# Patient Record
Sex: FEMALE | Race: WHITE | NOT HISPANIC OR LATINO | Employment: OTHER | URBAN - METROPOLITAN AREA
[De-identification: names, ages, dates, MRNs, and addresses within clinical notes are randomized per-mention and may not be internally consistent; named-entity substitution may affect disease eponyms.]

---

## 2017-05-10 RX ORDER — LISINOPRIL 40 MG/1
40 TABLET ORAL DAILY
Qty: 90 TABLET | Refills: 3 | Status: SHIPPED | OUTPATIENT
Start: 2017-05-10 | End: 2017-10-30 | Stop reason: SDUPTHER

## 2017-07-10 ENCOUNTER — OFFICE VISIT (OUTPATIENT)
Dept: OPHTHALMOLOGY | Facility: CLINIC | Age: 67
End: 2017-07-10
Payer: COMMERCIAL

## 2017-07-10 DIAGNOSIS — I10 ESSENTIAL HYPERTENSION: Primary | ICD-10-CM

## 2017-07-10 DIAGNOSIS — H52.4 BILATERAL PRESBYOPIA: ICD-10-CM

## 2017-07-10 DIAGNOSIS — H52.13 MYOPIA, BILATERAL: ICD-10-CM

## 2017-07-10 PROCEDURE — 99999 PR PBB SHADOW E&M-EST. PATIENT-LVL I: CPT | Mod: PBBFAC,,, | Performed by: OPTOMETRIST

## 2017-07-10 PROCEDURE — 92004 COMPRE OPH EXAM NEW PT 1/>: CPT | Mod: S$GLB,,, | Performed by: OPTOMETRIST

## 2017-07-10 PROCEDURE — 92015 DETERMINE REFRACTIVE STATE: CPT | Mod: S$GLB,,, | Performed by: OPTOMETRIST

## 2017-07-10 NOTE — PROGRESS NOTES
HPI     White part of Left eye swells sometimes once a month. Dry eyes.  Last eye   exam 08/09/2013 TRF. Update glasses RX. No problem with left eye today.   Update glasses RX.    Last edited by Amarjit Gutierrez, OD on 7/10/2017  9:42 AM. (History)            Assessment /Plan     For exam results, see Encounter Report.    Essential hypertension    Nuclear cataract, bilateral    Myopia, bilateral    Bilateral presbyopia      No HTN Retinopathy    Mild cataracts OU, not surgical.    Dispense Final Rx for glasses.  RTC 1 year

## 2017-10-17 ENCOUNTER — LAB VISIT (OUTPATIENT)
Dept: LAB | Facility: HOSPITAL | Age: 67
End: 2017-10-17
Attending: FAMILY MEDICINE
Payer: MEDICARE

## 2017-10-17 DIAGNOSIS — I10 ESSENTIAL HYPERTENSION: ICD-10-CM

## 2017-10-17 DIAGNOSIS — E03.9 HYPOTHYROIDISM, UNSPECIFIED TYPE: ICD-10-CM

## 2017-10-17 DIAGNOSIS — Z11.59 NEED FOR HEPATITIS C SCREENING TEST: ICD-10-CM

## 2017-10-17 DIAGNOSIS — E78.2 MIXED HYPERLIPIDEMIA: ICD-10-CM

## 2017-10-17 DIAGNOSIS — E88.810 DYSMETABOLIC SYNDROME X: ICD-10-CM

## 2017-10-17 DIAGNOSIS — Z00.00 ROUTINE GENERAL MEDICAL EXAMINATION AT A HEALTH CARE FACILITY: ICD-10-CM

## 2017-10-17 DIAGNOSIS — Z98.84 STATUS POST BARIATRIC SURGERY: ICD-10-CM

## 2017-10-17 LAB
ALBUMIN SERPL BCP-MCNC: 3.7 G/DL
ALP SERPL-CCNC: 77 U/L
ALT SERPL W/O P-5'-P-CCNC: 13 U/L
ANION GAP SERPL CALC-SCNC: 11 MMOL/L
AST SERPL-CCNC: 23 U/L
BASOPHILS # BLD AUTO: 0.07 K/UL
BASOPHILS NFR BLD: 1 %
BILIRUB SERPL-MCNC: 0.7 MG/DL
BUN SERPL-MCNC: 16 MG/DL
CALCIUM SERPL-MCNC: 9.8 MG/DL
CHLORIDE SERPL-SCNC: 103 MMOL/L
CHOLEST SERPL-MCNC: 181 MG/DL
CHOLEST/HDLC SERPL: 2.3 {RATIO}
CO2 SERPL-SCNC: 23 MMOL/L
CREAT SERPL-MCNC: 0.9 MG/DL
DIFFERENTIAL METHOD: NORMAL
EOSINOPHIL # BLD AUTO: 0.2 K/UL
EOSINOPHIL NFR BLD: 2.2 %
ERYTHROCYTE [DISTWIDTH] IN BLOOD BY AUTOMATED COUNT: 12.9 %
EST. GFR  (AFRICAN AMERICAN): >60 ML/MIN/1.73 M^2
EST. GFR  (NON AFRICAN AMERICAN): >60 ML/MIN/1.73 M^2
GLUCOSE SERPL-MCNC: 73 MG/DL
HCT VFR BLD AUTO: 38.5 %
HDLC SERPL-MCNC: 80 MG/DL
HDLC SERPL: 44.2 %
HGB BLD-MCNC: 12.5 G/DL
IMM GRANULOCYTES # BLD AUTO: 0.01 K/UL
IMM GRANULOCYTES NFR BLD AUTO: 0.1 %
IRON SERPL-MCNC: 100 UG/DL
LDLC SERPL CALC-MCNC: 93.8 MG/DL
LYMPHOCYTES # BLD AUTO: 2 K/UL
LYMPHOCYTES NFR BLD: 27.5 %
MCH RBC QN AUTO: 29.6 PG
MCHC RBC AUTO-ENTMCNC: 32.5 G/DL
MCV RBC AUTO: 91 FL
MONOCYTES # BLD AUTO: 0.5 K/UL
MONOCYTES NFR BLD: 6.6 %
NEUTROPHILS # BLD AUTO: 4.5 K/UL
NEUTROPHILS NFR BLD: 62.6 %
NONHDLC SERPL-MCNC: 101 MG/DL
NRBC BLD-RTO: 0 /100 WBC
PLATELET # BLD AUTO: 289 K/UL
PMV BLD AUTO: 10.5 FL
POTASSIUM SERPL-SCNC: 3.9 MMOL/L
PROT SERPL-MCNC: 7.4 G/DL
RBC # BLD AUTO: 4.23 M/UL
SATURATED IRON: 23 %
SODIUM SERPL-SCNC: 137 MMOL/L
T4 FREE SERPL-MCNC: 1.32 NG/DL
TOTAL IRON BINDING CAPACITY: 428 UG/DL
TRANSFERRIN SERPL-MCNC: 289 MG/DL
TRIGL SERPL-MCNC: 36 MG/DL
TSH SERPL DL<=0.005 MIU/L-ACNC: 1.33 UIU/ML
WBC # BLD AUTO: 7.17 K/UL

## 2017-10-17 PROCEDURE — 84443 ASSAY THYROID STIM HORMONE: CPT

## 2017-10-17 PROCEDURE — 84439 ASSAY OF FREE THYROXINE: CPT

## 2017-10-17 PROCEDURE — 80061 LIPID PANEL: CPT

## 2017-10-17 PROCEDURE — 36415 COLL VENOUS BLD VENIPUNCTURE: CPT | Mod: PO

## 2017-10-17 PROCEDURE — 85025 COMPLETE CBC W/AUTO DIFF WBC: CPT

## 2017-10-17 PROCEDURE — 83540 ASSAY OF IRON: CPT

## 2017-10-17 PROCEDURE — 83036 HEMOGLOBIN GLYCOSYLATED A1C: CPT

## 2017-10-17 PROCEDURE — 80053 COMPREHEN METABOLIC PANEL: CPT

## 2017-10-17 PROCEDURE — 86803 HEPATITIS C AB TEST: CPT

## 2017-10-17 PROCEDURE — 82607 VITAMIN B-12: CPT

## 2017-10-18 ENCOUNTER — PATIENT OUTREACH (OUTPATIENT)
Dept: ADMINISTRATIVE | Facility: HOSPITAL | Age: 67
End: 2017-10-18

## 2017-10-18 LAB
ESTIMATED AVG GLUCOSE: 103 MG/DL
HBA1C MFR BLD HPLC: 5.2 %
HCV AB SERPL QL IA: NEGATIVE
VIT B12 SERPL-MCNC: 825 PG/ML

## 2017-10-30 ENCOUNTER — OFFICE VISIT (OUTPATIENT)
Dept: INTERNAL MEDICINE | Facility: CLINIC | Age: 67
End: 2017-10-30
Payer: MEDICARE

## 2017-10-30 VITALS
SYSTOLIC BLOOD PRESSURE: 170 MMHG | BODY MASS INDEX: 30.04 KG/M2 | HEIGHT: 63 IN | DIASTOLIC BLOOD PRESSURE: 94 MMHG | WEIGHT: 169.56 LBS | TEMPERATURE: 98 F | HEART RATE: 68 BPM

## 2017-10-30 DIAGNOSIS — Z00.00 MEDICARE ANNUAL WELLNESS VISIT, INITIAL: Primary | ICD-10-CM

## 2017-10-30 DIAGNOSIS — E03.9 ACQUIRED HYPOTHYROIDISM: ICD-10-CM

## 2017-10-30 DIAGNOSIS — E78.2 MIXED HYPERLIPIDEMIA: ICD-10-CM

## 2017-10-30 DIAGNOSIS — F41.1 ANXIETY STATE: ICD-10-CM

## 2017-10-30 DIAGNOSIS — I10 ESSENTIAL HYPERTENSION: ICD-10-CM

## 2017-10-30 DIAGNOSIS — N95.2 POST-MENOPAUSE ATROPHIC VAGINITIS: ICD-10-CM

## 2017-10-30 DIAGNOSIS — N95.9 MENOPAUSAL DISORDER: ICD-10-CM

## 2017-10-30 PROCEDURE — G0438 PPPS, INITIAL VISIT: HCPCS | Mod: S$GLB,,, | Performed by: FAMILY MEDICINE

## 2017-10-30 PROCEDURE — G0008 ADMIN INFLUENZA VIRUS VAC: HCPCS | Mod: S$GLB,,, | Performed by: FAMILY MEDICINE

## 2017-10-30 PROCEDURE — 90670 PCV13 VACCINE IM: CPT | Mod: S$GLB,,, | Performed by: FAMILY MEDICINE

## 2017-10-30 PROCEDURE — G0009 ADMIN PNEUMOCOCCAL VACCINE: HCPCS | Mod: S$GLB,,, | Performed by: FAMILY MEDICINE

## 2017-10-30 PROCEDURE — 99999 PR PBB SHADOW E&M-EST. PATIENT-LVL III: CPT | Mod: PBBFAC,,, | Performed by: FAMILY MEDICINE

## 2017-10-30 PROCEDURE — 90662 IIV NO PRSV INCREASED AG IM: CPT | Mod: S$GLB,,, | Performed by: FAMILY MEDICINE

## 2017-10-30 RX ORDER — LOVASTATIN 40 MG/1
TABLET ORAL
Qty: 90 TABLET | Refills: 3 | Status: SHIPPED | OUTPATIENT
Start: 2017-10-30 | End: 2018-03-05 | Stop reason: SDUPTHER

## 2017-10-30 RX ORDER — LISINOPRIL 40 MG/1
40 TABLET ORAL DAILY
Qty: 90 TABLET | Refills: 3 | Status: SHIPPED | OUTPATIENT
Start: 2017-10-30 | End: 2017-11-27

## 2017-10-30 RX ORDER — ALPRAZOLAM 1 MG/1
1 TABLET ORAL 2 TIMES DAILY PRN
Qty: 180 TABLET | Refills: 1 | Status: SHIPPED | OUTPATIENT
Start: 2017-10-30 | End: 2018-03-05 | Stop reason: SDUPTHER

## 2017-10-30 RX ORDER — LEVOTHYROXINE SODIUM 112 UG/1
TABLET ORAL
Qty: 90 TABLET | Refills: 3 | Status: SHIPPED | OUTPATIENT
Start: 2017-10-30 | End: 2018-02-02 | Stop reason: SDUPTHER

## 2017-10-30 RX ORDER — VENLAFAXINE 37.5 MG/1
37.5 TABLET ORAL 2 TIMES DAILY
Qty: 60 TABLET | Refills: 11 | Status: SHIPPED | OUTPATIENT
Start: 2017-10-30 | End: 2017-12-15

## 2017-10-30 RX ORDER — HYDRALAZINE HYDROCHLORIDE 10 MG/1
10 TABLET, FILM COATED ORAL EVERY 12 HOURS
Qty: 60 TABLET | Refills: 11 | Status: SHIPPED | OUTPATIENT
Start: 2017-10-30 | End: 2017-11-20 | Stop reason: SDUPTHER

## 2017-10-30 NOTE — PROGRESS NOTES
Subjective:      Patient ID: Cleo Ford is a 67 y.o. female.    Chief Complaint: Annual Exam    She is here today for followup of multiple issues and prevention exam.  Since I last saw her she is doing well but having issues dealing with menopausal hot flashes and moodiness.  Her  had back surgery earlier this year.  She did injure her back some trying to pick him up.  She feels like she needs something to help with her mood and irritability.  She has never taken anything other than his Xanax.  Willing to do the Effexor.   Still doing well with her gastric bypass. Iron is stable, on multivitamins.     Her thyroid is currently controlled.  No problems with medication labs reviewed today.  Her blood pressure is elevated.  She is a little agitated currently with her  and her menopausal symptoms.  Also her insurance is making her change from her Procardia to an alternative.  In the past however when she was on amlodipine she had a good bit of leg swelling.  Also on lisinopril 40.    Hyperlipidemia is currently controlled with low-dose statin.  HDL is much improved.  Tolerating medications well.  However risk score still greater than 7%.    The patient sees Dr. church for her mammograms Pap smear and DEXA scans in the past however now with Medicare she is having to find a new provider.  She's had a hysterectomy.  Having some vaginal dryness but not interested in doing topical estrogen.  Willing to have me order her mammograms and bone density still.  No current indication for Pap smear.  Has history of osteopenia.  Wants to still do these at [facility] in April.  ARA- is well controlled with as needed use of Xanax however lately having more agitation and anhedonia lately.    The 10-year ASCVD risk score (Renuka ZAYNAB Jr., et al., 2013) is: 10.5%    Values used to calculate the score:      Age: 67 years      Sex: Female      Is Non- : No      Diabetic: No       Tobacco smoker: No      Systolic Blood Pressure: 170 mmHg      Is BP treated: Yes      HDL Cholesterol: 80 mg/dL      Total Cholesterol: 181 mg/dL    Medicare wellness assessment:    Depression screening  1. In the past 2 weeks she has the patient felt down, depressed or hopeless? yes  2. Over the past 2 weeks as the patient felt little interest or pleasure in doing things?  yes   Functional Ability/ Safety Screening  1. Was the  Patient's timed up and go test unsteady or longer than 30 seconds?  No   2. Has the patient needed help with transportation shopping preparing meals housework laundry medications are managing money?  No  3.  If the patient's home have rugs in the hallway, back grab bars in the bathroom, or poor lighting? No, not necessary per patient.   4.has there been any hearing difficulties?  No            Lab Results   Component Value Date    WBC 7.17 10/17/2017    HGB 12.5 10/17/2017    HCT 38.5 10/17/2017     10/17/2017    CHOL 181 10/17/2017    TRIG 36 10/17/2017    HDL 80 (H) 10/17/2017    ALT 13 10/17/2017    AST 23 10/17/2017     10/17/2017    K 3.9 10/17/2017     10/17/2017    CREATININE 0.9 10/17/2017    BUN 16 10/17/2017    CO2 23 10/17/2017    TSH 1.331 10/17/2017    HGBA1C 5.2 10/17/2017       Review of Systems   Constitutional: Negative for chills, fatigue and fever.   HENT: Negative for ear pain and trouble swallowing.    Eyes: Negative for pain and visual disturbance.   Respiratory: Negative for cough and shortness of breath.    Cardiovascular: Negative for chest pain and leg swelling.   Gastrointestinal: Negative for abdominal pain, blood in stool, nausea and vomiting.   Endocrine: Negative for cold intolerance and heat intolerance.        Hot flashes   Genitourinary: Negative for dysuria and frequency.        Vaginal dryness   Musculoskeletal: Positive for back pain. Negative for joint swelling, myalgias and neck pain.   Skin: Negative for color change and rash.    Neurological: Negative for dizziness, weakness and headaches.   Psychiatric/Behavioral: Positive for dysphoric mood. Negative for behavioral problems, decreased concentration and sleep disturbance. The patient is not nervous/anxious.      Objective:     Physical Exam   Constitutional: She is oriented to person, place, and time. She appears well-developed and well-nourished.   HENT:   Head: Normocephalic and atraumatic.   Right Ear: External ear normal.   Left Ear: External ear normal.   Mouth/Throat: Oropharynx is clear and moist.   Eyes: EOM are normal.   Neck: Normal range of motion. Neck supple. No thyromegaly present.   Cardiovascular: Normal rate and regular rhythm.  Exam reveals no gallop and no friction rub.    No murmur heard.  Pulmonary/Chest: Effort normal. No respiratory distress. She has no wheezes. She has no rales.   Abdominal: Soft. Bowel sounds are normal. She exhibits no distension. There is no tenderness. There is no rebound.   Musculoskeletal: Normal range of motion. She exhibits no edema.        Lumbar back: She exhibits tenderness and spasm. She exhibits normal range of motion, no bony tenderness and no pain.        Back:    Lymphadenopathy:     She has no cervical adenopathy.   Neurological: She is alert and oriented to person, place, and time.   Skin: Skin is warm and dry. No rash noted.   Psychiatric: Her speech is normal and behavior is normal. Judgment and thought content normal. Her affect is blunt.   Vitals reviewed.    Assessment:     1. Medicare annual wellness visit, initial    2. Menopausal disorder    3. Post-menopause atrophic vaginitis    4. Essential hypertension    5. Acquired hypothyroidism    6. Mixed hyperlipidemia    7. Anxiety state      Plan:   Cleo was seen today for annual exam.    Diagnoses and all orders for this visit:    Medicare annual wellness visit, initial-patient now on Medicare.  Wanting to do her annual wellness visit.  See above.  Prevnar and flu shot  today.  Mammogram and bone density due in April.  Previously done at [facility].    Menopausal disorder-starting Effexor 37.5 mg dosing twice a day    Post-menopause atrophic vaginitis-declines wanting to do topical estrogen at this time    Essential hypertension-not controlled and due to tear changing on her insurance switching from Procardia to hydralazine.  Failed amlodipine in the past.  Needing nurse visit in 3-4 weeks for blood pressure recheck    Acquired hypothyroidism-stable labs reviewed continue with medications    Mixed hyperlipidemia-stable and improved with medications continue risk greater than 7% needing statin therapy    Anxiety state-stable with medications refilled  -     ALPRAZolam (XANAX) 1 MG tablet; Take 1 tablet (1 mg total) by mouth 2 (two) times daily as needed for Anxiety.    Other orders  -     venlafaxine (EFFEXOR) 37.5 MG Tab; Take 1 tablet (37.5 mg total) by mouth 2 (two) times daily.  -     levothyroxine (SYNTHROID) 112 MCG tablet; Take 1 tablet by mouth  before breakfast  -     lovastatin (MEVACOR) 40 MG tablet; Take 1 tablet by mouth  nightly  -     lisinopril (PRINIVIL,ZESTRIL) 40 MG tablet; Take 1 tablet (40 mg total) by mouth once daily.  -     hydrALAZINE (APRESOLINE) 10 MG tablet; Take 1 tablet (10 mg total) by mouth every 12 (twelve) hours.  -     (In Office Administered) Pneumococcal Conjugate Vaccine (13 Valent) (IM)            Return in about 3 weeks (around 11/20/2017), or BP recheck nurse visit, 1 yr f/u me physical.

## 2017-10-31 ENCOUNTER — PATIENT MESSAGE (OUTPATIENT)
Dept: INTERNAL MEDICINE | Facility: CLINIC | Age: 67
End: 2017-10-31

## 2017-11-20 ENCOUNTER — OFFICE VISIT (OUTPATIENT)
Dept: INTERNAL MEDICINE | Facility: CLINIC | Age: 67
End: 2017-11-20
Payer: MEDICARE

## 2017-11-20 ENCOUNTER — PATIENT MESSAGE (OUTPATIENT)
Dept: ADMINISTRATIVE | Facility: OTHER | Age: 67
End: 2017-11-20

## 2017-11-20 ENCOUNTER — CLINICAL SUPPORT (OUTPATIENT)
Dept: INTERNAL MEDICINE | Facility: CLINIC | Age: 67
End: 2017-11-20
Payer: MEDICARE

## 2017-11-20 VITALS — SYSTOLIC BLOOD PRESSURE: 160 MMHG | DIASTOLIC BLOOD PRESSURE: 100 MMHG

## 2017-11-20 VITALS — DIASTOLIC BLOOD PRESSURE: 100 MMHG | SYSTOLIC BLOOD PRESSURE: 160 MMHG

## 2017-11-20 DIAGNOSIS — I10 ESSENTIAL HYPERTENSION: Primary | ICD-10-CM

## 2017-11-20 DIAGNOSIS — N95.1 HOT FLASHES DUE TO MENOPAUSE: ICD-10-CM

## 2017-11-20 DIAGNOSIS — F39 MOOD DISORDER: ICD-10-CM

## 2017-11-20 PROCEDURE — 99999 PR PBB SHADOW E&M-EST. PATIENT-LVL II: CPT | Mod: PBBFAC,,,

## 2017-11-20 PROCEDURE — 99999 PR PBB SHADOW E&M-EST. PATIENT-LVL II: CPT | Mod: PBBFAC,,, | Performed by: FAMILY MEDICINE

## 2017-11-20 PROCEDURE — 99213 OFFICE O/P EST LOW 20 MIN: CPT | Mod: S$GLB,,, | Performed by: FAMILY MEDICINE

## 2017-11-20 RX ORDER — HYDRALAZINE HYDROCHLORIDE 10 MG/1
20 TABLET, FILM COATED ORAL EVERY 12 HOURS
Qty: 120 TABLET | Refills: 11 | Status: SHIPPED | OUTPATIENT
Start: 2017-11-20 | End: 2018-03-19

## 2017-11-20 NOTE — PROGRESS NOTES
Subjective:      Patient ID: Cleo Ford is a 67 y.o. female.    Chief Complaint: Hypertension    Patient initially was on the schedule with the nurses to have a blood pressure recheck.  At her last visit he was elevated.  She's now doing hydralazine 10 mg twice daily as well as still doing lisinopril 40 mg.  She reports that she eats a good diet.  She's had bariatric procedure and does well with sodium intake.  She is very active as a .  She does have a history of anxiety and stress but none recently.  She does not have a blood pressure cuff at home and does not monitor it at home.  No headaches and no chest pain at this time.  She's willing to adjust her medicines further as well as get enrolled with the digital hypertension program.    We also recently started Effexor not only to help with mood but also hot flashes.  The first initial week she was nauseated with the medication however this is improved now.  No further issues with medication.        Lab Results   Component Value Date    WBC 7.17 10/17/2017    HGB 12.5 10/17/2017    HCT 38.5 10/17/2017     10/17/2017    CHOL 181 10/17/2017    TRIG 36 10/17/2017    HDL 80 (H) 10/17/2017    ALT 13 10/17/2017    AST 23 10/17/2017     10/17/2017    K 3.9 10/17/2017     10/17/2017    CREATININE 0.9 10/17/2017    BUN 16 10/17/2017    CO2 23 10/17/2017    TSH 1.331 10/17/2017    HGBA1C 5.2 10/17/2017       Review of Systems   Eyes: Negative for pain and visual disturbance.   Respiratory: Negative for cough and shortness of breath.    Cardiovascular: Negative for chest pain and palpitations.   Gastrointestinal: Positive for nausea. Negative for constipation and diarrhea.   Neurological: Negative for dizziness, light-headedness and headaches.     Objective:     Physical Exam   Constitutional: She appears well-developed and well-nourished.   HENT:   Head: Normocephalic and atraumatic.   Cardiovascular: Normal rate and regular  rhythm.    Pulmonary/Chest: Effort normal and breath sounds normal.   Psychiatric: She has a normal mood and affect. Her speech is normal and behavior is normal.   Vitals reviewed.    Assessment:     1. Essential hypertension    2. Hot flashes due to menopause      Plan:   Cleo was seen today for hypertension.    Diagnoses and all orders for this visit:    Essential hypertension-not controlled enrolled in the digital hypertension program.  Increase hydralazine to 20 mg twice a day.  Continue with lisinopril 40 mg.    Hot flashes due to menopause-improved with the Effexor continue with medication.            No Follow-up on file.

## 2017-11-22 ENCOUNTER — PATIENT MESSAGE (OUTPATIENT)
Dept: ADMINISTRATIVE | Facility: OTHER | Age: 67
End: 2017-11-22

## 2017-11-27 ENCOUNTER — PATIENT MESSAGE (OUTPATIENT)
Dept: INTERNAL MEDICINE | Facility: CLINIC | Age: 67
End: 2017-11-27

## 2017-11-27 RX ORDER — OLMESARTAN MEDOXOMIL AND HYDROCHLOROTHIAZIDE 40/25 40; 25 MG/1; MG/1
1 TABLET ORAL DAILY
Qty: 90 TABLET | Refills: 3 | Status: SHIPPED | OUTPATIENT
Start: 2017-11-27 | End: 2017-11-28 | Stop reason: SDUPTHER

## 2017-11-27 NOTE — TELEPHONE ENCOUNTER
Yes I don't like those readings. Let's change up the meds. Change to generic benicar hct. Stop the lisinopril. This has diuretic in it also. Continue with the hydralazine 20mg bid for now as well. If still high after then next 1-2 weeks, we will need to stop the effexor.

## 2017-11-28 ENCOUNTER — PATIENT MESSAGE (OUTPATIENT)
Dept: INTERNAL MEDICINE | Facility: CLINIC | Age: 67
End: 2017-11-28

## 2017-11-28 RX ORDER — OLMESARTAN MEDOXOMIL AND HYDROCHLOROTHIAZIDE 40/25 40; 25 MG/1; MG/1
1 TABLET ORAL DAILY
Qty: 30 TABLET | Refills: 0 | Status: SHIPPED | OUTPATIENT
Start: 2017-11-28 | End: 2017-12-04

## 2017-11-29 ENCOUNTER — PATIENT OUTREACH (OUTPATIENT)
Dept: OTHER | Facility: OTHER | Age: 67
End: 2017-11-29

## 2017-11-29 NOTE — LETTER
Nafisa Tomas, PharmD  [address] [place] [address]     Dear Cleo Ford,    Welcome to the [facility]!       My name is Nafisa Tomas PharmD and I am your dedicated Digital Medicine clinician.  As an expert in medication management, I will help ensure that the medications you are taking continue to provide you with the intended benefits.      I am Teodora Fernandes and I will be your health  for the duration of the program.  My  job is to help you identify lifestyle changes to improve your blood pressure control.  We will talk about nutrition, exercise, and other ways that you may be able to adjust your current habits to better your health. Together, we will work to improve your overall health and encourage you to meet your goals for a healthier lifestyle.    What we expect from YOU:    You will need to take blood pressure readings multiple times a week and no less than one reading per week.   It is important that you take your measurements at different times during the day, when possible.     What you should expect from your Digital Medicine Care Team:   We will provide you with education about high blood pressure, including lifestyle changes that could help you to control your blood pressure.   We will review your weekly readings and provide you with monthly blood pressure progress reports after you have been in the program for more than 30 days.   We will send monthly progress reports on your blood pressure control to your physician so they can follow along with your progress as well.    You will be able to reach me by phone at [phone] or through your [facility] account by clicking my name under Care Team on the right side of the home screen.    I look forward to working with you to achieve your blood pressure goals!    Sincerely,    Nafisa Tomas PharmD  Your personal clinician    Please visit  [email or web address] to learn more about high blood pressure and what you can do lower your blood pressure.                                                                                           Cleo Ford  [address] [place] [id number]

## 2017-11-29 NOTE — PROGRESS NOTES
Last 5 Patient Entered Readings Current 30 Day Average: 185/104     Recent Readings 11/29/2017 11/28/2017 11/28/2017 11/28/2017 11/28/2017    Systolic BP (mmHg) 171 193 201 159 198    Diastolic BP (mmHg) 98 101 125 97 110    Pulse 91 71 76 99 93        Hypertension Digital Medicine Program (HDMP): Health  Introduction    Introduced Mrs. Cleo Ford to HDMP. Discussed health  role and recommended lifestyle modifications.    Diet (i.e. Low sodium, food labels): Reports that she generally follows a low sodium diet, using Abdoulaye Prudhommes No Salt seasoning on most foods and avoiding adding any salt.  States she eats mostly fresh fruits and vegetables, avoids caffeine and soft drinks, and that she has been able to maintain weight loss progress after bariatric surgery by following their dietary guidelines.  Reports her diet as being a strength of hers, however she did note that she is often on the go due to being a , and that this can sometimes be a barrier.  Will want to review with patient goal of <2000mg/day of sodium at next call and discuss in greater depth daily routine.     Exercise: Doesn't exercise due to busy schedule per patient.  States she would like to be more active, but has not been successful thus far. Notes she prefers to focus on nutrition.     Alcohol/Tobacco: Did not review.     Medication Adherence: has been compliant with the medicaiton regimen.  Reports full compliance with medications; this was first morning taking benicar hct.  Expresses concern regarding elevated readings and did report taking supplements at home.  States she would like to review these with Pharm D Genoveva.  When asked what she felt was contributing to elevated readings, she noted stress and genetics as major factors. Reviewed blood pressure technique, and patient has been taking readings while holding her arm in the air rather than resting it at heart level.  Verbalizes understanding of proper  technique and agrees to begin resting arm at heart level from here on.     Other goals: Patient states her main goal is to be able to manage blood pressure successfully and to continue with progress from bariatric surgery.  Patient was very polite and states she is looking forward to the program.     Reviewed AHA recommendations:  Limit sodium intake to <2000mg/day  Perform 150 minutes of physical activity per week    Patient is aware of the importance of taking daily BP readings at various times of the day  Patient aware that the health  can be used as a resource for lifestyle modifications to help reduce or maintain a healthy BP  Patient is aware of the importance of medication adherence.  [facility] is aware that [facility] is not available for emergencies. Patient should call 911 or [facility] on [facility] if one arises.

## 2017-12-01 ENCOUNTER — PATIENT OUTREACH (OUTPATIENT)
Dept: OTHER | Facility: OTHER | Age: 67
End: 2017-12-01

## 2017-12-01 NOTE — PROGRESS NOTES
Last 5 Patient Entered Readings Current 30 Day Average: 189/103     Recent Readings 12/1/2017 11/30/2017 11/29/2017 11/29/2017 11/29/2017    Systolic BP (mmHg) 204 198 178 190 171    Diastolic BP (mmHg) 104 111 85 93 98    Pulse 91 89 79 79 91          Patient's BP average is not controlled. Reviewed 2017 ACC/AHA HTN guidelines with patient and explained new BP goal is <130/80.     Patient denies s/s of hypertension (SOB, CP, severe headaches, changes in vision) associated with high readings. Instructed patient to go to the ED if BP > 180/110 and accompanied by hypertensive s/s, patient confirms understanding. Patient denies caffeine and soft drinks. She uses a salt substitute and does not consume processed foods. She has a stressful job as a . Patient underwent a gastric sleeve five years ago.    Current HTN regimen:  Hypertension Medications             hydrALAZINE (APRESOLINE) 10 MG tablet Take 2 tablets (20 mg total) by mouth every 12 (twelve) hours.    olmesartan-hydrochlorothiazide (BENICAR HCT) 40-25 mg per tablet Take 1 tablet by mouth once daily.       Patient started Benicar HCT 11/29 by her PCP. If her readings remain elevated on 12/4 I will recommend a change to chlorthalidone 12.5mg daily and valsartan 160mg daily. Her potassium was within normal limits in October.     Will continue to monitor regularly. Will follow up within the next week.    Patient has my contact information and knows to call with any concerns or clinical changes.

## 2017-12-04 ENCOUNTER — PATIENT MESSAGE (OUTPATIENT)
Dept: OTHER | Facility: OTHER | Age: 67
End: 2017-12-04

## 2017-12-04 ENCOUNTER — PATIENT OUTREACH (OUTPATIENT)
Dept: OTHER | Facility: OTHER | Age: 67
End: 2017-12-04

## 2017-12-04 DIAGNOSIS — I10 ESSENTIAL HYPERTENSION: Primary | ICD-10-CM

## 2017-12-04 RX ORDER — CHLORTHALIDONE 25 MG/1
TABLET ORAL
Qty: 30 TABLET | Refills: 0 | Status: SHIPPED | OUTPATIENT
Start: 2017-12-04 | End: 2017-12-12 | Stop reason: SDUPTHER

## 2017-12-04 RX ORDER — VALSARTAN 160 MG/1
160 TABLET ORAL DAILY
Qty: 30 TABLET | Refills: 0 | Status: SHIPPED | OUTPATIENT
Start: 2017-12-04 | End: 2017-12-13 | Stop reason: SDUPTHER

## 2017-12-04 RX ORDER — AMLODIPINE BESYLATE 5 MG/1
2.5 TABLET ORAL DAILY
Qty: 30 TABLET | Refills: 0 | Status: SHIPPED | OUTPATIENT
Start: 2017-12-04 | End: 2017-12-13 | Stop reason: SDUPTHER

## 2017-12-04 NOTE — PROGRESS NOTES
Last 5 Patient Entered Readings Current 30 Day Average: 188/102     Recent Readings 12/3/2017 12/2/2017 12/2/2017 12/1/2017 12/1/2017    Systolic BP (mmHg) 188 185 176 197 204    Diastolic BP (mmHg) 97 94 96 104 104    Pulse 70 73 93 76 91        Patient denies s/s of hypertension (SOB, CP, severe headaches, changes in vision, dizziness or nosebleeds) associated with high readings. Instructed patient to go to the ED if BP > 180/110 and accompanied by hypertensive s/s, patient confirms understanding. Patient is currently taking Benicar HCT and took a dose this morning. She will stop this medication today and begin chlorthalidone 12.5mg daily, Diovan 160mg daily and amlodipine 2.5mg daily. She has an allergy to amlodipine on her allergy list however it is leg edema which is a side effect not allergy. She stated it took place when they were in [place] after a day of walking around. She was also a hundred pounds heavier. These new medications were sent to her local pharmacy. If her BP responds nicely with these medications I will send a 90 day supply to her mail order pharmacy. She will have labs in 7-10 days after starting these medications.    Current HTN regimen:  Hypertension Medications             amLODIPine (NORVASC) 5 MG tablet Take 0.5 tablets (2.5 mg total) by mouth once daily.    chlorthalidone (HYGROTEN) 25 MG Tab Take 0.5 tablets (12.5mg) daily    hydrALAZINE (APRESOLINE) 10 MG tablet Take 2 tablets (20 mg total) by mouth every 12 (twelve) hours.    valsartan (DIOVAN) 160 MG tablet Take 1 tablet (160 mg total) by mouth once daily.          Follow up on labs and BP readings. Labs scheduled 12/14.

## 2017-12-08 NOTE — PROGRESS NOTES
Last 5 Patient Entered Readings Current 30 Day Average: 183/100     Recent Readings 12/8/2017 12/7/2017 12/5/2017 12/4/2017 12/3/2017    Systolic BP (mmHg) 171 173 169 154 188    Diastolic BP (mmHg) 98 91 97 96 97    Pulse 110 98 89 95 70        Her blood pressure has started to increase therefore I am adjusting her chlorthalidone from 12.5mg to 25mg. Patient advised through [facility]. She will begin the change 12/9. Labs will remain as scheduled 11/14.

## 2017-12-12 ENCOUNTER — PATIENT OUTREACH (OUTPATIENT)
Dept: OTHER | Facility: OTHER | Age: 67
End: 2017-12-12

## 2017-12-12 DIAGNOSIS — I10 ESSENTIAL HYPERTENSION: Primary | ICD-10-CM

## 2017-12-12 RX ORDER — CHLORTHALIDONE 25 MG/1
25 TABLET ORAL DAILY
Qty: 30 TABLET | Refills: 0
Start: 2017-12-12 | End: 2017-12-15

## 2017-12-12 NOTE — PROGRESS NOTES
Last 5 Patient Entered Readings Current 30 Day Average: 183/101     Recent Readings 12/12/2017 12/11/2017 12/10/2017 12/9/2017 12/8/2017    Systolic BP (mmHg) 184 184 188 188 171    Diastolic BP (mmHg) 106 101 108 104 98    Pulse 88 88 87 96 110          Patient's BP average is not controlled. Reviewed 2017 ACC/AHA HTN guidelines with patient and explained BP goal is <130/80.     Patient denies s/s of hypertension (SOB, CP, severe headaches, changes in vision) associated with high readings. Instructed patient to go to the ED if BP > 180/110 and accompanied by hypertensive s/s, patient confirms understanding.    Will continue to monitor regularly. Will follow up in 1 weeks, sooner if BP begins to trend upward or downward.    Patient has my contact information and knows to call with any concerns or clinical changes.     Current HTN regimen:  Hypertension Medications             amLODIPine (NORVASC) 5 MG tablet Take 0.5 tablets (2.5 mg total) by mouth once daily.    chlorthalidone (HYGROTEN) 25 MG Tab Take 1 tablet (25 mg total) by mouth once daily. Take 0.5 tablets (12.5mg) daily    hydrALAZINE (APRESOLINE) 10 MG tablet Take 2 tablets (20 mg total) by mouth every 12 (twelve) hours.    valsartan (DIOVAN) 160 MG tablet Take 1 tablet (160 mg total) by mouth once daily.        Patient denies any leg swelling since starting amlodipine. I am increasing amlodipine to 5mg daily and valsartan to 320mg daily taking 160mg AM and 160mg PM starting 12/13. She has labs planned tomorrow to assess any changes since starting chlorthalidone. Verbal and written instructions through [facility] were given including hypotension side effects.

## 2017-12-13 RX ORDER — VALSARTAN 160 MG/1
160 TABLET ORAL 2 TIMES DAILY
Qty: 30 TABLET | Refills: 0
Start: 2017-12-13 | End: 2017-12-19 | Stop reason: SDUPTHER

## 2017-12-13 RX ORDER — AMLODIPINE BESYLATE 5 MG/1
5 TABLET ORAL DAILY
Qty: 30 TABLET | Refills: 0
Start: 2017-12-13 | End: 2017-12-29 | Stop reason: SDUPTHER

## 2017-12-14 ENCOUNTER — LAB VISIT (OUTPATIENT)
Dept: LAB | Facility: HOSPITAL | Age: 67
End: 2017-12-14
Attending: FAMILY MEDICINE
Payer: MEDICARE

## 2017-12-14 ENCOUNTER — PATIENT MESSAGE (OUTPATIENT)
Dept: INTERNAL MEDICINE | Facility: CLINIC | Age: 67
End: 2017-12-14

## 2017-12-14 DIAGNOSIS — I10 ESSENTIAL HYPERTENSION: ICD-10-CM

## 2017-12-14 LAB
ANION GAP SERPL CALC-SCNC: 7 MMOL/L
BUN SERPL-MCNC: 18 MG/DL
CALCIUM SERPL-MCNC: 10.8 MG/DL
CHLORIDE SERPL-SCNC: 94 MMOL/L
CO2 SERPL-SCNC: 30 MMOL/L
CREAT SERPL-MCNC: 0.9 MG/DL
EST. GFR  (AFRICAN AMERICAN): >60 ML/MIN/1.73 M^2
EST. GFR  (NON AFRICAN AMERICAN): >60 ML/MIN/1.73 M^2
GLUCOSE SERPL-MCNC: 86 MG/DL
POTASSIUM SERPL-SCNC: 4.1 MMOL/L
SODIUM SERPL-SCNC: 131 MMOL/L

## 2017-12-14 PROCEDURE — 80048 BASIC METABOLIC PNL TOTAL CA: CPT

## 2017-12-14 PROCEDURE — 36415 COLL VENOUS BLD VENIPUNCTURE: CPT | Mod: PO

## 2017-12-14 RX ORDER — METOPROLOL TARTRATE 50 MG/1
50 TABLET ORAL 2 TIMES DAILY
Qty: 60 TABLET | Refills: 0 | Status: SHIPPED | OUTPATIENT
Start: 2017-12-14 | End: 2018-01-12 | Stop reason: SDUPTHER

## 2017-12-14 NOTE — PROGRESS NOTES
Last 5 Patient Entered Readings Current 30 Day Average: 183/102       Units 12/14/2017 12/14/2017 12/13/2017 12/12/2017 12/11/2017    Time -  9:04 AM  9:03 AM 10:53 AM  9:57 AM  9:27 AM    Systolic Blood Pressure - 195 208 166 184 184    Diastolic Blood Pressure - 108 116 99 106 101    Pulse bpm 78 81 113 88 88        Patient is taking swiss jasper herbal laxative however I do not see any interference with BP.  Patient called regarding her BP and concern over a stroke. I am adding metoprolol 50mg BID. Labs from today are still in process since starting chlorthalidone.

## 2017-12-15 ENCOUNTER — PATIENT MESSAGE (OUTPATIENT)
Dept: INTERNAL MEDICINE | Facility: CLINIC | Age: 67
End: 2017-12-15

## 2017-12-15 ENCOUNTER — PATIENT MESSAGE (OUTPATIENT)
Dept: OTHER | Facility: OTHER | Age: 67
End: 2017-12-15

## 2017-12-15 DIAGNOSIS — I10 HYPERTENSION, UNSPECIFIED TYPE: Primary | ICD-10-CM

## 2017-12-15 RX ORDER — CITALOPRAM 20 MG/1
20 TABLET, FILM COATED ORAL DAILY
Qty: 30 TABLET | Refills: 11 | Status: SHIPPED | OUTPATIENT
Start: 2017-12-15 | End: 2018-01-03 | Stop reason: SDUPTHER

## 2017-12-15 NOTE — TELEPHONE ENCOUNTER
I sent in citalopram. Stop effexor. Stop chlorthalidone for now. Repeat labs in 1 week. Pharm D is aware and will continue to monitor BP.

## 2017-12-15 NOTE — PROGRESS NOTES
Last 5 Patient Entered Readings Current 30 Day Average: 183/101       Units 12/15/2017 12/14/2017 12/14/2017 12/13/2017 12/12/2017    Time -  9:34 AM  9:04 AM  9:03 AM 10:53 AM  9:57 AM    Systolic Blood Pressure - 185 195 208 166 184    Diastolic Blood Pressure - 93 108 116 99 106    Pulse bpm 64 78 81 113 88        Patient began chlorthalidone and her sodium intake dropped. She will stop chlorthalidone per Dr. Julian. I sent Rut a [facility] message confirming these changes.

## 2017-12-15 NOTE — TELEPHONE ENCOUNTER
Called pt, she verbalized understanding. She will stop the cholorthalidone. She stated that she does find the effexor is really helping her and she is feeling better since getting on it. She would be okay to stop it if you could give her something in its place?      Pt asked since she was driving when I called to send info to her [facility]. Message sent.

## 2017-12-15 NOTE — TELEPHONE ENCOUNTER
Pt needs to stop he cholorthalidone due to the low sodium and elevated potassium levels.     She is on the HTN digital program.     I would see if she is willing to stop the effexor because her bp really has been uncontrolled since starting this medication. Let's see if it improves after stopping that medication.

## 2017-12-18 NOTE — PROGRESS NOTES
Last 5 Patient Entered Readings Current 30 Day Average: 180/99       Units 12/18/2017 12/18/2017 12/17/2017 12/17/2017 12/15/2017    Time - 10:11 AM  9:54 AM 10:17 PM  8:20 PM  9:34 AM    Systolic Blood Pressure - 133 138 158 185 185    Diastolic Blood Pressure - 77 91 79 107 93    Pulse bpm 75 73 88 76 64        Patient sent me a [facility] message this morning relating to side effects. She is experiencing flatulence, headaches, confusion and nightmares. I feel this is most likely related to withdrawals from stopping Effexor 12/15. I did offer to adjust metoprolol to carvedilol to help reduce her nightmares but recommended waiting to make any adjustments at this time since she just stopped Effexor. She will have labs 12/21 due to her abnormal sodium 12/14.

## 2017-12-19 ENCOUNTER — PATIENT MESSAGE (OUTPATIENT)
Dept: OTHER | Facility: OTHER | Age: 67
End: 2017-12-19

## 2017-12-19 DIAGNOSIS — I10 ESSENTIAL HYPERTENSION: Primary | ICD-10-CM

## 2017-12-19 RX ORDER — VALSARTAN 160 MG/1
160 TABLET ORAL 2 TIMES DAILY
Qty: 180 TABLET | Refills: 0 | OUTPATIENT
Start: 2017-12-19 | End: 2018-01-30 | Stop reason: SDUPTHER

## 2017-12-20 ENCOUNTER — PATIENT MESSAGE (OUTPATIENT)
Dept: OTHER | Facility: OTHER | Age: 67
End: 2017-12-20

## 2017-12-20 NOTE — PROGRESS NOTES
Last 5 Patient Entered Readings Current 30 Day Average: 180/99       Units 12/19/2017 12/18/2017 12/18/2017 12/18/2017 12/17/2017    Time - 10:06 AM  7:19 PM 10:11 AM  9:54 AM 10:17 PM    Systolic Blood Pressure - 154 155 133 138 158    Diastolic Blood Pressure - 83 78 77 91 79    Pulse bpm 55 63 75 73 88          Patient stated she had another nightmare 12/18. She does not want to stop metoprolol and start carvedilol. Her BP is on a downward trend and she wants to continue the regimen due to the reduction in BP. I will follow-up in one week.

## 2017-12-22 ENCOUNTER — LAB VISIT (OUTPATIENT)
Dept: LAB | Facility: HOSPITAL | Age: 67
End: 2017-12-22
Attending: FAMILY MEDICINE
Payer: MEDICARE

## 2017-12-22 DIAGNOSIS — I10 ESSENTIAL HYPERTENSION: ICD-10-CM

## 2017-12-22 LAB
ANION GAP SERPL CALC-SCNC: 8 MMOL/L
BUN SERPL-MCNC: 16 MG/DL
CALCIUM SERPL-MCNC: 9.5 MG/DL
CHLORIDE SERPL-SCNC: 99 MMOL/L
CO2 SERPL-SCNC: 26 MMOL/L
CREAT SERPL-MCNC: 1 MG/DL
EST. GFR  (AFRICAN AMERICAN): >60 ML/MIN/1.73 M^2
EST. GFR  (NON AFRICAN AMERICAN): 59.3 ML/MIN/1.73 M^2
GLUCOSE SERPL-MCNC: 144 MG/DL
POTASSIUM SERPL-SCNC: 4 MMOL/L
SODIUM SERPL-SCNC: 133 MMOL/L

## 2017-12-22 PROCEDURE — 36415 COLL VENOUS BLD VENIPUNCTURE: CPT | Mod: PO

## 2017-12-22 PROCEDURE — 80048 BASIC METABOLIC PNL TOTAL CA: CPT

## 2017-12-29 ENCOUNTER — PATIENT OUTREACH (OUTPATIENT)
Dept: OTHER | Facility: OTHER | Age: 67
End: 2017-12-29

## 2017-12-29 DIAGNOSIS — I10 ESSENTIAL HYPERTENSION: ICD-10-CM

## 2017-12-29 RX ORDER — AMLODIPINE BESYLATE 10 MG/1
10 TABLET ORAL DAILY
Qty: 30 TABLET | Refills: 0 | Status: SHIPPED | OUTPATIENT
Start: 2017-12-29 | End: 2018-01-15 | Stop reason: SDUPTHER

## 2017-12-29 NOTE — PROGRESS NOTES
Last 5 Patient Entered Readings Current 30 Day Average: 175/95     Recent Readings 12/28/2017 12/27/2017 12/27/2017 12/26/2017 12/21/2017    SBP (mmHg) 175 177 184 184 157    DBP (mmHg) 92 91 96 95 81    Pulse 59 65 59 70 62        Patient's BP average is above goal of <130/80.     Patient denies s/s of hypotension (lightheadedness, dizziness, nausea, fatigue) associated with low readings. Instructed patient to inform me if this occurs, patient confirms understanding.      Patient denies s/s of hypertension (SOB, CP, severe headaches, changes in vision) associated with high readings. Instructed patient to go to the ED if BP > 180/110 and accompanied by hypertensive s/s, patient confirms understanding.    Will continue to monitor regularly. Will follow up in 2-3 weeks, sooner if BP begins to trend upward or downward.    Patient has my contact information and knows to call with any concerns or clinical changes.     Current HTN regimen:  Hypertension Medications             amLODIPine (NORVASC) 5 MG tablet Take 1 tablet (5 mg total) by mouth once daily.    hydrALAZINE (APRESOLINE) 10 MG tablet Take 2 tablets (20 mg total) by mouth every 12 (twelve) hours.    metoprolol tartrate (LOPRESSOR) 50 MG tablet Take 1 tablet (50 mg total) by mouth 2 (two) times daily.    valsartan (DIOVAN) 160 MG tablet Take 1 tablet (160 mg total) by mouth 2 (two) times daily.        Patient denies any swelling with amlodipine. She ate a high salt diet over Tamie causing her elevated BP readings 12/26-12/27. She will begin an increase in amlodipine dose from 5mg daily to 10mg daily. She will inform me if swelling occurs and I will follow-up in two weeks. Her sodium has improved but not back with normal limits. I will reassess labs in one month.

## 2018-01-03 ENCOUNTER — PATIENT OUTREACH (OUTPATIENT)
Dept: OTHER | Facility: OTHER | Age: 68
End: 2018-01-03

## 2018-01-03 ENCOUNTER — PATIENT MESSAGE (OUTPATIENT)
Dept: INTERNAL MEDICINE | Facility: CLINIC | Age: 68
End: 2018-01-03

## 2018-01-03 RX ORDER — CITALOPRAM 20 MG/1
30 TABLET, FILM COATED ORAL DAILY
Qty: 45 TABLET | Refills: 11 | Status: SHIPPED | OUTPATIENT
Start: 2018-01-03 | End: 2018-01-16

## 2018-01-03 NOTE — PROGRESS NOTES
"Last 5 Patient Entered Readings Current 30 Day Average: 171/93     Recent Readings 1/2/2018 1/2/2018 12/30/2017 12/29/2017 12/29/2017    SBP (mmHg) 180 144 168 156 178    DBP (mmHg) 91 88 87 85 90    Pulse 72 67 58 61 61          Hypertension Digital Medicine Program (HDMP): Health  Follow Up    Lifestyle Modifications:    1.Low sodium diet: no States she has not been adhering to low sodium diet with holidays but is currently working on "getting back on track". States she has been successful with this in the past and feels confident her ability to return to her routine.  Encouraged to keep up the good work and reinforced importance of low sodium diet.     2.Physical activity: no Discussed with patient benefits of physical activity on overall wellbeing in addition to heart health.  States she is feeling much better with recent anti-depressant, but that she feels she may want to increase the dose.  Inquired about ways to continue improving her mental health from a lifestyle perspective; reinforced benefits of exercise.     3.Hypotension/Hypertension symptoms: no Denies any s/sx of HTN at this time.  Encouraged to follow up with Dr. Julian to discuss antidepressants should she wish to make a change.     4.Patient has been compliant with the medication regimen.     Follow up with . Cleo Ford completed. No further questions or concerns. I will follow up in a few weeks to assess progress.       "

## 2018-01-04 ENCOUNTER — PATIENT MESSAGE (OUTPATIENT)
Dept: INTERNAL MEDICINE | Facility: CLINIC | Age: 68
End: 2018-01-04

## 2018-01-12 ENCOUNTER — PATIENT OUTREACH (OUTPATIENT)
Dept: OTHER | Facility: OTHER | Age: 68
End: 2018-01-12

## 2018-01-12 ENCOUNTER — PATIENT MESSAGE (OUTPATIENT)
Dept: OTHER | Facility: OTHER | Age: 68
End: 2018-01-12

## 2018-01-12 DIAGNOSIS — I10 ESSENTIAL HYPERTENSION: Primary | ICD-10-CM

## 2018-01-12 RX ORDER — METOPROLOL TARTRATE 50 MG/1
50 TABLET ORAL 2 TIMES DAILY
Qty: 180 TABLET | Refills: 3 | OUTPATIENT
Start: 2018-01-12 | End: 2018-01-15 | Stop reason: SDUPTHER

## 2018-01-12 NOTE — PROGRESS NOTES
Last 5 Patient Entered Readings                                      Current 30 Day Average: 160/85     Recent Readings 1/11/2018 1/11/2018 1/9/2018 1/7/2018 1/4/2018    SBP (mmHg) 156 164 120 122 140    DBP (mmHg) 83 85 61 76 79    Pulse 59 60 54 59 65        Patient's BP average is above goal of <130/80.     Patient denies s/s of hypotension (lightheadedness, dizziness, nausea, fatigue) associated with low readings. Instructed patient to inform me if this occurs, patient confirms understanding.      Patient denies s/s of hypertension (SOB, CP, severe headaches, changes in vision) associated with high readings. Instructed patient to go to the ED if BP > 180/110 and accompanied by hypertensive s/s, patient confirms understanding.    Will continue to monitor regularly. Will follow up in 2-3 weeks, sooner if BP begins to trend upward or downward.    Patient has my contact information and knows to call with any concerns or clinical changes.     Current HTN regimen:  Hypertension Medications             amLODIPine (NORVASC) 10 MG tablet Take 1 tablet (10 mg total) by mouth once daily.    hydrALAZINE (APRESOLINE) 10 MG tablet Take 2 tablets (20 mg total) by mouth every 12 (twelve) hours.    metoprolol tartrate (LOPRESSOR) 50 MG tablet Take 1 tablet (50 mg total) by mouth 2 (two) times daily.    valsartan (DIOVAN) 160 MG tablet Take 1 tablet (160 mg total) by mouth 2 (two) times daily.        Blood pressure improving but remains above goal. I am maintaining her current regimen and giving it time to show it's full affects. She will remain on these medications for another two weeks and if no changes occur, I will discuss increasing her medications. She needed a refill on metoprolol which will be sent over today. Labs are due 1/18. I have put an order in for a BMP.

## 2018-01-15 ENCOUNTER — PATIENT OUTREACH (OUTPATIENT)
Dept: OTHER | Facility: OTHER | Age: 68
End: 2018-01-15

## 2018-01-15 DIAGNOSIS — I10 ESSENTIAL HYPERTENSION: ICD-10-CM

## 2018-01-15 RX ORDER — AMLODIPINE BESYLATE 10 MG/1
10 TABLET ORAL DAILY
Qty: 90 TABLET | Refills: 3 | OUTPATIENT
Start: 2018-01-15 | End: 2018-01-30 | Stop reason: SDUPTHER

## 2018-01-15 RX ORDER — METOPROLOL TARTRATE 50 MG/1
50 TABLET ORAL 2 TIMES DAILY
Qty: 180 TABLET | Refills: 3 | OUTPATIENT
Start: 2018-01-15 | End: 2018-01-19 | Stop reason: SDUPTHER

## 2018-01-15 NOTE — PROGRESS NOTES
Last 5 Patient Entered Readings                                      Current 30 Day Average: 156/83     Recent Readings 1/12/2018 1/12/2018 1/11/2018 1/11/2018 1/9/2018    SBP (mmHg) 163 161 156 164 120    DBP (mmHg) 86 97 83 85 61    Pulse 59 60 59 60 54        Patient's BP average is above goal of <130/80.     Patient denies s/s of hypotension (lightheadedness, dizziness, nausea, fatigue) associated with low readings. Instructed patient to inform me if this occurs, patient confirms understanding.      Patient denies s/s of hypertension (SOB, CP, severe headaches, changes in vision) associated with high readings. Instructed patient to go to the ED if BP > 180/110 and accompanied by hypertensive s/s, patient confirms understanding.    Will continue to monitor regularly. Will follow up in 2-3 weeks, sooner if BP begins to trend upward or downward.    Patient has my contact information and knows to call with any concerns or clinical changes.     Current HTN regimen:  Hypertension Medications             amLODIPine (NORVASC) 10 MG tablet Take 1 tablet (10 mg total) by mouth once daily.    hydrALAZINE (APRESOLINE) 10 MG tablet Take 2 tablets (20 mg total) by mouth every 12 (twelve) hours.    metoprolol tartrate (LOPRESSOR) 50 MG tablet Take 1 tablet (50 mg total) by mouth 2 (two) times daily.    valsartan (DIOVAN) 160 MG tablet Take 1 tablet (160 mg total) by mouth 2 (two) times daily.        Patient had a gastric sleeve procedure 5 years ago and has gained 9 pounds since the holidays. She is thinking about food all the time and feels this is related to Celexa. She will begin a tapered down dose of Celexa over this week and will then stop. I messaged Dr. Julian informing her of these plans and need for another antidepressant.     Patient would like to come off Celexa before deciding about medication changes for HTN. Her BP remains above goal and I will add on a Maxzide if her BP remains above goal.

## 2018-01-16 ENCOUNTER — TELEPHONE (OUTPATIENT)
Dept: INTERNAL MEDICINE | Facility: CLINIC | Age: 68
End: 2018-01-16

## 2018-01-16 RX ORDER — FLUOXETINE HYDROCHLORIDE 20 MG/1
20 CAPSULE ORAL DAILY
Qty: 30 CAPSULE | Refills: 11 | Status: SHIPPED | OUTPATIENT
Start: 2018-01-16 | End: 2018-02-23 | Stop reason: SDUPTHER

## 2018-01-16 RX ORDER — FLUOXETINE HYDROCHLORIDE 20 MG/1
20 CAPSULE ORAL DAILY
Qty: 30 CAPSULE | Refills: 11 | Status: SHIPPED | OUTPATIENT
Start: 2018-01-16 | End: 2018-01-16 | Stop reason: SDUPTHER

## 2018-01-16 NOTE — TELEPHONE ENCOUNTER
----- Message from Nafisa Tomas, PharmD sent at 1/15/2018  4:29 PM CST -----  Good Afternoon Dr. Julian,    Ms. Ford would like to stop Celexa due to a 9 pound weight gain. I am tapering her dose over a week before completely stopping. She did state it worked well and she was feels great with it however with her weight gain she would prefer to try another medication. She asked that I reach out to you to inform you of this change and need for another antidepressant.     If there's anything I can do to help please let me know.    Thank you!    Nafisa Tomas

## 2018-01-16 NOTE — TELEPHONE ENCOUNTER
OK I 'll send in prozac instead. Still will help with mood and works on serotonin. Will just have to monitor if has weight gain with it. Please inform. Can send her a message through the patient portal. She responds that way.

## 2018-01-19 DIAGNOSIS — I10 ESSENTIAL HYPERTENSION: ICD-10-CM

## 2018-01-19 RX ORDER — METOPROLOL TARTRATE 50 MG/1
50 TABLET ORAL 2 TIMES DAILY
Qty: 180 TABLET | Refills: 3 | Status: SHIPPED | OUTPATIENT
Start: 2018-01-19 | End: 2018-01-22 | Stop reason: SDUPTHER

## 2018-01-19 NOTE — TELEPHONE ENCOUNTER
----- Message from Alayna Woods sent at 1/19/2018 11:58 AM CST -----  Contact: Thierry/[facility]   States she's requesting a refill on metoprolol. Please call Thierry at [phone]. Thank you

## 2018-01-22 ENCOUNTER — PATIENT OUTREACH (OUTPATIENT)
Dept: OTHER | Facility: OTHER | Age: 68
End: 2018-01-22

## 2018-01-22 ENCOUNTER — PATIENT MESSAGE (OUTPATIENT)
Dept: OTHER | Facility: OTHER | Age: 68
End: 2018-01-22

## 2018-01-22 DIAGNOSIS — I10 ESSENTIAL HYPERTENSION: Primary | ICD-10-CM

## 2018-01-22 RX ORDER — TRIAMTERENE/HYDROCHLOROTHIAZID 37.5-25 MG
1 TABLET ORAL DAILY
Qty: 30 TABLET | Refills: 0 | Status: SHIPPED | OUTPATIENT
Start: 2018-01-22 | End: 2018-01-30 | Stop reason: SDUPTHER

## 2018-01-22 RX ORDER — METOPROLOL TARTRATE 50 MG/1
50 TABLET ORAL 2 TIMES DAILY
Qty: 180 TABLET | Refills: 3 | Status: SHIPPED | OUTPATIENT
Start: 2018-01-22 | End: 2018-02-06 | Stop reason: DRUGHIGH

## 2018-01-22 NOTE — PROGRESS NOTES
Last 5 Patient Entered Readings                                      Current 30 Day Average: 155/83     Recent Readings 1/19/2018 1/18/2018 1/16/2018 1/16/2018 1/15/2018    SBP (mmHg) 156 146 152 160 129    DBP (mmHg) 107 80 76 79 49    Pulse 103 103 65 66 63        Patient's BP average is above goal of <130/80.     Patient denies s/s of hypotension (lightheadedness, dizziness, nausea, fatigue) associated with low readings. Instructed patient to inform me if this occurs, patient confirms understanding.      Patient denies s/s of hypertension (SOB, CP, severe headaches, changes in vision) associated with high readings. Instructed patient to go to the ED if BP > 180/110 and accompanied by hypertensive s/s, patient confirms understanding.    Will continue to monitor regularly. Will follow up in 2-3 weeks, sooner if BP begins to trend upward or downward.    Patient has my contact information and knows to call with any concerns or clinical changes.     Current HTN regimen:  Hypertension Medications             amLODIPine (NORVASC) 10 MG tablet Take 1 tablet (10 mg total) by mouth once daily.    hydrALAZINE (APRESOLINE) 10 MG tablet Take 2 tablets (20 mg total) by mouth every 12 (twelve) hours.    metoprolol tartrate (LOPRESSOR) 50 MG tablet Take 1 tablet (50 mg total) by mouth 2 (two) times daily.    valsartan (DIOVAN) 160 MG tablet Take 1 tablet (160 mg total) by mouth 2 (two) times daily.

## 2018-01-22 NOTE — PROGRESS NOTES
Last 5 Patient Entered Readings                                      Current 30 Day Average: 155/83     Recent Readings 1/22/2018 1/22/2018 1/19/2018 1/18/2018 1/16/2018    SBP (mmHg) 165 182 156 146 152    DBP (mmHg) 89 94 107 80 76    Pulse 72 73 103 103 65        Patient's BP average is above goal of <130/80.     Patient denies s/s of hypotension (lightheadedness, dizziness, nausea, fatigue) associated with low readings. Instructed patient to inform me if this occurs, patient confirms understanding.      Patient denies s/s of hypertension (SOB, CP, severe headaches, changes in vision) associated with high readings. Instructed patient to go to the ED if BP > 180/110 and accompanied by hypertensive s/s, patient confirms understanding.    Will continue to monitor regularly. Will follow up in 2-3 weeks, sooner if BP begins to trend upward or downward.    Patient has my contact information and knows to call with any concerns or clinical changes.     Current HTN regimen:  Hypertension Medications             amLODIPine (NORVASC) 10 MG tablet Take 1 tablet (10 mg total) by mouth once daily.    hydrALAZINE (APRESOLINE) 10 MG tablet Take 2 tablets (20 mg total) by mouth every 12 (twelve) hours.    metoprolol tartrate (LOPRESSOR) 50 MG tablet Take 1 tablet (50 mg total) by mouth 2 (two) times daily.    triamterene-hydrochlorothiazide 37.5-25 mg (MAXZIDE-25) 37.5-25 mg per tablet Take 1 tablet by mouth once daily.    valsartan (DIOVAN) 160 MG tablet Take 1 tablet (160 mg total) by mouth 2 (two) times daily.        Patient sent me a [facility] message stating swelling in her ankles 1/19. She stated it was for one day and has now resolved. She has continued amlodipine despite this. Her BP has improved but not at goal. I am starting triamterene-HCTZ 37.5-25mg once daily. She stated the metoprolol prescription never reached the pharmacy on 1/19 therefore I sent over another prescription today.     I am changing her lab date from  today to 2/5 to see how she looks with triamterene-hydrochlorothiazide.

## 2018-01-30 ENCOUNTER — PATIENT MESSAGE (OUTPATIENT)
Dept: CARDIOLOGY | Facility: CLINIC | Age: 68
End: 2018-01-30

## 2018-01-30 DIAGNOSIS — I10 ESSENTIAL HYPERTENSION: Primary | ICD-10-CM

## 2018-01-30 RX ORDER — TRIAMTERENE/HYDROCHLOROTHIAZID 37.5-25 MG
1 TABLET ORAL DAILY
Qty: 90 TABLET | Refills: 3 | Status: SHIPPED | OUTPATIENT
Start: 2018-01-30 | End: 2018-02-02 | Stop reason: SDUPTHER

## 2018-01-30 RX ORDER — AMLODIPINE BESYLATE 10 MG/1
10 TABLET ORAL DAILY
Qty: 90 TABLET | Refills: 3 | Status: SHIPPED | OUTPATIENT
Start: 2018-01-30 | End: 2018-05-23 | Stop reason: DRUGHIGH

## 2018-01-30 RX ORDER — VALSARTAN 160 MG/1
160 TABLET ORAL 2 TIMES DAILY
Qty: 180 TABLET | Refills: 3 | Status: SHIPPED | OUTPATIENT
Start: 2018-01-30 | End: 2018-02-27 | Stop reason: SDUPTHER

## 2018-01-31 ENCOUNTER — PATIENT OUTREACH (OUTPATIENT)
Dept: OTHER | Facility: OTHER | Age: 68
End: 2018-01-31

## 2018-01-31 NOTE — PROGRESS NOTES
"Last 5 Patient Entered Readings                                      Current 30 Day Average: 145/79     Recent Readings 1/30/2018 1/28/2018 1/26/2018 1/24/2018 1/23/2018    SBP (mmHg) 129 134 147 137 137    DBP (mmHg) 68 72 82 74 84    Pulse 58 60 57 57 59        Hypertension Digital Medicine Program (HDMP): Health  Follow Up    Lifestyle Modifications:    1.Low sodium diet: yes Reports monitoring of sodium and using Abdoulaye Bolanos's No Salt seasoning; states she enjoys this and uses it often.  Patient also reports increasing fruit and vegetable intake and is working on reducing portions to assist with weight loss.  States she "feels great" and is pleased with recent medication changes; feels this combined with dietary changes have been helpful in managing blood pressure.     2.Physical activity: no Per patient, "I still sit on the couch all day" but does note she has improved sleep quality and is pleased with energy levels. Reports she works from home often and does get up and move a little bit, but has not been integrating exercise per say. Will review at next follow up.     3.Hypotension/Hypertension symptoms: no Denies any s/sx of hyper/hypotension and states she's "feeling great".  Denies any recent ankle swelling and tremors; states both of these have stopped with the exception of some swelling when she is on her feet for long periods.     4.Patient has been compliant with the medication regimen.     Follow up with Mrs. Cleo GUTIERREZ Logan completed. No further questions or concerns. I will follow up in a few weeks to assess progress.         "

## 2018-02-02 ENCOUNTER — PATIENT MESSAGE (OUTPATIENT)
Dept: INTERNAL MEDICINE | Facility: CLINIC | Age: 68
End: 2018-02-02

## 2018-02-02 DIAGNOSIS — I10 ESSENTIAL HYPERTENSION: ICD-10-CM

## 2018-02-02 RX ORDER — TRIAMTERENE/HYDROCHLOROTHIAZID 37.5-25 MG
1 TABLET ORAL DAILY
Qty: 90 TABLET | Refills: 3 | Status: SHIPPED | OUTPATIENT
Start: 2018-02-02 | End: 2018-02-19 | Stop reason: SDUPTHER

## 2018-02-02 RX ORDER — LEVOTHYROXINE SODIUM 112 UG/1
TABLET ORAL
Qty: 90 TABLET | Refills: 3 | Status: SHIPPED | OUTPATIENT
Start: 2018-02-02 | End: 2018-11-29

## 2018-02-05 ENCOUNTER — LAB VISIT (OUTPATIENT)
Dept: LAB | Facility: HOSPITAL | Age: 68
End: 2018-02-05
Attending: FAMILY MEDICINE
Payer: MEDICARE

## 2018-02-05 DIAGNOSIS — I10 ESSENTIAL HYPERTENSION: ICD-10-CM

## 2018-02-05 LAB
ANION GAP SERPL CALC-SCNC: 7 MMOL/L
BUN SERPL-MCNC: 29 MG/DL
CALCIUM SERPL-MCNC: 10.3 MG/DL
CHLORIDE SERPL-SCNC: 103 MMOL/L
CO2 SERPL-SCNC: 26 MMOL/L
CREAT SERPL-MCNC: 1.4 MG/DL
EST. GFR  (AFRICAN AMERICAN): 45.5 ML/MIN/1.73 M^2
EST. GFR  (NON AFRICAN AMERICAN): 39.5 ML/MIN/1.73 M^2
GLUCOSE SERPL-MCNC: 72 MG/DL
POTASSIUM SERPL-SCNC: 4.4 MMOL/L
SODIUM SERPL-SCNC: 136 MMOL/L

## 2018-02-05 PROCEDURE — 80048 BASIC METABOLIC PNL TOTAL CA: CPT

## 2018-02-05 PROCEDURE — 36415 COLL VENOUS BLD VENIPUNCTURE: CPT | Mod: PO

## 2018-02-06 ENCOUNTER — PATIENT OUTREACH (OUTPATIENT)
Dept: OTHER | Facility: OTHER | Age: 68
End: 2018-02-06

## 2018-02-06 ENCOUNTER — PATIENT MESSAGE (OUTPATIENT)
Dept: CARDIOLOGY | Facility: CLINIC | Age: 68
End: 2018-02-06

## 2018-02-06 DIAGNOSIS — I10 ESSENTIAL HYPERTENSION: Primary | ICD-10-CM

## 2018-02-06 RX ORDER — CARVEDILOL 12.5 MG/1
12.5 TABLET ORAL 2 TIMES DAILY WITH MEALS
Qty: 60 TABLET | Refills: 3 | Status: SHIPPED | OUTPATIENT
Start: 2018-02-06 | End: 2018-04-10

## 2018-02-06 NOTE — PROGRESS NOTES
Last 5 Patient Entered Readings                                      Current 30 Day Average: 145/79     Recent Readings 2/5/2018 2/2/2018 1/31/2018 1/31/2018 1/30/2018    SBP (mmHg) 173 159 142 161 129    DBP (mmHg) 88 86 83 85 68    Pulse 59 56 55 55 58        Patient's BP average is above goal of <130/80.     Patient denies s/s of hypotension (lightheadedness, dizziness, nausea, fatigue) associated with low readings. Instructed patient to inform me if this occurs, patient confirms understanding.      Patient denies s/s of hypertension (SOB, CP, severe headaches, changes in vision) associated with high readings. Instructed patient to go to the ED if BP > 180/110 and accompanied by hypertensive s/s, patient confirms understanding.    Will continue to monitor regularly. Will follow up in 2-3 weeks, sooner if BP begins to trend upward or downward.    Patient has my contact information and knows to call with any concerns or clinical changes.     Current HTN regimen:  Hypertension Medications             amLODIPine (NORVASC) 10 MG tablet Take 1 tablet (10 mg total) by mouth once daily.    hydrALAZINE (APRESOLINE) 10 MG tablet Take 2 tablets (20 mg total) by mouth every 12 (twelve) hours.    metoprolol tartrate (LOPRESSOR) 50 MG tablet Take 1 tablet (50 mg total) by mouth 2 (two) times daily.    triamterene-hydrochlorothiazide 37.5-25 mg (MAXZIDE-25) 37.5-25 mg per tablet Take 1 tablet by mouth once daily.    valsartan (DIOVAN) 160 MG tablet Take 1 tablet (160 mg total) by mouth 2 (two) times daily.        Patient's labs returned with a decline in kidney function. I sent a message to Dr. Julian to recommend looking into secondary causes for her HTN. She is classified as resistant hypertension since she is on three different classes of medications including a diuretic. I can increase hydralazine today. I will await a response from Dr. Julian before contacting the patient to inform her of the results and plan.    Per Dr. Julian  ""Sure, I'm happy to put in a renal consult if needed, but she has been on ace inhibitor for quite some time without issues. To me the biggest issues were she finally decided to get treated for her depression/anxiety and she had gastric sleeve. I don't doubt she was dehydrated with lab draw, but I'm happy to work up the issue as well. Just let me know if you need me to put it the referral.   Elizabeth Love     I am changing metoprolol to carvedilol before making a renal consult. If this does not obtain her BP I will suggest a renal consult. She will begin carvedilol 12.5mg BID 2/6.    "

## 2018-02-17 ENCOUNTER — PATIENT MESSAGE (OUTPATIENT)
Dept: CARDIOLOGY | Facility: CLINIC | Age: 68
End: 2018-02-17

## 2018-02-19 ENCOUNTER — TELEPHONE (OUTPATIENT)
Dept: INTERNAL MEDICINE | Facility: CLINIC | Age: 68
End: 2018-02-19

## 2018-02-19 DIAGNOSIS — I10 ESSENTIAL HYPERTENSION: ICD-10-CM

## 2018-02-19 RX ORDER — TRIAMTERENE/HYDROCHLOROTHIAZID 37.5-25 MG
1 TABLET ORAL DAILY
Qty: 90 TABLET | Refills: 3 | Status: CANCELLED | OUTPATIENT
Start: 2018-02-19 | End: 2019-02-19

## 2018-02-19 RX ORDER — TRIAMTERENE/HYDROCHLOROTHIAZID 37.5-25 MG
1 TABLET ORAL DAILY
Qty: 90 TABLET | Refills: 3 | Status: ON HOLD | OUTPATIENT
Start: 2018-02-19 | End: 2018-03-17 | Stop reason: HOSPADM

## 2018-02-19 RX ORDER — TRIAMTERENE/HYDROCHLOROTHIAZID 37.5-25 MG
1 TABLET ORAL DAILY
Qty: 90 TABLET | Refills: 3 | Status: SHIPPED | OUTPATIENT
Start: 2018-02-19 | End: 2018-02-19 | Stop reason: SDUPTHER

## 2018-02-21 ENCOUNTER — PATIENT OUTREACH (OUTPATIENT)
Dept: OTHER | Facility: OTHER | Age: 68
End: 2018-02-21

## 2018-02-21 NOTE — PROGRESS NOTES
"Last 5 Patient Entered Readings                                      Current 30 Day Average: 138/76     Recent Readings 2/17/2018 2/15/2018 2/12/2018 2/12/2018 2/11/2018    SBP (mmHg) 144 146 125 125 86    DBP (mmHg) 80 79 67 76 55    Pulse 70 73 67 69 64          Hypertension Digital Medicine Program (HDMP): Health  Follow Up    Lifestyle Modifications:    1.Low sodium diet: yes Reports continued reading of food labels and adherence to low sodium diet. Notes she has also continued avoiding fast food or fried foods even when she is "on the go". States she often chooses salads while dining out and that she is looking forward to getting a little more serious now that Jose Holder is over, and has set a goal for herself to lose 7 lbs.     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  Reports this has been going well for her and she intends to continue. States her sleep quality has also improved greatly and she is extremely pleased with this.     3.Hypotension/Hypertension symptoms: no     4.Patient has been compliant with the medication regimen.     Follow up with Mrs. Cleo Ford completed. No further questions or concerns. I will follow up in a few weeks to assess progress. Continue to build on successes. Patient states her goal is to continue with current nutrition and integrating movement.             "

## 2018-02-23 ENCOUNTER — PATIENT MESSAGE (OUTPATIENT)
Dept: CARDIOLOGY | Facility: CLINIC | Age: 68
End: 2018-02-23

## 2018-02-23 RX ORDER — FLUOXETINE HYDROCHLORIDE 20 MG/1
20 CAPSULE ORAL DAILY
Qty: 90 CAPSULE | Refills: 3 | Status: SHIPPED | OUTPATIENT
Start: 2018-02-23 | End: 2018-03-19

## 2018-02-27 ENCOUNTER — PATIENT OUTREACH (OUTPATIENT)
Dept: OTHER | Facility: OTHER | Age: 68
End: 2018-02-27

## 2018-02-27 DIAGNOSIS — I10 ESSENTIAL HYPERTENSION: ICD-10-CM

## 2018-02-27 RX ORDER — VALSARTAN 160 MG/1
320 TABLET ORAL NIGHTLY
Qty: 180 TABLET | Refills: 3
Start: 2018-02-27 | End: 2018-07-17

## 2018-02-27 NOTE — PROGRESS NOTES
Last 5 Patient Entered Readings                                      Current 30 Day Average: 137/73     Recent Readings 2/27/2018 2/26/2018 2/17/2018 2/15/2018 2/12/2018    SBP (mmHg) 151 145 144 146 125    DBP (mmHg) 73 59 80 79 67    Pulse 66 69 70 73 67        Patient's BP average is above goal of <130/80.     Patient denies s/s of hypotension (lightheadedness, dizziness, nausea, fatigue) associated with low readings. Instructed patient to inform me if this occurs, patient confirms understanding.      Patient denies s/s of hypertension (SOB, CP, severe headaches, changes in vision) associated with high readings. Instructed patient to go to the ED if BP > 180/110 and accompanied by hypertensive s/s, patient confirms understanding.    Will continue to monitor regularly. Will follow up in 2-3 weeks, sooner if BP begins to trend upward or downward.    Patient has my contact information and knows to call with any concerns or clinical changes.     Current HTN regimen:  Hypertension Medications             amLODIPine (NORVASC) 10 MG tablet Take 1 tablet (10 mg total) by mouth once daily.    carvedilol (COREG) 12.5 MG tablet Take 1 tablet (12.5 mg total) by mouth 2 (two) times daily with meals.    hydrALAZINE (APRESOLINE) 10 MG tablet Take 2 tablets (20 mg total) by mouth every 12 (twelve) hours.    triamterene-hydrochlorothiazide 37.5-25 mg (MAXZIDE-25) 37.5-25 mg per tablet Take 1 tablet by mouth once daily.    valsartan (DIOVAN) 160 MG tablet Take 1 tablet (160 mg total) by mouth 2 (two) times daily.        Patient sent me a [employer] message stating she is experiencing fatigue. I am adjusting valsartan from 160mg twice daily to 320mg nightly. She mentioned weight gain and doesn't feel it's related to her diet. I messaged Dr. Julian since she is quite concerned since this is her first weight gain since her gastric procedure.

## 2018-03-01 ENCOUNTER — PATIENT MESSAGE (OUTPATIENT)
Dept: INTERNAL MEDICINE | Facility: CLINIC | Age: 68
End: 2018-03-01

## 2018-03-01 DIAGNOSIS — F41.1 ANXIETY STATE: ICD-10-CM

## 2018-03-05 RX ORDER — LOVASTATIN 40 MG/1
TABLET ORAL
Qty: 90 TABLET | Refills: 3 | Status: SHIPPED | OUTPATIENT
Start: 2018-03-05 | End: 2019-01-30 | Stop reason: SDUPTHER

## 2018-03-05 RX ORDER — ALPRAZOLAM 1 MG/1
1 TABLET ORAL 2 TIMES DAILY PRN
Qty: 180 TABLET | Refills: 1 | Status: SHIPPED | OUTPATIENT
Start: 2018-03-05 | End: 2018-12-10 | Stop reason: SDUPTHER

## 2018-03-13 ENCOUNTER — PATIENT MESSAGE (OUTPATIENT)
Dept: INTERNAL MEDICINE | Facility: CLINIC | Age: 68
End: 2018-03-13

## 2018-03-14 ENCOUNTER — PATIENT OUTREACH (OUTPATIENT)
Dept: OTHER | Facility: OTHER | Age: 68
End: 2018-03-14

## 2018-03-14 ENCOUNTER — PATIENT MESSAGE (OUTPATIENT)
Dept: CARDIOLOGY | Facility: CLINIC | Age: 68
End: 2018-03-14

## 2018-03-14 ENCOUNTER — PATIENT MESSAGE (OUTPATIENT)
Dept: OTHER | Facility: OTHER | Age: 68
End: 2018-03-14

## 2018-03-14 DIAGNOSIS — E02 SUBCLINICAL IODINE-DEFICIENCY HYPOTHYROIDISM: ICD-10-CM

## 2018-03-14 DIAGNOSIS — I10 ESSENTIAL HYPERTENSION: Primary | ICD-10-CM

## 2018-03-14 NOTE — PROGRESS NOTES
"Last 5 Patient Entered Readings                                      Current 30 Day Average: 138/75     Recent Readings 3/14/2018 3/14/2018 3/13/2018 3/9/2018 3/6/2018    SBP (mmHg) 146 154 117 145 124    DBP (mmHg) 100 85 83 78 62    Pulse 67 70 64 63 62      Per [employer] message "Abran Hoovern.  I am not quite sure what is going on.  I am still sleeping and listless for most of the day.  I can't concentrate and feel like I have brain fog.  I also am dizzy and light headed.  I attempted to make an appointment with  my PCP but nothing available until 3/30.  I stopped taking the Prozac over a week ago, thinking that was the issue, but it is not.  This is not like me.  I usually have a lot of energy and have no problem focusing mentally.  Also, my vision seems to be declining."    I am reducing carvedilol from 12.5mg twice daily to 6.25mg twice daily.    "

## 2018-03-15 ENCOUNTER — LAB VISIT (OUTPATIENT)
Dept: LAB | Facility: HOSPITAL | Age: 68
End: 2018-03-15
Attending: FAMILY MEDICINE
Payer: MEDICARE

## 2018-03-15 ENCOUNTER — HOSPITAL ENCOUNTER (INPATIENT)
Facility: HOSPITAL | Age: 68
LOS: 2 days | Discharge: HOME | DRG: 641 | End: 2018-03-17
Attending: EMERGENCY MEDICINE | Admitting: HOSPITALIST
Payer: MEDICARE

## 2018-03-15 ENCOUNTER — TELEPHONE (OUTPATIENT)
Dept: FAMILY MEDICINE | Facility: CLINIC | Age: 68
End: 2018-03-15

## 2018-03-15 ENCOUNTER — PATIENT MESSAGE (OUTPATIENT)
Dept: OTHER | Facility: OTHER | Age: 68
End: 2018-03-15

## 2018-03-15 DIAGNOSIS — E87.1 DILUTIONAL HYPONATREMIA: ICD-10-CM

## 2018-03-15 DIAGNOSIS — E02 SUBCLINICAL IODINE-DEFICIENCY HYPOTHYROIDISM: ICD-10-CM

## 2018-03-15 DIAGNOSIS — E88.810 METABOLIC SYNDROME: Chronic | ICD-10-CM

## 2018-03-15 DIAGNOSIS — E03.8 OTHER SPECIFIED HYPOTHYROIDISM: Chronic | ICD-10-CM

## 2018-03-15 DIAGNOSIS — I10 ESSENTIAL HYPERTENSION: ICD-10-CM

## 2018-03-15 DIAGNOSIS — E87.1 HYPONATREMIA: Primary | ICD-10-CM

## 2018-03-15 LAB
ALBUMIN SERPL BCP-MCNC: 4.1 G/DL
ALP SERPL-CCNC: 74 U/L
ALT SERPL W/O P-5'-P-CCNC: 16 U/L
ANION GAP SERPL CALC-SCNC: 10 MMOL/L
AST SERPL-CCNC: 25 U/L
BILIRUB SERPL-MCNC: 0.7 MG/DL
BILIRUB UR QL STRIP: NEGATIVE
BUN SERPL-MCNC: 23 MG/DL
CALCIUM SERPL-MCNC: 10 MG/DL
CHLORIDE SERPL-SCNC: 84 MMOL/L
CLARITY UR: CLEAR
CO2 SERPL-SCNC: 25 MMOL/L
COLOR UR: YELLOW
CREAT SERPL-MCNC: 1.3 MG/DL
EST. GFR  (AFRICAN AMERICAN): 49.7 ML/MIN/1.73 M^2
EST. GFR  (NON AFRICAN AMERICAN): 43.2 ML/MIN/1.73 M^2
GLUCOSE SERPL-MCNC: 84 MG/DL
GLUCOSE UR QL STRIP: NEGATIVE
HGB UR QL STRIP: NEGATIVE
KETONES UR QL STRIP: ABNORMAL
LEUKOCYTE ESTERASE UR QL STRIP: ABNORMAL
MICROSCOPIC COMMENT: NORMAL
NITRITE UR QL STRIP: NEGATIVE
PH UR STRIP: 6 [PH] (ref 5–8)
POTASSIUM SERPL-SCNC: 4.4 MMOL/L
PROT SERPL-MCNC: 7.5 G/DL
PROT UR QL STRIP: NEGATIVE
SODIUM SERPL-SCNC: 119 MMOL/L
SP GR UR STRIP: <=1.005 (ref 1–1.03)
T4 FREE SERPL-MCNC: 1.36 NG/DL
TSH SERPL DL<=0.005 MIU/L-ACNC: 2.35 UIU/ML
URN SPEC COLLECT METH UR: ABNORMAL
UROBILINOGEN UR STRIP-ACNC: NEGATIVE EU/DL
WBC #/AREA URNS HPF: 2 /HPF (ref 0–5)

## 2018-03-15 PROCEDURE — 11000001 HC ACUTE MED/SURG PRIVATE ROOM

## 2018-03-15 PROCEDURE — 80053 COMPREHEN METABOLIC PANEL: CPT

## 2018-03-15 PROCEDURE — 81000 URINALYSIS NONAUTO W/SCOPE: CPT

## 2018-03-15 PROCEDURE — 99285 EMERGENCY DEPT VISIT HI MDM: CPT

## 2018-03-15 PROCEDURE — 84439 ASSAY OF FREE THYROXINE: CPT

## 2018-03-15 PROCEDURE — 80053 COMPREHEN METABOLIC PANEL: CPT | Mod: 91

## 2018-03-15 PROCEDURE — 85025 COMPLETE CBC W/AUTO DIFF WBC: CPT

## 2018-03-15 PROCEDURE — 36415 COLL VENOUS BLD VENIPUNCTURE: CPT | Mod: PO

## 2018-03-15 PROCEDURE — 84100 ASSAY OF PHOSPHORUS: CPT

## 2018-03-15 PROCEDURE — 84443 ASSAY THYROID STIM HORMONE: CPT

## 2018-03-15 PROCEDURE — 83735 ASSAY OF MAGNESIUM: CPT

## 2018-03-15 NOTE — PROGRESS NOTES
"Last 5 Patient Entered Readings                                      Current 30 Day Average: 139/74     Recent Readings 3/15/2018 3/14/2018 3/14/2018 3/14/2018 3/13/2018    SBP (mmHg) 138 142 146 154 117    DBP (mmHg) 78 79 100 85 83    Pulse 63 63 67 70 64        Per [facility] "Nafisa, I really think I need bloodwork again.  I changed my meds last night and this morning as you prescribed.  Not feeling any better. "    I sent her for a TSH, T4 and CMP. Informing patient through Mr Banananorma.    "

## 2018-03-16 ENCOUNTER — PATIENT MESSAGE (OUTPATIENT)
Dept: OTHER | Facility: OTHER | Age: 68
End: 2018-03-16

## 2018-03-16 PROBLEM — E88.810 METABOLIC SYNDROME: Chronic | Status: ACTIVE | Noted: 2018-03-16

## 2018-03-16 PROBLEM — E87.1 HYPONATREMIA: Status: ACTIVE | Noted: 2018-03-16

## 2018-03-16 PROBLEM — E88.810 METABOLIC SYNDROME: Chronic | Status: RESOLVED | Noted: 2018-03-16 | Resolved: 2018-03-16

## 2018-03-16 PROBLEM — N17.9 AKI (ACUTE KIDNEY INJURY): Status: ACTIVE | Noted: 2018-03-16

## 2018-03-16 LAB
ALBUMIN SERPL BCP-MCNC: 4.6 G/DL
ALP SERPL-CCNC: 79 U/L
ALT SERPL W/O P-5'-P-CCNC: 17 U/L
ANION GAP SERPL CALC-SCNC: 10 MMOL/L
ANION GAP SERPL CALC-SCNC: 12 MMOL/L
ANION GAP SERPL CALC-SCNC: 13 MMOL/L
ANION GAP SERPL CALC-SCNC: 8 MMOL/L
ANION GAP SERPL CALC-SCNC: 8 MMOL/L
ANION GAP SERPL CALC-SCNC: 9 MMOL/L
AST SERPL-CCNC: 40 U/L
BASOPHILS # BLD AUTO: 0.03 K/UL
BASOPHILS # BLD AUTO: 0.03 K/UL
BASOPHILS NFR BLD: 0.3 %
BASOPHILS NFR BLD: 0.4 %
BILIRUB SERPL-MCNC: 0.6 MG/DL
BUN SERPL-MCNC: 20 MG/DL
BUN SERPL-MCNC: 22 MG/DL
BUN SERPL-MCNC: 22 MG/DL
BUN SERPL-MCNC: 23 MG/DL
BUN SERPL-MCNC: 24 MG/DL
BUN SERPL-MCNC: 24 MG/DL
CALCIUM SERPL-MCNC: 10.1 MG/DL
CALCIUM SERPL-MCNC: 10.1 MG/DL
CALCIUM SERPL-MCNC: 9.3 MG/DL
CALCIUM SERPL-MCNC: 9.4 MG/DL
CHLORIDE SERPL-SCNC: 83 MMOL/L
CHLORIDE SERPL-SCNC: 86 MMOL/L
CHLORIDE SERPL-SCNC: 89 MMOL/L
CHLORIDE SERPL-SCNC: 89 MMOL/L
CHLORIDE SERPL-SCNC: 90 MMOL/L
CHLORIDE SERPL-SCNC: 92 MMOL/L
CO2 SERPL-SCNC: 22 MMOL/L
CO2 SERPL-SCNC: 22 MMOL/L
CO2 SERPL-SCNC: 23 MMOL/L
CO2 SERPL-SCNC: 24 MMOL/L
CO2 SERPL-SCNC: 24 MMOL/L
CO2 SERPL-SCNC: 25 MMOL/L
CREAT SERPL-MCNC: 1 MG/DL
CREAT SERPL-MCNC: 1.2 MG/DL
CREAT SERPL-MCNC: 1.3 MG/DL
CREAT SERPL-MCNC: 1.3 MG/DL
DIFFERENTIAL METHOD: ABNORMAL
DIFFERENTIAL METHOD: ABNORMAL
EOSINOPHIL # BLD AUTO: 0.2 K/UL
EOSINOPHIL # BLD AUTO: 0.2 K/UL
EOSINOPHIL NFR BLD: 2.4 %
EOSINOPHIL NFR BLD: 2.5 %
ERYTHROCYTE [DISTWIDTH] IN BLOOD BY AUTOMATED COUNT: 12.4 %
ERYTHROCYTE [DISTWIDTH] IN BLOOD BY AUTOMATED COUNT: 12.6 %
EST. GFR  (AFRICAN AMERICAN): 50 ML/MIN/1.73 M^2
EST. GFR  (AFRICAN AMERICAN): 50 ML/MIN/1.73 M^2
EST. GFR  (AFRICAN AMERICAN): 55 ML/MIN/1.73 M^2
EST. GFR  (AFRICAN AMERICAN): >60 ML/MIN/1.73 M^2
EST. GFR  (NON AFRICAN AMERICAN): 43 ML/MIN/1.73 M^2
EST. GFR  (NON AFRICAN AMERICAN): 43 ML/MIN/1.73 M^2
EST. GFR  (NON AFRICAN AMERICAN): 48 ML/MIN/1.73 M^2
EST. GFR  (NON AFRICAN AMERICAN): 59 ML/MIN/1.73 M^2
GLUCOSE SERPL-MCNC: 110 MG/DL
GLUCOSE SERPL-MCNC: 112 MG/DL
GLUCOSE SERPL-MCNC: 134 MG/DL
GLUCOSE SERPL-MCNC: 134 MG/DL
GLUCOSE SERPL-MCNC: 81 MG/DL
GLUCOSE SERPL-MCNC: 81 MG/DL
HCT VFR BLD AUTO: 30.4 %
HCT VFR BLD AUTO: 34.6 %
HGB BLD-MCNC: 11.2 G/DL
HGB BLD-MCNC: 12.8 G/DL
LYMPHOCYTES # BLD AUTO: 2 K/UL
LYMPHOCYTES # BLD AUTO: 2.7 K/UL
LYMPHOCYTES NFR BLD: 26.3 %
LYMPHOCYTES NFR BLD: 29.6 %
MAGNESIUM SERPL-MCNC: 2.7 MG/DL
MCH RBC QN AUTO: 30.8 PG
MCH RBC QN AUTO: 31.1 PG
MCHC RBC AUTO-ENTMCNC: 36.8 G/DL
MCHC RBC AUTO-ENTMCNC: 37 G/DL
MCV RBC AUTO: 83 FL
MCV RBC AUTO: 84 FL
MONOCYTES # BLD AUTO: 0.8 K/UL
MONOCYTES # BLD AUTO: 0.8 K/UL
MONOCYTES NFR BLD: 10.2 %
MONOCYTES NFR BLD: 8.4 %
NEUTROPHILS # BLD AUTO: 4.6 K/UL
NEUTROPHILS # BLD AUTO: 5.5 K/UL
NEUTROPHILS NFR BLD: 59.5 %
NEUTROPHILS NFR BLD: 60.9 %
OSMOLALITY UR: 144 MOSM/KG
OVALOCYTES BLD QL SMEAR: ABNORMAL
PHOSPHATE SERPL-MCNC: 3 MG/DL
PLATELET # BLD AUTO: 252 K/UL
PLATELET # BLD AUTO: 347 K/UL
PMV BLD AUTO: 9.4 FL
PMV BLD AUTO: 9.5 FL
POIKILOCYTOSIS BLD QL SMEAR: SLIGHT
POTASSIUM SERPL-SCNC: 3.6 MMOL/L
POTASSIUM SERPL-SCNC: 3.6 MMOL/L
POTASSIUM SERPL-SCNC: 3.7 MMOL/L
POTASSIUM SERPL-SCNC: 3.9 MMOL/L
POTASSIUM SERPL-SCNC: 4 MMOL/L
POTASSIUM SERPL-SCNC: 4.2 MMOL/L
PROT SERPL-MCNC: 8.5 G/DL
RBC # BLD AUTO: 3.6 M/UL
RBC # BLD AUTO: 4.15 M/UL
SODIUM SERPL-SCNC: 118 MMOL/L
SODIUM SERPL-SCNC: 121 MMOL/L
SODIUM SERPL-SCNC: 122 MMOL/L
SODIUM SERPL-SCNC: 126 MMOL/L
SODIUM UR-SCNC: 22 MMOL/L
WBC # BLD AUTO: 7.52 K/UL
WBC # BLD AUTO: 9.25 K/UL

## 2018-03-16 PROCEDURE — 85025 COMPLETE CBC W/AUTO DIFF WBC: CPT

## 2018-03-16 PROCEDURE — 25000003 PHARM REV CODE 250: Performed by: NURSE PRACTITIONER

## 2018-03-16 PROCEDURE — 80048 BASIC METABOLIC PNL TOTAL CA: CPT | Mod: 91

## 2018-03-16 PROCEDURE — 99223 1ST HOSP IP/OBS HIGH 75: CPT | Mod: ,,, | Performed by: INTERNAL MEDICINE

## 2018-03-16 PROCEDURE — 25000003 PHARM REV CODE 250: Performed by: FAMILY MEDICINE

## 2018-03-16 PROCEDURE — 21400001 HC TELEMETRY ROOM

## 2018-03-16 PROCEDURE — 63600175 PHARM REV CODE 636 W HCPCS: Performed by: NURSE PRACTITIONER

## 2018-03-16 PROCEDURE — 83935 ASSAY OF URINE OSMOLALITY: CPT

## 2018-03-16 PROCEDURE — 36415 COLL VENOUS BLD VENIPUNCTURE: CPT

## 2018-03-16 PROCEDURE — 84300 ASSAY OF URINE SODIUM: CPT

## 2018-03-16 RX ORDER — ALPRAZOLAM 0.25 MG/1
0.25 TABLET ORAL 2 TIMES DAILY PRN
Status: DISCONTINUED | OUTPATIENT
Start: 2018-03-16 | End: 2018-03-16

## 2018-03-16 RX ORDER — ALPRAZOLAM 0.5 MG/1
0.5 TABLET ORAL 2 TIMES DAILY PRN
Status: DISCONTINUED | OUTPATIENT
Start: 2018-03-16 | End: 2018-03-17 | Stop reason: HOSPADM

## 2018-03-16 RX ORDER — ONDANSETRON 2 MG/ML
4 INJECTION INTRAMUSCULAR; INTRAVENOUS EVERY 6 HOURS PRN
Status: DISCONTINUED | OUTPATIENT
Start: 2018-03-16 | End: 2018-03-17 | Stop reason: HOSPADM

## 2018-03-16 RX ORDER — AMLODIPINE BESYLATE 10 MG/1
10 TABLET ORAL DAILY
Status: DISCONTINUED | OUTPATIENT
Start: 2018-03-16 | End: 2018-03-16

## 2018-03-16 RX ORDER — HYDRALAZINE HYDROCHLORIDE 20 MG/ML
10 INJECTION INTRAMUSCULAR; INTRAVENOUS EVERY 8 HOURS PRN
Status: DISCONTINUED | OUTPATIENT
Start: 2018-03-16 | End: 2018-03-17 | Stop reason: HOSPADM

## 2018-03-16 RX ORDER — ENOXAPARIN SODIUM 100 MG/ML
40 INJECTION SUBCUTANEOUS EVERY 24 HOURS
Status: DISCONTINUED | OUTPATIENT
Start: 2018-03-16 | End: 2018-03-17 | Stop reason: HOSPADM

## 2018-03-16 RX ORDER — ACETAMINOPHEN 325 MG/1
650 TABLET ORAL EVERY 6 HOURS PRN
Status: DISCONTINUED | OUTPATIENT
Start: 2018-03-16 | End: 2018-03-17 | Stop reason: HOSPADM

## 2018-03-16 RX ORDER — CARVEDILOL 12.5 MG/1
12.5 TABLET ORAL 2 TIMES DAILY WITH MEALS
Status: DISCONTINUED | OUTPATIENT
Start: 2018-03-16 | End: 2018-03-16

## 2018-03-16 RX ORDER — SODIUM CHLORIDE 9 MG/ML
INJECTION, SOLUTION INTRAVENOUS CONTINUOUS
Status: DISCONTINUED | OUTPATIENT
Start: 2018-03-16 | End: 2018-03-16

## 2018-03-16 RX ORDER — FAMOTIDINE 20 MG/1
20 TABLET, FILM COATED ORAL DAILY
Status: DISCONTINUED | OUTPATIENT
Start: 2018-03-16 | End: 2018-03-17 | Stop reason: HOSPADM

## 2018-03-16 RX ORDER — LEVOTHYROXINE SODIUM 112 UG/1
112 TABLET ORAL
Status: DISCONTINUED | OUTPATIENT
Start: 2018-03-16 | End: 2018-03-17 | Stop reason: HOSPADM

## 2018-03-16 RX ORDER — ASPIRIN 81 MG/1
81 TABLET ORAL DAILY
Status: DISCONTINUED | OUTPATIENT
Start: 2018-03-16 | End: 2018-03-17 | Stop reason: HOSPADM

## 2018-03-16 RX ORDER — ALPRAZOLAM 0.25 MG/1
0.25 TABLET ORAL ONCE
Status: COMPLETED | OUTPATIENT
Start: 2018-03-16 | End: 2018-03-16

## 2018-03-16 RX ORDER — LOVASTATIN 20 MG/1
40 TABLET ORAL NIGHTLY
Status: DISCONTINUED | OUTPATIENT
Start: 2018-03-16 | End: 2018-03-17 | Stop reason: HOSPADM

## 2018-03-16 RX ADMIN — ASPIRIN 81 MG: 81 TABLET, COATED ORAL at 08:03

## 2018-03-16 RX ADMIN — LEVOTHYROXINE SODIUM 112 MCG: 112 TABLET ORAL at 05:03

## 2018-03-16 RX ADMIN — FAMOTIDINE 20 MG: 20 TABLET ORAL at 06:03

## 2018-03-16 RX ADMIN — ALPRAZOLAM 0.25 MG: 0.25 TABLET ORAL at 10:03

## 2018-03-16 RX ADMIN — ENOXAPARIN SODIUM 40 MG: 100 INJECTION SUBCUTANEOUS at 04:03

## 2018-03-16 RX ADMIN — LOVASTATIN 40 MG: 20 TABLET ORAL at 09:03

## 2018-03-16 RX ADMIN — ALPRAZOLAM 0.25 MG: 0.25 TABLET ORAL at 11:03

## 2018-03-16 RX ADMIN — SODIUM CHLORIDE: 0.9 INJECTION, SOLUTION INTRAVENOUS at 02:03

## 2018-03-16 NOTE — ASSESSMENT & PLAN NOTE
- Antihypertensives as above.  - Continue statin therapy.  - HbA1c 5.2.  - Continue lifestyle modifications.

## 2018-03-16 NOTE — PROGRESS NOTES
Patient's /59 without receiving any antihypertensives.  She remains asymptomatic.  Will hold Coreg and amlodipine.  Monitor BP trends and will resume meds, with parameters, as needed.        Loida Jennings, JAMARP

## 2018-03-16 NOTE — PROGRESS NOTES
"[facility] Hospital Medicine  Progress Note    Patient Name: Cleo Ford  MRN: [id number]  Patient Class: IP- Inpatient   Admission Date: 3/15/2018  Length of Stay: 0 days  Attending Physician: Nigel Borges MD  Primary Care Provider: Elizabeth Julian MD        Subjective:     Principal Problem:Dilutional hyponatremia    HPI:  Ms. Ford is a 66yo female with a PMHx of HTN, metabolic syndrome, hypothyroidism, and h/o sleeve gastrectomy, who presented to the ED with c/o fatigue that has progressively worsened over the past 1 month.  Associated blurry vision and "foggy headedness".  No aggravating or alleviating factors.  Denies any headache, lethargy, nausea, vomiting, dizziness, gait disturbances, confusion, muscle cramps, or seizures.  She had blood work drawn at PCP's office earlier today, and received a call from the office instructing her to go to ED for low sodium level.  Initial work-up in ED resulted Na 118, Cl 83, BUN 22, cr. 1.2.  Patient reports multiple adjustments to her BP medications since December, including adding and increasing doses of diuretics and valsartan.  Also, patient states she has been on a special liquid cleanse diet where she is drinking several more liters of water per day than usual.  Patient was admitted for severe hyponatremia under Hospital Medicine services.    Hospital Course:  Cleo Ford is a 67 year old female admitted for Dilutional hyponatremia. Na+ level upon admit was 118. Associated symptoms include: fatigue, weakness, and feeling "dazed" for 3 weeks. Pt reports increasing water intake and started "juicing" on Monday, 03/12/18. Pt reports drinking almost 6-8 16 oz of water a day 3-4 days prior to admit. Recently tapered off Paxil by PCP. She also reports taking HCTZ but has not really been diuresing as much. Nephrology consulted -- hyponatremia occurred as a result of SSRI (tapered off recently), HCTZ, and not having a normal diet. " "Continue to hold HCTZ and do not restart on discharge. Fluid restriction of 1000mL. Na+ level improved to 122 s/p IVF. PO intake encouraged.     Interval History: Pt seen and examined. Pt reports, "I feel 100% better." Na+ level upon admit 118, now 122 s/p IVF. Nephrology consulted. Low sodium likely secondary to water intoxification, HCTZ, and recent discontinuation of SSRI. PO intake encouraged with 1000 mL fluid restriction. Possible discharge in AM    Review of Systems   Constitutional: Positive for activity change and fatigue.   HENT: Negative.    Eyes: Negative.    Respiratory: Negative for apnea, cough, choking, chest tightness, shortness of breath, wheezing and stridor.    Cardiovascular: Negative for chest pain, palpitations and leg swelling.   Gastrointestinal: Negative for abdominal pain, blood in stool, constipation, diarrhea and vomiting.   Endocrine: Negative.    Genitourinary: Negative.    Musculoskeletal: Negative.    Skin: Negative.    Allergic/Immunologic: Negative.    Neurological: Positive for weakness. Negative for dizziness, tremors, seizures, syncope, facial asymmetry, speech difficulty, light-headedness, numbness and headaches.   Hematological: Negative.    Psychiatric/Behavioral: Negative.      Objective:     Vital Signs (Most Recent):  Temp: 98.6 °F (37 °C) (03/16/18 1237)  Pulse: 64 (03/16/18 1237)  Resp: 20 (03/16/18 1237)  BP: (!) 116/58 (03/16/18 1237)  SpO2: 99 % (03/16/18 1237) Vital Signs (24h Range):  Temp:  [96.2 °F (35.7 °C)-98.6 °F (37 °C)] 98.6 °F (37 °C)  Pulse:  [59-67] 64  Resp:  [14-20] 20  SpO2:  [96 %-100 %] 99 %  BP: ()/(41-88) 116/58     Weight: 77.4 kg (170 lb 10.2 oz)  Body mass index is 30.23 kg/m².    Intake/Output Summary (Last 24 hours) at 03/16/18 1436  Last data filed at 03/16/18 0959   Gross per 24 hour   Intake              240 ml   Output                0 ml   Net              240 ml      Physical Exam   Constitutional: She is oriented to person, place, " and time. She appears well-developed and well-nourished. No distress.   HENT:   Head: Normocephalic and atraumatic.   Eyes: EOM are normal.   Neck: Normal range of motion. Neck supple.   Cardiovascular: Normal rate, regular rhythm, normal heart sounds and intact distal pulses.    No murmur heard.  Pulmonary/Chest: Effort normal and breath sounds normal. No respiratory distress. She has no wheezes. She has no rales. She exhibits no tenderness.   Abdominal: Soft. Bowel sounds are normal. She exhibits no distension. There is no tenderness. There is no rebound and no guarding.   Genitourinary:   Genitourinary Comments: Deferred   Musculoskeletal: Normal range of motion.   Neurological: She is alert and oriented to person, place, and time. No sensory deficit.   Skin: Skin is warm and dry. Capillary refill takes less than 2 seconds.   Psychiatric: She has a normal mood and affect. Her behavior is normal. Judgment and thought content normal.   Nursing note and vitals reviewed.      Significant Labs:   BMP:   Recent Labs  Lab 03/15/18  2324  03/16/18  0927   GLU 81  < > 112*   *  < > 122*   K 4.2  < > 3.9   CL 83*  < > 90*   CO2 23  < > 22*   BUN 22  < > 23   CREATININE 1.2  < > 1.2   CALCIUM 10.1  < > 9.3   MG 2.7*  --   --    < > = values in this interval not displayed.  CBC:   Recent Labs  Lab 03/15/18  2324 03/16/18  0625   WBC 9.25 7.52   HGB 12.8 11.2*   HCT 34.6* 30.4*    252       Significant Imaging:   Imaging Results          CT Head Without Contrast (Final result)  Result time 03/15/18 23:48:04    Final result by Laron Roca MD (03/15/18 23:48:04)                 Impression:         No acute findings        All CT scans at this facility use dose modulation, iterative reconstruction, and/or weight based dosing when appropriate to reduce radiation dose to as low as reasonably achievable.       Electronically signed by: LARON ROCA MD  Date:     03/15/18  Time:    23:48               Narrative:    CT HEAD WITHOUT CONTRAST    Date: 03/15/18 23:30:00    Clinical indication: Blurred vision, malaise and fatigue     Technique:  Standard noncontrast CT scan of the brain. No previous for comparison.     Findings:  There is no evidence of intracranial hemorrhage or acute focal parenchymal abnormality. The ventricular system is normal. The cranium is intact. Visualized paranasal sinuses and mastoid air cells are clear.                            Assessment/Plan:      * Dilutional hyponatremia    - Na+ level upon admit 118  - Improved to 122 s/p IVF  - Nephrology following  - Likely secondary to water intoxification, HCTZ, and recently discontinuation of SSRI   - PO intake encouraged with 1000 mL fluid restriction  - Continue to hold HCTZ and do not restart at discharge  - Continue Neuro checks          ENMA (acute kidney injury)    - Improving  - Continue to hold ARB and HCTZ  - Follow BMP        Hypothyroidism    - TSH 2.346  - Continue Levothyroxine        Essential hypertension    - Hypotensive overnight. BP improving -- obtained BP at bedside, 123/66  - Continue Coreg and Amlodipine with parameters  - If becomes hypertensive, restart home Diovan  - Hold HCTZ             VTE Risk Mitigation         Ordered     enoxaparin injection 40 mg  Daily     Route:  Subcutaneous        03/16/18 0205     IP VTE LOW RISK PATIENT  Once      03/16/18 0205     Place sequential compression device  Until discontinued      03/16/18 0205              KJ Kraft  Department of Hospital Medicine   [facility]

## 2018-03-16 NOTE — PLAN OF CARE
Problem: Patient Care Overview  Goal: Plan of Care Review  Outcome: Ongoing (interventions implemented as appropriate)  Pt remains free of falls and free of injury this shift. Pt asks appropriate questions and is very pleasant and responsive to staying in the hospital until medically cleared to go home.   Bed in low position, side rails up x2, call light in reach.

## 2018-03-16 NOTE — SUBJECTIVE & OBJECTIVE
"Interval History: Pt seen and examined. Pt reports, "I feel 100% better." Na+ level upon admit 118, now 122 s/p IVF. Nephrology consulted. Low sodium likely secondary to water intoxification, HCTZ, and recent discontinuation of SSRI. PO intake encouraged with 1000 mL fluid restriction. Possible discharge in AM    Review of Systems   Constitutional: Positive for activity change and fatigue.   HENT: Negative.    Eyes: Negative.    Respiratory: Negative for apnea, cough, choking, chest tightness, shortness of breath, wheezing and stridor.    Cardiovascular: Negative for chest pain, palpitations and leg swelling.   Gastrointestinal: Negative for abdominal pain, blood in stool, constipation, diarrhea and vomiting.   Endocrine: Negative.    Genitourinary: Negative.    Musculoskeletal: Negative.    Skin: Negative.    Allergic/Immunologic: Negative.    Neurological: Positive for weakness. Negative for dizziness, tremors, seizures, syncope, facial asymmetry, speech difficulty, light-headedness, numbness and headaches.   Hematological: Negative.    Psychiatric/Behavioral: Negative.      Objective:     Vital Signs (Most Recent):  Temp: 98.6 °F (37 °C) (03/16/18 1237)  Pulse: 64 (03/16/18 1237)  Resp: 20 (03/16/18 1237)  BP: (!) 116/58 (03/16/18 1237)  SpO2: 99 % (03/16/18 1237) Vital Signs (24h Range):  Temp:  [96.2 °F (35.7 °C)-98.6 °F (37 °C)] 98.6 °F (37 °C)  Pulse:  [59-67] 64  Resp:  [14-20] 20  SpO2:  [96 %-100 %] 99 %  BP: ()/(41-88) 116/58     Weight: 77.4 kg (170 lb 10.2 oz)  Body mass index is 30.23 kg/m².    Intake/Output Summary (Last 24 hours) at 03/16/18 1436  Last data filed at 03/16/18 0959   Gross per 24 hour   Intake              240 ml   Output                0 ml   Net              240 ml      Physical Exam   Constitutional: She is oriented to person, place, and time. She appears well-developed and well-nourished. No distress.   HENT:   Head: Normocephalic and atraumatic.   Eyes: EOM are normal. "   Neck: Normal range of motion. Neck supple.   Cardiovascular: Normal rate, regular rhythm, normal heart sounds and intact distal pulses.    No murmur heard.  Pulmonary/Chest: Effort normal and breath sounds normal. No respiratory distress. She has no wheezes. She has no rales. She exhibits no tenderness.   Abdominal: Soft. Bowel sounds are normal. She exhibits no distension. There is no tenderness. There is no rebound and no guarding.   Genitourinary:   Genitourinary Comments: Deferred   Musculoskeletal: Normal range of motion.   Neurological: She is alert and oriented to person, place, and time. No sensory deficit.   Skin: Skin is warm and dry. Capillary refill takes less than 2 seconds.   Psychiatric: She has a normal mood and affect. Her behavior is normal. Judgment and thought content normal.   Nursing note and vitals reviewed.      Significant Labs:   BMP:   Recent Labs  Lab 03/15/18  2324  03/16/18  0927   GLU 81  < > 112*   *  < > 122*   K 4.2  < > 3.9   CL 83*  < > 90*   CO2 23  < > 22*   BUN 22  < > 23   CREATININE 1.2  < > 1.2   CALCIUM 10.1  < > 9.3   MG 2.7*  --   --    < > = values in this interval not displayed.  CBC:   Recent Labs  Lab 03/15/18  2324 03/16/18  0625   WBC 9.25 7.52   HGB 12.8 11.2*   HCT 34.6* 30.4*    252       Significant Imaging:   Imaging Results          CT Head Without Contrast (Final result)  Result time 03/15/18 23:48:04    Final result by Laron Roca MD (03/15/18 23:48:04)                 Impression:         No acute findings        All CT scans at this facility use dose modulation, iterative reconstruction, and/or weight based dosing when appropriate to reduce radiation dose to as low as reasonably achievable.       Electronically signed by: LARON ROCA MD  Date:     03/15/18  Time:    23:48              Narrative:    CT HEAD WITHOUT CONTRAST    Date: 03/15/18 23:30:00    Clinical indication: Blurred vision, malaise and fatigue     Technique:   Standard noncontrast CT scan of the brain. No previous for comparison.     Findings:  There is no evidence of intracranial hemorrhage or acute focal parenchymal abnormality. The ventricular system is normal. The cranium is intact. Visualized paranasal sinuses and mastoid air cells are clear.

## 2018-03-16 NOTE — HPI
Pt was seen and examined. Chart and h/o reviewed. Discussed with PCP. Pt is a 66 y/o female who was admitted with hyponatremia. Noted pt has background wt issues with past h/o of abdominal surgery for wt loss. Pt is constantly pre-occupied with her wt and applies diet and liquids to control her wt. Pt reports having developed vague sx's related to tiredness and fatigue and some minor walking issues resulting in tripping. Pt currently feels better. treatment and meds reviewed. Noted pt was on an SSRI until 3 wks ago. She says that due to wt gain, pt and her PCP were tapering prozac slowly. Pt also was taking HCTZ. In addition, pt drinks very large amounts of water, and in the past 4 day prior to admission, pt was not eating at all and was using a diet consisting only of variety of juices. Pt has no pertinent h/o of heart, kidney, or liver disease. She does have hypothyroidism and takes synthroid. TSH is normal.

## 2018-03-16 NOTE — ASSESSMENT & PLAN NOTE
- Na+ level upon admit 118  - Improved to 122 s/p IVF  - Nephrology following  - Likely secondary to water intoxification, HCTZ, and recently discontinuation of SSRI   - Continue gentle IVF  - PO intake encouraged with 1000 mL fluid restriction  - Continue to hold HCTZ and do not restart at discharge  - Continue Neuro checks

## 2018-03-16 NOTE — TELEPHONE ENCOUNTER
"Critical lab call: Na+ 119  One month ago 136  Called patient: was alert and oriented but said she has been feeling "foggy brained" and tired.   Recent HTN med changes, incl diuretics.   Pt agreed to go to ER tonight for treatment  "

## 2018-03-16 NOTE — ED PROVIDER NOTES
SCRIBE #1 NOTE: I, Chana Rome, am scribing for, and in the presence of, Mejia Baltazar MD. I have scribed the entire note.      History      Chief Complaint   Patient presents with    Abnormal Lab     patient sent her by MD for hyonatremia        Review of patient's allergies indicates:   Allergen Reactions    Tobramycin-dexamethasone      Eye Drops      Amlodipine      Other reaction(s): leg edema        HPI   HPI    3/15/2018, 10:47 PM   History obtained from the patient      History of Present Illness: Cleo Ford is a 67 y.o. female patient who presents to the Emergency Department for Hyponatremia which onset gradually PTA. Pt had labs drawn today and was told to report to the ED for a sodium of 119. Pt is c/o fatigue, blurred vision, and activity change. Pt has been experiencing these sxs for 2-3 weeks. Pt states she has been sleeping 12 hours every day which is very unusual per pt. Patient denies any fever, chills, n/v/d, dysuria, frequency, hematuria, HA, CP, SOB, palpitations, and all other sxs at this time. Pt states her PCP has been adjusting her anti-hypertensives. Pt also states she has been doing a juice cleanse and drinking 0.5 gallons of H2O per day for 1-2 weeks. Pt states she normally drinks less H2O. No prior Tx reported. No further complaints or concerns at this time.       Arrival mode: Personal vehicle      PCP: Elizabeth Julian MD       Past Medical History:  Past Medical History:   Diagnosis Date    Anxiety     Dysmetabolic syndrome X     Obesity     Other and unspecified hyperlipidemia     Unspecified essential hypertension     Unspecified hypothyroidism        Past Surgical History:  Past Surgical History:   Procedure Laterality Date    APPENDECTOMY      CHOLECYSTECTOMY      COLONOSCOPY N/A 9/15/2016    Procedure: COLONOSCOPY;  Surgeon: Jose Daniel MD;  Location: [facility];  Service: Endoscopy;  Laterality: N/A;    gastric sleeve      HYSTERECTOMY            Family History:  Family History   Problem Relation Age of Onset    Cancer Mother     Hypertension Father     Cancer Maternal Grandmother 80     colon    Colon cancer         Social History:  Social History     Social History Main Topics    Smoking status: Never Smoker    Smokeless tobacco: Never Used    Alcohol use No    Drug use: No    Sexual activity: Yes     Partners: Female       ROS   Review of Systems   Constitutional: Positive for activity change (Excessive Sleeping) and fatigue. Negative for chills and fever.   HENT: Negative for sore throat.    Eyes: Positive for visual disturbance (blurred).   Respiratory: Negative for shortness of breath.    Cardiovascular: Negative for chest pain and palpitations.   Gastrointestinal: Negative for diarrhea, nausea and vomiting.   Genitourinary: Negative for dysuria.   Musculoskeletal: Negative for back pain.   Skin: Negative for rash.   Neurological: Negative for weakness and headaches.   Hematological: Does not bruise/bleed easily.   All other systems reviewed and are negative.    Physical Exam      Initial Vitals [03/15/18 2243]   BP Pulse Resp Temp SpO2   (!) 156/88 67 20 98.3 °F (36.8 °C) 99 %      MAP       110.67          Physical Exam  Nursing Notes and Vital Signs Reviewed.  Constitutional: Patient is in no apparent distress. Well-developed and well-nourished.  Head: Atraumatic. Normocephalic.  Eyes: PERRL. EOM intact. Conjunctivae are not pale. No scleral icterus.  ENT: Mucous membranes are moist. Oropharynx is clear and symmetric.    Neck: Supple. Full ROM. No lymphadenopathy.  Cardiovascular: Regular rate. Regular rhythm. No murmurs, rubs, or gallops. Distal pulses are 2+ and symmetric.  Pulmonary/Chest: No respiratory distress. Clear to auscultation bilaterally. No wheezing or rales.  Abdominal: Soft and non-distended.  There is no tenderness.  No rebound, guarding, or rigidity.  Musculoskeletal: Moves all extremities. No obvious deformities. No  "edema. No calf tenderness.  Skin: Warm and dry.  Neurological:  Alert, awake, and appropriate.  Normal speech.  No acute focal neurological deficits are appreciated.  Psychiatric: Normal affect. Good eye contact. Appropriate in content.    ED Course    Procedures  ED Vital Signs:  Vitals:    03/15/18 2243 03/15/18 2323 03/16/18 0002   BP: (!) 156/88 (!) 146/87 (!) 152/73   Pulse: 67 62 61   Resp: 20  16   Temp: 98.3 °F (36.8 °C)     TempSrc: Oral     SpO2: 99% 100% 100%   Weight: 77.1 kg (170 lb)     Height: 5' 3" (1.6 m)         Abnormal Lab Results:  Labs Reviewed   CBC W/ AUTO DIFFERENTIAL - Abnormal; Notable for the following:        Result Value    Hematocrit 34.6 (*)     MCHC 37.0 (*)     All other components within normal limits   COMPREHENSIVE METABOLIC PANEL - Abnormal; Notable for the following:     Sodium 118 (*)     Chloride 83 (*)     Total Protein 8.5 (*)     eGFR if  55 (*)     eGFR if non  48 (*)     All other components within normal limits    Narrative:        critical result(s) called and verbal readback obtained from   DARRYL Galindo RN , 03/16/2018 00:16   MAGNESIUM - Abnormal; Notable for the following:     Magnesium 2.7 (*)     All other components within normal limits   URINALYSIS - Abnormal; Notable for the following:     Specific Gravity, UA <=1.005 (*)     Ketones, UA Trace (*)     Leukocytes, UA 1+ (*)     All other components within normal limits   PHOSPHORUS   URINALYSIS MICROSCOPIC   OSMOLALITY, URINE RANDOM   SODIUM, URINE, RANDOM   SODIUM, URINE, RANDOM   OSMOLALITY, URINE RANDOM        All Lab Results:  Results for orders placed or performed during the hospital encounter of 03/15/18   CBC auto differential   Result Value Ref Range    WBC 9.25 3.90 - 12.70 K/uL    RBC 4.15 4.00 - 5.40 M/uL    Hemoglobin 12.8 12.0 - 16.0 g/dL    Hematocrit 34.6 (L) 37.0 - 48.5 %    MCV 83 82 - 98 fL    MCH 30.8 27.0 - 31.0 pg    MCHC 37.0 (H) 32.0 - 36.0 g/dL    RDW " 12.6 11.5 - 14.5 %    Platelets 347 150 - 350 K/uL    MPV 9.5 9.2 - 12.9 fL    Gran # (ANC) 5.5 1.8 - 7.7 K/uL    Lymph # 2.7 1.0 - 4.8 K/uL    Mono # 0.8 0.3 - 1.0 K/uL    Eos # 0.2 0.0 - 0.5 K/uL    Baso # 0.03 0.00 - 0.20 K/uL    Gran% 59.5 38.0 - 73.0 %    Lymph% 29.6 18.0 - 48.0 %    Mono% 8.4 4.0 - 15.0 %    Eosinophil% 2.4 0.0 - 8.0 %    Basophil% 0.3 0.0 - 1.9 %    Differential Method Automated    Comprehensive metabolic panel   Result Value Ref Range    Sodium 118 (LL) 136 - 145 mmol/L    Potassium 4.2 3.5 - 5.1 mmol/L    Chloride 83 (L) 95 - 110 mmol/L    CO2 23 23 - 29 mmol/L    Glucose 81 70 - 110 mg/dL    BUN, Bld 22 8 - 23 mg/dL    Creatinine 1.2 0.5 - 1.4 mg/dL    Calcium 10.1 8.7 - 10.5 mg/dL    Total Protein 8.5 (H) 6.0 - 8.4 g/dL    Albumin 4.6 3.5 - 5.2 g/dL    Total Bilirubin 0.6 0.1 - 1.0 mg/dL    Alkaline Phosphatase 79 55 - 135 U/L    AST 40 10 - 40 U/L    ALT 17 10 - 44 U/L    Anion Gap 12 8 - 16 mmol/L    eGFR if African American 55 (A) >60 mL/min/1.73 m^2    eGFR if non African American 48 (A) >60 mL/min/1.73 m^2   Magnesium   Result Value Ref Range    Magnesium 2.7 (H) 1.6 - 2.6 mg/dL   Phosphorus   Result Value Ref Range    Phosphorus 3.0 2.7 - 4.5 mg/dL   Urinalysis   Result Value Ref Range    Specimen UA Urine, Clean Catch     Color, UA Yellow Yellow, Straw, Letha    Appearance, UA Clear Clear    pH, UA 6.0 5.0 - 8.0    Specific Gravity, UA <=1.005 (A) 1.005 - 1.030    Protein, UA Negative Negative    Glucose, UA Negative Negative    Ketones, UA Trace (A) Negative    Bilirubin (UA) Negative Negative    Occult Blood UA Negative Negative    Nitrite, UA Negative Negative    Urobilinogen, UA Negative <2.0 EU/dL    Leukocytes, UA 1+ (A) Negative   Urinalysis Microscopic   Result Value Ref Range    WBC, UA 2 0 - 5 /hpf    Microscopic Comment SEE COMMENT          Results for JUAN PAIGE (MRN [id number]) as of 3/15/2018 22:49   Ref. Range 3/15/2018 13:00   Sodium Latest Ref Range: 136 -  145 mmol/L 119 (LL)   Potassium Latest Ref Range: 3.5 - 5.1 mmol/L 4.4   Chloride Latest Ref Range: 95 - 110 mmol/L 84 (L)   CO2 Latest Ref Range: 23 - 29 mmol/L 25   Anion Gap Latest Ref Range: 8 - 16 mmol/L 10   BUN, Bld Latest Ref Range: 8 - 23 mg/dL 23   Creatinine Latest Ref Range: 0.5 - 1.4 mg/dL 1.3   eGFR if non African American Latest Ref Range: >60 mL/min/1.73 m^2 43.2 (A)   eGFR if African American Latest Ref Range: >60 mL/min/1.73 m^2 49.7 (A)   Glucose Latest Ref Range: 70 - 110 mg/dL 84   Calcium Latest Ref Range: 8.7 - 10.5 mg/dL 10.0   Alkaline Phosphatase Latest Ref Range: 55 - 135 U/L 74   Total Protein Latest Ref Range: 6.0 - 8.4 g/dL 7.5   Albumin Latest Ref Range: 3.5 - 5.2 g/dL 4.1   Total Bilirubin Latest Ref Range: 0.1 - 1.0 mg/dL 0.7   AST Latest Ref Range: 10 - 40 U/L 25   ALT Latest Ref Range: 10 - 44 U/L 16     Imaging Results:  Imaging Results          CT Head Without Contrast (Final result)  Result time 03/15/18 23:48:04    Final result by Laron Roca MD (03/15/18 23:48:04)                 Impression:         No acute findings        All CT scans at this facility use dose modulation, iterative reconstruction, and/or weight based dosing when appropriate to reduce radiation dose to as low as reasonably achievable.       Electronically signed by: LARON ROCA MD  Date:     03/15/18  Time:    23:48              Narrative:    CT HEAD WITHOUT CONTRAST    Date: 03/15/18 23:30:00    Clinical indication: Blurred vision, malaise and fatigue     Technique:  Standard noncontrast CT scan of the brain. No previous for comparison.     Findings:  There is no evidence of intracranial hemorrhage or acute focal parenchymal abnormality. The ventricular system is normal. The cranium is intact. Visualized paranasal sinuses and mastoid air cells are clear.                                      The Emergency Provider reviewed the vital signs and test results, which are outlined above.    ED Discussion      12:47 AM: Discussed case with Loida Jennings NP ([facility]). Dr. Burrows agrees with current care and management of pt and accepts admission.   Admitting Service: Hospital medicine   Admitting Physician: Dr. Burrows  Admit to: Med/Tele     12:49 AM: Re-evaluated pt. I have discussed test results, shared treatment plan, and the need for admission with patient and family at bedside. Pt and family express understanding at this time and agree with all information. All questions answered. Pt and family have no further questions or concerns at this time. Pt is ready for admit.      ED Medication(s):  Medications - No data to display    New Prescriptions    No medications on file             Medical Decision Making    Medical Decision Making:   History:        <> Summary of Records:     Clinical Tests:   Lab Tests: Ordered and Reviewed  Radiological Study: Ordered and Reviewed           Scribe Attestation:   Scribe #1: I performed the above scribed service and the documentation accurately describes the services I performed. I attest to the accuracy of the note.    Attending:   Physician Attestation Statement for Scribe #1: I, Mejia Baltazar MD, personally performed the services described in this documentation, as scribed by Chana Rome, in my presence, and it is both accurate and complete.          Clinical Impression       ICD-10-CM ICD-9-CM   1. Hyponatremia E87.1 276.1       Disposition:   Disposition: Admitted  Condition: Fair         Mejia Baltazar MD  03/16/18 0128

## 2018-03-16 NOTE — ASSESSMENT & PLAN NOTE
Renal: Hyponatremia. Chronic  Slow development  Mild, subtle symptoms  Euvolemic  Hyponatremia occurred as a result of background SSRI and HCTZ and pt not having a normal diet.  Pt drinks water excessively, polydipsia  In addition, pt drinks juices instead of eating solid food more more osmoles.  Discussed with pt in details  Reason for her hyponatremia explained in layman's language  Agree with holding HCTZ, do not re-start on d/c  Encourage PO feed of food  Fluid restrict 1 L/day  May d/c NS IVF's  Will repeat BMP  Opportunity for questions and discussion provided.

## 2018-03-16 NOTE — HOSPITAL COURSE
"Cleo Ford is a 67 year old female admitted for Dilutional hyponatremia. Na+ level upon admit was 118. Associated symptoms include: fatigue, weakness, and feeling "dazed" for 3 weeks. Pt reports increasing water intake and started "juicing" on Monday, 03/12/18. Pt reports drinking almost 6-8 16 oz of water a day 3-4 days prior to admit. Recently tapered off Paxil by PCP. She also reports taking HCTZ but has not really been diuresing as much. Nephrology consulted -- hyponatremia occurred as a result of SSRI (tapered off recently), HCTZ, and not having a normal diet. Continue to hold HCTZ and do not restart on discharge. Fluid restriction of 1000mL. Na+ level improved to 122 s/p IVF. PO intake encouraged. 3/17/18 The patient reports improvement in symptoms overnight. The patients Na level increased to 130 today. The patient was seen and examined today and deemed stable for discharge. The HCTZ will not be restarted on discharge. The patient will follow up with her PCP.   "

## 2018-03-16 NOTE — PROGRESS NOTES
Pharmacist Renal Dose Adjustment Note    Cleo Ford is a 67 y.o. female being treated with the medication famotidine (Pepcid) for stress ulcer prophylaxis.     Patient Data:    Vital Signs (Most Recent):  Temp: 98.6 °F (37 °C) (03/16/18 1603)  Pulse: 65 (03/16/18 1722)  Resp: 20 (03/16/18 1603)  BP: (!) 127/59 (03/16/18 1603)  SpO2: 99 % (03/16/18 1603)   Vital Signs (72h Range):  Temp:  [96.2 °F (35.7 °C)-98.6 °F (37 °C)]   Pulse:  [59-67]   Resp:  [14-20]   BP: ()/(41-88)   SpO2:  [96 %-100 %]        Recent Labs     Lab 03/16/18  0625 03/16/18  0927 03/16/18  1449   CREATININE 1.3  1.3 1.2 1.2     Serum creatinine: 1.2 mg/dL 03/16/18 1449  Estimated creatinine clearance: 46 mL/min    Pepcid 20 mg PO twice daily will be decreased to Pepcid 20 mg PO once daily due to CrCl < 50 ml/min.     Pharmacist's Name: Katherine E Mcardle  Pharmacist's Extension: [phone]

## 2018-03-16 NOTE — ASSESSMENT & PLAN NOTE
- Hypotensive overnight. BP improving -- obtained BP at bedside, 123/66  - Continue Coreg and Amlodipine with parameters  - If becomes hypertensive, restart home Diovan  - Hold HCTZ

## 2018-03-16 NOTE — SUBJECTIVE & OBJECTIVE
Past Medical History:   Diagnosis Date    Anxiety     Dysmetabolic syndrome X     Obesity     Other and unspecified hyperlipidemia     Unspecified essential hypertension     Unspecified hypothyroidism        Past Surgical History:   Procedure Laterality Date    APPENDECTOMY      CHOLECYSTECTOMY      COLONOSCOPY N/A 9/15/2016    Procedure: COLONOSCOPY;  Surgeon: Jose Daniel MD;  Location: [facility];  Service: Endoscopy;  Laterality: N/A;    gastric sleeve      HYSTERECTOMY         Review of patient's allergies indicates:   Allergen Reactions    Tobramycin-dexamethasone      Eye Drops      Amlodipine      Other reaction(s): leg edema       No current facility-administered medications on file prior to encounter.      Current Outpatient Prescriptions on File Prior to Encounter   Medication Sig    ALPRAZolam (XANAX) 1 MG tablet Take 1 tablet (1 mg total) by mouth 2 (two) times daily as needed for Anxiety.    amLODIPine (NORVASC) 10 MG tablet Take 1 tablet (10 mg total) by mouth once daily.    aspirin (ECOTRIN) 81 MG EC tablet Take 81 mg by mouth once daily.    carvedilol (COREG) 12.5 MG tablet Take 1 tablet (12.5 mg total) by mouth 2 (two) times daily with meals.    FLUoxetine (PROZAC) 20 MG capsule Take 1 capsule (20 mg total) by mouth once daily.    hydrALAZINE (APRESOLINE) 10 MG tablet Take 2 tablets (20 mg total) by mouth every 12 (twelve) hours.    levothyroxine (SYNTHROID) 112 MCG tablet Take 1 tablet by mouth  before breakfast    lovastatin (MEVACOR) 40 MG tablet Take 1 tablet by mouth  nightly    triamterene-hydrochlorothiazide 37.5-25 mg (MAXZIDE-25) 37.5-25 mg per tablet Take 1 tablet by mouth once daily.    valsartan (DIOVAN) 160 MG tablet Take 2 tablets (320 mg total) by mouth every evening.     Family History     Problem Relation (Age of Onset)    Cancer Mother, Maternal Grandmother (80)    Colon cancer     Hypertension Father        Social History Main Topics    Smoking  status: Never Smoker    Smokeless tobacco: Never Used    Alcohol use No    Drug use: No    Sexual activity: Yes     Partners: Female     Review of Systems   Constitutional: Positive for fatigue. Negative for activity change, chills, diaphoresis, fever and unexpected weight change.   HENT: Negative for congestion, sore throat and trouble swallowing.    Eyes: Positive for visual disturbance (blurry vision). Negative for photophobia.   Respiratory: Negative for apnea, cough, chest tightness, shortness of breath and wheezing.    Cardiovascular: Negative for chest pain, palpitations and leg swelling.   Gastrointestinal: Negative for abdominal distention, abdominal pain, blood in stool, constipation, diarrhea, nausea and vomiting.   Endocrine: Negative for polydipsia, polyphagia and polyuria.   Genitourinary: Negative for difficulty urinating, dysuria, frequency, hematuria and urgency.   Musculoskeletal: Negative for arthralgias, back pain, gait problem, joint swelling and myalgias.   Skin: Negative for pallor, rash and wound.   Neurological: Negative for dizziness, tremors, seizures, syncope, speech difficulty, weakness, light-headedness, numbness and headaches.   Psychiatric/Behavioral: Positive for decreased concentration. Negative for agitation, confusion, hallucinations and sleep disturbance. The patient is not nervous/anxious.    All other systems reviewed and are negative.    Objective:     Vital Signs (Most Recent):  Temp: 97.5 °F (36.4 °C) (03/16/18 0202)  Pulse: 66 (03/16/18 0202)  Resp: 14 (03/16/18 0202)  BP: (!) 163/71 (03/16/18 0202)  SpO2: 98 % (03/16/18 0202) Vital Signs (24h Range):  Temp:  [97.5 °F (36.4 °C)-98.3 °F (36.8 °C)] 97.5 °F (36.4 °C)  Pulse:  [61-67] 66  Resp:  [14-20] 14  SpO2:  [98 %-100 %] 98 %  BP: (146-168)/(71-88) 163/71     Weight: 77.1 kg (170 lb)  Body mass index is 30.11 kg/m².    Physical Exam   Constitutional: She is oriented to person, place, and time. She appears  well-developed and well-nourished. No distress.   HENT:   Head: Normocephalic and atraumatic.   Eyes: Conjunctivae are normal.   PERRL; EOM intact.   Neck: Normal range of motion. Neck supple. No JVD present.   Cardiovascular: Normal rate, regular rhythm, S1 normal, S2 normal and intact distal pulses.   No extrasystoles are present. Exam reveals no gallop and no friction rub.    No murmur heard.  Pulses:       Radial pulses are 2+ on the right side, and 2+ on the left side.        Dorsalis pedis pulses are 2+ on the right side, and 2+ on the left side.        Posterior tibial pulses are 2+ on the right side, and 2+ on the left side.   Pulmonary/Chest: Effort normal and breath sounds normal. No accessory muscle usage. No tachypnea. No respiratory distress. She has no wheezes. She has no rhonchi. She has no rales.   Abdominal: Soft. Bowel sounds are normal. She exhibits no distension. There is no tenderness. There is no rebound, no guarding and no CVA tenderness.   Musculoskeletal: Normal range of motion. She exhibits no edema, tenderness or deformity.   Neurological: She is alert and oriented to person, place, and time. No cranial nerve deficit or sensory deficit.   Skin: Skin is warm, dry and intact. Capillary refill takes less than 2 seconds. No rash noted. She is not diaphoretic. No cyanosis or erythema.   Psychiatric: She has a normal mood and affect. Her speech is normal and behavior is normal. Cognition and memory are normal.   Nursing note and vitals reviewed.          Significant Labs:   Results for orders placed or performed during the hospital encounter of 03/15/18   CBC auto differential   Result Value Ref Range    WBC 9.25 3.90 - 12.70 K/uL    RBC 4.15 4.00 - 5.40 M/uL    Hemoglobin 12.8 12.0 - 16.0 g/dL    Hematocrit 34.6 (L) 37.0 - 48.5 %    MCV 83 82 - 98 fL    MCH 30.8 27.0 - 31.0 pg    MCHC 37.0 (H) 32.0 - 36.0 g/dL    RDW 12.6 11.5 - 14.5 %    Platelets 347 150 - 350 K/uL    MPV 9.5 9.2 - 12.9 fL     Gran # (ANC) 5.5 1.8 - 7.7 K/uL    Lymph # 2.7 1.0 - 4.8 K/uL    Mono # 0.8 0.3 - 1.0 K/uL    Eos # 0.2 0.0 - 0.5 K/uL    Baso # 0.03 0.00 - 0.20 K/uL    Gran% 59.5 38.0 - 73.0 %    Lymph% 29.6 18.0 - 48.0 %    Mono% 8.4 4.0 - 15.0 %    Eosinophil% 2.4 0.0 - 8.0 %    Basophil% 0.3 0.0 - 1.9 %    Differential Method Automated    Comprehensive metabolic panel   Result Value Ref Range    Sodium 118 (LL) 136 - 145 mmol/L    Potassium 4.2 3.5 - 5.1 mmol/L    Chloride 83 (L) 95 - 110 mmol/L    CO2 23 23 - 29 mmol/L    Glucose 81 70 - 110 mg/dL    BUN, Bld 22 8 - 23 mg/dL    Creatinine 1.2 0.5 - 1.4 mg/dL    Calcium 10.1 8.7 - 10.5 mg/dL    Total Protein 8.5 (H) 6.0 - 8.4 g/dL    Albumin 4.6 3.5 - 5.2 g/dL    Total Bilirubin 0.6 0.1 - 1.0 mg/dL    Alkaline Phosphatase 79 55 - 135 U/L    AST 40 10 - 40 U/L    ALT 17 10 - 44 U/L    Anion Gap 12 8 - 16 mmol/L    eGFR if African American 55 (A) >60 mL/min/1.73 m^2    eGFR if non African American 48 (A) >60 mL/min/1.73 m^2   Magnesium   Result Value Ref Range    Magnesium 2.7 (H) 1.6 - 2.6 mg/dL   Phosphorus   Result Value Ref Range    Phosphorus 3.0 2.7 - 4.5 mg/dL   Urinalysis   Result Value Ref Range    Specimen UA Urine, Clean Catch     Color, UA Yellow Yellow, Straw, Letha    Appearance, UA Clear Clear    pH, UA 6.0 5.0 - 8.0    Specific Gravity, UA <=1.005 (A) 1.005 - 1.030    Protein, UA Negative Negative    Glucose, UA Negative Negative    Ketones, UA Trace (A) Negative    Bilirubin (UA) Negative Negative    Occult Blood UA Negative Negative    Nitrite, UA Negative Negative    Urobilinogen, UA Negative <2.0 EU/dL    Leukocytes, UA 1+ (A) Negative   Urinalysis Microscopic   Result Value Ref Range    WBC, UA 2 0 - 5 /hpf    Microscopic Comment SEE COMMENT       All pertinent labs within the past 24 hours have been reviewed.    Significant Imaging:   Imaging Results          CT Head Without Contrast (Final result)  Result time 03/15/18 23:48:04    Final result by Laron PARKS  MD Chanelle (03/15/18 23:48:04)                 Impression:         No acute findings        All CT scans at this facility use dose modulation, iterative reconstruction, and/or weight based dosing when appropriate to reduce radiation dose to as low as reasonably achievable.       Electronically signed by: MARBIN PALOMINO MD  Date:     03/15/18  Time:    23:48              Narrative:    CT HEAD WITHOUT CONTRAST    Date: 03/15/18 23:30:00    Clinical indication: Blurred vision, malaise and fatigue     Technique:  Standard noncontrast CT scan of the brain. No previous for comparison.     Findings:  There is no evidence of intracranial hemorrhage or acute focal parenchymal abnormality. The ventricular system is normal. The cranium is intact. Visualized paranasal sinuses and mastoid air cells are clear.                             I have reviewed all pertinent imaging results/findings within the past 24 hours.

## 2018-03-16 NOTE — H&P
"[facility] Hospital Medicine  History & Physical    Patient Name: Cleo Ford  MRN: [id number]  Admission Date: 3/16/2018  Attending Physician: Ernesto Burrows MD   Primary Care Provider: Elizabeth Julian MD         Patient information was obtained from patient, past medical records and ER records.     Subjective:     Principal Problem:Hyponatremia    Chief Complaint:   Chief Complaint   Patient presents with    Abnormal Lab     patient sent her by MD for hyonatremia         HPI: Ms. Ford is a 68yo female with a PMHx of HTN, metabolic syndrome, hypothyroidism, and h/o sleeve gastrectomy, who presented to the ED with c/o fatigue that has progressively worsened over the past 1 month.  Associated blurry vision and "foggy headedness".  No aggravating or alleviating factors.  Denies any headache, lethargy, nausea, vomiting, dizziness, gait disturbances, confusion, muscle cramps, or seizures.  She had blood work drawn at PCP's office earlier today, and received a call from the office instructing her to go to ED for low sodium level.  Initial work-up in ED resulted Na 118, Cl 83, BUN 22, cr. 1.2.  Patient reports multiple adjustments to her BP medications since December, including adding and increasing doses of diuretics and valsartan.  Also, patient states she has been on a special liquid cleanse diet where she is drinking several more liters of water per day than usual.  Patient was admitted for severe hyponatremia under Hospital Medicine services.    Past Medical History:   Diagnosis Date    Anxiety     Dysmetabolic syndrome X     Obesity     Other and unspecified hyperlipidemia     Unspecified essential hypertension     Unspecified hypothyroidism        Past Surgical History:   Procedure Laterality Date    APPENDECTOMY      CHOLECYSTECTOMY      COLONOSCOPY N/A 9/15/2016    Procedure: COLONOSCOPY;  Surgeon: Jose Daniel MD;  Location: [facility];  Service: Endoscopy;  Laterality: " N/A;    gastric sleeve      HYSTERECTOMY         Review of patient's allergies indicates:   Allergen Reactions    Tobramycin-dexamethasone      Eye Drops      Amlodipine      Other reaction(s): leg edema       No current facility-administered medications on file prior to encounter.      Current Outpatient Prescriptions on File Prior to Encounter   Medication Sig    ALPRAZolam (XANAX) 1 MG tablet Take 1 tablet (1 mg total) by mouth 2 (two) times daily as needed for Anxiety.    amLODIPine (NORVASC) 10 MG tablet Take 1 tablet (10 mg total) by mouth once daily.    aspirin (ECOTRIN) 81 MG EC tablet Take 81 mg by mouth once daily.    carvedilol (COREG) 12.5 MG tablet Take 1 tablet (12.5 mg total) by mouth 2 (two) times daily with meals.    FLUoxetine (PROZAC) 20 MG capsule Take 1 capsule (20 mg total) by mouth once daily.    hydrALAZINE (APRESOLINE) 10 MG tablet Take 2 tablets (20 mg total) by mouth every 12 (twelve) hours.    levothyroxine (SYNTHROID) 112 MCG tablet Take 1 tablet by mouth  before breakfast    lovastatin (MEVACOR) 40 MG tablet Take 1 tablet by mouth  nightly    triamterene-hydrochlorothiazide 37.5-25 mg (MAXZIDE-25) 37.5-25 mg per tablet Take 1 tablet by mouth once daily.    valsartan (DIOVAN) 160 MG tablet Take 2 tablets (320 mg total) by mouth every evening.     Family History     Problem Relation (Age of Onset)    Cancer Mother, Maternal Grandmother (80)    Colon cancer     Hypertension Father        Social History Main Topics    Smoking status: Never Smoker    Smokeless tobacco: Never Used    Alcohol use No    Drug use: No    Sexual activity: Yes     Partners: Female     Review of Systems   Constitutional: Positive for fatigue. Negative for activity change, chills, diaphoresis, fever and unexpected weight change.   HENT: Negative for congestion, sore throat and trouble swallowing.    Eyes: Positive for visual disturbance (blurry vision). Negative for photophobia.   Respiratory:  Negative for apnea, cough, chest tightness, shortness of breath and wheezing.    Cardiovascular: Negative for chest pain, palpitations and leg swelling.   Gastrointestinal: Negative for abdominal distention, abdominal pain, blood in stool, constipation, diarrhea, nausea and vomiting.   Endocrine: Negative for polydipsia, polyphagia and polyuria.   Genitourinary: Negative for difficulty urinating, dysuria, frequency, hematuria and urgency.   Musculoskeletal: Negative for arthralgias, back pain, gait problem, joint swelling and myalgias.   Skin: Negative for pallor, rash and wound.   Neurological: Negative for dizziness, tremors, seizures, syncope, speech difficulty, weakness, light-headedness, numbness and headaches.   Psychiatric/Behavioral: Positive for decreased concentration. Negative for agitation, confusion, hallucinations and sleep disturbance. The patient is not nervous/anxious.    All other systems reviewed and are negative.    Objective:     Vital Signs (Most Recent):  Temp: 97.5 °F (36.4 °C) (03/16/18 0202)  Pulse: 66 (03/16/18 0202)  Resp: 14 (03/16/18 0202)  BP: (!) 163/71 (03/16/18 0202)  SpO2: 98 % (03/16/18 0202) Vital Signs (24h Range):  Temp:  [97.5 °F (36.4 °C)-98.3 °F (36.8 °C)] 97.5 °F (36.4 °C)  Pulse:  [61-67] 66  Resp:  [14-20] 14  SpO2:  [98 %-100 %] 98 %  BP: (146-168)/(71-88) 163/71     Weight: 77.1 kg (170 lb)  Body mass index is 30.11 kg/m².    Physical Exam   Constitutional: She is oriented to person, place, and time. She appears well-developed and well-nourished. No distress.   HENT:   Head: Normocephalic and atraumatic.   Eyes: Conjunctivae are normal.   PERRL; EOM intact.   Neck: Normal range of motion. Neck supple. No JVD present.   Cardiovascular: Normal rate, regular rhythm, S1 normal, S2 normal and intact distal pulses.   No extrasystoles are present. Exam reveals no gallop and no friction rub.    No murmur heard.  Pulses:       Radial pulses are 2+ on the right side, and 2+ on  the left side.        Dorsalis pedis pulses are 2+ on the right side, and 2+ on the left side.        Posterior tibial pulses are 2+ on the right side, and 2+ on the left side.   Pulmonary/Chest: Effort normal and breath sounds normal. No accessory muscle usage. No tachypnea. No respiratory distress. She has no wheezes. She has no rhonchi. She has no rales.   Abdominal: Soft. Bowel sounds are normal. She exhibits no distension. There is no tenderness. There is no rebound, no guarding and no CVA tenderness.   Musculoskeletal: Normal range of motion. She exhibits no edema, tenderness or deformity.   Neurological: She is alert and oriented to person, place, and time. No cranial nerve deficit or sensory deficit.   Skin: Skin is warm, dry and intact. Capillary refill takes less than 2 seconds. No rash noted. She is not diaphoretic. No cyanosis or erythema.   Psychiatric: She has a normal mood and affect. Her speech is normal and behavior is normal. Cognition and memory are normal.   Nursing note and vitals reviewed.          Significant Labs:   Results for orders placed or performed during the hospital encounter of 03/15/18   CBC auto differential   Result Value Ref Range    WBC 9.25 3.90 - 12.70 K/uL    RBC 4.15 4.00 - 5.40 M/uL    Hemoglobin 12.8 12.0 - 16.0 g/dL    Hematocrit 34.6 (L) 37.0 - 48.5 %    MCV 83 82 - 98 fL    MCH 30.8 27.0 - 31.0 pg    MCHC 37.0 (H) 32.0 - 36.0 g/dL    RDW 12.6 11.5 - 14.5 %    Platelets 347 150 - 350 K/uL    MPV 9.5 9.2 - 12.9 fL    Gran # (ANC) 5.5 1.8 - 7.7 K/uL    Lymph # 2.7 1.0 - 4.8 K/uL    Mono # 0.8 0.3 - 1.0 K/uL    Eos # 0.2 0.0 - 0.5 K/uL    Baso # 0.03 0.00 - 0.20 K/uL    Gran% 59.5 38.0 - 73.0 %    Lymph% 29.6 18.0 - 48.0 %    Mono% 8.4 4.0 - 15.0 %    Eosinophil% 2.4 0.0 - 8.0 %    Basophil% 0.3 0.0 - 1.9 %    Differential Method Automated    Comprehensive metabolic panel   Result Value Ref Range    Sodium 118 (LL) 136 - 145 mmol/L    Potassium 4.2 3.5 - 5.1 mmol/L     Chloride 83 (L) 95 - 110 mmol/L    CO2 23 23 - 29 mmol/L    Glucose 81 70 - 110 mg/dL    BUN, Bld 22 8 - 23 mg/dL    Creatinine 1.2 0.5 - 1.4 mg/dL    Calcium 10.1 8.7 - 10.5 mg/dL    Total Protein 8.5 (H) 6.0 - 8.4 g/dL    Albumin 4.6 3.5 - 5.2 g/dL    Total Bilirubin 0.6 0.1 - 1.0 mg/dL    Alkaline Phosphatase 79 55 - 135 U/L    AST 40 10 - 40 U/L    ALT 17 10 - 44 U/L    Anion Gap 12 8 - 16 mmol/L    eGFR if African American 55 (A) >60 mL/min/1.73 m^2    eGFR if non African American 48 (A) >60 mL/min/1.73 m^2   Magnesium   Result Value Ref Range    Magnesium 2.7 (H) 1.6 - 2.6 mg/dL   Phosphorus   Result Value Ref Range    Phosphorus 3.0 2.7 - 4.5 mg/dL   Urinalysis   Result Value Ref Range    Specimen UA Urine, Clean Catch     Color, UA Yellow Yellow, Straw, Letha    Appearance, UA Clear Clear    pH, UA 6.0 5.0 - 8.0    Specific Gravity, UA <=1.005 (A) 1.005 - 1.030    Protein, UA Negative Negative    Glucose, UA Negative Negative    Ketones, UA Trace (A) Negative    Bilirubin (UA) Negative Negative    Occult Blood UA Negative Negative    Nitrite, UA Negative Negative    Urobilinogen, UA Negative <2.0 EU/dL    Leukocytes, UA 1+ (A) Negative   Urinalysis Microscopic   Result Value Ref Range    WBC, UA 2 0 - 5 /hpf    Microscopic Comment SEE COMMENT       All pertinent labs within the past 24 hours have been reviewed.    Significant Imaging:   Imaging Results          CT Head Without Contrast (Final result)  Result time 03/15/18 23:48:04    Final result by Laron Roca MD (03/15/18 23:48:04)                 Impression:         No acute findings        All CT scans at this facility use dose modulation, iterative reconstruction, and/or weight based dosing when appropriate to reduce radiation dose to as low as reasonably achievable.       Electronically signed by: LARON ROCA MD  Date:     03/15/18  Time:    23:48              Narrative:    CT HEAD WITHOUT CONTRAST    Date: 03/15/18 23:30:00    Clinical  indication: Blurred vision, malaise and fatigue     Technique:  Standard noncontrast CT scan of the brain. No previous for comparison.     Findings:  There is no evidence of intracranial hemorrhage or acute focal parenchymal abnormality. The ventricular system is normal. The cranium is intact. Visualized paranasal sinuses and mastoid air cells are clear.                             I have reviewed all pertinent imaging results/findings within the past 24 hours.        Assessment/Plan:     * Severe hyponatremia    - Possibly secondary to excessive free water intake and HCTZ + ARB.  - Initial Na 118.  Denies HA, lethargy, N/V, dizziness, AMS, or seizures.   - 0.9% NS @ 75mL/hr.  - NPO; Free water restriction.  - Hold ARB and HCTZ.  - Follow serial BMP.  - Check urine osmolality + Na.  - Neuro checks.  - Nephrology consult in AM.        ENMA (acute kidney injury)    - Kidney function progressively worsening since adding HCTZ + ARB in 12/2017, will hold for now.  - IVF's as above.  - Strict I&O's.  - Follow BMP.        Metabolic syndrome    - Antihypertensives as above.  - Continue statin therapy.  - HbA1c 5.2.  - Continue lifestyle modifications.        Hypothyroidism    - TSH 2.346.  - Continue Synthroid.        Essential hypertension    - BP stable.  - Continue home Coreg and amlodipine.  Monitor BP trends and optimize as needed.  - ARB and diuretic on hold as above.          VTE Risk Mitigation         Ordered     enoxaparin injection 40 mg  Daily     Route:  Subcutaneous        03/16/18 0205     IP VTE LOW RISK PATIENT  Once      03/16/18 0205     Place sequential compression device  Until discontinued      03/16/18 0205             KJ Varghese  Department of Hospital Medicine   [facility]

## 2018-03-16 NOTE — HPI
"Ms. Ford is a 68yo female with a PMHx of HTN, metabolic syndrome, hypothyroidism, and h/o sleeve gastrectomy, who presented to the ED with c/o fatigue that has progressively worsened over the past 1 month.  Associated blurry vision and "foggy headedness".  No aggravating or alleviating factors.  Denies any headache, lethargy, nausea, vomiting, dizziness, gait disturbances, confusion, muscle cramps, or seizures.  She had blood work drawn at PCP's office earlier today, and received a call from the office instructing her to go to ED for low sodium level.  Initial work-up in ED resulted Na 118, Cl 83, BUN 22, cr. 1.2.  Patient reports multiple adjustments to her BP medications since December, including adding and increasing doses of diuretics and valsartan.  Also, patient states she has been on a special liquid cleanse diet where she is drinking several more liters of water per day than usual.  Patient was admitted for severe hyponatremia under Hospital Medicine services.  "

## 2018-03-16 NOTE — ED NOTES
Pt up to floor with this rn. Pt remained stable throughout transport. Pt ambulated with steady gait to bed. Report given to lilliana ariza for room 204.

## 2018-03-16 NOTE — CONSULTS
[facility]   Nephrology  Consult Note    Patient Name: Cleo Ford  MRN: [id number]  Admission Date: 3/15/2018  Hospital Length of Stay: 0 days  Attending Provider: Nigel Borges MD   Primary Care Physician: Elizabeth Julian MD  Principal Problem:Hyponatremia     Referring physician: Dr. Nigel Borges  Reason for consult: hyponatremia    Consults  Subjective:     HPI: Pt was seen and examined. Chart and h/o reviewed. Discussed with PCP. Pt is a 68 y/o female who was admitted with hyponatremia. Noted pt has background wt issues with past h/o of abdominal surgery for wt loss. Pt is constantly pre-occupied with her wt and applies diet and liquids to control her wt. Pt reports having developed vague sx's related to tiredness and fatigue and some minor walking issues resulting in tripping. Pt currently feels better. treatment and meds reviewed. Noted pt was on an SSRI until 3 wks ago. She says that due to wt gain, pt and her PCP were tapering prozac slowly. Pt also was taking HCTZ. In addition, pt drinks very large amounts of water, and in the past 4 day prior to admission, pt was not eating at all and was using a diet consisting only of variety of juices. Pt has no pertinent h/o of heart, kidney, or liver disease. She does have hypothyroidism and takes synthroid. TSH is normal.    Past Medical History:   Diagnosis Date    Anxiety     Dysmetabolic syndrome X     Obesity     Other and unspecified hyperlipidemia     Unspecified essential hypertension     Unspecified hypothyroidism        Past Surgical History:   Procedure Laterality Date    APPENDECTOMY      CHOLECYSTECTOMY      COLONOSCOPY N/A 9/15/2016    Procedure: COLONOSCOPY;  Surgeon: Jose Daniel MD;  Location: [facility];  Service: Endoscopy;  Laterality: N/A;    gastric sleeve      HYSTERECTOMY         Review of patient's allergies indicates:   Allergen Reactions    Tobramycin-dexamethasone      Eye Drops      Amlodipine       Other reaction(s): leg edema     Current Facility-Administered Medications   Medication Frequency    0.9%  NaCl infusion Continuous    acetaminophen tablet 650 mg Q6H PRN    aspirin EC tablet 81 mg Daily    enoxaparin injection 40 mg Daily    hydrALAZINE injection 10 mg Q8H PRN    levothyroxine tablet 112 mcg Before breakfast    lovastatin tablet 40 mg Nightly    ondansetron injection 4 mg Q6H PRN     Family History     Problem Relation (Age of Onset)    Cancer Mother, Maternal Grandmother (80)    Colon cancer     Hypertension Father        Social History Main Topics    Smoking status: Never Smoker    Smokeless tobacco: Never Used    Alcohol use No    Drug use: No    Sexual activity: Yes     Partners: Female     Review of Systems   Constitutional: Positive for activity change and fatigue.   HENT: Negative.    Respiratory: Negative.    Cardiovascular: Negative.    Gastrointestinal: Negative.    Genitourinary: Negative.    Musculoskeletal: Negative.    Neurological: Negative.    Psychiatric/Behavioral: Negative.      Objective:     Vital Signs (Most Recent):  Temp: 97.8 °F (36.6 °C) (03/16/18 0730)  Pulse: (!) 59 (03/16/18 0730)  Resp: 18 (03/16/18 0730)  BP: (!) 85/49 (03/16/18 0730)  SpO2: 96 % (03/16/18 0730)  O2 Device (Oxygen Therapy): room air (03/16/18 0730) Vital Signs (24h Range):  Temp:  [96.2 °F (35.7 °C)-98.3 °F (36.8 °C)] 97.8 °F (36.6 °C)  Pulse:  [59-67] 59  Resp:  [14-20] 18  SpO2:  [96 %-100 %] 96 %  BP: ()/(41-88) 85/49     Weight: 77.4 kg (170 lb 10.2 oz) (03/16/18 0202)  Body mass index is 30.23 kg/m².  Body surface area is 1.85 meters squared.    No intake/output data recorded.    Physical Exam   Constitutional: She is oriented to person, place, and time. She appears well-developed and well-nourished.   HENT:   Head: Normocephalic and atraumatic.   Neck: No JVD present.   Cardiovascular: Normal rate, regular rhythm and normal heart sounds.  Exam reveals no friction rub.     Pulmonary/Chest: Effort normal and breath sounds normal. No respiratory distress. She has no rales.   Abdominal: Soft. She exhibits no distension.   Musculoskeletal: She exhibits no edema.   Neurological: She is alert and oriented to person, place, and time.   Skin: Skin is warm and dry.   Psychiatric: She has a normal mood and affect. Her behavior is normal. Thought content normal.   Nursing note and vitals reviewed.      Significant Labs: reviewed  BMP  Lab Results   Component Value Date     (L) 03/16/2018    K 3.9 03/16/2018    CL 90 (L) 03/16/2018    CO2 22 (L) 03/16/2018    BUN 23 03/16/2018    CREATININE 1.2 03/16/2018    CALCIUM 9.3 03/16/2018    ANIONGAP 10 03/16/2018    ESTGFRAFRICA 55 (A) 03/16/2018    EGFRNONAA 48 (A) 03/16/2018     S na on admit 118    Significant Imaging: CXR reviewed, clear lung fields  CT head unremarkable    Assessment/Plan:   66 y/o female with hyponatremia:    * Severe hyponatremia    Renal: Hyponatremia. Chronic  Slow development  Mild, subtle symptoms  Euvolemic  Hyponatremia occurred as a result of background SSRI and HCTZ and pt not having a normal diet.  Pt drinks water excessively, polydipsia  In addition, pt drinks juices instead of eating solid food more more osmoles.  Discussed with pt in details  Reason for her hyponatremia explained in layman's language  Agree with holding HCTZ, do not re-start on d/c  Encourage PO feed of food  Fluid restrict 1 L/day  May d/c NS IVF's  Will repeat BMP  Opportunity for questions and discussion provided.        Plans and recommendations:  As detailed above  Total time spent 70 minutes including time needed to review the records, the   patient evaluation, documentation, face-to-face discussion with the patient,   more than 50% of the time was spent on coordination of care and counseling.    Level V visit.      Lenore Murphy MD  Nephrology  [facility]

## 2018-03-16 NOTE — ASSESSMENT & PLAN NOTE
- BP stable.  - Continue home Coreg and amlodipine.  Monitor BP trends and optimize as needed.  - ARB and diuretic on hold as above.

## 2018-03-16 NOTE — PLAN OF CARE
CM met with patient at the bedside to assess for discharge needs.  Patient lives at home with her .  She is independent with needs and does not have any anticipated discharge needs.  CM provided a transitional care folder, information on advanced directives, information on pharmacy bedside delivery, and discharge planning begins on admission with contact information for BRIAN Marte    D/C Plan:  Home with no needs    CM handed off to BRIAN Marte     03/16/18 3905   Discharge Assessment   Assessment Type Discharge Planning Assessment   Confirmed/corrected address and phone number on facesheet? Yes   Assessment information obtained from? Patient;Medical Record   Expected Length of Stay (days) (TBD)   Communicated expected length of stay with patient/caregiver yes   Prior to hospitilization cognitive status: Alert/Oriented   Prior to hospitalization functional status: Independent   Current cognitive status: Alert/Oriented   Current Functional Status: Independent   Facility Arrived From: home    Lives With spouse   Able to Return to Prior Arrangements yes   Is patient able to care for self after discharge? Yes   Who are your caregiver(s) and their phone number(s)? Patient is independent.  Henrry Ford, spouse [phone]   Patient's perception of discharge disposition home or selfcare   Readmission Within The Last 30 Days no previous admission in last 30 days   Patient currently being followed by outpatient case management? No   Patient currently receives any other outside agency services? No   Equipment Currently Used at Home none   Do you have any problems affording any of your prescribed medications? No   Is the patient taking medications as prescribed? yes   Does the patient have transportation home? Yes   Transportation Available family or friend will provide;car   Dialysis Name and Scheduled days na   Does the patient receive services at the [facility]mad[facility] Clinic? No   Discharge Plan A Home with family   Discharge  Plan B Home with family   Patient/Family In Agreement With Plan yes

## 2018-03-16 NOTE — ASSESSMENT & PLAN NOTE
- Kidney function progressively worsening since adding HCTZ + ARB in 12/2017, will hold for now.  - IVF's as above.  - Strict I&O's.  - Follow BMP.

## 2018-03-16 NOTE — PLAN OF CARE
Problem: Patient Care Overview  Goal: Plan of Care Review  Outcome: Ongoing (interventions implemented as appropriate)  Pt stable. NSR on heart monitor. NS @ 75 ml/hr through PIV, no redness, warmth or swelling noted. Urine sample obtained. Aware of frequent lab draws to monitor electrolytes. Neuro checks done every 4 hours as ordered. NPO. VSS, remains free of injury or trauma. Bed in low position, nonskid socks, call light in reach,  at bedside. Instructed to call for assistance. Plan of care reviewed with patient, verbalized understanding.

## 2018-03-16 NOTE — ASSESSMENT & PLAN NOTE
- Possibly secondary to excessive free water intake and HCTZ + ARB.  - Initial Na 118.  Denies HA, lethargy, N/V, dizziness, AMS, or seizures.   - 0.9% NS @ 75mL/hr.  - NPO; Free water restriction.  - Hold ARB and HCTZ.  - Follow serial BMP.  - Check urine osmolality + Na.  - Neuro checks.  - Nephrology consult in AM.

## 2018-03-16 NOTE — SUBJECTIVE & OBJECTIVE
Past Medical History:   Diagnosis Date    Anxiety     Dysmetabolic syndrome X     Obesity     Other and unspecified hyperlipidemia     Unspecified essential hypertension     Unspecified hypothyroidism        Past Surgical History:   Procedure Laterality Date    APPENDECTOMY      CHOLECYSTECTOMY      COLONOSCOPY N/A 9/15/2016    Procedure: COLONOSCOPY;  Surgeon: Jose Daniel MD;  Location: [facility];  Service: Endoscopy;  Laterality: N/A;    gastric sleeve      HYSTERECTOMY         Review of patient's allergies indicates:   Allergen Reactions    Tobramycin-dexamethasone      Eye Drops      Amlodipine      Other reaction(s): leg edema     Current Facility-Administered Medications   Medication Frequency    0.9%  NaCl infusion Continuous    acetaminophen tablet 650 mg Q6H PRN    aspirin EC tablet 81 mg Daily    enoxaparin injection 40 mg Daily    hydrALAZINE injection 10 mg Q8H PRN    levothyroxine tablet 112 mcg Before breakfast    lovastatin tablet 40 mg Nightly    ondansetron injection 4 mg Q6H PRN     Family History     Problem Relation (Age of Onset)    Cancer Mother, Maternal Grandmother (80)    Colon cancer     Hypertension Father        Social History Main Topics    Smoking status: Never Smoker    Smokeless tobacco: Never Used    Alcohol use No    Drug use: No    Sexual activity: Yes     Partners: Female     Review of Systems   Constitutional: Positive for activity change and fatigue.   HENT: Negative.    Respiratory: Negative.    Cardiovascular: Negative.    Gastrointestinal: Negative.    Genitourinary: Negative.    Musculoskeletal: Negative.    Neurological: Negative.    Psychiatric/Behavioral: Negative.      Objective:     Vital Signs (Most Recent):  Temp: 97.8 °F (36.6 °C) (03/16/18 0730)  Pulse: (!) 59 (03/16/18 0730)  Resp: 18 (03/16/18 0730)  BP: (!) 85/49 (03/16/18 0730)  SpO2: 96 % (03/16/18 0730)  O2 Device (Oxygen Therapy): room air (03/16/18 0730) Vital Signs (24h  Range):  Temp:  [96.2 °F (35.7 °C)-98.3 °F (36.8 °C)] 97.8 °F (36.6 °C)  Pulse:  [59-67] 59  Resp:  [14-20] 18  SpO2:  [96 %-100 %] 96 %  BP: ()/(41-88) 85/49     Weight: 77.4 kg (170 lb 10.2 oz) (03/16/18 0202)  Body mass index is 30.23 kg/m².  Body surface area is 1.85 meters squared.    No intake/output data recorded.    Physical Exam   Constitutional: She is oriented to person, place, and time. She appears well-developed and well-nourished.   HENT:   Head: Normocephalic and atraumatic.   Neck: No JVD present.   Cardiovascular: Normal rate, regular rhythm and normal heart sounds.  Exam reveals no friction rub.    Pulmonary/Chest: Effort normal and breath sounds normal. No respiratory distress. She has no rales.   Abdominal: Soft. She exhibits no distension.   Musculoskeletal: She exhibits no edema.   Neurological: She is alert and oriented to person, place, and time.   Skin: Skin is warm and dry.   Psychiatric: She has a normal mood and affect. Her behavior is normal. Thought content normal.   Nursing note and vitals reviewed.      Significant Labs: reviewed  BMP  Lab Results   Component Value Date     (L) 03/16/2018    K 3.9 03/16/2018    CL 90 (L) 03/16/2018    CO2 22 (L) 03/16/2018    BUN 23 03/16/2018    CREATININE 1.2 03/16/2018    CALCIUM 9.3 03/16/2018    ANIONGAP 10 03/16/2018    ESTGFRAFRICA 55 (A) 03/16/2018    EGFRNONAA 48 (A) 03/16/2018     S na on admit 118    Significant Imaging: CXR reviewed, clear lung fields  CT head unremarkable

## 2018-03-17 VITALS
HEART RATE: 63 BPM | SYSTOLIC BLOOD PRESSURE: 114 MMHG | TEMPERATURE: 98 F | WEIGHT: 181.19 LBS | HEIGHT: 63 IN | OXYGEN SATURATION: 95 % | DIASTOLIC BLOOD PRESSURE: 55 MMHG | RESPIRATION RATE: 18 BRPM | BODY MASS INDEX: 32.11 KG/M2

## 2018-03-17 PROBLEM — N17.9 AKI (ACUTE KIDNEY INJURY): Status: RESOLVED | Noted: 2018-03-16 | Resolved: 2018-03-17

## 2018-03-17 LAB
ANION GAP SERPL CALC-SCNC: 5 MMOL/L
BASOPHILS # BLD AUTO: 0.04 K/UL
BASOPHILS NFR BLD: 0.6 %
BUN SERPL-MCNC: 22 MG/DL
CALCIUM SERPL-MCNC: 9.1 MG/DL
CHLORIDE SERPL-SCNC: 98 MMOL/L
CO2 SERPL-SCNC: 27 MMOL/L
CREAT SERPL-MCNC: 1.1 MG/DL
DIFFERENTIAL METHOD: ABNORMAL
EOSINOPHIL # BLD AUTO: 0.1 K/UL
EOSINOPHIL NFR BLD: 2.1 %
ERYTHROCYTE [DISTWIDTH] IN BLOOD BY AUTOMATED COUNT: 13 %
EST. GFR  (AFRICAN AMERICAN): >60 ML/MIN/1.73 M^2
EST. GFR  (NON AFRICAN AMERICAN): 53 ML/MIN/1.73 M^2
GLUCOSE SERPL-MCNC: 84 MG/DL
HCT VFR BLD AUTO: 30 %
HGB BLD-MCNC: 10.6 G/DL
LYMPHOCYTES # BLD AUTO: 2.5 K/UL
LYMPHOCYTES NFR BLD: 37.2 %
MCH RBC QN AUTO: 30.5 PG
MCHC RBC AUTO-ENTMCNC: 35.3 G/DL
MCV RBC AUTO: 87 FL
MONOCYTES # BLD AUTO: 0.7 K/UL
MONOCYTES NFR BLD: 10.7 %
NEUTROPHILS # BLD AUTO: 3.3 K/UL
NEUTROPHILS NFR BLD: 49.4 %
PLATELET # BLD AUTO: 227 K/UL
PMV BLD AUTO: 9.5 FL
POTASSIUM SERPL-SCNC: 3.9 MMOL/L
RBC # BLD AUTO: 3.47 M/UL
SODIUM SERPL-SCNC: 130 MMOL/L
WBC # BLD AUTO: 6.62 K/UL

## 2018-03-17 PROCEDURE — 99233 SBSQ HOSP IP/OBS HIGH 50: CPT | Mod: ,,, | Performed by: INTERNAL MEDICINE

## 2018-03-17 PROCEDURE — 25000003 PHARM REV CODE 250: Performed by: FAMILY MEDICINE

## 2018-03-17 PROCEDURE — 85025 COMPLETE CBC W/AUTO DIFF WBC: CPT

## 2018-03-17 PROCEDURE — 25000003 PHARM REV CODE 250: Performed by: NURSE PRACTITIONER

## 2018-03-17 PROCEDURE — 80048 BASIC METABOLIC PNL TOTAL CA: CPT

## 2018-03-17 PROCEDURE — 36415 COLL VENOUS BLD VENIPUNCTURE: CPT

## 2018-03-17 RX ADMIN — LEVOTHYROXINE SODIUM 112 MCG: 112 TABLET ORAL at 05:03

## 2018-03-17 RX ADMIN — ASPIRIN 81 MG: 81 TABLET, COATED ORAL at 08:03

## 2018-03-17 RX ADMIN — FAMOTIDINE 20 MG: 20 TABLET ORAL at 08:03

## 2018-03-17 NOTE — DISCHARGE SUMMARY
"[facility] Hospital Medicine  Discharge Summary      Patient Name: Cleo Ford  MRN: [id number]  Admission Date: 3/15/2018  Hospital Length of Stay: 1 days  Discharge Date and Time:  03/17/2018 1:47 PM  Attending Physician: No att. providers found   Discharging Provider: Jonathon Mccall NP  Primary Care Provider: Elizabeth Julian MD      HPI:   Ms. Ford is a 66yo female with a PMHx of HTN, metabolic syndrome, hypothyroidism, and h/o sleeve gastrectomy, who presented to the ED with c/o fatigue that has progressively worsened over the past 1 month.  Associated blurry vision and "foggy headedness".  No aggravating or alleviating factors.  Denies any headache, lethargy, nausea, vomiting, dizziness, gait disturbances, confusion, muscle cramps, or seizures.  She had blood work drawn at PCP's office earlier today, and received a call from the office instructing her to go to ED for low sodium level.  Initial work-up in ED resulted Na 118, Cl 83, BUN 22, cr. 1.2.  Patient reports multiple adjustments to her BP medications since December, including adding and increasing doses of diuretics and valsartan.  Also, patient states she has been on a special liquid cleanse diet where she is drinking several more liters of water per day than usual.  Patient was admitted for severe hyponatremia under Hospital Medicine services.    * No surgery found *      Hospital Course:   Cleo Ford is a 67 year old female admitted for Dilutional hyponatremia. Na+ level upon admit was 118. Associated symptoms include: fatigue, weakness, and feeling "dazed" for 3 weeks. Pt reports increasing water intake and started "juicing" on Monday, 03/12/18. Pt reports drinking almost 6-8 16 oz of water a day 3-4 days prior to admit. Recently tapered off Paxil by PCP. She also reports taking HCTZ but has not really been diuresing as much. Nephrology consulted -- hyponatremia occurred as a result of SSRI (tapered off recently), " HCTZ, and not having a normal diet. Continue to hold HCTZ and do not restart on discharge. Fluid restriction of 1000mL. Na+ level improved to 122 s/p IVF. PO intake encouraged. 3/17/18 The patient reports improvement in symptoms overnight. The patients Na level increased to 130 today. The patient was seen and examined today and deemed stable for discharge. The HCTZ will not be restarted on discharge. The patient will follow up with her PCP.      Consults:   Consults         Status Ordering Provider     Inpatient consult to Nephrology  Once     Provider:  Lenore Murphy MD    Acknowledged SANDRA LUWDIG          No new Assessment & Plan notes have been filed under this hospital service since the last note was generated.  Service: Hospital Medicine    Final Active Diagnoses:    Diagnosis Date Noted POA    PRINCIPAL PROBLEM:  Dilutional hyponatremia [E87.1] 03/16/2018 Yes    Essential hypertension [I10]  Yes     Chronic    Hypothyroidism [E03.9]  Yes     Chronic      Problems Resolved During this Admission:    Diagnosis Date Noted Date Resolved POA    Metabolic syndrome [E88.81] 03/16/2018 03/16/2018 Yes     Chronic    ENMA (acute kidney injury) [N17.9] 03/16/2018 03/17/2018 Yes       Discharged Condition: stable    Disposition: Home or Self Care    Follow Up:  Follow-up Information     Elizabeth Julian MD. Schedule an appointment as soon as possible for a visit in 3 days.    Specialty:  Family Medicine  Why:  With a BMP   Contact information:  [id number] [facility]  [address]  [facility] [address] [phone]                 Patient Instructions:   No discharge procedures on file.    Significant Diagnostic Studies:   Imaging Results          CT Head Without Contrast (Final result)  Result time 03/15/18 23:48:04    Final result by Laron Roca MD (03/15/18 23:48:04)                 Impression:         No acute findings        All CT scans at this facility use dose modulation, iterative reconstruction, and/or  weight based dosing when appropriate to reduce radiation dose to as low as reasonably achievable.       Electronically signed by: MARBIN PALOMINO MD  Date:     03/15/18  Time:    23:48              Narrative:    CT HEAD WITHOUT CONTRAST    Date: 03/15/18 23:30:00    Clinical indication: Blurred vision, malaise and fatigue     Technique:  Standard noncontrast CT scan of the brain. No previous for comparison.     Findings:  There is no evidence of intracranial hemorrhage or acute focal parenchymal abnormality. The ventricular system is normal. The cranium is intact. Visualized paranasal sinuses and mastoid air cells are clear.                              Pending Diagnostic Studies:     None         Medications:  Reconciled Home Medications:   Discharge Medication List as of 3/17/2018 12:10 PM      CONTINUE these medications which have NOT CHANGED    Details   ALPRAZolam (XANAX) 1 MG tablet Take 1 tablet (1 mg total) by mouth 2 (two) times daily as needed for Anxiety., Starting Mon 3/5/2018, Normal      amLODIPine (NORVASC) 10 MG tablet Take 1 tablet (10 mg total) by mouth once daily., Starting Tue 1/30/2018, Until Wed 1/30/2019, Normal      aspirin (ECOTRIN) 81 MG EC tablet Take 81 mg by mouth once daily., Until Discontinued, Historical Med      carvedilol (COREG) 12.5 MG tablet Take 1 tablet (12.5 mg total) by mouth 2 (two) times daily with meals., Starting Tue 2/6/2018, Until Wed 2/6/2019, Normal      FLUoxetine (PROZAC) 20 MG capsule Take 1 capsule (20 mg total) by mouth once daily., Starting Fri 2/23/2018, Until Sat 2/23/2019, Normal      hydrALAZINE (APRESOLINE) 10 MG tablet Take 2 tablets (20 mg total) by mouth every 12 (twelve) hours., Starting Mon 11/20/2017, Until Tue 11/20/2018, Normal      levothyroxine (SYNTHROID) 112 MCG tablet Take 1 tablet by mouth  before breakfast, Normal      lovastatin (MEVACOR) 40 MG tablet Take 1 tablet by mouth  nightly, Normal      valsartan (DIOVAN) 160 MG tablet Take 2  tablets (320 mg total) by mouth every evening., Starting Tue 2/27/2018, Until Wed 2/27/2019, No Print         STOP taking these medications       triamterene-hydrochlorothiazide 37.5-25 mg (MAXZIDE-25) 37.5-25 mg per tablet Comments:   Reason for Stopping:               Indwelling Lines/Drains at time of discharge:   Lines/Drains/Airways          No matching active lines, drains, or airways          Time spent on the discharge of patient: > 30 minutes  Patient was seen and examined on the date of discharge and determined to be suitable for discharge.         Jonathon Mccall NP  Department of Hospital Medicine  [facility]

## 2018-03-17 NOTE — PLAN OF CARE
Problem: Patient Care Overview  Goal: Plan of Care Review  Patient is resting in bed. No complaints at this time. PIV intact. Saline locked. Medications reviewed. Fall precautions in place. Non skid socks on. Hourly rounding completed. SR on tele monitor. Will continue to monitor patient.

## 2018-03-17 NOTE — PLAN OF CARE
Problem: Patient Care Overview  Goal: Plan of Care Review  Outcome: Outcome(s) achieved Date Met: 03/17/18  Pt remains free of falls and free of injury. Pt being discharged this afternoon; verbalizes that she feels better and stronger than she has in several days.   Bed in low position, side rails up x2, call light in reach.

## 2018-03-17 NOTE — PROGRESS NOTES
[facility]   Nephrology  Progress Note    Patient Name: Cleo Ford  MRN: [id number]  Admission Date: 3/15/2018  Hospital Length of Stay: 1 days  Attending Provider: No att. providers found   Primary Care Physician: Elizabeth Julian MD  Principal Problem:Dilutional hyponatremia    Subjective:     HPI: Pt was seen and examined. Chart and h/o reviewed. Discussed with PCP. Pt is a 66 y/o female who was admitted with hyponatremia. Noted pt has background wt issues with past h/o of abdominal surgery for wt loss. Pt is constantly pre-occupied with her wt and applies diet and liquids to control her wt. Pt reports having developed vague sx's related to tiredness and fatigue and some minor walking issues resulting in tripping. Pt currently feels better. treatment and meds reviewed. Noted pt was on an SSRI until 3 wks ago. She says that due to wt gain, pt and her PCP were tapering prozac slowly. Pt also was taking HCTZ. In addition, pt drinks very large amounts of water, and in the past 4 day prior to admission, pt was not eating at all and was using a diet consisting only of variety of juices. Pt has no pertinent h/o of heart, kidney, or liver disease. She does have hypothyroidism and takes synthroid. TSH is normal.    Interval History: Pt was seen and examined this am before d/c home. No new events last pm, stable since last visit. Has no c/o's this am. No SOB, no leg swelling. Has adhered to fluid restriction.    Review of patient's allergies indicates:   Allergen Reactions    Tobramycin-dexamethasone      Eye Drops      Amlodipine      Other reaction(s): leg edema     Current Facility-Administered Medications   Medication Frequency    acetaminophen tablet 650 mg Q6H PRN    ALPRAZolam tablet 0.5 mg BID PRN    aspirin EC tablet 81 mg Daily    enoxaparin injection 40 mg Daily    famotidine tablet 20 mg Daily    hydrALAZINE injection 10 mg Q8H PRN    levothyroxine tablet 112 mcg Before breakfast     lovastatin tablet 40 mg Nightly    ondansetron injection 4 mg Q6H PRN     Current Outpatient Prescriptions   Medication    ALPRAZolam (XANAX) 1 MG tablet    amLODIPine (NORVASC) 10 MG tablet    aspirin (ECOTRIN) 81 MG EC tablet    carvedilol (COREG) 12.5 MG tablet    FLUoxetine (PROZAC) 20 MG capsule    hydrALAZINE (APRESOLINE) 10 MG tablet    levothyroxine (SYNTHROID) 112 MCG tablet    lovastatin (MEVACOR) 40 MG tablet    valsartan (DIOVAN) 160 MG tablet       Objective:     Vital Signs (Most Recent):  Temp: 98 °F (36.7 °C) (03/17/18 0717)  Pulse: 63 (03/17/18 1112)  Resp: 18 (03/17/18 0717)  BP: (!) 114/55 (03/17/18 0717)  SpO2: 95 % (03/17/18 0717)  O2 Device (Oxygen Therapy): room air (03/17/18 0717) Vital Signs (24h Range):  Temp:  [97.7 °F (36.5 °C)-98.6 °F (37 °C)] 98 °F (36.7 °C)  Pulse:  [63-71] 63  Resp:  [18-20] 18  SpO2:  [95 %-99 %] 95 %  BP: (111-130)/(55-63) 114/55     Weight: 82.2 kg (181 lb 3.5 oz) (03/17/18 0405)  Body mass index is 32.1 kg/m².  Body surface area is 1.91 meters squared.    I/O last 3 completed shifts:  In: 1620 [P.O.:720; I.V.:900]  Out: -     Physical Exam   Constitutional: She is oriented to person, place, and time. She appears well-developed and well-nourished.   HENT:   Head: Normocephalic and atraumatic.   Neck: No JVD present.   Cardiovascular: Normal rate, regular rhythm and normal heart sounds.  Exam reveals no friction rub.    Pulmonary/Chest: Effort normal and breath sounds normal. No respiratory distress. She has no rales.   Abdominal: Soft. She exhibits no distension.   Musculoskeletal: She exhibits no edema.   Neurological: She is alert and oriented to person, place, and time.   Skin: Skin is warm and dry.   Psychiatric: She has a normal mood and affect. Her behavior is normal. Thought content normal.   Nursing note and vitals reviewed.      Significant Labs: reviewed  BMP  Lab Results   Component Value Date     (L) 03/17/2018    K 3.9 03/17/2018     CL 98 03/17/2018    CO2 27 03/17/2018    BUN 22 03/17/2018    CREATININE 1.1 03/17/2018    CALCIUM 9.1 03/17/2018    ANIONGAP 5 (L) 03/17/2018    ESTGFRAFRICA >60 03/17/2018    EGFRNONAA 53 (A) 03/17/2018         Significant Imaging: reviewed    Assessment/Plan:         66 y/o female with hyponatremia:         * Severe hyponatremia     Renal: Hyponatremia. Chronic.  Slowly corrected. s Na has improved. The correction was achieved gradually.  Asymptomatic  Hemodynamically stable  Remains euvolemic    Hyponatremia due to SSRI and HCTZ and pt not having a normal diet.  Pt drinks water excessively, polydipsia  In addition, pt drinks juices instead of eating solid food more more osmoles.  Discussed with pt in details again today  OK to d/c home  Advised pt not to re-start HCTZ  Pt already has stopped the SSRI (was done by PCP about 3 wks ago)  Limit water intake to about 1 L/day  Include solid food in diet  Opportunity for questions and discussion provided.          Plans and recommendations:  As detailed above  Renal clinic f/u in 1-2 wks recommended.            Lenore Murphy MD  Nephrology  [facility]

## 2018-03-17 NOTE — ASSESSMENT & PLAN NOTE
66 y/o female with hyponatremia:         * Severe hyponatremia     Renal: Hyponatremia. Chronic  Slow development  Mild, subtle symptoms  Euvolemic  Hyponatremia occurred as a result of background SSRI and HCTZ and pt not having a normal diet.  Pt drinks water excessively, polydipsia  In addition, pt drinks juices instead of eating solid food more more osmoles.  Discussed with pt in details  Reason for her hyponatremia explained in layman's language  Agree with holding HCTZ, do not re-start on d/c  Encourage PO feed of food  Fluid restrict 1 L/day  May d/c NS IVF's  Will repeat BMP  Opportunity for questions and discussion provided.          Plans and recommendations:  As detailed above  Total time spent 70 minutes including time needed to review the records, the   patient evaluation, documentation, face-to-face discussion with the patient,   more than 50% of the time was spent on coordination of care and counseling.    Level V visit.

## 2018-03-17 NOTE — SUBJECTIVE & OBJECTIVE
Interval History: Pt was seen and examined this am before d/c home. No new events last pm, stable since last visit. Has no c/o's this am. No SOB, no leg swelling. Has adhered to fluid restriction.    Review of patient's allergies indicates:   Allergen Reactions    Tobramycin-dexamethasone      Eye Drops      Amlodipine      Other reaction(s): leg edema     Current Facility-Administered Medications   Medication Frequency    acetaminophen tablet 650 mg Q6H PRN    ALPRAZolam tablet 0.5 mg BID PRN    aspirin EC tablet 81 mg Daily    enoxaparin injection 40 mg Daily    famotidine tablet 20 mg Daily    hydrALAZINE injection 10 mg Q8H PRN    levothyroxine tablet 112 mcg Before breakfast    lovastatin tablet 40 mg Nightly    ondansetron injection 4 mg Q6H PRN     Current Outpatient Prescriptions   Medication    ALPRAZolam (XANAX) 1 MG tablet    amLODIPine (NORVASC) 10 MG tablet    aspirin (ECOTRIN) 81 MG EC tablet    carvedilol (COREG) 12.5 MG tablet    FLUoxetine (PROZAC) 20 MG capsule    hydrALAZINE (APRESOLINE) 10 MG tablet    levothyroxine (SYNTHROID) 112 MCG tablet    lovastatin (MEVACOR) 40 MG tablet    valsartan (DIOVAN) 160 MG tablet       Objective:     Vital Signs (Most Recent):  Temp: 98 °F (36.7 °C) (03/17/18 0717)  Pulse: 63 (03/17/18 1112)  Resp: 18 (03/17/18 0717)  BP: (!) 114/55 (03/17/18 0717)  SpO2: 95 % (03/17/18 0717)  O2 Device (Oxygen Therapy): room air (03/17/18 0717) Vital Signs (24h Range):  Temp:  [97.7 °F (36.5 °C)-98.6 °F (37 °C)] 98 °F (36.7 °C)  Pulse:  [63-71] 63  Resp:  [18-20] 18  SpO2:  [95 %-99 %] 95 %  BP: (111-130)/(55-63) 114/55     Weight: 82.2 kg (181 lb 3.5 oz) (03/17/18 0405)  Body mass index is 32.1 kg/m².  Body surface area is 1.91 meters squared.    I/O last 3 completed shifts:  In: 1620 [P.O.:720; I.V.:900]  Out: -     Physical Exam   Constitutional: She is oriented to person, place, and time. She appears well-developed and well-nourished.   HENT:   Head:  Normocephalic and atraumatic.   Neck: No JVD present.   Cardiovascular: Normal rate, regular rhythm and normal heart sounds.  Exam reveals no friction rub.    Pulmonary/Chest: Effort normal and breath sounds normal. No respiratory distress. She has no rales.   Abdominal: Soft. She exhibits no distension.   Musculoskeletal: She exhibits no edema.   Neurological: She is alert and oriented to person, place, and time.   Skin: Skin is warm and dry.   Psychiatric: She has a normal mood and affect. Her behavior is normal. Thought content normal.   Nursing note and vitals reviewed.      Significant Labs: reviewed  BMP  Lab Results   Component Value Date     (L) 03/17/2018    K 3.9 03/17/2018    CL 98 03/17/2018    CO2 27 03/17/2018    BUN 22 03/17/2018    CREATININE 1.1 03/17/2018    CALCIUM 9.1 03/17/2018    ANIONGAP 5 (L) 03/17/2018    ESTGFRAFRICA >60 03/17/2018    EGFRNONAA 53 (A) 03/17/2018         Significant Imaging: reviewed

## 2018-03-17 NOTE — PLAN OF CARE
03/17/18 1110   Final Note   Assessment Type Final Discharge Note   Discharge Disposition Home   What phone number can be called within the next 1-3 days to see how you are doing after discharge? [phone]   Right Care Referral Info   Post Acute Recommendation No Care

## 2018-03-19 ENCOUNTER — TELEPHONE (OUTPATIENT)
Dept: INTERNAL MEDICINE | Facility: CLINIC | Age: 68
End: 2018-03-19

## 2018-03-19 NOTE — TELEPHONE ENCOUNTER
----- Message from Nafisa Tomas, PharmD sent at 3/19/2018  9:09 AM CDT -----  Good Morning Dr. Julian,    I am sure you are aware but Rut was discharged over the weekend from hyponatremia. She has an appointment with you for 4/30 however she would like something sooner due to her recent hospitalization.     If I can do anything, please let me know.    Nafisa

## 2018-03-19 NOTE — PROGRESS NOTES
"Last 5 Patient Entered Readings                                      Current 30 Day Average: 140/74     Recent Readings 3/18/2018 3/15/2018 3/14/2018 3/14/2018 3/14/2018    SBP (mmHg) 166 138 142 146 154    DBP (mmHg) 83 78 79 100 85    Pulse 76 63 63 67 70        Per [employer] message "I was discharged Saturday.   My sodium level was not up to normal but they let me go anyway.  I was told to stop my diuretic, to resume my other meds and to see Dr. Julian as soon as possible.  To my knowledge, the only meds I was given was my thyroid, statin, pepcid (don't know why), a shot in my stomach I don't remember why and .25 Xanax.  Unless it was given in my IV, I don't believe I was given any blood pressure medication. "    Per discharge note "Cleo Ford is a 67 year old female admitted for Dilutional hyponatremia. Na+ level upon admit was 118. Associated symptoms include: fatigue, weakness, and feeling "dazed" for 3 weeks. Pt reports increasing water intake and started "juicing" on Monday, 03/12/18. Pt reports drinking almost 6-8 16 oz of water a day 3-4 days prior to admit. Recently tapered off Paxil by PCP. She also reports taking HCTZ but has not really been diuresing as much. Nephrology consulted -- hyponatremia occurred as a result of SSRI (tapered off recently), HCTZ, and not having a normal diet. Continue to hold HCTZ and do not restart on discharge."    Patient stated she is still gaining weight. She will be see by Dr. Julian 4/30 however she is calling today to get the appointment moved up.  "

## 2018-03-20 ENCOUNTER — PATIENT OUTREACH (OUTPATIENT)
Dept: ADMINISTRATIVE | Facility: CLINIC | Age: 68
End: 2018-03-20

## 2018-03-20 NOTE — PATIENT INSTRUCTIONS
Discharge Instructions for Hyponatremia  You were diagnosed with hyponatremia, which means your blood level of sodium (salt) is too low. Salt is needed for the body and brain to work. Very low blood levels of sodium can be fatal. Symptoms can include headache, confusion, fatigue, muscle cramps, hallucinations, seizures, and coma. You have been treated to raise your blood levels of sodium. The following instructions will help you care for yourself at home as you have been instructed.  Home care  · Limit your intake of fluids. Drink only the amounts directed by your healthcare provider.  · Ask your healthcare provider what you should use to replace fluids if you are throwing up.  · Keep all follow-up appointments. Your provider needs to watch your condition closely.  To help prevent hyponatremia:  · Take all medicines exactly as directed. Certain medicines can lower blood sodium levels.  · If you have done something that makes you sweat a lot, drink fluids that contain salt and other electrolytes.   · Tell all healthcare providers what medicines you take. Mention all prescription and over-the-counter drugs and herbs.  · Have your sodium levels checked often. This is vital if you take a diuretic (medicine that helps your body get rid of water).  Follow-up  Schedule a follow-up visit as directed.  When to call your healthcare provider  Call your provider right away if you have any of the following:  · Severe tiredness  · Fainting  · Dizziness  · Loss of appetite  · Nausea or vomiting  · Confusion or forgetfullness  · Muscle spasms, cramping, or twitching  · Seizures  · Gait disturbances   Date Last Reviewed: 6/8/2015  © [id number] [employer]. [address] [place] [address], [place] [address]. All rights reserved. This information is not intended as a substitute for professional medical care. Always follow your healthcare professional's instructions.

## 2018-03-21 ENCOUNTER — OFFICE VISIT (OUTPATIENT)
Dept: INTERNAL MEDICINE | Facility: CLINIC | Age: 68
End: 2018-03-21
Payer: MEDICARE

## 2018-03-21 VITALS
BODY MASS INDEX: 30.94 KG/M2 | TEMPERATURE: 98 F | HEART RATE: 64 BPM | SYSTOLIC BLOOD PRESSURE: 120 MMHG | DIASTOLIC BLOOD PRESSURE: 60 MMHG | WEIGHT: 174.63 LBS | HEIGHT: 63 IN

## 2018-03-21 DIAGNOSIS — E87.1 HYPONATREMIA: Primary | ICD-10-CM

## 2018-03-21 DIAGNOSIS — Z12.31 ENCOUNTER FOR SCREENING MAMMOGRAM FOR BREAST CANCER: ICD-10-CM

## 2018-03-21 DIAGNOSIS — F39 MOOD DISORDER: ICD-10-CM

## 2018-03-21 DIAGNOSIS — E87.1 HYPOSMOLALITY SYNDROME: ICD-10-CM

## 2018-03-21 DIAGNOSIS — Z13.820 ENCOUNTER FOR SCREENING FOR OSTEOPOROSIS: ICD-10-CM

## 2018-03-21 DIAGNOSIS — E03.8 OTHER SPECIFIED HYPOTHYROIDISM: Chronic | ICD-10-CM

## 2018-03-21 DIAGNOSIS — I10 ESSENTIAL HYPERTENSION, MALIGNANT: ICD-10-CM

## 2018-03-21 DIAGNOSIS — E06.5 HYPOTHYROIDISM DUE TO FIBROUS INVASIVE THYROIDITIS: ICD-10-CM

## 2018-03-21 DIAGNOSIS — R53.83 FATIGUE, UNSPECIFIED TYPE: ICD-10-CM

## 2018-03-21 DIAGNOSIS — I10 ESSENTIAL HYPERTENSION: ICD-10-CM

## 2018-03-21 DIAGNOSIS — E03.8 HYPOTHYROIDISM DUE TO FIBROUS INVASIVE THYROIDITIS: ICD-10-CM

## 2018-03-21 DIAGNOSIS — R53.83 FATIGUE, UNSPECIFIED TYPE: Primary | ICD-10-CM

## 2018-03-21 PROCEDURE — 99495 TRANSJ CARE MGMT MOD F2F 14D: CPT | Mod: S$GLB,,, | Performed by: FAMILY MEDICINE

## 2018-03-21 PROCEDURE — 99999 PR PBB SHADOW E&M-EST. PATIENT-LVL III: CPT | Mod: PBBFAC,,, | Performed by: FAMILY MEDICINE

## 2018-03-21 NOTE — PROGRESS NOTES
"Subjective:      Patient ID: Cleo Ford is a 67 y.o. female.    Chief Complaint: Hospital Follow Up (Hyponatremia, blood pressure)    Disclaimer:  This note is prepared using voice recognition software and as such is likely to have errors and has not been proof read. Please contact me for questions.     Transitional Care Note    Family and/or Caretaker present at visit?  No.  Diagnostic tests reviewed/disposition: No diagnosic tests pending after this hospitalization.  Disease/illness education: hyponatremia  Home health/community services discussion/referrals: Patient does not have home health established from hospital visit.  They do not need home health.  If needed, we will set up home health for the patient.   Establishment or re-establishment of referral orders for community resources: No other necessary community resources.   Discussion with other health care providers: will forward to [facility] digital htn program.     .     Patient's coming in today for hospital follow-up with recent hospitalization for hyponatremia.  She had about 1 month history of not feeling well with fatigue and noticing some weight gain.  She's been enrolled in the digital hypertension program.  There've been several medication changes.  She did also recently embarked on a "cleanse" where she was drinking a lot of water and having some diarrhea as well.  Ultimately she ended up with a sodium level of 119.  She is placed inpatient in For several days until her sodium levels had become more normal.  She's here today to have repeat sodium level testing.    Since that time she's off all SSRIs.  She did find that it helped with her depression and anxiety but she is heavily concerned about weight gain.  She's gained approximately 5-10 pounds in the last month.  She's had gastric sleeve surgery.  For this reason she states she would much rather be off any medication and have her normal weight then to gain weight with her mood " "better.  We did discuss potential possibly using Wellbutrin in the future but initially I would like to get her sodium levels reevaluated.    Also from the fatigue she's currently only on the Coreg at half tablet twice daily.  I believe this is probably more of her cause for fatigue as well.        Lab Results   Component Value Date    WBC 8.09 03/22/2018    HGB 10.8 (L) 03/22/2018    HCT 33.7 (L) 03/22/2018     03/22/2018    CHOL 181 10/17/2017    TRIG 36 10/17/2017    HDL 80 (H) 10/17/2017    ALT 16 03/22/2018    AST 26 03/22/2018     03/22/2018    K 4.1 03/22/2018     03/22/2018    CREATININE 1.1 03/22/2018    BUN 18 03/22/2018    CO2 26 03/22/2018    TSH 2.346 03/15/2018    HGBA1C 5.2 10/17/2017       Review of Systems   Constitutional: Positive for activity change, appetite change, fatigue and unexpected weight change.   Respiratory: Negative for cough and shortness of breath.    Cardiovascular: Negative for chest pain, palpitations and leg swelling.   Gastrointestinal: Negative for abdominal distention and abdominal pain.   Psychiatric/Behavioral: Positive for decreased concentration, dysphoric mood and sleep disturbance. The patient is not nervous/anxious.      Objective:     Vitals:    03/21/18 1158   BP: 120/60   Pulse: 64   Temp: 98.3 °F (36.8 °C)   Weight: 79.2 kg (174 lb 9.7 oz)   Height: 5' 3" (1.6 m)     Physical Exam   Constitutional: She appears well-developed and well-nourished.   HENT:   Head: Normocephalic and atraumatic.   Right Ear: Tympanic membrane normal.   Left Ear: Tympanic membrane normal.   Mouth/Throat: Oropharynx is clear and moist.   Eyes: Conjunctivae and EOM are normal.   Neck: Normal range of motion. Neck supple.   Cardiovascular: Normal rate and regular rhythm.    Pulmonary/Chest: Effort normal and breath sounds normal.   Abdominal: Soft. Bowel sounds are normal. She exhibits no distension. There is no tenderness.   Psychiatric: She has a normal mood and affect. " Her behavior is normal.   Nursing note and vitals reviewed.    Assessment:     1. Hyponatremia    2. Fatigue, unspecified type    3. Other specified hypothyroidism    4. Essential hypertension    5. Mood disorder    6. Encounter for screening mammogram for breast cancer    7. Encounter for screening for osteoporosis      Plan:   Cleo was seen today for hospital follow up.    Diagnoses and all orders for this visit:    Hyponatremia  Comments:  s/p hospitalization, needing repeat today. off diuretics.  Sodium levels now normalized.  Checking also for other causes of fatigue  Orders:  -     Comprehensive metabolic panel; Future  -     CBC auto differential; Future  -     Ferritin; Future  -     Iron and TIBC; Future  -     Vitamin B12; Future    Fatigue, unspecified type  Comments:  still present, check labs, if normal, stop coreg.  In the setting of gastric sleeve needing to check iron and B12 levels.    Orders:  -     Comprehensive metabolic panel; Future  -     CBC auto differential; Future  -     Ferritin; Future  -     Iron and TIBC; Future  -     Vitamin B12; Future    Other specified hypothyroidism  Comments:  stable labs reviewed.  Continue with medications  Orders:  -     Comprehensive metabolic panel; Future  -     CBC auto differential; Future  -     Ferritin; Future  -     Iron and TIBC; Future  -     Vitamin B12; Future    Essential hypertension  Comments:  on digital program, if labs normal, then will stop carvediol due to fatigue and ok for higher pressures for 2 weeks to see if improved.   Orders:  -     Comprehensive metabolic panel; Future  -     CBC auto differential; Future  -     Ferritin; Future  -     Iron and TIBC; Future  -     Vitamin B12; Future    Mood disorder  Comments:  interested in trying wellbutrin in future. make sure labs ok and bp ok.     Encounter for screening mammogram for breast cancer-schedule  -     Mammo Digital Screening Bilat with CAD; Future    Encounter for screening  for osteoporosis-schedule  -     DXA Bone Density Spine And Hip; Future            Follow-up in about 4 weeks (around 4/18/2018) for F/u WT, Labs, Meds Dr Julian.

## 2018-03-22 ENCOUNTER — PATIENT OUTREACH (OUTPATIENT)
Dept: OTHER | Facility: OTHER | Age: 68
End: 2018-03-22

## 2018-03-22 ENCOUNTER — LAB VISIT (OUTPATIENT)
Dept: LAB | Facility: HOSPITAL | Age: 68
End: 2018-03-22
Attending: FAMILY MEDICINE
Payer: MEDICARE

## 2018-03-22 DIAGNOSIS — E87.1 HYPOSMOLALITY SYNDROME: ICD-10-CM

## 2018-03-22 DIAGNOSIS — R53.83 FATIGUE, UNSPECIFIED TYPE: ICD-10-CM

## 2018-03-22 DIAGNOSIS — I10 ESSENTIAL HYPERTENSION, MALIGNANT: ICD-10-CM

## 2018-03-22 DIAGNOSIS — E03.8 HYPOTHYROIDISM DUE TO FIBROUS INVASIVE THYROIDITIS: ICD-10-CM

## 2018-03-22 DIAGNOSIS — E06.5 HYPOTHYROIDISM DUE TO FIBROUS INVASIVE THYROIDITIS: ICD-10-CM

## 2018-03-22 LAB
BASOPHILS # BLD AUTO: 0.09 K/UL
BASOPHILS NFR BLD: 1.1 %
DIFFERENTIAL METHOD: ABNORMAL
EOSINOPHIL # BLD AUTO: 0.2 K/UL
EOSINOPHIL NFR BLD: 3 %
ERYTHROCYTE [DISTWIDTH] IN BLOOD BY AUTOMATED COUNT: 12.5 %
HCT VFR BLD AUTO: 33.7 %
HGB BLD-MCNC: 10.8 G/DL
IMM GRANULOCYTES # BLD AUTO: 0.02 K/UL
IMM GRANULOCYTES NFR BLD AUTO: 0.2 %
LYMPHOCYTES # BLD AUTO: 2.4 K/UL
LYMPHOCYTES NFR BLD: 29.2 %
MCH RBC QN AUTO: 30.2 PG
MCHC RBC AUTO-ENTMCNC: 32 G/DL
MCV RBC AUTO: 94 FL
MONOCYTES # BLD AUTO: 0.5 K/UL
MONOCYTES NFR BLD: 6.2 %
NEUTROPHILS # BLD AUTO: 4.9 K/UL
NEUTROPHILS NFR BLD: 60.3 %
NRBC BLD-RTO: 0 /100 WBC
PLATELET # BLD AUTO: 293 K/UL
PMV BLD AUTO: 10.1 FL
RBC # BLD AUTO: 3.58 M/UL
WBC # BLD AUTO: 8.09 K/UL

## 2018-03-22 PROCEDURE — 82607 VITAMIN B-12: CPT

## 2018-03-22 PROCEDURE — 82728 ASSAY OF FERRITIN: CPT

## 2018-03-22 PROCEDURE — 36415 COLL VENOUS BLD VENIPUNCTURE: CPT | Mod: PO

## 2018-03-22 PROCEDURE — 80053 COMPREHEN METABOLIC PANEL: CPT

## 2018-03-22 PROCEDURE — 83540 ASSAY OF IRON: CPT

## 2018-03-22 PROCEDURE — 85025 COMPLETE CBC W/AUTO DIFF WBC: CPT

## 2018-03-22 NOTE — PROGRESS NOTES
"Last 5 Patient Entered Readings                                      Current 30 Day Average: 137/74     Recent Readings 3/19/2018 3/18/2018 3/15/2018 3/14/2018 3/14/2018    SBP (mmHg) 111 166 138 142 146    DBP (mmHg) 77 83 78 79 100    Pulse 64 76 63 63 67        Hypertension Digital Medicine Program (HDMP): Health  Follow Up    Lifestyle Modifications:    1.Low sodium diet: yes Reports that since ED she is feeling much better and has since resumed normal eating patterns. States she did a juice cleanse for a few days before ending up in the hospital, however notes that she thinks this was the "icing on the cake" and exacerbated underlying issues.  Encouraged patient to resume eating whole foods and monitoring sodium.  Declines further resources/coaching at this time. States she "feels great" and returned to work Monday.     2.Physical activity: no Deferred.     3.Hypotension/Hypertension symptoms: no Denies s/sx. States that since being discharged she has felt great with no symptoms.     4.Patient has been compliant with the medication regimen.     Follow up with Mrs. Cleo GUTIERREZ Logan completed. No further questions or concerns. I will follow up in a few weeks to assess progress. BP is not yet at goal. May want to discuss meal planning or meal structure with patient in future to assist with health and wellness goals.       "

## 2018-03-23 ENCOUNTER — PATIENT OUTREACH (OUTPATIENT)
Dept: OTHER | Facility: OTHER | Age: 68
End: 2018-03-23

## 2018-03-23 LAB
ALBUMIN SERPL BCP-MCNC: 4 G/DL
ALP SERPL-CCNC: 73 U/L
ALT SERPL W/O P-5'-P-CCNC: 16 U/L
ANION GAP SERPL CALC-SCNC: 9 MMOL/L
AST SERPL-CCNC: 26 U/L
BILIRUB SERPL-MCNC: 0.5 MG/DL
BUN SERPL-MCNC: 18 MG/DL
CALCIUM SERPL-MCNC: 10.2 MG/DL
CHLORIDE SERPL-SCNC: 104 MMOL/L
CO2 SERPL-SCNC: 26 MMOL/L
CREAT SERPL-MCNC: 1.1 MG/DL
EST. GFR  (AFRICAN AMERICAN): >60 ML/MIN/1.73 M^2
EST. GFR  (NON AFRICAN AMERICAN): 52.8 ML/MIN/1.73 M^2
FERRITIN SERPL-MCNC: 135 NG/ML
GLUCOSE SERPL-MCNC: 102 MG/DL
IRON SERPL-MCNC: 103 UG/DL
POTASSIUM SERPL-SCNC: 4.1 MMOL/L
PROT SERPL-MCNC: 7.3 G/DL
SATURATED IRON: 26 %
SODIUM SERPL-SCNC: 139 MMOL/L
TOTAL IRON BINDING CAPACITY: 389 UG/DL
TRANSFERRIN SERPL-MCNC: 263 MG/DL
VIT B12 SERPL-MCNC: 810 PG/ML

## 2018-03-23 NOTE — PROGRESS NOTES
Last 5 Patient Entered Readings                                      Current 30 Day Average: 137/74     Recent Readings 3/19/2018 3/18/2018 3/15/2018 3/14/2018 3/14/2018    SBP (mmHg) 111 166 138 142 146    DBP (mmHg) 77 83 78 79 100    Pulse 64 76 63 63 67        Sent [employer] message to see how she is feeling and let her know that her labs came back WNL 3/22.    4/10: Patient's BP average is above goal of <130/80.     Patient denies s/s of hypotension (lightheadedness, dizziness, nausea, fatigue) associated with low readings. Instructed patient to inform me if this occurs, patient confirms understanding.      Patient denies s/s of hypertension (SOB, CP, severe headaches, changes in vision) associated with high readings. Instructed patient to go to the ED if BP > 180/110 and accompanied by hypertensive s/s, patient confirms understanding.    Will continue to monitor regularly. Will follow up in 2-3 weeks, sooner if BP begins to trend upward or downward.    Patient has my contact information and knows to call with any concerns or clinical changes.     Current HTN regimen:  Hypertension Medications             amLODIPine (NORVASC) 10 MG tablet Take 1 tablet (10 mg total) by mouth once daily.    valsartan (DIOVAN) 160 MG tablet Take 2 tablets (320 mg total) by mouth every evening.      Patient seen by Dr. Julian today who stopped Coreg. Her pressure was at goal today at her appointment. I will follow-up in one month to assess her BP.    5/8: Dr. Julian stopped Coreg due to a goal blood pressure reading while in her office. Her 30 day average is elevated therefore I messaged Dr. Julian to see if she would be on board for resuming Coreg.

## 2018-03-25 ENCOUNTER — PATIENT MESSAGE (OUTPATIENT)
Dept: INTERNAL MEDICINE | Facility: CLINIC | Age: 68
End: 2018-03-25

## 2018-03-27 ENCOUNTER — PATIENT MESSAGE (OUTPATIENT)
Dept: INTERNAL MEDICINE | Facility: CLINIC | Age: 68
End: 2018-03-27

## 2018-03-27 RX ORDER — CIPROFLOXACIN 500 MG/1
500 TABLET ORAL 2 TIMES DAILY
Qty: 14 TABLET | Refills: 0 | Status: SHIPPED | OUTPATIENT
Start: 2018-03-27 | End: 2018-04-10

## 2018-04-06 ENCOUNTER — PATIENT OUTREACH (OUTPATIENT)
Dept: ADMINISTRATIVE | Facility: HOSPITAL | Age: 68
End: 2018-04-06

## 2018-04-06 ENCOUNTER — HOSPITAL ENCOUNTER (OUTPATIENT)
Dept: RADIOLOGY | Facility: HOSPITAL | Age: 68
Discharge: HOME | End: 2018-04-06
Attending: FAMILY MEDICINE
Payer: MEDICARE

## 2018-04-06 DIAGNOSIS — Z12.31 ENCOUNTER FOR SCREENING MAMMOGRAM FOR BREAST CANCER: ICD-10-CM

## 2018-04-06 PROCEDURE — 77063 BREAST TOMOSYNTHESIS BI: CPT | Mod: 26,,, | Performed by: RADIOLOGY

## 2018-04-06 PROCEDURE — 77067 SCR MAMMO BI INCL CAD: CPT | Mod: TC,PO

## 2018-04-06 PROCEDURE — 77067 SCR MAMMO BI INCL CAD: CPT | Mod: 26,,, | Performed by: RADIOLOGY

## 2018-04-10 ENCOUNTER — LAB VISIT (OUTPATIENT)
Dept: LAB | Facility: HOSPITAL | Age: 68
End: 2018-04-10
Attending: FAMILY MEDICINE
Payer: MEDICARE

## 2018-04-10 ENCOUNTER — OFFICE VISIT (OUTPATIENT)
Dept: INTERNAL MEDICINE | Facility: CLINIC | Age: 68
End: 2018-04-10
Payer: MEDICARE

## 2018-04-10 VITALS
TEMPERATURE: 97 F | HEIGHT: 63 IN | WEIGHT: 172.38 LBS | DIASTOLIC BLOOD PRESSURE: 60 MMHG | BODY MASS INDEX: 30.54 KG/M2 | SYSTOLIC BLOOD PRESSURE: 120 MMHG

## 2018-04-10 DIAGNOSIS — R10.9 RIGHT FLANK PAIN: ICD-10-CM

## 2018-04-10 DIAGNOSIS — E87.1 HYPONATREMIA: ICD-10-CM

## 2018-04-10 DIAGNOSIS — F39 MOOD DISORDER: ICD-10-CM

## 2018-04-10 DIAGNOSIS — E78.2 MIXED HYPERLIPIDEMIA: ICD-10-CM

## 2018-04-10 DIAGNOSIS — E03.8 OTHER SPECIFIED HYPOTHYROIDISM: Chronic | ICD-10-CM

## 2018-04-10 DIAGNOSIS — D64.9 ANEMIA, UNSPECIFIED TYPE: ICD-10-CM

## 2018-04-10 DIAGNOSIS — I10 ESSENTIAL HYPERTENSION: Primary | Chronic | ICD-10-CM

## 2018-04-10 DIAGNOSIS — M85.80 OSTEOPENIA, UNSPECIFIED LOCATION: ICD-10-CM

## 2018-04-10 DIAGNOSIS — I10 ESSENTIAL HYPERTENSION: Chronic | ICD-10-CM

## 2018-04-10 LAB
25(OH)D3+25(OH)D2 SERPL-MCNC: 51 NG/ML
ANION GAP SERPL CALC-SCNC: 5 MMOL/L
BASOPHILS # BLD AUTO: 0.08 K/UL
BASOPHILS NFR BLD: 1 %
BUN SERPL-MCNC: 17 MG/DL
CALCIUM SERPL-MCNC: 10.1 MG/DL
CHLORIDE SERPL-SCNC: 103 MMOL/L
CO2 SERPL-SCNC: 27 MMOL/L
CREAT SERPL-MCNC: 1 MG/DL
DIFFERENTIAL METHOD: ABNORMAL
EOSINOPHIL # BLD AUTO: 0.3 K/UL
EOSINOPHIL NFR BLD: 3.7 %
ERYTHROCYTE [DISTWIDTH] IN BLOOD BY AUTOMATED COUNT: 13.6 %
EST. GFR  (AFRICAN AMERICAN): >60 ML/MIN/1.73 M^2
EST. GFR  (NON AFRICAN AMERICAN): 59.3 ML/MIN/1.73 M^2
FOLATE SERPL-MCNC: 12.7 NG/ML
GLUCOSE SERPL-MCNC: 86 MG/DL
HCT VFR BLD AUTO: 34 %
HGB BLD-MCNC: 10.5 G/DL
IMM GRANULOCYTES # BLD AUTO: 0.02 K/UL
IMM GRANULOCYTES NFR BLD AUTO: 0.2 %
LDH SERPL L TO P-CCNC: 154 U/L
LYMPHOCYTES # BLD AUTO: 1.9 K/UL
LYMPHOCYTES NFR BLD: 23.9 %
MCH RBC QN AUTO: 31 PG
MCHC RBC AUTO-ENTMCNC: 30.9 G/DL
MCV RBC AUTO: 100 FL
MONOCYTES # BLD AUTO: 0.5 K/UL
MONOCYTES NFR BLD: 5.9 %
NEUTROPHILS # BLD AUTO: 5.2 K/UL
NEUTROPHILS NFR BLD: 65.3 %
NRBC BLD-RTO: 0 /100 WBC
PLATELET # BLD AUTO: 332 K/UL
PMV BLD AUTO: 10.1 FL
POTASSIUM SERPL-SCNC: 3.9 MMOL/L
RBC # BLD AUTO: 3.39 M/UL
SODIUM SERPL-SCNC: 135 MMOL/L
WBC # BLD AUTO: 8.02 K/UL

## 2018-04-10 PROCEDURE — 80048 BASIC METABOLIC PNL TOTAL CA: CPT

## 2018-04-10 PROCEDURE — 82746 ASSAY OF FOLIC ACID SERUM: CPT

## 2018-04-10 PROCEDURE — 36415 COLL VENOUS BLD VENIPUNCTURE: CPT | Mod: PO

## 2018-04-10 PROCEDURE — 87086 URINE CULTURE/COLONY COUNT: CPT

## 2018-04-10 PROCEDURE — 3074F SYST BP LT 130 MM HG: CPT | Mod: CPTII,S$GLB,, | Performed by: FAMILY MEDICINE

## 2018-04-10 PROCEDURE — 99215 OFFICE O/P EST HI 40 MIN: CPT | Mod: S$GLB,,, | Performed by: FAMILY MEDICINE

## 2018-04-10 PROCEDURE — 82306 VITAMIN D 25 HYDROXY: CPT

## 2018-04-10 PROCEDURE — 85025 COMPLETE CBC W/AUTO DIFF WBC: CPT

## 2018-04-10 PROCEDURE — 83615 LACTATE (LD) (LDH) ENZYME: CPT

## 2018-04-10 PROCEDURE — 99999 PR PBB SHADOW E&M-EST. PATIENT-LVL III: CPT | Mod: PBBFAC,,, | Performed by: FAMILY MEDICINE

## 2018-04-10 PROCEDURE — 3078F DIAST BP <80 MM HG: CPT | Mod: CPTII,S$GLB,, | Performed by: FAMILY MEDICINE

## 2018-04-10 NOTE — PROGRESS NOTES
Subjective:      Patient ID: Cleo Ford is a 67 y.o. female.    Chief Complaint: Follow-up (fatigue, labs, bp, bone density)    Disclaimer:  This note is prepared using voice recognition software and as such is likely to have errors and has not been proof read. Please contact me for questions.     Patient's coming in today for three-week follow-up.  Since I last saw her her fatigue is slightly better but still not back to her 100%.  At her last visit she had just gotten out of the hospital due to some hyponatremia.  Her sodium levels had resolved.  However it was noted that she was significantly anemic.  Her previous hemoglobin had been in the 12's and she was down into the lower tens upon discharge.  Repeat showed slight improvement from 10.6-10.8.  Her iron levels and B12 levels however were normal.  She did go ahead and start an iron tablet on her own.  She's willing to repeat some labs again today.  She denies any blood in her stool.  She denies any indigestion or gastritis symptoms.  She has had a gastric sleeve.  She's chronically suffered from chronic constipation and since taking the iron tablets her stool has become more black.  She does usually use an herbal laxative every day.    She also reports that she's been having some right-sided flank pain with a backache.  She was uncertain if it might be related to a bladder infection.  The pain is not present today but has been off and on.    When we discussed her anemia levels we discussed several causes for anemia 1 is not having enough of the basic building materials which so far her B12 and iron levels are normal.  We need to do folate levels today.  Another way is losing blood from either the colon the stomach or the small bowel.  She had a colonoscopy in 2016 which showed some nonbleeding internal hemorrhoids but no polyps.  She has a history of diverticulosis noted on colonoscopy.  She has not had an EGD.    The we also discussed the potential  for breakdown of the red blood cells.  For this reason we'll get an LDH today.  She does not have a prior history of sickle cell or thalassemia.  Her MCV does not support the use of doing a thalassemia testing.    She also wants to review her bone density.  She's noted to be osteopenia with the left hip at a -1.8.  Lumbar spine is at -0.4.  She did continue with her calcium and vitamin D supplements however she gets enough calcium to her diet that at this point I recommend she stop the extra calcium supplements and mainly get calcium through her diet.  We'll be willing to check her vitamin D levels today.    Upon review I did repeat her blood pressure again today and both arms on the right arm it was 120/60 on the left arm is 110/60.  This is with out Coreg.  She's been enrolled in the digital hypertension program.  She normally takes her medicines and then about 30 minutes later will take her blood pressure.  This morning at home she got around 141/82.  I did discuss with her about proper taking of her blood pressure at least 1 hour after her medications sitting with both feet on the floor and had a resting position.  She'll start to do this as well. From a blood pressure standpoint she's now only on Norvasc 10 and Diovan 320.    Reviewed her thyroid studies a been normal.        She would be interested in starting the Wellbutrin again to help not only with depression and anxiety issues however at this point once again I like to hold off and make sure we evaluate further the anemia and the fatigue before adding on an additional medication.  She was amendable to this.    Weight is also down 2 pounds.        Lab Results   Component Value Date    WBC 8.09 03/22/2018    HGB 10.8 (L) 03/22/2018    HCT 33.7 (L) 03/22/2018     03/22/2018    CHOL 181 10/17/2017    TRIG 36 10/17/2017    HDL 80 (H) 10/17/2017    ALT 16 03/22/2018    AST 26 03/22/2018     03/22/2018    K 4.1 03/22/2018     03/22/2018     "CREATININE 1.1 03/22/2018    BUN 18 03/22/2018    CO2 26 03/22/2018    TSH 2.346 03/15/2018    HGBA1C 5.2 10/17/2017       Review of Systems   Constitutional: Positive for appetite change, fatigue and unexpected weight change. Negative for activity change and fever.   HENT: Negative for hearing loss, rhinorrhea and trouble swallowing.    Eyes: Negative for discharge and visual disturbance.   Respiratory: Negative for chest tightness and wheezing.    Cardiovascular: Negative for chest pain and palpitations.   Gastrointestinal: Negative for abdominal distention, abdominal pain, anal bleeding, blood in stool, constipation, diarrhea, rectal pain and vomiting.   Endocrine: Positive for polydipsia. Negative for polyuria.   Genitourinary: Positive for flank pain. Negative for difficulty urinating, dysuria, frequency and hematuria.   Musculoskeletal: Negative for arthralgias, joint swelling and neck pain.   Skin: Negative for color change and rash.   Neurological: Negative for weakness and headaches.   Psychiatric/Behavioral: Positive for confusion and dysphoric mood. Negative for sleep disturbance.     Objective:     Vitals:    04/10/18 0819 04/10/18 0844   BP: 130/76 120/60   Temp: 96.6 °F (35.9 °C)    TempSrc: Tympanic    Weight: 78.2 kg (172 lb 6.4 oz)    Height: 5' 3" (1.6 m)      Physical Exam   Constitutional: She appears well-developed and well-nourished.   HENT:   Head: Normocephalic and atraumatic.   Cardiovascular: Normal rate and regular rhythm.    No murmur heard.  Pulmonary/Chest: Effort normal and breath sounds normal. No respiratory distress.   Abdominal: Soft. Normal appearance and bowel sounds are normal. There is tenderness. There is CVA tenderness. There is no rigidity, no rebound and no guarding.   Musculoskeletal:        Lumbar back: She exhibits tenderness and pain. She exhibits normal range of motion, no bony tenderness and no spasm.        Back:    Psychiatric: She has a normal mood and affect. Her " speech is normal and behavior is normal.   Vitals reviewed.    Assessment:     1. Essential hypertension    2. Hyponatremia    3. Other specified hypothyroidism    4. Anemia, unspecified type    5. Osteopenia, unspecified location    6. Right flank pain    7. Mood disorder    8. Mixed hyperlipidemia      Plan:   Cleo was seen today for follow-up.    Diagnoses and all orders for this visit:    Essential hypertension  Comments:  much improved, stopping coreg.  Continue with digital hypertension program  Orders:  -     CBC auto differential; Future  -     Lactate dehydrogenase; Future  -     Folate; Future  -     Basic metabolic panel; Future  -     Vitamin D; Future    Hyponatremia  Comments:  improved. repeat today  Orders:  -     CBC auto differential; Future  -     Lactate dehydrogenase; Future  -     Folate; Future  -     Basic metabolic panel; Future  -     Vitamin D; Future    Other specified hypothyroidism  Comments:  Stable reviewed labs from March  Orders:  -     CBC auto differential; Future  -     Lactate dehydrogenase; Future  -     Folate; Future  -     Basic metabolic panel; Future  -     Vitamin D; Future    Anemia, unspecified type  Comments:  not related to iron or b12. check folate, ldh and repeat today. if still not improving, then refer for egd.   Orders:  -     CBC auto differential; Future  -     Lactate dehydrogenase; Future  -     Folate; Future  -     Basic metabolic panel; Future  -     Vitamin D; Future    Osteopenia, unspecified location  Comments:  at left hip, ok for calcium through diet. check vitamin D level.  Reviewed DEXA  Orders:  -     CBC auto differential; Future  -     Lactate dehydrogenase; Future  -     Folate; Future  -     Basic metabolic panel; Future  -     Vitamin D; Future    Right flank pain  Comments:  off and on. check urine today, rule out infection.   Orders:  -     POCT URINE DIPSTICK WITHOUT MICROSCOPE  -     Urine culture; Future    Mood  disorder  Comments:  At this time are going to hold off on Wellbutrin as well until we get back the CBC.    Mixed hyperlipidemia  Comments:  On statin therapy no change            Follow-up in about 2 months (around 6/10/2018) for F/u WT, Labs, Meds Dr Julian.    Time spent: 45 minutes in face to face discussion concerning diagnosis, prognosis, review of lab and test results, benefits of treatment as well as management of disease, counseling of patient and coordination of care between various health care providers . Greater than half the time spent was used for coordination of care and counseling of patient.

## 2018-04-12 ENCOUNTER — PATIENT MESSAGE (OUTPATIENT)
Dept: INTERNAL MEDICINE | Facility: CLINIC | Age: 68
End: 2018-04-12

## 2018-04-12 ENCOUNTER — PATIENT OUTREACH (OUTPATIENT)
Dept: OTHER | Facility: OTHER | Age: 68
End: 2018-04-12

## 2018-04-12 LAB — BACTERIA UR CULT: NORMAL

## 2018-04-12 NOTE — PROGRESS NOTES
"Last 5 Patient Entered Readings                                      Current 30 Day Average: 138/80     Recent Readings 4/10/2018 4/6/2018 3/28/2018 3/19/2018 3/18/2018    SBP (mmHg) 141 138 148 111 166    DBP (mmHg) 82 73 85 77 83    Pulse 69 86 83 64 76          Hypertension Digital Medicine Program (HDMP): Health  Follow Up    Lifestyle Modifications:    1.Low sodium diet: yes Reports no changes in diet; states she continues to monitor sodium and feels like she has improved overall in how she feels after last medication change. Reports some issues with fatigue she associates with anemia; states she is incorporating strategies discussed by her and her doctor to help iron sources in diet and is scheduling labs to follow up.     2.Physical activity: no Deferred.     3.Hypotension/Hypertension symptoms: yes Reporting fatigue and is noting "weird dreams" at night and thinks it may be related to one of her medications.     4.Patient has been compliant with the medication regimen.     Follow up with Mrs. Cleo GUTIERREZ Logan completed. No further questions or concerns. I will follow up in a few weeks to assess progress. BP is not at goal. Patient declines further coaching/resources at this time.             "

## 2018-04-13 ENCOUNTER — PATIENT MESSAGE (OUTPATIENT)
Dept: INTERNAL MEDICINE | Facility: CLINIC | Age: 68
End: 2018-04-13

## 2018-04-13 DIAGNOSIS — D64.9 ANEMIA, UNSPECIFIED TYPE: Primary | ICD-10-CM

## 2018-04-24 ENCOUNTER — TELEPHONE (OUTPATIENT)
Dept: NEPHROLOGY | Facility: CLINIC | Age: 68
End: 2018-04-24

## 2018-04-24 DIAGNOSIS — E87.1 HYPONATREMIA: Primary | ICD-10-CM

## 2018-05-01 ENCOUNTER — LAB VISIT (OUTPATIENT)
Dept: LAB | Facility: HOSPITAL | Age: 68
End: 2018-05-01
Attending: INTERNAL MEDICINE
Payer: MEDICARE

## 2018-05-01 ENCOUNTER — INITIAL CONSULT (OUTPATIENT)
Dept: HEMATOLOGY/ONCOLOGY | Facility: CLINIC | Age: 68
End: 2018-05-01
Payer: MEDICARE

## 2018-05-01 VITALS
DIASTOLIC BLOOD PRESSURE: 78 MMHG | HEIGHT: 63 IN | BODY MASS INDEX: 31.05 KG/M2 | HEART RATE: 72 BPM | TEMPERATURE: 98 F | RESPIRATION RATE: 20 BRPM | OXYGEN SATURATION: 98 % | SYSTOLIC BLOOD PRESSURE: 151 MMHG | WEIGHT: 175.25 LBS

## 2018-05-01 DIAGNOSIS — E87.1 HYPONATREMIA: ICD-10-CM

## 2018-05-01 DIAGNOSIS — D53.9 MACROCYTIC ANEMIA: ICD-10-CM

## 2018-05-01 LAB
ANION GAP SERPL CALC-SCNC: 11 MMOL/L
BASOPHILS # BLD AUTO: 0.06 K/UL
BASOPHILS NFR BLD: 0.9 %
BUN SERPL-MCNC: 18 MG/DL
CALCIUM SERPL-MCNC: 10.2 MG/DL
CHLORIDE SERPL-SCNC: 105 MMOL/L
CO2 SERPL-SCNC: 24 MMOL/L
CREAT SERPL-MCNC: 0.8 MG/DL
DIFFERENTIAL METHOD: ABNORMAL
EOSINOPHIL # BLD AUTO: 0.2 K/UL
EOSINOPHIL NFR BLD: 2.8 %
ERYTHROCYTE [DISTWIDTH] IN BLOOD BY AUTOMATED COUNT: 13.2 %
EST. GFR  (AFRICAN AMERICAN): >60 ML/MIN/1.73 M^2
EST. GFR  (NON AFRICAN AMERICAN): >60 ML/MIN/1.73 M^2
GLUCOSE SERPL-MCNC: 83 MG/DL
HAPTOGLOB SERPL-MCNC: 193 MG/DL
HCT VFR BLD AUTO: 36.7 %
HGB BLD-MCNC: 12 G/DL
LYMPHOCYTES # BLD AUTO: 2.2 K/UL
LYMPHOCYTES NFR BLD: 32.7 %
MCH RBC QN AUTO: 31 PG
MCHC RBC AUTO-ENTMCNC: 32.7 G/DL
MCV RBC AUTO: 95 FL
MONOCYTES # BLD AUTO: 0.4 K/UL
MONOCYTES NFR BLD: 5.8 %
NEUTROPHILS # BLD AUTO: 3.9 K/UL
NEUTROPHILS NFR BLD: 57.8 %
PLATELET # BLD AUTO: 241 K/UL
PMV BLD AUTO: 9.8 FL
POTASSIUM SERPL-SCNC: 4 MMOL/L
RBC # BLD AUTO: 3.87 M/UL
SODIUM SERPL-SCNC: 140 MMOL/L
TSH SERPL DL<=0.005 MIU/L-ACNC: 1.92 UIU/ML
WBC # BLD AUTO: 6.73 K/UL

## 2018-05-01 PROCEDURE — 84443 ASSAY THYROID STIM HORMONE: CPT

## 2018-05-01 PROCEDURE — 3078F DIAST BP <80 MM HG: CPT | Mod: CPTII,S$GLB,, | Performed by: INTERNAL MEDICINE

## 2018-05-01 PROCEDURE — 99999 PR PBB SHADOW E&M-EST. PATIENT-LVL III: CPT | Mod: PBBFAC,,, | Performed by: INTERNAL MEDICINE

## 2018-05-01 PROCEDURE — 84165 PROTEIN E-PHORESIS SERUM: CPT

## 2018-05-01 PROCEDURE — 80048 BASIC METABOLIC PNL TOTAL CA: CPT | Mod: PO

## 2018-05-01 PROCEDURE — 99205 OFFICE O/P NEW HI 60 MIN: CPT | Mod: S$GLB,,, | Performed by: INTERNAL MEDICINE

## 2018-05-01 PROCEDURE — 36415 COLL VENOUS BLD VENIPUNCTURE: CPT | Mod: PO

## 2018-05-01 PROCEDURE — 3077F SYST BP >= 140 MM HG: CPT | Mod: CPTII,S$GLB,, | Performed by: INTERNAL MEDICINE

## 2018-05-01 PROCEDURE — 85025 COMPLETE CBC W/AUTO DIFF WBC: CPT | Mod: PO

## 2018-05-01 PROCEDURE — 83520 IMMUNOASSAY QUANT NOS NONAB: CPT | Mod: 59

## 2018-05-01 PROCEDURE — 84165 PROTEIN E-PHORESIS SERUM: CPT | Mod: 26,,, | Performed by: PATHOLOGY

## 2018-05-01 PROCEDURE — 83010 ASSAY OF HAPTOGLOBIN QUANT: CPT

## 2018-05-01 NOTE — PROGRESS NOTES
Subjective:       Patient ID: Cleo Ford is a 67 y.o. female.    Chief Complaint: Anemia and Results    HPI 67-year-old female referred from her primary physician for evaluation of macrocytic anemia patient had a gastric bypass performed 6 years ago I was asked to the patient for further evaluation    Past Medical History:   Diagnosis Date    Anxiety     Dysmetabolic syndrome X     Obesity     Other and unspecified hyperlipidemia     Unspecified essential hypertension     Unspecified hypothyroidism      Family History   Problem Relation Age of Onset    Cancer Mother     Breast cancer Mother     Hypertension Father     Cancer Maternal Grandmother 80     colon    Colon cancer       Social History     Social History    Marital status:      Spouse name: orlin    Number of children: 2    Years of education: N/A     Occupational History    realtor      Social History Main Topics    Smoking status: Never Smoker    Smokeless tobacco: Never Used    Alcohol use No    Drug use: No    Sexual activity: Yes     Partners: Female     Other Topics Concern    Not on file     Social History Narrative    No narrative on file     Past Surgical History:   Procedure Laterality Date    APPENDECTOMY      CHOLECYSTECTOMY      COLONOSCOPY N/A 9/15/2016    Procedure: COLONOSCOPY;  Surgeon: Jose Daniel MD;  Location: [facility];  Service: Endoscopy;  Laterality: N/A;    gastric sleeve      HYSTERECTOMY         Labs:  Lab Results   Component Value Date    WBC 6.73 05/01/2018    HGB 12.0 05/01/2018    HCT 36.7 (L) 05/01/2018    MCV 95 05/01/2018     05/01/2018     BMP  Lab Results   Component Value Date     (L) 04/10/2018    K 3.9 04/10/2018     04/10/2018    CO2 27 04/10/2018    BUN 17 04/10/2018    CREATININE 1.0 04/10/2018    CALCIUM 10.1 04/10/2018    ANIONGAP 5 (L) 04/10/2018    ESTGFRAFRICA >60.0 04/10/2018    EGFRNONAA 59.3 (A) 04/10/2018     Lab Results   Component  Value Date    ALT 16 03/22/2018    AST 26 03/22/2018    ALKPHOS 73 03/22/2018    BILITOT 0.5 03/22/2018       Lab Results   Component Value Date    IRON 103 03/22/2018    TIBC 389 03/22/2018    FERRITIN 135 03/22/2018     Lab Results   Component Value Date    VRXCKOOE44 810 03/22/2018     Lab Results   Component Value Date    FOLATE 12.7 04/10/2018     Lab Results   Component Value Date    TSH 2.346 03/15/2018         Review of Systems   Constitutional: Positive for fatigue. Negative for activity change, appetite change, chills, diaphoresis, fever and unexpected weight change.   HENT: Negative for congestion, dental problem, drooling, ear discharge, ear pain, facial swelling, hearing loss, mouth sores, nosebleeds, postnasal drip, rhinorrhea, sinus pressure, sneezing, sore throat, tinnitus, trouble swallowing and voice change.    Eyes: Negative for photophobia, pain, discharge, redness, itching and visual disturbance.   Respiratory: Negative for cough, choking, chest tightness, shortness of breath, wheezing and stridor.    Cardiovascular: Negative for chest pain, palpitations and leg swelling.   Gastrointestinal: Negative for abdominal distention, abdominal pain, anal bleeding, blood in stool, constipation, diarrhea, nausea, rectal pain and vomiting.   Endocrine: Negative for cold intolerance, heat intolerance, polydipsia, polyphagia and polyuria.   Genitourinary: Negative for decreased urine volume, difficulty urinating, dyspareunia, dysuria, enuresis, flank pain, frequency, genital sores, hematuria, menstrual problem, pelvic pain, urgency, vaginal bleeding, vaginal discharge and vaginal pain.   Musculoskeletal: Negative for arthralgias, back pain, gait problem, joint swelling, myalgias, neck pain and neck stiffness.   Skin: Negative for color change, pallor and rash.   Allergic/Immunologic: Negative for environmental allergies, food allergies and immunocompromised state.   Neurological: Positive for weakness.  Negative for dizziness, tremors, seizures, syncope, facial asymmetry, speech difficulty, light-headedness, numbness and headaches.   Hematological: Negative for adenopathy. Does not bruise/bleed easily.   Psychiatric/Behavioral: Negative for agitation, behavioral problems, confusion, decreased concentration, dysphoric mood, hallucinations, self-injury, sleep disturbance and suicidal ideas. The patient is nervous/anxious. The patient is not hyperactive.        Objective:      Physical Exam   Constitutional: She is oriented to person, place, and time. She appears well-developed and well-nourished. She appears distressed.   HENT:   Head: Normocephalic and atraumatic.   Right Ear: Tympanic membrane and external ear normal.   Left Ear: Tympanic membrane and external ear normal.   Nose: Nose normal. Right sinus exhibits no maxillary sinus tenderness and no frontal sinus tenderness. Left sinus exhibits no maxillary sinus tenderness and no frontal sinus tenderness.   Mouth/Throat: Oropharynx is clear and moist. No oropharyngeal exudate.   Eyes: Conjunctivae, EOM and lids are normal. Pupils are equal, round, and reactive to light. Right eye exhibits no discharge. Left eye exhibits no discharge. Right conjunctiva is not injected. Right conjunctiva has no hemorrhage. Left conjunctiva is not injected. Left conjunctiva has no hemorrhage. No scleral icterus.   Neck: Normal range of motion. Neck supple. No JVD present. No tracheal deviation present. No thyromegaly present.   Cardiovascular: Normal rate, regular rhythm, normal heart sounds and intact distal pulses.    Pulmonary/Chest: Effort normal and breath sounds normal. No stridor. No respiratory distress. She exhibits no tenderness.   Abdominal: Soft. Bowel sounds are normal. She exhibits no distension and no mass. There is no splenomegaly or hepatomegaly. There is no tenderness. There is no rebound.   Spleen tip palpated in the right lateral decubitus position    Musculoskeletal: Normal range of motion. She exhibits no edema or tenderness.   Lymphadenopathy:     She has no cervical adenopathy.     She has no axillary adenopathy.        Right: No supraclavicular adenopathy present.        Left: No supraclavicular adenopathy present.   Neurological: She is alert and oriented to person, place, and time. No cranial nerve deficit. Coordination normal.   Skin: Skin is dry. No rash noted. She is not diaphoretic. No erythema.   Psychiatric: Her behavior is normal. Judgment and thought content normal. Her mood appears anxious.   Vitals reviewed.          Assessment:      1. Macrocytic anemia           Plan:   Proceed with further laboratory results no evidence of iron deficiency B12 deficiency or folate deficiency.  Follow-up results of laboratory studies will most likely need to consider possibility of bone marrow aspirate biopsy discussed applications for her for possible underlying myelodysplasia.  If no other laboratory results demonstrate source of macrocytic findings

## 2018-05-01 NOTE — LETTER
May 1, 2018      Elizabeth Julian MD  [address]  [address]  [place] [address]           [facility] - Hematology Oncology  [address]  [place] [address]  Phone: [phone]  Fax: [phone]          Patient: Cleo Ford   MR Number: [id number]   YOB: 1950   Date of Visit: 5/1/2018       Dear Dr. Elizabeth Julian:    Thank you for referring Cleo Ford to me for evaluation. Attached you will find relevant portions of my assessment and plan of care.    If you have questions, please do not hesitate to call me. I look forward to following Cleo Ford along with you.    Sincerely,    Malik Perez MD    Enclosure  CC:  No Recipients    If you would like to receive this communication electronically, please contact [email or web address] or [phone] to request more information on [employer] Link access.    For providers and/or their staff who would like to refer a patient to [facility], please contact us through our one-stop-shop provider referral line, [facility] , at [phone].    If you feel you have received this communication in error or would no longer like to receive these types of communications, please e-mail [email or web address]

## 2018-05-02 LAB
ALBUMIN SERPL ELPH-MCNC: 4.12 G/DL
ALPHA1 GLOB SERPL ELPH-MCNC: 0.29 G/DL
ALPHA2 GLOB SERPL ELPH-MCNC: 0.79 G/DL
B-GLOBULIN SERPL ELPH-MCNC: 0.77 G/DL
GAMMA GLOB SERPL ELPH-MCNC: 1.03 G/DL
KAPPA LC SER QL IA: 1.81 MG/DL
KAPPA LC/LAMBDA SER IA: 1.46
LAMBDA LC SER QL IA: 1.24 MG/DL
PATHOLOGIST INTERPRETATION SPE: NORMAL
PROT SERPL-MCNC: 7 G/DL

## 2018-05-04 ENCOUNTER — OFFICE VISIT (OUTPATIENT)
Dept: NEPHROLOGY | Facility: CLINIC | Age: 68
End: 2018-05-04
Payer: MEDICARE

## 2018-05-04 VITALS
WEIGHT: 173.75 LBS | SYSTOLIC BLOOD PRESSURE: 134 MMHG | DIASTOLIC BLOOD PRESSURE: 70 MMHG | BODY MASS INDEX: 30.79 KG/M2 | HEART RATE: 76 BPM | HEIGHT: 63 IN

## 2018-05-04 DIAGNOSIS — E87.1 DILUTIONAL HYPONATREMIA: ICD-10-CM

## 2018-05-04 DIAGNOSIS — E87.1 HYPONATREMIA: Primary | ICD-10-CM

## 2018-05-04 DIAGNOSIS — R63.1 POLYDIPSIA: ICD-10-CM

## 2018-05-04 PROCEDURE — 99214 OFFICE O/P EST MOD 30 MIN: CPT | Mod: S$GLB,,, | Performed by: INTERNAL MEDICINE

## 2018-05-04 PROCEDURE — 99999 PR PBB SHADOW E&M-EST. PATIENT-LVL III: CPT | Mod: PBBFAC,,, | Performed by: INTERNAL MEDICINE

## 2018-05-04 PROCEDURE — 3075F SYST BP GE 130 - 139MM HG: CPT | Mod: CPTII,S$GLB,, | Performed by: INTERNAL MEDICINE

## 2018-05-04 PROCEDURE — 3078F DIAST BP <80 MM HG: CPT | Mod: CPTII,S$GLB,, | Performed by: INTERNAL MEDICINE

## 2018-05-04 PROCEDURE — 3008F BODY MASS INDEX DOCD: CPT | Mod: CPTII,S$GLB,, | Performed by: INTERNAL MEDICINE

## 2018-05-04 NOTE — PROGRESS NOTES
NEPHROLOGY CLINIC FOLLOWUP NOTE AND POST HOSPITAL FOLLOWUP NOTE    REASON FOR FOLLOWUP AND CHIEF COMPLAINT:  Post-hospital followup for   hyponatremia.    HISTORY OF PRESENT ILLNESS:  Ms. Cleo Ford is a 67-year-old female who   was initially seen by us at [facility] in 03/2018 for hyponatremia.  Her   serum sodium was as low as 118.  As previously documented, the evaluation showed   that there are multiple reasons for her hyponatremia.  This included patient's   drinking excessive amounts of water and being on a dietary cleansing system,   which used almost exclusively drinking juices and liquid extracts.  The patient   was avoiding solid food for most of the time.  In addition, she had been on an   SSRI until three weeks prior to the onset of hyponatremia as well as she was   actively taking HCTZ.  On fluid restriction and resumption of normal diet, her   serum sodium uneventfully and properly improved.  On discharge home, her serum   sodium was 130.  Post-hospital showed that her serum sodium was 139, and today,   her serum sodium is 140 millimoles per liter.  She has absolutely no complaints,   no chest pain or shortness of breath, no dizziness, no headaches, and no   walking problems.  She has maintained a normal diet and her fluid intake is   normal and not excessive.    PAST MEDICAL HISTORY:   1.  Hyperlipidemia.  2.  Metabolic syndrome.  3.  Anxiety.  4.  Mild obesity.  5.  Hypertension.  6.  Hypothyroidism.    PAST SURGICAL HISTORY:  Reviewed, appendectomy, cholecystectomy, colonoscopy,   and hysterectomy.    FAMILY MEDICAL HISTORY:  Reviewed.    ALLERGIES:  Reviewed, to tobramycin.    SOCIAL HISTORY:  Negative for smoking.  No alcohol use.    MEDICATIONS:  Reviewed.  Amlodipine 10 mg daily, aspirin 81 mg daily, Synthroid   112 mcg daily, Diovan 160 mg daily, Mevacor 40 mg daily, and Xanax as needed.    REVIEW OF SYSTEMS:  No recent hospitalizations apart from what is described    above.  GENERAL:  Negative.  HEAD, EYES, EARS, NOSE, THROAT:  Negative.  CARDIAC:  Negative.  PULMONARY:  Negative.  GASTROINTESTINAL:  Negative.  GENITOURINARY:  Negative.  PSYCHOLOGICAL:  Negative.  NEUROLOGICAL:  Negative.  ENDOCRINE:  Negative.  HEMATOLOGIC AND ONCOLOGIC:  Negative.  INFECTIOUS DISEASE:  Negative.  The rest of the review of systems negative.    PHYSICAL EXAMINATION:  VITAL SIGNS:  Blood pressure is 134/70, pulse 76, weight is 173 pounds.  GENERAL:  She is cooperative, pleasant, in no acute distress.  Speech and   thought process appropriate, normal.  Ambulating by herself.  Mucous membranes   moist.  NECK:  No JVD.  HEART:  Regular rate and rhythm.  CHEST:  Clear to auscultation.  No rales.  No wheezes.  Breathing symmetric,   unlabored.  EXTREMITIES:  No edema.    LABS:  Reviewed.  Sodium is now 140, potassium 4.0, chloride 105, bicarbonate   24, BUN is 18, calcium 10.2.  TSH is 1.9.    ASSESSMENT AND PLAN:  This is a 67-year-old female who presents for   post-hospital followup for hyponatremia.  The impression is as follows:  1.  Renal:  Serum sodium is now perfectly normal.  Previous hyponatremia had   occurred as a result of excessive fluid intake as well as lack of having a   normal diet because the patient was trying to lose weight using liquid extracts   to the exclusion of solid food.  In addition, she was on an SSRI for depression   and HCTZ    HCTZ was stopped.  I have advised the patient that she can take an SSRI for   depression, but that depends on her and her primary care physician, Dr. Julian.    If there is a situation where she would benefit from SSRI, it is okay by us for   the primary care to restart that.  As long as she is not on excessive water   intake or abnormal dietary intake, I do not expect that addition of an SSRI   would cause any further sodium problems.    2.  Hypertension.  Blood pressure is controlled.  The patient is doing well.    3.  Medication review was  done.  Details as above.    PLANS AND RECOMMENDATIONS:  As discussed above.    Total time spent 25 minutes, more than 50% of the time was spent in counseling   and coordination of care.  Level IV visit.  Return to see us in about one year   or sooner if needed.      AK/GONZALES  dd: 05/04/2018 10:35:26 (CDT)  td: 05/05/2018 01:14:45 (CDT)  Doc ID   #[id number]  Job ID #[id number]    CC:     [id number]

## 2018-05-08 ENCOUNTER — OFFICE VISIT (OUTPATIENT)
Dept: HEMATOLOGY/ONCOLOGY | Facility: CLINIC | Age: 68
End: 2018-05-08
Payer: MEDICARE

## 2018-05-08 VITALS
RESPIRATION RATE: 18 BRPM | DIASTOLIC BLOOD PRESSURE: 90 MMHG | WEIGHT: 172.63 LBS | TEMPERATURE: 98 F | SYSTOLIC BLOOD PRESSURE: 158 MMHG | HEART RATE: 72 BPM | HEIGHT: 63 IN | OXYGEN SATURATION: 99 % | BODY MASS INDEX: 30.59 KG/M2

## 2018-05-08 DIAGNOSIS — D53.9 MACROCYTIC ANEMIA: Primary | ICD-10-CM

## 2018-05-08 PROCEDURE — 99999 PR PBB SHADOW E&M-EST. PATIENT-LVL III: CPT | Mod: PBBFAC,,, | Performed by: INTERNAL MEDICINE

## 2018-05-08 PROCEDURE — 3077F SYST BP >= 140 MM HG: CPT | Mod: CPTII,S$GLB,, | Performed by: INTERNAL MEDICINE

## 2018-05-08 PROCEDURE — 99214 OFFICE O/P EST MOD 30 MIN: CPT | Mod: S$GLB,,, | Performed by: INTERNAL MEDICINE

## 2018-05-08 PROCEDURE — 3080F DIAST BP >= 90 MM HG: CPT | Mod: CPTII,S$GLB,, | Performed by: INTERNAL MEDICINE

## 2018-05-08 PROCEDURE — 3008F BODY MASS INDEX DOCD: CPT | Mod: CPTII,S$GLB,, | Performed by: INTERNAL MEDICINE

## 2018-05-08 NOTE — PROGRESS NOTES
Subjective:       Patient ID: Cleo Ford is a 67 y.o. female.    Chief Complaint: Follow-up and Results    HPI 67-year-old female history of macrocytic anemia returns for review of CBC and laboratory evaluation    Past Medical History:   Diagnosis Date    Anxiety     Dysmetabolic syndrome X     Obesity     Other and unspecified hyperlipidemia     Unspecified essential hypertension     Unspecified hypothyroidism      Family History   Problem Relation Age of Onset    Cancer Mother     Breast cancer Mother     Hypertension Father     Cancer Maternal Grandmother 80        colon    Colon cancer Unknown      Social History     Social History    Marital status:      Spouse name: orlin    Number of children: 2    Years of education: N/A     Occupational History    realtor      Social History Main Topics    Smoking status: Never Smoker    Smokeless tobacco: Never Used    Alcohol use No    Drug use: No    Sexual activity: Yes     Partners: Female     Other Topics Concern    Not on file     Social History Narrative    No narrative on file     Past Surgical History:   Procedure Laterality Date    APPENDECTOMY      CHOLECYSTECTOMY      COLONOSCOPY N/A 9/15/2016    Procedure: COLONOSCOPY;  Surgeon: Jose Daniel MD;  Location: [facility];  Service: Endoscopy;  Laterality: N/A;    gastric sleeve      HYSTERECTOMY         Labs:  Lab Results   Component Value Date    WBC 6.73 05/01/2018    HGB 12.0 05/01/2018    HCT 36.7 (L) 05/01/2018    MCV 95 05/01/2018     05/01/2018     BMP  Lab Results   Component Value Date     05/01/2018    K 4.0 05/01/2018     05/01/2018    CO2 24 05/01/2018    BUN 18 05/01/2018    CREATININE 0.8 05/01/2018    CALCIUM 10.2 05/01/2018    ANIONGAP 11 05/01/2018    ESTGFRAFRICA >60 05/01/2018    EGFRNONAA >60 05/01/2018     Lab Results   Component Value Date    ALT 16 03/22/2018    AST 26 03/22/2018    ALKPHOS 73 03/22/2018    BILITOT 0.5  03/22/2018       Lab Results   Component Value Date    IRON 103 03/22/2018    TIBC 389 03/22/2018    FERRITIN 135 03/22/2018     Lab Results   Component Value Date    [id number] 810 03/22/2018     Lab Results   Component Value Date    FOLATE 12.7 04/10/2018     Lab Results   Component Value Date    TSH 1.920 05/01/2018         Review of Systems   Constitutional: Negative for activity change, appetite change, chills, diaphoresis, fatigue, fever and unexpected weight change.   HENT: Negative for congestion, dental problem, drooling, ear discharge, ear pain, facial swelling, hearing loss, mouth sores, nosebleeds, postnasal drip, rhinorrhea, sinus pressure, sneezing, sore throat, tinnitus, trouble swallowing and voice change.    Eyes: Negative for photophobia, pain, discharge, redness, itching and visual disturbance.   Respiratory: Negative for cough, choking, chest tightness, shortness of breath, wheezing and stridor.    Cardiovascular: Negative for chest pain, palpitations and leg swelling.   Gastrointestinal: Negative for abdominal distention, abdominal pain, anal bleeding, blood in stool, constipation, diarrhea, nausea, rectal pain and vomiting.   Endocrine: Negative for cold intolerance, heat intolerance, polydipsia, polyphagia and polyuria.   Genitourinary: Negative for decreased urine volume, difficulty urinating, dyspareunia, dysuria, enuresis, flank pain, frequency, genital sores, hematuria, menstrual problem, pelvic pain, urgency, vaginal bleeding, vaginal discharge and vaginal pain.   Musculoskeletal: Negative for arthralgias, back pain, gait problem, joint swelling, myalgias, neck pain and neck stiffness.   Skin: Negative for color change, pallor and rash.   Allergic/Immunologic: Negative for environmental allergies, food allergies and immunocompromised state.   Neurological: Negative for dizziness, tremors, seizures, syncope, facial asymmetry, speech difficulty, weakness, light-headedness, numbness and  headaches.   Hematological: Negative for adenopathy. Does not bruise/bleed easily.   Psychiatric/Behavioral: Negative for agitation, behavioral problems, confusion, decreased concentration, dysphoric mood, hallucinations, self-injury, sleep disturbance and suicidal ideas. The patient is not nervous/anxious and is not hyperactive.        Objective:      Physical Exam   Constitutional: She is oriented to person, place, and time. She appears well-developed and well-nourished. No distress.   HENT:   Head: Normocephalic and atraumatic.   Right Ear: External ear normal.   Left Ear: External ear normal.   Nose: Nose normal. Right sinus exhibits no maxillary sinus tenderness and no frontal sinus tenderness. Left sinus exhibits no maxillary sinus tenderness and no frontal sinus tenderness.   Mouth/Throat: Oropharynx is clear and moist. No oropharyngeal exudate.   Eyes: Conjunctivae, EOM and lids are normal. Pupils are equal, round, and reactive to light. Right eye exhibits no discharge. Left eye exhibits no discharge. Right conjunctiva is not injected. Right conjunctiva has no hemorrhage. Left conjunctiva is not injected. Left conjunctiva has no hemorrhage. No scleral icterus.   Neck: Normal range of motion. Neck supple. No JVD present. No tracheal deviation present. No thyromegaly present.   Cardiovascular: Normal rate and regular rhythm.    Pulmonary/Chest: Effort normal. No stridor. No respiratory distress. She exhibits no tenderness.   Abdominal: Soft. She exhibits no distension and no mass. There is no splenomegaly or hepatomegaly. There is no tenderness. There is no rebound.   Musculoskeletal: Normal range of motion. She exhibits no edema or tenderness.   Lymphadenopathy:     She has no cervical adenopathy.     She has no axillary adenopathy.        Right: No supraclavicular adenopathy present.        Left: No supraclavicular adenopathy present.   Neurological: She is alert and oriented to person, place, and time. No  cranial nerve deficit. Coordination normal.   Skin: Skin is dry. No rash noted. She is not diaphoretic. No erythema.   Psychiatric: She has a normal mood and affect. Her behavior is normal. Judgment and thought content normal.   Vitals reviewed.          Assessment:      1. Macrocytic anemia           Plan:   Macrocytic anemia improved at this point check CBC on a monthly basis and follow-up in 4-6 months with repeat CBC if change will be contacted for further evaluation

## 2018-05-11 ENCOUNTER — PATIENT OUTREACH (OUTPATIENT)
Dept: OTHER | Facility: OTHER | Age: 68
End: 2018-05-11

## 2018-05-11 DIAGNOSIS — I10 ESSENTIAL HYPERTENSION: Primary | Chronic | ICD-10-CM

## 2018-05-11 NOTE — PROGRESS NOTES
"Last 5 Patient Entered Readings                                      Current 30 Day Average: 151/85     Recent Readings 5/9/2018 5/9/2018 5/9/2018 5/9/2018 5/9/2018    Pulse 77 75 72 73 75      Sent Dr. Julian message on 5/8 regarding resumption of Coreg. Sent patient a [facility] message.    5/15: Dr. Julian replied "I'd prefer to not put her back on the coreg because she was so fatigued before I believe this was causing some of the symptoms. Elizabeth Julian MD " I replied asking if she has a preference on a vasodilator or adjusting amlodipine to nifedipine. Beta blockers are not an option due to fatigue and thiazides are out due to hyponatremia. Dr. Julian replied stating to adjust amlodipine to nifedipine. I will sent the patient a [facility] message.     5/22: Patient is ready to make an adjustment in medication. She will stop amlodipine and start nifedipine however she would like to see how many days worth of amlodipine she has at home before making the switch. She will contact me through [facility] with the days supply before we adjust.    5/23: Patient replied through [facility] informing me she has a bottle left of amlodipine. She is fine switching to nifedipine therefore she will begin nifedipine today. I will follow-up in two weeks. We discussed side effects and to contact me if swelling occurs.  "

## 2018-05-23 ENCOUNTER — PATIENT MESSAGE (OUTPATIENT)
Dept: OTHER | Facility: OTHER | Age: 68
End: 2018-05-23

## 2018-05-23 RX ORDER — NIFEDIPINE 60 MG/1
60 TABLET, EXTENDED RELEASE ORAL DAILY
Qty: 30 TABLET | Refills: 3 | Status: SHIPPED | OUTPATIENT
Start: 2018-05-23 | End: 2018-07-03 | Stop reason: SDUPTHER

## 2018-05-29 ENCOUNTER — PATIENT OUTREACH (OUTPATIENT)
Dept: ADMINISTRATIVE | Facility: HOSPITAL | Age: 68
End: 2018-05-29

## 2018-06-01 ENCOUNTER — PATIENT OUTREACH (OUTPATIENT)
Dept: OTHER | Facility: OTHER | Age: 68
End: 2018-06-01

## 2018-06-01 NOTE — PROGRESS NOTES
Last 5 Patient Entered Readings                                      Current 30 Day Average: 151/88     Recent Readings 5/22/2018 5/21/2018 5/16/2018 5/9/2018 5/9/2018    SBP (mmHg) 169 143 155 - -    DBP (mmHg) 98 84 98 - -    Pulse 85 87 92 77 75        Digital Medicine: Health  Follow Up    Left voicemail to follow up with Ravin Cleo BRENDA Ford.  Current BP average 151/88 mmHg is not at goal, 130/80 mmHg. FU regarding low sodium diet and medication adjustments.

## 2018-06-07 ENCOUNTER — PATIENT OUTREACH (OUTPATIENT)
Dept: OTHER | Facility: OTHER | Age: 68
End: 2018-06-07

## 2018-06-07 NOTE — PROGRESS NOTES
Last 5 Patient Entered Readings                                      Current 30 Day Average: 152/91     Recent Readings 6/7/2018 5/22/2018 5/21/2018 5/16/2018 5/9/2018    SBP (mmHg) 140 169 143 155 -    DBP (mmHg) 83 98 84 98 -    Pulse 86 85 87 92 77        Patient's BP average is above goal of <130/80.     Patient denies s/s of hypotension (lightheadedness, dizziness, nausea, fatigue) associated with low readings. Instructed patient to inform me if this occurs, patient confirms understanding.      Patient denies s/s of hypertension (SOB, CP, severe headaches, changes in vision) associated with high readings. Instructed patient to go to the ED if BP > 180/110 and accompanied by hypertensive s/s, patient confirms understanding.    Will continue to monitor regularly. Will follow up in 2-3 weeks, sooner if BP begins to trend upward or downward.    Patient has my contact information and knows to call with any concerns or clinical changes.     Current HTN regimen:  Hypertension Medications             NIFEdipine (ADALAT CC) 60 MG TbSR Take 1 tablet (60 mg total) by mouth once daily.    valsartan (DIOVAN) 160 MG tablet Take 2 tablets (320 mg total) by mouth every evening.      Patient is feeling fine since starting nifedipine. She has one reading while on both medications therefore I am maintaining her current regimen and advised her to increase her readings. I will follow-up in two weeks to assess how if her blood pressure continues on this downward trend. She will see Dr. Julian 6/12.

## 2018-06-12 ENCOUNTER — HOSPITAL ENCOUNTER (OUTPATIENT)
Dept: RADIOLOGY | Facility: HOSPITAL | Age: 68
Discharge: HOME | End: 2018-06-12
Attending: FAMILY MEDICINE
Payer: MEDICARE

## 2018-06-12 ENCOUNTER — OFFICE VISIT (OUTPATIENT)
Dept: INTERNAL MEDICINE | Facility: CLINIC | Age: 68
End: 2018-06-12
Payer: MEDICARE

## 2018-06-12 VITALS
SYSTOLIC BLOOD PRESSURE: 126 MMHG | HEIGHT: 63 IN | TEMPERATURE: 98 F | WEIGHT: 174.38 LBS | HEART RATE: 72 BPM | BODY MASS INDEX: 30.9 KG/M2 | DIASTOLIC BLOOD PRESSURE: 78 MMHG

## 2018-06-12 DIAGNOSIS — K59.00 CONSTIPATION, UNSPECIFIED CONSTIPATION TYPE: ICD-10-CM

## 2018-06-12 DIAGNOSIS — I10 ESSENTIAL HYPERTENSION: Chronic | ICD-10-CM

## 2018-06-12 DIAGNOSIS — G89.29 CHRONIC RIGHT FLANK PAIN: ICD-10-CM

## 2018-06-12 DIAGNOSIS — M25.551 RIGHT HIP PAIN: ICD-10-CM

## 2018-06-12 DIAGNOSIS — R10.9 CHRONIC RIGHT FLANK PAIN: ICD-10-CM

## 2018-06-12 DIAGNOSIS — R10.9 CHRONIC RIGHT FLANK PAIN: Primary | ICD-10-CM

## 2018-06-12 DIAGNOSIS — R10.13 EPIGASTRIC PAIN: ICD-10-CM

## 2018-06-12 DIAGNOSIS — F33.1 MODERATE EPISODE OF RECURRENT MAJOR DEPRESSIVE DISORDER: ICD-10-CM

## 2018-06-12 DIAGNOSIS — M85.80 OSTEOPENIA, UNSPECIFIED LOCATION: ICD-10-CM

## 2018-06-12 DIAGNOSIS — G89.29 CHRONIC RIGHT FLANK PAIN: Primary | ICD-10-CM

## 2018-06-12 PROCEDURE — 73502 X-RAY EXAM HIP UNI 2-3 VIEWS: CPT | Mod: TC,FY,PO,RT

## 2018-06-12 PROCEDURE — 72100 X-RAY EXAM L-S SPINE 2/3 VWS: CPT | Mod: 26,,, | Performed by: RADIOLOGY

## 2018-06-12 PROCEDURE — 3008F BODY MASS INDEX DOCD: CPT | Mod: CPTII,S$GLB,, | Performed by: FAMILY MEDICINE

## 2018-06-12 PROCEDURE — 99999 PR PBB SHADOW E&M-EST. PATIENT-LVL III: CPT | Mod: PBBFAC,,, | Performed by: FAMILY MEDICINE

## 2018-06-12 PROCEDURE — 99215 OFFICE O/P EST HI 40 MIN: CPT | Mod: S$GLB,,, | Performed by: FAMILY MEDICINE

## 2018-06-12 PROCEDURE — 74018 RADEX ABDOMEN 1 VIEW: CPT | Mod: TC,FY,PO

## 2018-06-12 PROCEDURE — 73502 X-RAY EXAM HIP UNI 2-3 VIEWS: CPT | Mod: 26,RT,, | Performed by: RADIOLOGY

## 2018-06-12 PROCEDURE — 3078F DIAST BP <80 MM HG: CPT | Mod: CPTII,S$GLB,, | Performed by: FAMILY MEDICINE

## 2018-06-12 PROCEDURE — 74018 RADEX ABDOMEN 1 VIEW: CPT | Mod: 26,,, | Performed by: RADIOLOGY

## 2018-06-12 PROCEDURE — 3074F SYST BP LT 130 MM HG: CPT | Mod: CPTII,S$GLB,, | Performed by: FAMILY MEDICINE

## 2018-06-12 PROCEDURE — 72100 X-RAY EXAM L-S SPINE 2/3 VWS: CPT | Mod: TC,FY,PO

## 2018-06-12 RX ORDER — BUPROPION HYDROCHLORIDE 150 MG/1
150 TABLET ORAL DAILY
Qty: 90 TABLET | Refills: 3 | Status: SHIPPED | OUTPATIENT
Start: 2018-06-12 | End: 2018-07-09 | Stop reason: SDUPTHER

## 2018-06-12 RX ORDER — TRIAMTERENE/HYDROCHLOROTHIAZID 37.5-25 MG
TABLET ORAL
COMMUNITY
Start: 2018-05-25 | End: 2018-06-12

## 2018-06-12 RX ORDER — OMEPRAZOLE 40 MG/1
40 CAPSULE, DELAYED RELEASE ORAL DAILY
Qty: 30 CAPSULE | Refills: 11
Start: 2018-06-12 | End: 2018-10-30

## 2018-06-12 NOTE — PROGRESS NOTES
Subjective:      Patient ID: Cleo Ford is a 67 y.o. female.    Chief Complaint: Follow-up (2m)    Disclaimer:  This note is prepared using voice recognition software and as such is likely to have errors and has not been proof read. Please contact me for questions.     Patient's coming in today for 2 month follow-up.  Since I last saw her she met with the nephrologist.  It was found that there were multiple etiologies for the hyponatremia.  Since that time she has normalized.  She is now off hydrochlorothiazide.  Blood pressure is actually doing better at this time.  Currently on nifedipine.  Not on a beta-blocker due to significant fatigue.  Having minimal leg swelling off and on but it is tolerable.  Did discuss the potential of using compression socks that the patient is hesitant to do so because she gets too hot.  Finds that she is OK tolerating with the current dosing of her medications at this time.  On Diovan and nifedipine.    Also did see Dr. Perez for macrocytic anemia.  Did additional blood work and it normalized.    Lately reports that she is feeling like something is a burning or rale sensation in the epigastric area.  She has had gastric sleeve procedure.  Occasionally will take some of her 's omeprazole.  Does not eat spicy foods or acidic foods.  Has never been told she had a hiatal hernia before even previously when she did her surgery but has not had an EGD or had any type of upper scope or x-rays of the chest recently.  Does not lay down at night.  Only caffeine is from tea and Women in it.  Does take several supplements.    Possibly battles with constipation.  Has been taking a calcium supplement as well.  Has been doing an herbal supplement as well.  Weight is stable for now but she would like to go further down.    Chronically deals with also some right-sided hip and flank pain.  Uncertain if there may be issues ongoing has been told in the past that she has a larger right hip  than others due to caring her sisters in siblings before.  Is bothersome to her that she would like to have it evaluated.  No prior x-rays at this time.  Does have noticed osteopenia of the back and the hips on previous DXA scans.    Also still reports that she is depressed and has lived with lifelong depression.  Felt much better when she was on SSRI therapy however had to stop due to low sodium levels and also with the recent blood pressure changes and significantly weight gain.  Previously we had discussed about using Wellbutrin as it has a side effect of also weight loss.  She is interested in trying it at this time now.            Lab Results   Component Value Date    WBC 6.73 05/01/2018    HGB 12.0 05/01/2018    HCT 36.7 (L) 05/01/2018     05/01/2018    CHOL 181 10/17/2017    TRIG 36 10/17/2017    HDL 80 (H) 10/17/2017    ALT 16 03/22/2018    AST 26 03/22/2018     05/01/2018    K 4.0 05/01/2018     05/01/2018    CREATININE 0.8 05/01/2018    BUN 18 05/01/2018    CO2 24 05/01/2018    TSH 1.920 05/01/2018    HGBA1C 5.2 10/17/2017       Review of Systems   Constitutional: Positive for activity change and appetite change. Negative for chills, fatigue and fever.   HENT: Negative for ear pain and trouble swallowing.    Eyes: Negative for pain and visual disturbance.   Respiratory: Negative for cough and shortness of breath.    Cardiovascular: Negative for chest pain and leg swelling.   Gastrointestinal: Positive for abdominal pain and constipation. Negative for abdominal distention, blood in stool, nausea and vomiting.   Endocrine: Negative for cold intolerance and heat intolerance.   Genitourinary: Positive for flank pain. Negative for dysuria and frequency.   Musculoskeletal: Positive for arthralgias, back pain and myalgias. Negative for joint swelling and neck pain.   Skin: Negative for color change and rash.   Neurological: Negative for dizziness and headaches.   Psychiatric/Behavioral:  "Positive for dysphoric mood. Negative for behavioral problems, decreased concentration and sleep disturbance. The patient is not nervous/anxious.      Objective:     Vitals:    06/12/18 1002   BP: 126/78   Pulse: 72   Temp: 97.8 °F (36.6 °C)   Weight: 79.1 kg (174 lb 6.1 oz)   Height: 5' 3" (1.6 m)     Physical Exam   Constitutional: She is oriented to person, place, and time. She appears well-developed and well-nourished.   HENT:   Head: Normocephalic and atraumatic.   Right Ear: External ear normal.   Left Ear: External ear normal.   Mouth/Throat: Oropharynx is clear and moist.   Eyes: EOM are normal.   Neck: Normal range of motion. Neck supple. No thyromegaly present.   Cardiovascular: Normal rate, regular rhythm and normal heart sounds.  Exam reveals no gallop and no friction rub.    No murmur heard.  No evidence of leg swelling today however does have pictures from prior when it is slightly more nonpitting.  Lower extremities from the ankles below   Pulmonary/Chest: Effort normal. No respiratory distress. She has no wheezes. She has no rales.   Abdominal: Soft. Bowel sounds are normal. She exhibits no distension. There is tenderness in the epigastric area. There is no rebound.   Musculoskeletal: She exhibits no edema.        Lumbar back: She exhibits decreased range of motion, tenderness, bony tenderness, pain and spasm.        Back:    Possible evidence of either some mild scoliosis or leg length issues due to alignment.  When standing now actually she likes to stand with her right foot slightly more forward and turned out.  Does have limited flexion.  Pain with palpation of the right gluteal region.  Normal reflexes.  No radicular symptoms today.   Lymphadenopathy:     She has no cervical adenopathy.   Neurological: She is alert and oriented to person, place, and time.   Skin: Skin is warm and dry. No rash noted.   Psychiatric: Her speech is normal and behavior is normal. Judgment and thought content normal. " She exhibits a depressed mood.   Vitals reviewed.    Assessment:     1. Chronic right flank pain    2. Right hip pain    3. Essential hypertension    4. Epigastric pain    5. Constipation, unspecified constipation type    6. Moderate episode of recurrent major depressive disorder    7. Osteopenia, unspecified location      Plan:   Cleo was seen today for follow-up.    Diagnoses and all orders for this visit:    Chronic right flank pain-new desiring workup.  Rule out kidney stones.  Comments:  stop calcium supplements. check xrays today r/o kidney stone, or hip/pelvis, scoliosis as cause.  Could consider topical treatments versus muscle relaxants.  Holding off on NSAIDs due to high blood pressure  Orders:  -     Cancel: X-Ray Abdomen Flat And Erect; Future  -     X-Ray Lumbar Spine Ap And Lateral; Future  -     Cancel: X-Ray Hips Bilateral 2 [employer] AP Pelvis; Future    Right hip pain-suspect possibly of musculoskeletal etiology such as a ligament describes the or even possibly scoliosis.  Obtain x-rays today.  Discussed strengthening exercises  -     Cancel: X-Ray Abdomen Flat And Erect; Future  -     X-Ray Lumbar Spine Ap And Lateral; Future  -     Cancel: X-Ray Hips Bilateral 2 [employer] AP Pelvis; Future    Essential hypertension  Comments:  improved, mild leg swelling prn, use compression socks prn.  Continue with Diovan and nifedipine  Orders:  -     Cancel: X-Ray Abdomen Flat And Erect; Future  -     X-Ray Lumbar Spine Ap And Lateral; Future  -     Cancel: X-Ray Hips Bilateral 2 [employer] AP Pelvis; Future    Epigastric pain-new and ongoing with sensation of feeling raw and at times discomfort worse at night.  Comments:  consider hiatal hernia vs supplements.  Will look into diet further as well as some behavioral changes.  can try pepcid ac or omeprazole prn at night.  If not improving to consider EGD  Orders:  -     Cancel: X-Ray Abdomen Flat And Erect; Future  -     X-Ray Lumbar Spine Ap And Lateral;  Future  -     Cancel: X-Ray Hips Bilateral 2 [employer] AP Pelvis; Future    Constipation, unspecified constipation type  Comments:  stopping calcium supplements.  This may help can continue with her oral supplements  Orders:  -     Cancel: X-Ray Abdomen Flat And Erect; Future  -     X-Ray Lumbar Spine Ap And Lateral; Future  -     Cancel: X-Ray Hips Bilateral 2 [employer] AP Pelvis; Future    Moderate episode of recurrent major depressive disorder  Comments:  sodium normal, cbc now normal. start wellbutrin.  Discussed risks and benefits of medication.    Osteopenia, unspecified location-noted prior stopping calcium supplements will obtain through diet discussed weight-bearing exercise.    Other orders  -     buPROPion (WELLBUTRIN XL) 150 MG TB24 tablet; Take 1 tablet (150 mg total) by mouth once daily.  -     omeprazole (PRILOSEC) 40 MG capsule; Take 1 capsule (40 mg total) by mouth once daily.      Time spent: 45 minutes in face to face discussion concerning diagnosis, prognosis, review of lab and test results, benefits of treatment as well as management of disease, counseling of patient and coordination of care between various health care providers . Greater than half the time spent was used for coordination of care and counseling of patient.  I also did review Dr. Perez is notes and  came years notes from the specialist recently that she was'seen for in May for the macrocytic anemia and hyponatremia.        Follow-up in about 2 months (around 8/12/2018) for mood,meds, bp.

## 2018-06-12 NOTE — PATIENT INSTRUCTIONS
What Is a Hiatal Hernia?    Hiatal hernia is when the area where the stomach and esophagus meet bulges up through the diaphragm into the chest cavity. In some cases, part of the stomach may bulge above the diaphragm. Stomach acid may move up into the esophagus and cause symptoms. The symptoms are often blamed on gastroesophageal reflux disease (GERD). You may only know about the hernia when it shows up on an X-ray taken for other reasons.   What you may feel  The hiatus is a normal hole in the diaphragm. The esophagus passes through this hole and leads to the stomach. In some cases, part of the stomach may bulge above the diaphragm. This bulge is called a hernia. Stomach acid may move up into the esophagus and cause symptoms.  When you eat, the muscle at the hiatus relaxes to allow food to pass into the stomach. It tightens again to keep food and digestive acids in the stomach.  Many people with hiatal hernias have mild symptoms. You may notice the following GERD symptoms:  · Heartburn or other chest discomfort  · A feeling of chest fullness after a meal  · Frequent burping  · Acid taste in the mouth  · Trouble swallowing  Treating symptoms  If you have been diagnosed with hiatal hernia, these suggestions may help improve symptoms:  · Lose excess weight. Extra weight puts pressure on the stomach and esophagus.  · Dont lie down after eating. Sit up for at least an hour after eating. Lying down after eating can increase symptoms.  · Avoid certain foods and drinks. These include fatty foods, chocolate, coffee, mint, and other foods that cause symptoms for you.  · Dont smoke or drink alcohol. These can worsen symptoms.  · Look at your medicines. Discuss your medicines with your healthcare provider. Many medicines can cause symptoms.  · Consider an antacid medicine. Ask your healthcare provider about over-the-counter and prescription medicines that may help.  · Ask about surgery, if needed. Surgery is a treatment  choice for some people. Your healthcare provider can determine if surgery is an option for you.    Date Last Reviewed: 10/1/2016  © 0522-9636 [employer] [address] [place] [address], [place] [address]. All rights reserved. This information is not intended as a substitute for professional medical care. Always follow your healthcare professional's instructions.

## 2018-06-21 NOTE — PROGRESS NOTES
Last 5 Patient Entered Readings                                      Current 30 Day Average: 160/92     Recent Readings 6/11/2018 6/9/2018 6/7/2018 5/22/2018 5/21/2018    SBP (mmHg) 161 170 140 169 143    DBP (mmHg) 95 90 83 98 84    Pulse 97 80 86 85 87          Digital Medicine: Health  Follow Up    Lifestyle Modifications:    1.Dietary Modifications (Sodium intake <2,000mg/day, food labels, dining out): States she continues to read food labels and is monitoring for sodium. Encouraged continuation of healthy, low sodium diet and reinforced benefits. States she has been taking her bp readings in the morning less than one hour after taking her blood pressure medications and as she is trying to leave for the day. Educated on proper timing/technique; verbalizes understanding and agrees to take a reading today and to take readings in afternoon/evening when she is more relaxed and has taken medications.     2.Physical Activity: Deferred.     3.Medication Therapy: Patient has been compliant with the medication regimen.    4.Patient has the following medication side effects/concerns: No concerns per patient. States she is doing well with no issues.   (Frequency/Alleviating factors/Precipitating factors, etc.)     Follow up with Mrs. Gallo BRENDA Ford completed. No further questions or concerns. Will continue follow up to achieve health goals.

## 2018-06-28 ENCOUNTER — PATIENT OUTREACH (OUTPATIENT)
Dept: OTHER | Facility: OTHER | Age: 68
End: 2018-06-28

## 2018-06-28 DIAGNOSIS — I10 ESSENTIAL HYPERTENSION: Primary | Chronic | ICD-10-CM

## 2018-06-28 NOTE — PROGRESS NOTES
Last 5 Patient Entered Readings                                      Current 30 Day Average: 159/91     Recent Readings 6/24/2018 6/11/2018 6/9/2018 6/7/2018 5/22/2018    SBP (mmHg) 165 161 170 140 169    DBP (mmHg) 97 95 90 83 98    Pulse 88 97 80 86 85        LMFCB. Once reached, will discuss increasing nifedipine to 120mg daily. Beta blockers and thiazides are not an option due to complications in the past.     7/3: Patient's BP average is above goal of <130/80.     Patient denies s/s of hypotension (lightheadedness, dizziness, nausea, fatigue) associated with low readings. Instructed patient to inform me if this occurs, patient confirms understanding.      Patient denies s/s of hypertension (SOB, CP, severe headaches, changes in vision) associated with high readings. Instructed patient to go to the ED if BP > 180/110 and accompanied by hypertensive s/s, patient confirms understanding.    Will continue to monitor regularly. Will follow up in 2-3 weeks, sooner if BP begins to trend upward or downward.    Patient has my contact information and knows to call with any concerns or clinical changes.     Current HTN regimen:  Hypertension Medications             NIFEdipine (ADALAT CC) 60 MG TbSR Take 1 tablet (60 mg total) by mouth once daily.    valsartan (DIOVAN) 160 MG tablet Take 2 tablets (320 mg total) by mouth every evening.      Patient just refilled nifedipine 60mg tablets however her blood pressure continues to climb. She will begin nifedipine 90mg daily to help improve her climbing blood pressure. We discussed side effects and to keep me informed of any hypotension symptoms or swelling.    7/17: Dr. Julian changed valsartan 320mg to olmesartan 20mg daily. I will follow-up in two weeks assuming she will require 40mg daily of olmesartan.

## 2018-07-02 ENCOUNTER — PATIENT MESSAGE (OUTPATIENT)
Dept: INTERNAL MEDICINE | Facility: CLINIC | Age: 68
End: 2018-07-02

## 2018-07-03 RX ORDER — NIFEDIPINE 60 MG/1
120 TABLET, EXTENDED RELEASE ORAL DAILY
Qty: 30 TABLET | Refills: 3
Start: 2018-07-03 | End: 2018-07-03 | Stop reason: DRUGHIGH

## 2018-07-03 RX ORDER — NIFEDIPINE 90 MG/1
90 TABLET, FILM COATED, EXTENDED RELEASE ORAL DAILY
Qty: 30 TABLET | Refills: 3 | Status: SHIPPED | OUTPATIENT
Start: 2018-07-03 | End: 2019-01-30 | Stop reason: SDUPTHER

## 2018-07-09 ENCOUNTER — PATIENT MESSAGE (OUTPATIENT)
Dept: INTERNAL MEDICINE | Facility: CLINIC | Age: 68
End: 2018-07-09

## 2018-07-09 RX ORDER — BUPROPION HYDROCHLORIDE 300 MG/1
300 TABLET ORAL DAILY
Qty: 90 TABLET | Refills: 3 | Status: SHIPPED | OUTPATIENT
Start: 2018-07-09 | End: 2018-10-31

## 2018-07-17 ENCOUNTER — TELEPHONE (OUTPATIENT)
Dept: INTERNAL MEDICINE | Facility: CLINIC | Age: 68
End: 2018-07-17

## 2018-07-17 ENCOUNTER — PATIENT MESSAGE (OUTPATIENT)
Dept: OTHER | Facility: OTHER | Age: 68
End: 2018-07-17

## 2018-07-17 ENCOUNTER — PATIENT MESSAGE (OUTPATIENT)
Dept: INTERNAL MEDICINE | Facility: CLINIC | Age: 68
End: 2018-07-17

## 2018-07-17 RX ORDER — OLMESARTAN MEDOXOMIL 20 MG/1
20 TABLET ORAL DAILY
Qty: 90 TABLET | Refills: 3 | Status: SHIPPED | OUTPATIENT
Start: 2018-07-17 | End: 2018-09-25 | Stop reason: DRUGHIGH

## 2018-07-17 NOTE — TELEPHONE ENCOUNTER
----- Message from Paulette Tapia sent at 7/17/2018 10:24 AM CDT -----  Contact: Leno   Pharmacist is calling regarding the scripts valsartan (DIOVAN) 160 MG tablet. Pharmacist stated the script has a recall. Pharma ist would like to know if their is another option. .Leno #[phone]

## 2018-07-17 NOTE — TELEPHONE ENCOUNTER
Change to generic benicar for now until the manufacturers change for the generic diovan. Pt should be aware as she is enrolled in digtal htn program.

## 2018-07-19 ENCOUNTER — PATIENT MESSAGE (OUTPATIENT)
Dept: INTERNAL MEDICINE | Facility: CLINIC | Age: 68
End: 2018-07-19

## 2018-07-19 RX ORDER — CIPROFLOXACIN 500 MG/1
500 TABLET ORAL 2 TIMES DAILY
Qty: 14 TABLET | Refills: 0 | Status: SHIPPED | OUTPATIENT
Start: 2018-07-19 | End: 2018-07-26

## 2018-07-25 ENCOUNTER — PATIENT OUTREACH (OUTPATIENT)
Dept: OTHER | Facility: OTHER | Age: 68
End: 2018-07-25

## 2018-07-25 NOTE — PROGRESS NOTES
Last 5 Patient Entered Readings                                      Current 30 Day Average: 157/92     Recent Readings 7/23/2018 7/13/2018 7/3/2018 7/1/2018 6/24/2018    SBP (mmHg) 164 133 158 172 165    DBP (mmHg) 96 89 84 98 97    Pulse 89 95 99 93 88          Digital Medicine: Health  Follow Up    Lifestyle Modifications:    1.Dietary Modifications (Sodium intake <2,000mg/day, food labels, dining out): Deferred.     2.Physical Activity: Patient reports walking almost every day while in [place] on work last week and stated this felt great. Reports she does have a high level of stress with her job, but feels she is doing better at managing this and has spoken with her PCP regarding antidepressants, which she feels are helping. Encouraged to reach out with any question/concerns in between follow ups - declines further coaching at this time.     3.Medication Therapy: Patient has been compliant with the medication regimen.    4.Patient has the following medication side effects/concerns: No side effects/concerns per patient. FU at next contact regarding bp technique/timing.     Follow up with Ms. Cleo Ford completed. No further questions or concerns. Will continue follow up to achieve health goals.

## 2018-07-27 ENCOUNTER — PATIENT OUTREACH (OUTPATIENT)
Dept: OTHER | Facility: OTHER | Age: 68
End: 2018-07-27

## 2018-07-27 DIAGNOSIS — I10 ESSENTIAL HYPERTENSION: Primary | Chronic | ICD-10-CM

## 2018-07-27 NOTE — PROGRESS NOTES
Last 5 Patient Entered Readings                                      Current 30 Day Average: 157/92     Recent Readings 7/23/2018 7/13/2018 7/3/2018 7/1/2018 6/24/2018    SBP (mmHg) 164 133 158 172 165    DBP (mmHg) 96 89 84 98 97    Pulse 89 95 99 93 88        LMFCB. ONce reached, will increase olmesartan to 40mg daily.    8/21: No recent readings. Will send LAMINE Lakhani a juan jose.

## 2018-08-16 ENCOUNTER — OFFICE VISIT (OUTPATIENT)
Dept: INTERNAL MEDICINE | Facility: CLINIC | Age: 68
End: 2018-08-16
Payer: MEDICARE

## 2018-08-16 ENCOUNTER — LAB VISIT (OUTPATIENT)
Dept: LAB | Facility: HOSPITAL | Age: 68
End: 2018-08-16
Attending: INTERNAL MEDICINE
Payer: MEDICARE

## 2018-08-16 VITALS
HEART RATE: 76 BPM | DIASTOLIC BLOOD PRESSURE: 72 MMHG | WEIGHT: 171.5 LBS | HEIGHT: 64 IN | TEMPERATURE: 98 F | SYSTOLIC BLOOD PRESSURE: 132 MMHG | BODY MASS INDEX: 29.28 KG/M2

## 2018-08-16 DIAGNOSIS — E78.2 MIXED HYPERLIPIDEMIA: ICD-10-CM

## 2018-08-16 DIAGNOSIS — E87.1 HYPONATREMIA: ICD-10-CM

## 2018-08-16 DIAGNOSIS — I10 ESSENTIAL HYPERTENSION: Primary | Chronic | ICD-10-CM

## 2018-08-16 DIAGNOSIS — D53.9 MACROCYTIC ANEMIA: ICD-10-CM

## 2018-08-16 DIAGNOSIS — E55.9 VITAMIN D DEFICIENCY: ICD-10-CM

## 2018-08-16 DIAGNOSIS — I10 ESSENTIAL HYPERTENSION: Chronic | ICD-10-CM

## 2018-08-16 DIAGNOSIS — E03.8 OTHER SPECIFIED HYPOTHYROIDISM: Chronic | ICD-10-CM

## 2018-08-16 DIAGNOSIS — F41.9 ANXIETY: ICD-10-CM

## 2018-08-16 LAB
25(OH)D3+25(OH)D2 SERPL-MCNC: 53 NG/ML
ALBUMIN SERPL BCP-MCNC: 4 G/DL
ALBUMIN/CREAT UR: 20 UG/MG
ALP SERPL-CCNC: 93 U/L
ALT SERPL W/O P-5'-P-CCNC: 12 U/L
ANION GAP SERPL CALC-SCNC: 10 MMOL/L
AST SERPL-CCNC: 19 U/L
BASOPHILS # BLD AUTO: 0.07 K/UL
BASOPHILS NFR BLD: 0.9 %
BILIRUB SERPL-MCNC: 0.6 MG/DL
BUN SERPL-MCNC: 13 MG/DL
CALCIUM SERPL-MCNC: 10.3 MG/DL
CHLORIDE SERPL-SCNC: 105 MMOL/L
CHOLEST SERPL-MCNC: 183 MG/DL
CHOLEST/HDLC SERPL: 2.3 {RATIO}
CO2 SERPL-SCNC: 25 MMOL/L
CREAT SERPL-MCNC: 1 MG/DL
CREAT UR-MCNC: 35 MG/DL
DIFFERENTIAL METHOD: ABNORMAL
EOSINOPHIL # BLD AUTO: 0.1 K/UL
EOSINOPHIL NFR BLD: 1.7 %
ERYTHROCYTE [DISTWIDTH] IN BLOOD BY AUTOMATED COUNT: 13.3 %
EST. GFR  (AFRICAN AMERICAN): >60 ML/MIN/1.73 M^2
EST. GFR  (NON AFRICAN AMERICAN): 59.3 ML/MIN/1.73 M^2
GLUCOSE SERPL-MCNC: 86 MG/DL
HCT VFR BLD AUTO: 40.4 %
HDLC SERPL-MCNC: 79 MG/DL
HDLC SERPL: 43.2 %
HGB BLD-MCNC: 12.7 G/DL
IMM GRANULOCYTES # BLD AUTO: 0.02 K/UL
IMM GRANULOCYTES NFR BLD AUTO: 0.3 %
LDLC SERPL CALC-MCNC: 93 MG/DL
LYMPHOCYTES # BLD AUTO: 1.7 K/UL
LYMPHOCYTES NFR BLD: 21.8 %
MCH RBC QN AUTO: 30 PG
MCHC RBC AUTO-ENTMCNC: 31.4 G/DL
MCV RBC AUTO: 95 FL
MICROALBUMIN UR DL<=1MG/L-MCNC: 7 UG/ML
MONOCYTES # BLD AUTO: 0.5 K/UL
MONOCYTES NFR BLD: 6.1 %
NEUTROPHILS # BLD AUTO: 5.4 K/UL
NEUTROPHILS NFR BLD: 69.2 %
NONHDLC SERPL-MCNC: 104 MG/DL
NRBC BLD-RTO: 0 /100 WBC
PLATELET # BLD AUTO: 292 K/UL
PMV BLD AUTO: 10.1 FL
POTASSIUM SERPL-SCNC: 3.8 MMOL/L
PROT SERPL-MCNC: 7.5 G/DL
RBC # BLD AUTO: 4.24 M/UL
SODIUM SERPL-SCNC: 140 MMOL/L
T4 FREE SERPL-MCNC: 1.31 NG/DL
TRIGL SERPL-MCNC: 55 MG/DL
TSH SERPL DL<=0.005 MIU/L-ACNC: 1.68 UIU/ML
WBC # BLD AUTO: 7.84 K/UL

## 2018-08-16 PROCEDURE — 99999 PR PBB SHADOW E&M-EST. PATIENT-LVL III: CPT | Mod: PBBFAC,,, | Performed by: FAMILY MEDICINE

## 2018-08-16 PROCEDURE — 99214 OFFICE O/P EST MOD 30 MIN: CPT | Mod: S$GLB,,, | Performed by: FAMILY MEDICINE

## 2018-08-16 PROCEDURE — 83036 HEMOGLOBIN GLYCOSYLATED A1C: CPT

## 2018-08-16 PROCEDURE — 36415 COLL VENOUS BLD VENIPUNCTURE: CPT | Mod: PO

## 2018-08-16 PROCEDURE — 3075F SYST BP GE 130 - 139MM HG: CPT | Mod: CPTII,S$GLB,, | Performed by: FAMILY MEDICINE

## 2018-08-16 PROCEDURE — 82043 UR ALBUMIN QUANTITATIVE: CPT

## 2018-08-16 PROCEDURE — 80053 COMPREHEN METABOLIC PANEL: CPT

## 2018-08-16 PROCEDURE — 85025 COMPLETE CBC W/AUTO DIFF WBC: CPT

## 2018-08-16 PROCEDURE — 3078F DIAST BP <80 MM HG: CPT | Mod: CPTII,S$GLB,, | Performed by: FAMILY MEDICINE

## 2018-08-16 PROCEDURE — 3008F BODY MASS INDEX DOCD: CPT | Mod: CPTII,S$GLB,, | Performed by: FAMILY MEDICINE

## 2018-08-16 PROCEDURE — 82306 VITAMIN D 25 HYDROXY: CPT

## 2018-08-16 PROCEDURE — 80061 LIPID PANEL: CPT

## 2018-08-16 PROCEDURE — 84439 ASSAY OF FREE THYROXINE: CPT

## 2018-08-16 PROCEDURE — 84443 ASSAY THYROID STIM HORMONE: CPT

## 2018-08-16 NOTE — PROGRESS NOTES
Subjective:      Patient ID: Cleo Ford is a 67 y.o. female.    Chief Complaint: Follow-up (2 months )    Disclaimer:  This note is prepared using voice recognition software and as such is likely to have errors and has not been proof read. Please contact me for questions.     Patient's coming in today for 2 month follow-up.  Since I last saw her shes doing well. Now on wellbutrin at 300mg and helps a good bit with depression. Needing labs today. Prior issues with low sodium.     Is bruising some on her hands. Has cbc ordered today, but uncertain if needs to keep appt with Dr Perez.     It was found that there were multiple etiologies for the hyponatremia.  Since that time she has normalized.  She is now off hydrochlorothiazide.  Blood pressure is actually doing better at this time.  Currently on nifedipine.  Not on a beta-blocker due to significant fatigue.  Having minimal leg swelling off and on but it is tolerable. Now on benicar low dose due to drug recall.     Also did see Dr. Perez for macrocytic anemia.  Did additional blood work and it normalized.    On thyroid supplement. Feels well. Needing labs.     Hyperlipidemia- on statin. Willing to do labs today also.     Needs pna 23 shot in oct with flu shot.         Lab Results   Component Value Date    WBC 6.73 05/01/2018    HGB 12.0 05/01/2018    HCT 36.7 (L) 05/01/2018     05/01/2018    CHOL 181 10/17/2017    TRIG 36 10/17/2017    HDL 80 (H) 10/17/2017    ALT 16 03/22/2018    AST 26 03/22/2018     05/01/2018    K 4.0 05/01/2018     05/01/2018    CREATININE 0.8 05/01/2018    BUN 18 05/01/2018    CO2 24 05/01/2018    TSH 1.920 05/01/2018    HGBA1C 5.2 10/17/2017       Review of Systems   Constitutional: Negative for activity change, appetite change, chills, fatigue and fever.   HENT: Negative for ear pain and trouble swallowing.    Eyes: Negative for pain and visual disturbance.   Respiratory: Negative for cough and shortness of  "breath.    Cardiovascular: Negative for chest pain and leg swelling.   Gastrointestinal: Negative for abdominal pain, blood in stool, nausea and vomiting.   Endocrine: Negative for cold intolerance and heat intolerance.   Genitourinary: Negative for dysuria and frequency.   Musculoskeletal: Negative for joint swelling, myalgias and neck pain.   Skin: Negative for color change and rash.   Neurological: Negative for dizziness and headaches.   Hematological: Bruises/bleeds easily.   Psychiatric/Behavioral: Negative for behavioral problems and sleep disturbance.     Objective:     Vitals:    08/16/18 0959   BP: 132/72   Pulse: 76   Temp: 98.4 °F (36.9 °C)   TempSrc: Tympanic   Weight: 77.8 kg (171 lb 8.3 oz)   Height: 5' 4" (1.626 m)     Physical Exam   Constitutional: She appears well-developed and well-nourished.   HENT:   Head: Normocephalic and atraumatic.   Right Ear: Tympanic membrane normal.   Left Ear: Tympanic membrane normal.   Mouth/Throat: Oropharynx is clear and moist.   Eyes: Conjunctivae and EOM are normal.   Neck: Normal range of motion. Neck supple.   Cardiovascular: Normal rate and regular rhythm.   Pulmonary/Chest: Effort normal and breath sounds normal.   Skin: Bruising noted.        Psychiatric: She has a normal mood and affect. Her speech is normal and behavior is normal. Thought content normal.   Nursing note and vitals reviewed.    Assessment:     1. Essential hypertension    2. Mixed hyperlipidemia    3. Other specified hypothyroidism    4. Hyponatremia    5. Macrocytic anemia    6. Vitamin D deficiency    7. Anxiety      Plan:   Cleo was seen today for follow-up.    Diagnoses and all orders for this visit:    Essential hypertension - stable, labs ordered today.  Continue with current medications and interventions until labs are reviewed to make adjustments.     -     Comprehensive metabolic panel; Future  -     TSH; Future  -     T4, free; Future  -     Microalbumin/creatinine urine " ratio  -     Lipid panel; Future  -     Hemoglobin A1c; Future  -     Comprehensive metabolic panel; Future  -     Vitamin D; Future    Mixed hyperlipidemia - stable, labs ordered today.  Continue with current medications and interventions until labs are reviewed to make adjustments.   -     Comprehensive metabolic panel; Future  -     TSH; Future  -     T4, free; Future  -     Microalbumin/creatinine urine ratio  -     Lipid panel; Future  -     Hemoglobin A1c; Future  -     Comprehensive metabolic panel; Future  -     Vitamin D; Future    Other specified hypothyroidism  -     Comprehensive metabolic panel; Future  -     TSH; Future  -     T4, free; Future  -     Microalbumin/creatinine urine ratio  -     Lipid panel; Future  -     Hemoglobin A1c; Future  -     Comprehensive metabolic panel; Future  -     Vitamin D; Future    Hyponatremia - stable, labs ordered today.  Continue with current medications and interventions until labs are reviewed to make adjustments.   -     Comprehensive metabolic panel; Future  -     TSH; Future  -     T4, free; Future  -     Microalbumin/creatinine urine ratio  -     Lipid panel; Future  -     Hemoglobin A1c; Future  -     Comprehensive metabolic panel; Future  -     Vitamin D; Future    Macrocytic anemia - stable, labs ordered today.  Continue with current medications and interventions until labs are reviewed to make adjustments. Will not need followup with dr claudio if normal.   -     Comprehensive metabolic panel; Future  -     TSH; Future  -     T4, free; Future  -     Microalbumin/creatinine urine ratio  -     Lipid panel; Future  -     Hemoglobin A1c; Future  -     Comprehensive metabolic panel; Future  -     Vitamin D; Future    Vitamin D deficiency  - stable, labs ordered today.  Continue with current medications and interventions until labs are reviewed to make adjustments.   -     Comprehensive metabolic panel; Future  -     TSH; Future  -     T4, free; Future  -      Microalbumin/creatinine urine ratio  -     Lipid panel; Future  -     Hemoglobin A1c; Future  -     Comprehensive metabolic panel; Future  -     Vitamin D; Future    Anxiety- improved with wellbutrin 300mg.             Follow-up in about 6 months (around 2/16/2019).

## 2018-08-17 LAB
ESTIMATED AVG GLUCOSE: 100 MG/DL
HBA1C MFR BLD HPLC: 5.1 %

## 2018-08-21 ENCOUNTER — PATIENT OUTREACH (OUTPATIENT)
Dept: OTHER | Facility: OTHER | Age: 68
End: 2018-08-21

## 2018-08-21 NOTE — PROGRESS NOTES
Last 5 Patient Entered Readings                                      Current 30 Day Average: 157/89     Recent Readings 8/8/2018 7/31/2018 7/23/2018 7/13/2018 7/3/2018    SBP (mmHg) 167 140 164 133 158    DBP (mmHg) 85 86 96 89 84    Pulse 84 102 89 95 99            Digital Medicine: Health  Follow Up    Left voicemail to follow up with Mrs. Cleo GUTIERREZ Logan.  Current BP average 157/89 mmHg is not at goal, 130/80 mmHg. Requested more readings. FU regarding bp technique timing and integration of exercise.

## 2018-08-28 NOTE — PROGRESS NOTES
Last 5 Patient Entered Readings                                      Current 30 Day Average: 157/88     Recent Readings 8/23/2018 8/22/2018 8/8/2018 7/31/2018 7/23/2018    SBP (mmHg) 151 171 167 140 164    DBP (mmHg) 84 95 85 86 96    Pulse 87 85 84 102 89          Digital Medicine: Health  Follow Up    Lifestyle Modifications:    1.Dietary Modifications (Sodium intake <2,000mg/day, food labels, dining out): Deferred.     2.Physical Activity: Deferred. Ask at next contact.     3.Medication Therapy: Patient has been compliant with the medication regimen. Requested more regular bp readings and reviewed technique. Patient admits she may not always be resting prior to taking blood pressure readings. Importance of this was reinforced. She states that she takes them in the morning prior to leaving, and that she feels this is the only time she is able. Encouraged to try to rest/relax for at least 5 minutes before taking readings. Agreed to try this and take more regular readings    4.Patient has the following medication side effects/concerns: Denies any side effects/concerns at this time    Follow up with Mrs. Cleo Ford completed. No further questions or concerns. Will continue follow up to achieve health goals.

## 2018-09-04 NOTE — PROGRESS NOTES
Bp recheck at 10:50 was 160/100. Dr. Julian notified. Dr. Julian went in to visit with pt.  
Pt in clinic for bp check. BP is 166/112. Pt stated, she did take her lisinopril 40 mg and hydralazine 25 mg this morning. No complaints of pain or discomfort. Dr. Julian notified.   Have pt rest for 15 minutes and recheck bp. VO Dr. Julian/GONSALO Yee  Pt notified. She verbalized understanding.   
No

## 2018-09-25 ENCOUNTER — PATIENT OUTREACH (OUTPATIENT)
Dept: OTHER | Facility: OTHER | Age: 68
End: 2018-09-25

## 2018-09-25 RX ORDER — OLMESARTAN MEDOXOMIL 40 MG/1
40 TABLET ORAL DAILY
Qty: 30 TABLET | Refills: 3 | Status: SHIPPED | OUTPATIENT
Start: 2018-09-25 | End: 2019-01-30 | Stop reason: SDUPTHER

## 2018-09-25 NOTE — PROGRESS NOTES
Last 5 Patient Entered Readings                                      Current 30 Day Average: 152/86     Recent Readings 9/25/2018 9/24/2018 9/20/2018 9/19/2018 9/19/2018    SBP (mmHg) 157 141 148 162 151    DBP (mmHg) 91 87 77 87 84    Pulse 93 101 81 73 87        Patient's BP average is above goal of <130/80.     Patient denies s/s of hypotension (lightheadedness, dizziness, nausea, fatigue) associated with low readings. Instructed patient to inform me if this occurs, patient confirms understanding.      Patient denies s/s of hypertension (SOB, CP, severe headaches, changes in vision) associated with high readings. Instructed patient to go to the ED if BP > 180/110 and accompanied by hypertensive s/s, patient confirms understanding.    Current HTN regimen:  Hypertension Medications             NIFEdipine (ADALAT CC) 90 MG TbSR Take 1 tablet (90 mg total) by mouth once daily.    olmesartan (BENICAR) 20 MG tablet Take 1 tablet (20 mg total) by mouth once daily.      Patient has swelling when she is on her feet all day. She is fine to tolerate the edema that she's experiencing with nifedipine.    Her BP remains elevated therefore I am increasing olmesartan to 40mg daily.    Will continue to monitor regularly. Will follow up in 2-3 weeks, sooner if BP begins to trend upward or downward.    Patient has my contact information and knows to call with any concerns or clinical changes.

## 2018-09-25 NOTE — PROGRESS NOTES
Last 5 Patient Entered Readings                                      Current 30 Day Average: 150/84     Recent Readings 9/24/2018 9/20/2018 9/19/2018 9/19/2018 8/23/2018    SBP (mmHg) 141 148 162 151 151    DBP (mmHg) 87 77 87 84 84    Pulse 101 81 73 87 87          Digital Medicine: Health  Follow Up    Lifestyle Modifications:    1.Dietary Modifications (Sodium intake <2,000mg/day, food labels, dining out): Patient states she would like to focus on losing 5-10 lbs, and that she has been working on this through both monitoring carbohydrate intake in addition to portion control. Reports she is generally successful when focusing on these areas, and she'd like to continue. Reinforced importance of low sodium options and adequate hydration. Admits work schedule and new stressor of her sister passing away have affected readings over the past month. Is taking anti-depressants and feels these have been very helpful. Consider discussing stress management strategies in the future.     2.Physical Activity: Deferred.     3.Medication Therapy: Patient has been compliant with the medication regimen.    4.Patient has the following medication side effects/concerns: Patient is reporting swelling in feet/ankles on days she is on her feet all day. States if she is not on her feet, it is not a problem, but that this has been occurring for the past 1-2 months or so. Is relieved with elevation of feet.  Discussed influence of sodium and hydration and encouraged her to continue to monitor this. Agrees to continue with regular readings and importance of this to assess progress was discussed.     Follow up with Mrs. Cleo Ford completed. No further questions or concerns. Will continue to follow up to achieve health goals.

## 2018-09-26 ENCOUNTER — PATIENT MESSAGE (OUTPATIENT)
Dept: OTHER | Facility: OTHER | Age: 68
End: 2018-09-26

## 2018-10-02 ENCOUNTER — PATIENT MESSAGE (OUTPATIENT)
Dept: INTERNAL MEDICINE | Facility: CLINIC | Age: 68
End: 2018-10-02

## 2018-10-03 ENCOUNTER — IMMUNIZATION (OUTPATIENT)
Dept: FAMILY MEDICINE | Facility: CLINIC | Age: 68
End: 2018-10-03
Payer: MEDICARE

## 2018-10-03 PROCEDURE — 90662 IIV NO PRSV INCREASED AG IM: CPT | Mod: PBBFAC,PO

## 2018-10-09 ENCOUNTER — PATIENT OUTREACH (OUTPATIENT)
Dept: OTHER | Facility: OTHER | Age: 68
End: 2018-10-09

## 2018-10-09 DIAGNOSIS — I10 ESSENTIAL HYPERTENSION: Primary | Chronic | ICD-10-CM

## 2018-10-09 NOTE — PROGRESS NOTES
Last 5 Patient Entered Readings                                      Current 30 Day Average: 152/86     Recent Readings 9/25/2018 9/24/2018 9/20/2018 9/19/2018 9/19/2018    SBP (mmHg) 157 141 148 162 151    DBP (mmHg) 91 87 77 87 84    Pulse 93 101 81 73 87        LMFCB to discuss increased dose of olmesartan.     10/16: Patient encouraged to increase her frequency of her readings to assess if the increase in olmesartan dropped her blood pressure as indicated with her 10/10 reading.    10/26: Patient has an appointment with Dr. Julian 10/31. I will message Dr. Julian with my recommendation to re-challenge a thiazide or beta blocker. Dr. Julian's vanesa labs and her kidney function declined. She will start a beta blocker in two weeks if her BP does not improve with the change in antidepressant medication.    11/15:LMFCB to assess how she is tolerating the change in depression medications and to see if her tremors improved. Her BP looks good. Will maintain her current medications for now.

## 2018-10-11 ENCOUNTER — PATIENT MESSAGE (OUTPATIENT)
Dept: INTERNAL MEDICINE | Facility: CLINIC | Age: 68
End: 2018-10-11

## 2018-10-28 ENCOUNTER — OFFICE VISIT (OUTPATIENT)
Dept: URGENT CARE | Facility: CLINIC | Age: 68
End: 2018-10-28
Payer: MEDICARE

## 2018-10-28 VITALS
DIASTOLIC BLOOD PRESSURE: 72 MMHG | RESPIRATION RATE: 18 BRPM | TEMPERATURE: 99 F | BODY MASS INDEX: 30.86 KG/M2 | SYSTOLIC BLOOD PRESSURE: 124 MMHG | OXYGEN SATURATION: 99 % | WEIGHT: 174.19 LBS | HEIGHT: 63 IN | HEART RATE: 91 BPM

## 2018-10-28 DIAGNOSIS — N39.0 URINARY TRACT INFECTION WITH HEMATURIA, SITE UNSPECIFIED: Primary | ICD-10-CM

## 2018-10-28 DIAGNOSIS — R31.9 URINARY TRACT INFECTION WITH HEMATURIA, SITE UNSPECIFIED: Primary | ICD-10-CM

## 2018-10-28 LAB
BILIRUB SERPL-MCNC: NEGATIVE MG/DL
BLOOD URINE, POC: 250
COLOR, POC UA: ABNORMAL
GLUCOSE UR QL STRIP: NORMAL
KETONES UR QL STRIP: ABNORMAL
LEUKOCYTE ESTERASE URINE, POC: ABNORMAL
NITRITE, POC UA: NEGATIVE
PH, POC UA: 5
PROTEIN, POC: 500
SPECIFIC GRAVITY, POC UA: 1.02
UROBILINOGEN, POC UA: NORMAL

## 2018-10-28 PROCEDURE — 87077 CULTURE AEROBIC IDENTIFY: CPT

## 2018-10-28 PROCEDURE — 1100F PTFALLS ASSESS-DOCD GE2>/YR: CPT | Mod: CPTII,,, | Performed by: NURSE PRACTITIONER

## 2018-10-28 PROCEDURE — 87088 URINE BACTERIA CULTURE: CPT

## 2018-10-28 PROCEDURE — 3074F SYST BP LT 130 MM HG: CPT | Mod: CPTII,,, | Performed by: NURSE PRACTITIONER

## 2018-10-28 PROCEDURE — 99999 PR PBB SHADOW E&M-EST. PATIENT-LVL IV: CPT | Mod: PBBFAC,,, | Performed by: NURSE PRACTITIONER

## 2018-10-28 PROCEDURE — 81001 URINALYSIS AUTO W/SCOPE: CPT | Mod: PBBFAC,PO | Performed by: NURSE PRACTITIONER

## 2018-10-28 PROCEDURE — 3078F DIAST BP <80 MM HG: CPT | Mod: CPTII,,, | Performed by: NURSE PRACTITIONER

## 2018-10-28 PROCEDURE — 99214 OFFICE O/P EST MOD 30 MIN: CPT | Mod: PBBFAC,PO | Performed by: NURSE PRACTITIONER

## 2018-10-28 PROCEDURE — 3288F FALL RISK ASSESSMENT DOCD: CPT | Mod: CPTII,,, | Performed by: NURSE PRACTITIONER

## 2018-10-28 PROCEDURE — 99214 OFFICE O/P EST MOD 30 MIN: CPT | Mod: S$PBB,,, | Performed by: NURSE PRACTITIONER

## 2018-10-28 PROCEDURE — 87086 URINE CULTURE/COLONY COUNT: CPT

## 2018-10-28 PROCEDURE — 87186 SC STD MICRODIL/AGAR DIL: CPT

## 2018-10-28 RX ORDER — AMOXICILLIN AND CLAVULANATE POTASSIUM 875; 125 MG/1; MG/1
1 TABLET, FILM COATED ORAL 2 TIMES DAILY
Qty: 20 TABLET | Refills: 0 | Status: SHIPPED | OUTPATIENT
Start: 2018-10-28 | End: 2018-11-07

## 2018-10-28 NOTE — PROGRESS NOTES
"CC:Dysuria.  Cleo Ford is a 68 y.o. female with a new complaint of dysuria, lower abdominal pain and urinary frequency.   The patient denies fever none, nausea and vomiting.  Symptoms have been present for 1 day(s).   Treatments tried include:no    Review of patient's allergies indicates:   Allergen Reactions    Tobramycin-dexamethasone      Eye Drops         Outpatient Medications Prior to Visit   Medication Sig Dispense Refill    ALPRAZolam (XANAX) 1 MG tablet Take 1 tablet (1 mg total) by mouth 2 (two) times daily as needed for Anxiety. 180 tablet 1    aspirin (ECOTRIN) 81 MG EC tablet Take 81 mg by mouth once daily.      buPROPion (WELLBUTRIN XL) 300 MG 24 hr tablet Take 1 tablet (300 mg total) by mouth once daily. 90 tablet 3    levothyroxine (SYNTHROID) 112 MCG tablet Take 1 tablet by mouth  before breakfast 90 tablet 3    lovastatin (MEVACOR) 40 MG tablet Take 1 tablet by mouth  nightly 90 tablet 3    NIFEdipine (ADALAT CC) 90 MG TbSR Take 1 tablet (90 mg total) by mouth once daily. 30 tablet 3    olmesartan (BENICAR) 40 MG tablet Take 1 tablet (40 mg total) by mouth once daily. 30 tablet 3    omeprazole (PRILOSEC) 40 MG capsule Take 1 capsule (40 mg total) by mouth once daily. 30 capsule 11     No facility-administered medications prior to visit.         Physical Exam   /72   Pulse 91   Temp 98.5 °F (36.9 °C) (Tympanic)   Resp 18   Ht 5' 3" (1.6 m)   Wt 79 kg (174 lb 2.6 oz)   SpO2 99%   BMI 30.85 kg/m²   Constitutional: The patient appears well-developed and well-nourished. No distress.   Cardiovascular: Normal rate, regular rhythm and normal heart sounds.  Exam reveals no gallop and no friction rub.    No murmur heard.  Pulmonary/Chest: Effort normal and breath sounds normal. No respiratory distress. She has no wheezes. She has no rales.   Abdominal: Soft. Bowel sounds are normal. The patient exhibits no distension and no mass. There is tenderness in the suprapubic area. " There is no rebound, no guarding and no CVA tenderness. No hernia.   Skin: The patient is not diaphoretic.     The patient had a urinalysis performed today and it was abnormal.     Encounter Diagnosis   Name Primary?    Urinary tract infection with hematuria, site unspecified Yes       PLAN:  Cleo was seen today for urinary tract infection.    Diagnoses and all orders for this visit:    Urinary tract infection with hematuria, site unspecified  -     POCT URINE DIPSTICK WITH MICROSCOPE, AUTOMATED  -     Urine culture  -     amoxicillin-clavulanate 875-125mg (AUGMENTIN) 875-125 mg per tablet; Take 1 tablet by mouth 2 (two) times daily. Take with food for 10 days      Medications Ordered This Encounter   Medications    amoxicillin-clavulanate 875-125mg (AUGMENTIN) 875-125 mg per tablet     Sig: Take 1 tablet by mouth 2 (two) times daily. Take with food for 10 days     Dispense:  20 tablet     Refill:  0     [unfilled]  Orders Placed This Encounter   Procedures    Urine culture    POCT URINE DIPSTICK WITH MICROSCOPE, AUTOMATED     RTC if symptoms are worsening or changing significantly or if not improved by the end of therapy.

## 2018-10-30 ENCOUNTER — OFFICE VISIT (OUTPATIENT)
Dept: INTERNAL MEDICINE | Facility: CLINIC | Age: 68
End: 2018-10-30
Payer: MEDICARE

## 2018-10-30 ENCOUNTER — HOSPITAL ENCOUNTER (OUTPATIENT)
Dept: RADIOLOGY | Facility: HOSPITAL | Age: 68
Discharge: HOME | End: 2018-10-30
Attending: FAMILY MEDICINE
Payer: MEDICARE

## 2018-10-30 VITALS
SYSTOLIC BLOOD PRESSURE: 140 MMHG | BODY MASS INDEX: 30.74 KG/M2 | DIASTOLIC BLOOD PRESSURE: 80 MMHG | HEART RATE: 100 BPM | TEMPERATURE: 98 F | WEIGHT: 173.5 LBS | HEIGHT: 63 IN

## 2018-10-30 DIAGNOSIS — Z23 NEED FOR PNEUMOCOCCAL VACCINATION: ICD-10-CM

## 2018-10-30 DIAGNOSIS — M79.601 BILATERAL ARM PAIN: ICD-10-CM

## 2018-10-30 DIAGNOSIS — R25.1 TREMOR: Primary | ICD-10-CM

## 2018-10-30 DIAGNOSIS — F33.41 RECURRENT MAJOR DEPRESSIVE DISORDER, IN PARTIAL REMISSION: ICD-10-CM

## 2018-10-30 DIAGNOSIS — I10 ESSENTIAL HYPERTENSION: Chronic | ICD-10-CM

## 2018-10-30 DIAGNOSIS — E78.2 MIXED HYPERLIPIDEMIA: ICD-10-CM

## 2018-10-30 DIAGNOSIS — E03.8 OTHER SPECIFIED HYPOTHYROIDISM: Chronic | ICD-10-CM

## 2018-10-30 DIAGNOSIS — F41.9 ANXIETY: ICD-10-CM

## 2018-10-30 DIAGNOSIS — M79.602 BILATERAL ARM PAIN: ICD-10-CM

## 2018-10-30 LAB — BACTERIA UR CULT: NORMAL

## 2018-10-30 PROCEDURE — 3077F SYST BP >= 140 MM HG: CPT | Mod: CPTII,,, | Performed by: FAMILY MEDICINE

## 2018-10-30 PROCEDURE — 99214 OFFICE O/P EST MOD 30 MIN: CPT | Mod: S$PBB,,, | Performed by: FAMILY MEDICINE

## 2018-10-30 PROCEDURE — 72040 X-RAY EXAM NECK SPINE 2-3 VW: CPT | Mod: 26,,, | Performed by: RADIOLOGY

## 2018-10-30 PROCEDURE — 3079F DIAST BP 80-89 MM HG: CPT | Mod: CPTII,,, | Performed by: FAMILY MEDICINE

## 2018-10-30 PROCEDURE — 99999 PR PBB SHADOW E&M-EST. PATIENT-LVL III: CPT | Mod: PBBFAC,,, | Performed by: FAMILY MEDICINE

## 2018-10-30 PROCEDURE — 90732 PPSV23 VACC 2 YRS+ SUBQ/IM: CPT | Mod: PBBFAC,PO

## 2018-10-30 PROCEDURE — 72040 X-RAY EXAM NECK SPINE 2-3 VW: CPT | Mod: TC,FY,PO

## 2018-10-30 PROCEDURE — 1101F PT FALLS ASSESS-DOCD LE1/YR: CPT | Mod: CPTII,,, | Performed by: FAMILY MEDICINE

## 2018-10-30 PROCEDURE — 99213 OFFICE O/P EST LOW 20 MIN: CPT | Mod: PBBFAC,25,PO | Performed by: FAMILY MEDICINE

## 2018-10-30 RX ORDER — OMEPRAZOLE 40 MG/1
40 CAPSULE, DELAYED RELEASE ORAL DAILY
Qty: 90 CAPSULE | Refills: 3
Start: 2018-10-30 | End: 2019-01-30 | Stop reason: SDUPTHER

## 2018-10-30 NOTE — PROGRESS NOTES
Subjective:      Patient ID: Cleo Ford is a 68 y.o. female.    Chief Complaint: Tremors (hand)    Disclaimer:  This note is prepared using voice recognition software and as such is likely to have errors and has not been proof read. Please contact me for questions.     Pt coming in today for follow-up.  I was recently contacted by the pharmacist who is managing her digital hypertension program.  They have been reporting that her systolic blood pressures is still been elevated above the goal ranges around 150.  She has been on multiple medications.  Earlier this year she had issues with hyponatremia.  She is now currently on calcium channel blocker and ARB.  She is not currently on a diuretic.  Previously when on a beta-blocker she had a lot of fatigue issues.  On repeat today was 140/80 and then again at 146/76.  She is very willing to adjust medicines further if necessary.  Previously she was on Diovan HCT and Benicar HCT in the past.  She reports that she is not doing any further cleanses at this time.  Willing to do lab work today and then adjust further once necessary.    She also reports that she has been having a hand tremor for over 1 year.  However lately she has noticed that she has been having hand tremors much more noticeable and in the last 1-2 months.  The biggest change in her medication was the addition of Wellbutrin.  We also had stop the beta-blocker.  She notices that the tremors present with a mouse with using the phone with eating or writing.  She states that starting become bothersome for her.  Her  does have Parkinson's so she is aware of this.  Of note I believe it is it may be more related to the Wellbutrin.  She does not want to get fully off the Wellbutrin.  She also is on thyroid medication.  We discussed that she may be slightly overactive on her thyroid as well which would need to be evaluated also.    From a depression standpoint she states she is doing much better with  being on the Wellbutrin.  Previous this year we also tried Effexor, Prozac, and Celexa.  She reports that her sister just recently committed suicide about a few months ago.  She was 11 months younger.  She had had a rough life going through a lot issues related to poverty and divorce.  She states that she did have the Wellbutrin she done thing she would been able to handle it.  She has never tried Zoloft.  She has never had any issues with her bowels being overactive.  If anything she has more issues with constipation.  She is willing to adjust medications if necessary.    She also reports that occasionally at night both of her arms seem to bother her with some pain but it goes away with the use of Advil.  It is mainly from the shoulder to the elbows.  Not noticed during the daytime.  She does work as a  and does a lot through the car through the phone at the computer.  No numbness or tingling going down.  Does occasionally have a sharp shooting pain at times in her neck also.    Hyperlipidemia- on statin. Willing to do labs today also.     Needs pna 23 shot .  Has already received the flu shot            Lab Results   Component Value Date    WBC 7.84 08/16/2018    HGB 12.7 08/16/2018    HCT 40.4 08/16/2018     08/16/2018    CHOL 183 08/16/2018    TRIG 55 08/16/2018    HDL 79 (H) 08/16/2018    ALT 12 08/16/2018    AST 19 08/16/2018     08/16/2018    K 3.8 08/16/2018     08/16/2018    CREATININE 1.0 08/16/2018    BUN 13 08/16/2018    CO2 25 08/16/2018    TSH 1.683 08/16/2018    HGBA1C 5.1 08/16/2018       X-Ray Abdomen AP 1 View  Narrative: EXAMINATION:  XR ABDOMEN AP 1 VIEW    CLINICAL HISTORY:  flank pain, right hip and flank. consider leg length discrepency; Unspecified abdominal pain    TECHNIQUE:  Single AP View of the abdomen was performed.    COMPARISON:  None.    FINDINGS:  Intestinal gas pattern is nonspecific.  There is no bowel obstruction or ileus.  Scattered  phleboliths in the pelvis and vascular calcifications in the left upper quadrant.  Surgical clips in the right and left upper quadrants.  Impression: As above.    Electronically signed by: ANTONIO Ma MD  Date:    06/12/2018  Time:    11:35  X-Ray Hip 2 or 3 views Right  Narrative: EXAMINATION:  XR HIP 2 VIEW RIGHT    CLINICAL HISTORY:  flank pain, right hip and flank. consider leg length discrepency; Unspecified abdominal pain    TECHNIQUE:  AP view of the pelvis and frog leg lateral view of the right hip were performed.    COMPARISON:  None    FINDINGS:  There is mild bilateral acetabular lipping with preservation of the hip joint spaces.  Degenerative changes in the pubic symphysis and visualized lower lumbar spine.  No intraosseous lesion or radiographic evidence of AVN.  No acute fracture or dislocation.  Impression: As above.    Electronically signed by: ANTONIO Ma MD  Date:    06/12/2018  Time:    11:34  X-Ray Lumbar Spine Ap And Lateral  Narrative: EXAMINATION:  XR LUMBAR SPINE AP AND LATERAL    CLINICAL HISTORY:  flank pain, right hip and flank. consider leg length discrepency;Unspecified abdominal pain    TECHNIQUE:  AP, lateral and spot images were performed of the lumbar spine.    COMPARISON:  None    FINDINGS:  There is anatomic spinal alignment.  Degenerative vertebral endplate lipping and bilateral facet arthropathy present at multiple levels, most pronounced at L4-5 and L5-S1.  No significant disc space narrowing.  Mild grade 1 anterolisthesis of L4 on L5.  No compression fracture.  Impression: As above.    Electronically signed by: ANTONIO Ma MD  Date:    06/12/2018  Time:    11:33        Review of Systems   Constitutional: Negative for activity change, appetite change, fatigue and unexpected weight change.   Respiratory: Negative for cough and shortness of breath.    Cardiovascular: Negative for chest pain and palpitations.   Gastrointestinal: Positive for constipation. Negative  "for abdominal distention, abdominal pain, diarrhea, nausea and vomiting.   Musculoskeletal: Positive for arthralgias, myalgias, neck pain and neck stiffness.   Neurological: Positive for tremors.   Psychiatric/Behavioral: Negative for decreased concentration, dysphoric mood and sleep disturbance. The patient is not nervous/anxious.      Objective:     Vitals:    10/30/18 1405   BP: (!) 140/80   Pulse: 100   Temp: 98.3 °F (36.8 °C)   Weight: 78.7 kg (173 lb 8 oz)   Height: 5' 3" (1.6 m)     Physical Exam   Constitutional: She is oriented to person, place, and time. She appears well-developed and well-nourished.   HENT:   Head: Normocephalic and atraumatic.   Right Ear: Tympanic membrane normal.   Left Ear: Tympanic membrane normal.   Mouth/Throat: Oropharynx is clear and moist.   Eyes: Conjunctivae and EOM are normal.   Neck: Normal range of motion. Neck supple.   Cardiovascular: Normal rate and regular rhythm.   Pulmonary/Chest: Effort normal and breath sounds normal.   Musculoskeletal:        Cervical back: She exhibits spasm. She exhibits normal range of motion, no tenderness, no bony tenderness and no pain.        Back:    Neurological: She is alert and oriented to person, place, and time. She displays tremor. No sensory deficit. Coordination and gait normal.   negative Spurling testing today   Psychiatric: She has a normal mood and affect. Her behavior is normal.   Nursing note and vitals reviewed.    Assessment:     1. Tremor    2. Other specified hypothyroidism    3. Mixed hyperlipidemia    4. Essential hypertension    5. Bilateral arm pain    6. Need for pneumococcal vaccination    7. Anxiety    8. Recurrent major depressive disorder, in partial remission      Plan:   Cleo was seen today for tremors.    Diagnoses and all orders for this visit:    Tremor-new needing workup.  At this time she reports has been going on for 1 year.  Recommend that we do repeat thyroid studies as well as electrolyte studies.  " If normal then suspect this may be more related to Wellbutrin with the increased dose and possibly the potential of not being on a beta-blocker now.  We discussed various options such as the addition of a beta-blocker or even the use of Mirapex.  Or even reducing the dose of Wellbutrin and adding a little Zoloft.  Once we get the lab results back we can discuss as her 1st improvement would be for blood pressure control..  -     TSH; Future  -     T4, free; Future  -     Comprehensive metabolic panel; Future  -     CBC auto differential; Future  -     Sedimentation rate; Future    Other specified hypothyroidism repeating labs today, rule out hyperthyroidism state due to tremor  -     TSH; Future  -     T4, free; Future  -     Comprehensive metabolic panel; Future  -     CBC auto differential; Future    Mixed hyperlipidemia-on statin labs reviewed  -     TSH; Future  -     T4, free; Future  -     Comprehensive metabolic panel; Future  -     CBC auto differential; Future    Essential hypertension-not controlled repeating labs today to review electrolyte status and kidney function.  If normal then would recommend either adding low-dose hydrochlorothiazide versus propanolol to help not only with blood pressure control but possibly also a tremor  -     TSH; Future  -     T4, free; Future  -     Comprehensive metabolic panel; Future  -     CBC auto differential; Future  -     Sedimentation rate; Future    Bilateral arm pain-new check sed rate today x-rays cervical spine consider cervical radiculopathy versus muscle spasm.  Advised her to hold off on Aleve or ibuprofen.  Could do massage therapy.  -     Sedimentation rate; Future  -     X-Ray Cervical Spine AP And Lateral; Future    Need for pneumococcal vaccination-update today    Anxiety-on Wellbutrin 300 mg however may reduce dose down to 150 to add in low-dose Zoloft to see if this will help not only depression but also help to reduce the tremor.  Can still continue  with Xanax at night    Recurrent major depressive disorder, in partial remission-stable at this time but may need to adjust dosing    Other orders  -     omeprazole (PRILOSEC) 40 MG capsule; Take 1 capsule (40 mg total) by mouth once daily.  -     Pneumococcal Polysaccharide Vaccine (23 Valent) (SQ/IM)            Follow-up in about 3 months (around 1/30/2019) for chronic issues Dr Julian.    Patient Instructions   Shingrix vaccine is the new shingles vaccine. Ask at pharmacy.  2 shots, not live, covered by insurance. 90 % effective against Shingles. Can start it now at age 50. Not doing the old Zostavax anymore. Even if you got zostavax, it is recommended to get the new shingles vaccine.

## 2018-10-30 NOTE — PATIENT INSTRUCTIONS
Shingrix vaccine is the new shingles vaccine. Ask at pharmacy.  2 shots, not live, covered by insurance. 90 % effective against Shingles. Can start it now at age 50. Not doing the old Zostavax anymore. Even if you got zostavax, it is recommended to get the new shingles vaccine.

## 2018-10-31 ENCOUNTER — PATIENT MESSAGE (OUTPATIENT)
Dept: INTERNAL MEDICINE | Facility: CLINIC | Age: 68
End: 2018-10-31

## 2018-10-31 RX ORDER — SERTRALINE HYDROCHLORIDE 50 MG/1
50 TABLET, FILM COATED ORAL DAILY
Qty: 90 TABLET | Refills: 3 | Status: SHIPPED | OUTPATIENT
Start: 2018-10-31 | End: 2019-01-30 | Stop reason: SDUPTHER

## 2018-10-31 RX ORDER — BUPROPION HYDROCHLORIDE 150 MG/1
150 TABLET ORAL DAILY
Qty: 90 TABLET | Refills: 3 | Status: SHIPPED | OUTPATIENT
Start: 2018-10-31 | End: 2019-01-30 | Stop reason: SDUPTHER

## 2018-10-31 NOTE — TELEPHONE ENCOUNTER
Labs reviewed. Normal thyroid studies.     Decrease wellbutrin from 300mg to 150mg for tremor and possibly BP.   Add sertraline 50mg.     No change to BP meds currently. Not adding hctz due to decreased GFR.     Pt to message me back in 2 weeks regarding tremor, mood, and BP to discuss changes and additional medications for BP and tremor.

## 2018-11-06 ENCOUNTER — PATIENT OUTREACH (OUTPATIENT)
Dept: OTHER | Facility: OTHER | Age: 68
End: 2018-11-06

## 2018-11-06 NOTE — PROGRESS NOTES
Last 5 Patient Entered Readings                                      Current 30 Day Average: 152/86     Recent Readings 11/1/2018 10/30/2018 10/30/2018 10/29/2018 10/26/2018    SBP (mmHg) 148 148 148 137 162    DBP (mmHg) 84 85 85 67 90    Pulse 76 87 87 86 95        Digital Medicine: Health  Follow Up    Lifestyle Modifications:    1.Dietary Modifications (Sodium intake <2,000mg/day, food labels, dining out): Patient reports nutrition has been a challenge and that she has not been monitoring sodium intake. Educated on importance of staying under 2000 mg sodium per serving for blood pressure improvements as she states she does not wish to focus on weight loss as previously stated. Reports strengths over the last month are taking medications, regular readings, and staying busy throughout the day. Agrees to try to monitor sodium and hydration.     2.Physical Activity: Deferred.     3.Medication Therapy: Patient has been compliant with the medication regimen.    4.Patient has the following medication side effects/concerns: Denies side effects/concerns.     Follow up with Ms. Cleo Ford completed. No further questions or concerns. Will continue to follow up to achieve health goals.

## 2018-11-28 ENCOUNTER — PATIENT MESSAGE (OUTPATIENT)
Dept: INTERNAL MEDICINE | Facility: CLINIC | Age: 68
End: 2018-11-28

## 2018-11-28 RX ORDER — CARVEDILOL 3.12 MG/1
3.12 TABLET ORAL 2 TIMES DAILY WITH MEALS
Qty: 60 TABLET | Refills: 3 | Status: SHIPPED | OUTPATIENT
Start: 2018-11-28 | End: 2019-01-30 | Stop reason: SDUPTHER

## 2018-11-28 NOTE — PROGRESS NOTES
Last 5 Patient Entered Readings                                      Current 30 Day Average: 146/85     Recent Readings 11/28/2018 11/17/2018 11/12/2018 11/8/2018 11/1/2018    SBP (mmHg) 167 147 120 155 148    DBP (mmHg) 100 82 78 95 84    Pulse 90 72 66 88 76      Patient's BP average is above goal of <130/80.     Patient denies s/s of hypotension (lightheadedness, dizziness, nausea, fatigue) associated with low readings. Instructed patient to inform me if this occurs, patient confirms understanding.      Patient denies s/s of hypertension (SOB, CP, severe headaches, changes in vision) associated with high readings. Instructed patient to go to the ED if BP > 180/110 and accompanied by hypertensive s/s, patient confirms understanding.    Current HTN regimen:  Hypertension Medications             NIFEdipine (ADALAT CC) 90 MG TbSR Take 1 tablet (90 mg total) by mouth once daily.    olmesartan (BENICAR) 40 MG tablet Take 1 tablet (40 mg total) by mouth once daily.      I sent Dr. Julian my recommendations to improve her blood pressure by adding either a thiazide or beta blocker. Her latest BMP came back normal however she is one to change her diet causing an electrolyte disturbance. She experienced fatigue with a beta blocker in the past and we would start low and increase slowly. Will inform the patient of the change once I hear back from Dr. Julian.     Dr. Julian replied stating to do the carvedilol at 3.125mg twice daily. I will send over the new prescription to her local pharmacy.    Will continue to monitor regularly. Will follow up in 2-3 weeks, sooner if BP begins to trend upward or downward. Patient will contact me sooner if she experiences fatigue.    Patient has my contact information and knows to call with any concerns or clinical changes.

## 2018-11-29 ENCOUNTER — PATIENT MESSAGE (OUTPATIENT)
Dept: INTERNAL MEDICINE | Facility: CLINIC | Age: 68
End: 2018-11-29

## 2018-11-29 RX ORDER — LEVOTHYROXINE SODIUM 112 UG/1
TABLET ORAL
Qty: 90 TABLET | Refills: 3 | Status: SHIPPED | OUTPATIENT
Start: 2018-11-29 | End: 2019-01-30 | Stop reason: SDUPTHER

## 2018-11-29 RX ORDER — LEVOTHYROXINE SODIUM 112 UG/1
TABLET ORAL
Qty: 90 TABLET | Refills: 3 | Status: SHIPPED | OUTPATIENT
Start: 2018-11-29 | End: 2018-11-29 | Stop reason: SDUPTHER

## 2018-12-10 DIAGNOSIS — F41.1 ANXIETY STATE: ICD-10-CM

## 2018-12-10 RX ORDER — ALPRAZOLAM 1 MG/1
1 TABLET ORAL 2 TIMES DAILY PRN
Qty: 180 TABLET | Refills: 1 | Status: SHIPPED | OUTPATIENT
Start: 2018-12-10 | End: 2019-01-30 | Stop reason: SDUPTHER

## 2018-12-12 ENCOUNTER — PATIENT OUTREACH (OUTPATIENT)
Dept: OTHER | Facility: OTHER | Age: 68
End: 2018-12-12

## 2018-12-12 NOTE — PROGRESS NOTES
Last 5 Patient Entered Readings                                      Current 30 Day Average: 145/87     Recent Readings 11/28/2018 11/17/2018 11/12/2018 11/8/2018 11/1/2018    SBP (mmHg) 167 147 120 155 148    DBP (mmHg) 100 82 78 95 84    Pulse 90 72 66 88 76        Patient's BP average is above goal of <130/80.     Patient denies s/s of hypotension (lightheadedness, dizziness, nausea, fatigue) associated with low readings. Instructed patient to inform me if this occurs, patient confirms understanding.      Patient denies s/s of hypertension (SOB, CP, severe headaches, changes in vision) associated with high readings. Instructed patient to go to the ED if BP > 180/110 and accompanied by hypertensive s/s, patient confirms understanding.    Patient stated she is tolerating carvedilol and denies fatigue. I don't have any recent BP readings to see her BP with the change in medication. I advised her to increase the frequency of her readings so we can assess if further adjustments are needed.    Will continue to monitor regularly. Will follow up in 2-3 weeks, sooner if BP begins to trend upward or downward.    Patient has my contact information and knows to call with any concerns or clinical changes.     Current HTN regimen:  Hypertension Medications             carvedilol (COREG) 3.125 MG tablet Take 1 tablet (3.125 mg total) by mouth 2 (two) times daily with meals.    NIFEdipine (ADALAT CC) 90 MG TbSR Take 1 tablet (90 mg total) by mouth once daily.    olmesartan (BENICAR) 40 MG tablet Take 1 tablet (40 mg total) by mouth once daily.

## 2019-01-01 NOTE — TELEPHONE ENCOUNTER
Pharmacy updated to ..  Ana, [address] [place] [id number]   Phone [phone]  
[phone]

## 2019-01-02 ENCOUNTER — PATIENT OUTREACH (OUTPATIENT)
Dept: OTHER | Facility: OTHER | Age: 69
End: 2019-01-02

## 2019-01-02 NOTE — PROGRESS NOTES
Last 5 Patient Entered Readings                                      Current 30 Day Average: 154/89     Recent Readings 12/17/2018 11/28/2018 11/17/2018 11/12/2018 11/8/2018    SBP (mmHg) 154 167 147 120 155    DBP (mmHg) 89 100 82 78 95    Pulse 83 90 72 66 88      Sent patient a [facility] message encouraging her to increase her BP readings.    1/16: LMFCB to discuss increasing carvedilol to 6.25mg twice daily.

## 2019-01-30 ENCOUNTER — TELEPHONE (OUTPATIENT)
Dept: INTERNAL MEDICINE | Facility: CLINIC | Age: 69
End: 2019-01-30

## 2019-01-30 ENCOUNTER — OFFICE VISIT (OUTPATIENT)
Dept: INTERNAL MEDICINE | Facility: CLINIC | Age: 69
End: 2019-01-30
Payer: MEDICARE

## 2019-01-30 VITALS
TEMPERATURE: 97 F | BODY MASS INDEX: 31.41 KG/M2 | SYSTOLIC BLOOD PRESSURE: 138 MMHG | HEIGHT: 63 IN | HEART RATE: 76 BPM | WEIGHT: 177.25 LBS | DIASTOLIC BLOOD PRESSURE: 82 MMHG

## 2019-01-30 DIAGNOSIS — I10 ESSENTIAL HYPERTENSION: Primary | Chronic | ICD-10-CM

## 2019-01-30 DIAGNOSIS — J01.00 SUBACUTE MAXILLARY SINUSITIS: ICD-10-CM

## 2019-01-30 DIAGNOSIS — F41.9 ANXIETY: ICD-10-CM

## 2019-01-30 DIAGNOSIS — Z00.00 ROUTINE GENERAL MEDICAL EXAMINATION AT A HEALTH CARE FACILITY: ICD-10-CM

## 2019-01-30 DIAGNOSIS — F33.41 RECURRENT MAJOR DEPRESSIVE DISORDER, IN PARTIAL REMISSION: ICD-10-CM

## 2019-01-30 DIAGNOSIS — E78.2 MIXED HYPERLIPIDEMIA: ICD-10-CM

## 2019-01-30 DIAGNOSIS — E03.8 OTHER SPECIFIED HYPOTHYROIDISM: Chronic | ICD-10-CM

## 2019-01-30 DIAGNOSIS — D53.9 MACROCYTIC ANEMIA: ICD-10-CM

## 2019-01-30 DIAGNOSIS — Z12.31 BREAST CANCER SCREENING BY MAMMOGRAM: ICD-10-CM

## 2019-01-30 DIAGNOSIS — F41.1 ANXIETY STATE: ICD-10-CM

## 2019-01-30 DIAGNOSIS — E87.1 HYPONATREMIA: ICD-10-CM

## 2019-01-30 PROCEDURE — 99999 PR PBB SHADOW E&M-EST. PATIENT-LVL III: CPT | Mod: PBBFAC,,, | Performed by: FAMILY MEDICINE

## 2019-01-30 PROCEDURE — 99214 OFFICE O/P EST MOD 30 MIN: CPT | Mod: 25,S$GLB,, | Performed by: FAMILY MEDICINE

## 2019-01-30 PROCEDURE — 99214 PR OFFICE/OUTPT VISIT, EST, LEVL IV, 30-39 MIN: ICD-10-PCS | Mod: 25,S$GLB,, | Performed by: FAMILY MEDICINE

## 2019-01-30 PROCEDURE — 3075F PR MOST RECENT SYSTOLIC BLOOD PRESS GE 130-139MM HG: ICD-10-PCS | Mod: CPTII,S$GLB,, | Performed by: FAMILY MEDICINE

## 2019-01-30 PROCEDURE — 3079F PR MOST RECENT DIASTOLIC BLOOD PRESSURE 80-89 MM HG: ICD-10-PCS | Mod: CPTII,S$GLB,, | Performed by: FAMILY MEDICINE

## 2019-01-30 PROCEDURE — 1101F PT FALLS ASSESS-DOCD LE1/YR: CPT | Mod: CPTII,S$GLB,, | Performed by: FAMILY MEDICINE

## 2019-01-30 PROCEDURE — 3075F SYST BP GE 130 - 139MM HG: CPT | Mod: CPTII,S$GLB,, | Performed by: FAMILY MEDICINE

## 2019-01-30 PROCEDURE — 1101F PR PT FALLS ASSESS DOC 0-1 FALLS W/OUT INJ PAST YR: ICD-10-PCS | Mod: CPTII,S$GLB,, | Performed by: FAMILY MEDICINE

## 2019-01-30 PROCEDURE — 99397 PR PREVENTIVE VISIT,EST,65 & OVER: ICD-10-PCS | Mod: 25,S$GLB,, | Performed by: FAMILY MEDICINE

## 2019-01-30 PROCEDURE — 99999 PR PBB SHADOW E&M-EST. PATIENT-LVL III: ICD-10-PCS | Mod: PBBFAC,,, | Performed by: FAMILY MEDICINE

## 2019-01-30 PROCEDURE — 3079F DIAST BP 80-89 MM HG: CPT | Mod: CPTII,S$GLB,, | Performed by: FAMILY MEDICINE

## 2019-01-30 PROCEDURE — 99397 PER PM REEVAL EST PAT 65+ YR: CPT | Mod: 25,S$GLB,, | Performed by: FAMILY MEDICINE

## 2019-01-30 RX ORDER — ALPRAZOLAM 1 MG/1
1 TABLET ORAL 2 TIMES DAILY PRN
Qty: 180 TABLET | Refills: 1 | Status: SHIPPED | OUTPATIENT
Start: 2019-01-30 | End: 2020-03-26

## 2019-01-30 RX ORDER — SERTRALINE HYDROCHLORIDE 50 MG/1
50 TABLET, FILM COATED ORAL DAILY
Qty: 90 TABLET | Refills: 3 | Status: ON HOLD | OUTPATIENT
Start: 2019-01-30 | End: 2020-01-16 | Stop reason: HOSPADM

## 2019-01-30 RX ORDER — LOVASTATIN 40 MG/1
TABLET ORAL
Qty: 90 TABLET | Refills: 3 | Status: SHIPPED | OUTPATIENT
Start: 2019-01-30 | End: 2020-02-27

## 2019-01-30 RX ORDER — AMOXICILLIN AND CLAVULANATE POTASSIUM 875; 125 MG/1; MG/1
1 TABLET, FILM COATED ORAL EVERY 12 HOURS
Qty: 20 TABLET | Refills: 0 | Status: ON HOLD | OUTPATIENT
Start: 2019-01-30 | End: 2020-01-16 | Stop reason: HOSPADM

## 2019-01-30 RX ORDER — NIFEDIPINE 90 MG/1
90 TABLET, FILM COATED, EXTENDED RELEASE ORAL DAILY
Qty: 90 TABLET | Refills: 3 | Status: SHIPPED | OUTPATIENT
Start: 2019-01-30 | End: 2019-07-30 | Stop reason: SDUPTHER

## 2019-01-30 RX ORDER — OLMESARTAN MEDOXOMIL 40 MG/1
40 TABLET ORAL DAILY
Qty: 90 TABLET | Refills: 3 | Status: SHIPPED | OUTPATIENT
Start: 2019-01-30 | End: 2019-05-20

## 2019-01-30 RX ORDER — LEVOTHYROXINE SODIUM 112 UG/1
TABLET ORAL
Qty: 90 TABLET | Refills: 3 | Status: SHIPPED | OUTPATIENT
Start: 2019-01-30 | End: 2020-03-26 | Stop reason: SDUPTHER

## 2019-01-30 RX ORDER — CARVEDILOL 3.12 MG/1
3.12 TABLET ORAL 2 TIMES DAILY WITH MEALS
Qty: 180 TABLET | Refills: 3 | Status: SHIPPED | OUTPATIENT
Start: 2019-01-30 | End: 2019-06-06 | Stop reason: SDUPTHER

## 2019-01-30 RX ORDER — BUPROPION HYDROCHLORIDE 150 MG/1
150 TABLET ORAL DAILY
Qty: 90 TABLET | Refills: 3 | Status: SHIPPED | OUTPATIENT
Start: 2019-01-30 | End: 2020-01-23

## 2019-01-30 RX ORDER — OMEPRAZOLE 40 MG/1
40 CAPSULE, DELAYED RELEASE ORAL DAILY
Qty: 90 CAPSULE | Refills: 3 | Status: SHIPPED | OUTPATIENT
Start: 2019-01-30 | End: 2020-03-24 | Stop reason: SDUPTHER

## 2019-01-30 NOTE — PROGRESS NOTES
"Cleo Ford presented for a prevention/wellness visit today. The following components were reviewed and updated:    · Medical history  · Family History  · Social history  · Allergies and Current Medications  · Health Maintenance  Patient Care Team:  Elizabeth Julian MD as PCP - General (Family Medicine)  Teodora Lakhani (Inactive) as Digital Medicine Health   Nafisa Tomas PharmD as Hypertension Digital Medicine Clinician (Pharmacist)  Elizabeth Julian MD as Hypertension Digital Medicine Responsible Provider (Family Medicine)  Namita Posada LPN as Care Coordinator (Internal Medicine)        Vitals:    01/30/19 1300   BP: 138/82   Pulse: 76   Temp: 97.3 °F (36.3 °C)   TempSrc: Tympanic   Weight: 80.4 kg (177 lb 4 oz)   Height: 5' 3" (1.6 m)     Body mass index is 31.4 kg/m².   ]    Review of Systems   Eyes: Negative for pain and visual disturbance.   Skin: Negative for color change, pallor and wound.   Allergic/Immunologic: Negative for environmental allergies, food allergies and immunocompromised state.     Physical Exam   Constitutional: She is oriented to person, place, and time.   Pulmonary/Chest: Effort normal and breath sounds normal. No respiratory distress.   Abdominal: Soft. Bowel sounds are normal. She exhibits no distension and no mass. There is no tenderness. There is no guarding.   Musculoskeletal: Normal range of motion.   Neurological: She is alert and oriented to person, place, and time.   Skin: Skin is warm and dry.   Psychiatric: Judgment and thought content normal.   Vitals reviewed.        Annual Wellness Visit  Patient Active Problem List   Diagnosis    Essential hypertension    Hyperlipidemia    Hypothyroidism    Anxiety    Dysmetabolic syndrome X    Screen for colon cancer    Hyponatremia    Macrocytic anemia    Recurrent major depressive disorder, in partial remission         Provided Cleo with a 5-10 year written screening schedule and personal prevention " plan. Recommendations were developed using the USPSTF age appropriate recommendations. Education, counseling, and referrals were provided as needed.  After Visit Summary printed and given to patient which includes a list of additional screenings\tests needed.  Labs scheduled for tomorrow including urine and blood work testing.  Also scheduled her for mammogram.  Otherwise up-to-date on immunizations at this time.  Continue to work on diet exercise weight management blood pressure control.  Mental health has been screened and doing well currently from a depression standpoint.  No concerns for cognitive issues at this time.    Health Maintenance   Topic Date Due    Mammogram  04/06/2019    Lipid Panel  08/16/2019    DEXA SCAN  04/06/2021    TETANUS VACCINE  08/11/2021    Colonoscopy  09/15/2026    Hepatitis C Screening  Completed    Zoster Vaccine  Completed    Pneumococcal Vaccine (65+ Low/Medium Risk)  Completed    Influenza Vaccine  Completed       Follow-up in about 1 year (around 1/30/2020) for physical with Dr MARLEY.      Elizabeth Marley MD

## 2019-01-30 NOTE — PROGRESS NOTES
Subjective:      Patient ID: Cleo Ford is a 68 y.o. female.    Chief Complaint: Follow-up (3 months and chronic issues)    Disclaimer:  This note is prepared using voice recognition software and as such is likely to have errors and has not been proof read. Please contact me for questions.     Pt coming in today for follow-up of multiple issues.    1.  Her tremor has now resolved.  Since we decreased the Wellbutrin to 150 it is doing better.  Also she is now on the 01/12 the thyroid supplement as well.  Is fully resolved and she is happy about this.    2.  She is enrolled in the digital hypertension program.  She was actually doing fairly well but she has had a lot of medication changes because of most recently now with the drug recall for her Diovan.  She is now on Benicar 40, low-dose Coreg at 3.125 b.i.d., and nifedipine 90.  She is not on diuretics at this time because she had hyponatremia.  She feels the best she has ever felt at this time.  She is willing to do lab work tomorrow.    3.  She is doing so much better with a depression standpoint.  She really did realize in the past few years that she had been more depressed.  Now on Zoloft at 50 and Wellbutrin at 1:50 a.m..  She is very happy with this regimen.    4.  From an anxiety standpoint she is doing well with the Xanax 1 mg up to b.i.d. use.  Uses it very infrequently though.    5.  Is on a statin for hyperlipidemia willing to do her labs tomorrow fasting.    6.  She does feel like she has a sinus infection today.  Starting have tooth pain this week.  Has not used anything thus far.    7.  Is currently doing some vitamins to help with bowels.            Lab Results   Component Value Date    WBC 8.29 10/30/2018    HGB 11.9 (L) 10/30/2018    HCT 38.7 10/30/2018     10/30/2018    CHOL 183 08/16/2018    TRIG 55 08/16/2018    HDL 79 (H) 08/16/2018    ALT 16 10/30/2018    AST 21 10/30/2018     10/30/2018    K 3.8 10/30/2018      "10/30/2018    CREATININE 1.3 10/30/2018    BUN 23 10/30/2018    CO2 25 10/30/2018    TSH 1.948 10/30/2018    HGBA1C 5.1 08/16/2018       X-Ray Cervical Spine AP And Lateral  Narrative: EXAMINATION:  XR CERVICAL SPINE AP LATERAL    CLINICAL HISTORY:  Pain in right arm    TECHNIQUE:  AP, lateral and open mouth views of the cervical spine were performed.    COMPARISON:  None.    FINDINGS:  Bones are demineralized.  No acute fracture.  Multilevel lower cervical facet arthropathy.  No listhesis.  Odontoid is intact.  Prevertebral soft tissues are maintained.  Dental hardware is incidentally noted.  Impression: As above    Electronically signed by: Kamari Cross MD  Date:    10/30/2018  Time:    16:11        Review of Systems   Constitutional: Negative for chills, fatigue and fever.   HENT: Positive for dental problem, sinus pressure and sinus pain. Negative for ear pain and trouble swallowing.    Eyes: Negative for pain and visual disturbance.   Respiratory: Negative for cough and shortness of breath.    Cardiovascular: Negative for chest pain and leg swelling.   Gastrointestinal: Negative for abdominal pain, blood in stool, nausea and vomiting.   Endocrine: Negative for cold intolerance and heat intolerance.   Genitourinary: Negative for dysuria and frequency.   Musculoskeletal: Negative for joint swelling, myalgias and neck pain.   Skin: Negative for color change and rash.   Neurological: Negative for dizziness, tremors and headaches.   Psychiatric/Behavioral: Negative for behavioral problems, decreased concentration, dysphoric mood and sleep disturbance. The patient is not nervous/anxious.      Objective:     Vitals:    01/30/19 1300   BP: 138/82   Pulse: 76   Temp: 97.3 °F (36.3 °C)   TempSrc: Tympanic   Weight: 80.4 kg (177 lb 4 oz)   Height: 5' 3" (1.6 m)     Physical Exam   Constitutional: She appears well-developed and well-nourished.   HENT:   Head: Normocephalic and atraumatic.   Right Ear: Tympanic membrane is " erythematous.   Left Ear: Tympanic membrane is erythematous.   Nose: Mucosal edema and rhinorrhea present. Right sinus exhibits maxillary sinus tenderness and frontal sinus tenderness. Left sinus exhibits maxillary sinus tenderness and frontal sinus tenderness.   Mouth/Throat: Uvula is midline and mucous membranes are normal. Posterior oropharyngeal erythema present.   Eyes: Conjunctivae and EOM are normal.   Neck: Normal range of motion. Neck supple.   Cardiovascular: Normal rate and regular rhythm.   Neurological: She displays no tremor.   Psychiatric: She has a normal mood and affect. Her speech is normal and behavior is normal.   Vitals reviewed.    Assessment:     1. Essential hypertension    2. Anxiety state    3. Other specified hypothyroidism    4. Mixed hyperlipidemia    5. Subacute maxillary sinusitis    6. Hyponatremia    7. Macrocytic anemia    8. Recurrent major depressive disorder, in partial remission    9. Anxiety              Plan:   Cleo was seen today for follow-up.    Diagnoses and all orders for this visit:    Essential hypertension-at this time much better controlled 138/82.  Continue with current regimen at this time.  Still do digital hypertension program.  Basically the patient would like to get all of her medicine seemed at the same time because currently she is having to go back and forth the drugstore to often.  Will submit to the [facility] home delivery program.  Hopefully they can sync all of her medications.  -     carvedilol (COREG) 3.125 MG tablet; Take 1 tablet (3.125 mg total) by mouth 2 (two) times daily with meals.  -     NIFEdipine (ADALAT CC) 90 MG TbSR; Take 1 tablet (90 mg total) by mouth once daily.  -     olmesartan (BENICAR) 40 MG tablet; Take 1 tablet (40 mg total) by mouth once daily.  -     TSH; Future  -     T4, free; Future  -     Cancel: Microalbumin/creatinine urine ratio  -     Lipid panel; Future  -     Hemoglobin A1c; Future  -     Comprehensive metabolic  panel; Future  -     Vitamin D; Future  -     CBC auto differential; Future  -     Iron and TIBC; Future  -     Vitamin B12; Future  -     Microalbumin/creatinine urine ratio; Future    Anxiety state stable at this time refilled her medication continue with Wellbutrin and Zoloft  -     ALPRAZolam (XANAX) 1 MG tablet; Take 1 tablet (1 mg total) by mouth 2 (two) times daily as needed for Anxiety.  -     TSH; Future  -     T4, free; Future  -     Cancel: Microalbumin/creatinine urine ratio  -     Lipid panel; Future  -     Hemoglobin A1c; Future  -     Comprehensive metabolic panel; Future  -     Vitamin D; Future  -     CBC auto differential; Future  -     Iron and TIBC; Future  -     Vitamin B12; Future    Other specified hypothyroidism-checking lab work tomorrow continue with supplement  -     TSH; Future  -     T4, free; Future  -     Cancel: Microalbumin/creatinine urine ratio  -     Lipid panel; Future  -     Hemoglobin A1c; Future  -     Comprehensive metabolic panel; Future  -     Vitamin D; Future  -     CBC auto differential; Future  -     Iron and TIBC; Future  -     Vitamin B12; Future    Mixed hyperlipidemia-on statin therapy continue  -     TSH; Future  -     T4, free; Future  -     Cancel: Microalbumin/creatinine urine ratio  -     Lipid panel; Future  -     Hemoglobin A1c; Future  -     Comprehensive metabolic panel; Future  -     Vitamin D; Future  -     CBC auto differential; Future  -     Iron and TIBC; Future  -     Vitamin B12; Future    Subacute maxillary sinusitis-new problem treating with of Augmentin  -     TSH; Future  -     T4, free; Future  -     Cancel: Microalbumin/creatinine urine ratio  -     Lipid panel; Future  -     Hemoglobin A1c; Future  -     Comprehensive metabolic panel; Future  -     Vitamin D; Future  -     CBC auto differential; Future  -     Iron and TIBC; Future  -     Vitamin B12; Future    Hyponatremia-noted in the past off diuretics at this time not doing diuretics not  doing cleanse is  -     TSH; Future  -     T4, free; Future  -     Cancel: Microalbumin/creatinine urine ratio  -     Lipid panel; Future  -     Hemoglobin A1c; Future  -     Comprehensive metabolic panel; Future  -     Vitamin D; Future  -     CBC auto differential; Future  -     Iron and TIBC; Future  -     Vitamin B12; Future    Macrocytic anemia-noted in the past status post gastric sleeve procedure repeating lab work along with B12 and iron  -     TSH; Future  -     T4, free; Future  -     Cancel: Microalbumin/creatinine urine ratio  -     Lipid panel; Future  -     Hemoglobin A1c; Future  -     Comprehensive metabolic panel; Future  -     Vitamin D; Future  -     CBC auto differential; Future  -     Iron and TIBC; Future  -     Vitamin B12; Future    Recurrent major depressive disorder, in partial remission-much improved with Zoloft and Wellbutrin  -     TSH; Future  -     T4, free; Future  -     Cancel: Microalbumin/creatinine urine ratio  -     Lipid panel; Future  -     Hemoglobin A1c; Future  -     Comprehensive metabolic panel; Future  -     Vitamin D; Future  -     CBC auto differential; Future  -     Iron and TIBC; Future  -     Vitamin B12; Future    Anxiety-stable  -     TSH; Future  -     T4, free; Future  -     Cancel: Microalbumin/creatinine urine ratio  -     Lipid panel; Future  -     Hemoglobin A1c; Future  -     Comprehensive metabolic panel; Future  -     Vitamin D; Future  -     CBC auto differential; Future  -     Iron and TIBC; Future  -     Vitamin B12; Future      Other orders  -     omeprazole (PRILOSEC) 40 MG capsule; Take 1 capsule (40 mg total) by mouth once daily.  -     sertraline (ZOLOFT) 50 MG tablet; Take 1 tablet (50 mg total) by mouth once daily.  -     lovastatin (MEVACOR) 40 MG tablet; Take 1 tablet by mouth  nightly  -     levothyroxine (SYNTHROID) 112 MCG tablet; Take 1 tablet by mouth  before breakfast  -     buPROPion (WELLBUTRIN XL) 150 MG TB24 tablet; Take 1 tablet (150  mg total) by mouth once daily.  -     amoxicillin-clavulanate 875-125mg (AUGMENTIN) 875-125 mg per tablet; Take 1 tablet by mouth every 12 (twelve) hours.            Follow-up in about 1 year (around 1/30/2020) for physical with Dr MARLEY.    There are no Patient Instructions on file for this visit.

## 2019-01-30 NOTE — TELEPHONE ENCOUNTER
Can you cancel the order for the urine today, she wants to do at [place] tomorrow along with her labs.  Need home collect order so I can schedule it.

## 2019-01-30 NOTE — TELEPHONE ENCOUNTER
OK I change the order to home collect for tomorrow to do for the urine microalbumin please schedule

## 2019-01-31 ENCOUNTER — LAB VISIT (OUTPATIENT)
Dept: LAB | Facility: HOSPITAL | Age: 69
End: 2019-01-31
Payer: MEDICARE

## 2019-01-31 DIAGNOSIS — E03.8 OTHER SPECIFIED HYPOTHYROIDISM: Chronic | ICD-10-CM

## 2019-01-31 DIAGNOSIS — D53.9 MACROCYTIC ANEMIA: ICD-10-CM

## 2019-01-31 DIAGNOSIS — I10 ESSENTIAL HYPERTENSION: Chronic | ICD-10-CM

## 2019-01-31 DIAGNOSIS — F41.9 ANXIETY: ICD-10-CM

## 2019-01-31 DIAGNOSIS — E78.2 MIXED HYPERLIPIDEMIA: ICD-10-CM

## 2019-01-31 DIAGNOSIS — J01.00 SUBACUTE MAXILLARY SINUSITIS: ICD-10-CM

## 2019-01-31 DIAGNOSIS — F41.1 ANXIETY STATE: ICD-10-CM

## 2019-01-31 DIAGNOSIS — Z00.00 ROUTINE GENERAL MEDICAL EXAMINATION AT A HEALTH CARE FACILITY: ICD-10-CM

## 2019-01-31 DIAGNOSIS — E87.1 HYPONATREMIA: ICD-10-CM

## 2019-01-31 DIAGNOSIS — F33.41 RECURRENT MAJOR DEPRESSIVE DISORDER, IN PARTIAL REMISSION: ICD-10-CM

## 2019-01-31 LAB
25(OH)D3+25(OH)D2 SERPL-MCNC: 48 NG/ML
ALBUMIN SERPL BCP-MCNC: 3.9 G/DL
ALP SERPL-CCNC: 68 U/L
ALT SERPL W/O P-5'-P-CCNC: 17 U/L
ANION GAP SERPL CALC-SCNC: 11 MMOL/L
AST SERPL-CCNC: 25 U/L
BASOPHILS # BLD AUTO: 0.09 K/UL
BASOPHILS NFR BLD: 1.3 %
BILIRUB SERPL-MCNC: 0.4 MG/DL
BUN SERPL-MCNC: 16 MG/DL
CALCIUM SERPL-MCNC: 10.2 MG/DL
CHLORIDE SERPL-SCNC: 104 MMOL/L
CHOLEST SERPL-MCNC: 198 MG/DL
CHOLEST/HDLC SERPL: 2.6 {RATIO}
CO2 SERPL-SCNC: 25 MMOL/L
CREAT SERPL-MCNC: 0.9 MG/DL
DIFFERENTIAL METHOD: ABNORMAL
EOSINOPHIL # BLD AUTO: 0.2 K/UL
EOSINOPHIL NFR BLD: 3.4 %
ERYTHROCYTE [DISTWIDTH] IN BLOOD BY AUTOMATED COUNT: 13.1 %
EST. GFR  (AFRICAN AMERICAN): >60 ML/MIN/1.73 M^2
EST. GFR  (NON AFRICAN AMERICAN): >60 ML/MIN/1.73 M^2
ESTIMATED AVG GLUCOSE: 100 MG/DL
GLUCOSE SERPL-MCNC: 87 MG/DL
HBA1C MFR BLD HPLC: 5.1 %
HCT VFR BLD AUTO: 41.9 %
HDLC SERPL-MCNC: 77 MG/DL
HDLC SERPL: 38.9 %
HGB BLD-MCNC: 13 G/DL
IMM GRANULOCYTES # BLD AUTO: 0.02 K/UL
IMM GRANULOCYTES NFR BLD AUTO: 0.3 %
IRON SERPL-MCNC: 80 UG/DL
LDLC SERPL CALC-MCNC: 109.2 MG/DL
LYMPHOCYTES # BLD AUTO: 1.8 K/UL
LYMPHOCYTES NFR BLD: 26.1 %
MCH RBC QN AUTO: 28.8 PG
MCHC RBC AUTO-ENTMCNC: 31 G/DL
MCV RBC AUTO: 93 FL
MONOCYTES # BLD AUTO: 0.4 K/UL
MONOCYTES NFR BLD: 5.3 %
NEUTROPHILS # BLD AUTO: 4.5 K/UL
NEUTROPHILS NFR BLD: 63.6 %
NONHDLC SERPL-MCNC: 121 MG/DL
NRBC BLD-RTO: 0 /100 WBC
PLATELET # BLD AUTO: 357 K/UL
PMV BLD AUTO: 10.5 FL
POTASSIUM SERPL-SCNC: 3.6 MMOL/L
PROT SERPL-MCNC: 7.7 G/DL
RBC # BLD AUTO: 4.51 M/UL
SATURATED IRON: 20 %
SODIUM SERPL-SCNC: 140 MMOL/L
T4 FREE SERPL-MCNC: 1.19 NG/DL
TOTAL IRON BINDING CAPACITY: 394 UG/DL
TRANSFERRIN SERPL-MCNC: 266 MG/DL
TRIGL SERPL-MCNC: 59 MG/DL
TSH SERPL DL<=0.005 MIU/L-ACNC: 4.83 UIU/ML
VIT B12 SERPL-MCNC: 688 PG/ML
WBC # BLD AUTO: 7.01 K/UL

## 2019-01-31 PROCEDURE — 84443 ASSAY THYROID STIM HORMONE: CPT

## 2019-01-31 PROCEDURE — 85025 COMPLETE CBC W/AUTO DIFF WBC: CPT

## 2019-01-31 PROCEDURE — 80053 COMPREHEN METABOLIC PANEL: CPT

## 2019-01-31 PROCEDURE — 82607 VITAMIN B-12: CPT

## 2019-01-31 PROCEDURE — 83540 ASSAY OF IRON: CPT

## 2019-01-31 PROCEDURE — 80061 LIPID PANEL: CPT

## 2019-01-31 PROCEDURE — 83036 HEMOGLOBIN GLYCOSYLATED A1C: CPT

## 2019-01-31 PROCEDURE — 84439 ASSAY OF FREE THYROXINE: CPT

## 2019-01-31 PROCEDURE — 36415 COLL VENOUS BLD VENIPUNCTURE: CPT

## 2019-01-31 PROCEDURE — 82306 VITAMIN D 25 HYDROXY: CPT

## 2019-02-06 NOTE — PROGRESS NOTES
Last 5 Patient Entered Readings                                      Current 30 Day Average: 147/86     Recent Readings 1/24/2019 1/10/2019 1/8/2019 1/8/2019 12/17/2018    SBP (mmHg) 150 155 137 138 154    DBP (mmHg) 89 87 82 84 89    Pulse 97 92 85 88 83        HPI:  Called patient to follow up on her lack of readings and high BP readings. Patient endorses adherence to medication regimen. Patient denies hypotensive s/sx (lightheadedness, dizziness, nausea, fatigue); patient denies hypertensive s/sx (SOB, CP, severe headaches, changes in vision, dizziness, fatigue, confusion, anxiety, nosebleeds). Instructed patient to seek medical care if BP > 180/110 and is accompanied by hypertensive s/sx associated, patient confirms understanding.     Assessment:  Reviewed recent readings. Per 2017 ACC/ AHA HTN guidelines (goal of BP < 130/80), current 30-day average needs to be addressed more thoroughly today. Patient seen by Dr. Julian 1/30 where she had a close to goal reading. No medication adjustments were made for BP and she was pleased with her in office BP reading.    Plan:  Continue current medication regimen. Increase the frequency of your blood pressure readings. I will continue to monitor regularly and will follow-up in 2 to 3 weeks, sooner if blood pressure begins to trend upward or downward.     Current medication regimen:  Hypertension Medications             carvedilol (COREG) 3.125 MG tablet Take 1 tablet (3.125 mg total) by mouth 2 (two) times daily with meals.    NIFEdipine (ADALAT CC) 90 MG TbSR Take 1 tablet (90 mg total) by mouth once daily.    olmesartan (BENICAR) 40 MG tablet Take 1 tablet (40 mg total) by mouth once daily.          Patient denies having questions or concerns. Patient has my contact information and knows to call with any concerns or clinical changes.

## 2019-02-20 ENCOUNTER — PATIENT OUTREACH (OUTPATIENT)
Dept: OTHER | Facility: OTHER | Age: 69
End: 2019-02-20

## 2019-02-20 NOTE — PROGRESS NOTES
Last 5 Patient Entered Readings                                      Current 30 Day Average: 146/84     Recent Readings 2/11/2019 2/11/2019 2/9/2019 2/7/2019 1/24/2019    SBP (mmHg) 131 131 167 136 150    DBP (mmHg) 80 80 92 78 89    Pulse 88 88 75 85 97        LMFCB to increase carvedilol to 6.25mg BID.    4/15: She now has a lack of readings. Will encourage more readings once reached.    5/15: She resumed readings that are high. LMFCB to increase carvedilol. If she does not answer with my next outreach, I will send the discharge letter.

## 2019-02-20 NOTE — LETTER
April 15, 2019     Cleo Ford  [address] [place] [id number]       Dear Cleo,    Thank you for enrolling in [facility]s Digital Medicine Program. To participate, we ask that you submit information at least once weekly through your [facility] account and maintain regular contact with your Care Team. We have not received any data or heard from you in some time.     The Digital Medicine Care Team has attempted to reach you on multiple occasions to determine if you would like to continue participating in the program. While we encourage you to continue participating fully, we understand that circumstances may change.     To continue participating in the program, please contact me at . If we do not hear back, you will be un-enrolled, and your physician will be notified of your decision.    If you have submitted data and believe you are receiving this letter in error, please call the Digital Medicine Patient Support Line at [phone] for troubleshooting.      We look forward to hearing from you soon.    Sincerely,     Teodora Lakhani (Inactive)  Keshia Damon  Your Personal Health

## 2019-02-20 NOTE — LETTER
April 15, 2019     Cleo Ford  [address] [place] [id number]       Dear Cleo,    Thank you for enrolling in [facility] Digital [facility] Program. To participate, we ask that you submit information at least once weekly through your [facility] account and maintain regular contact with your Care Team. We have not received any data or heard from you in some time.     The Digital Medicine Care Team has attempted to reach you on multiple occasions to determine if you would like to continue participating in the program. While we encourage you to continue participating fully, we understand that circumstances may change.     To continue participating in the program, please contact me at . If we do not hear back, you will be un-enrolled, and your physician will be notified of your decision.    If you have submitted data and believe you are receiving this letter in error, please call the Digital Medicine Patient Support Line at [phone] for troubleshooting.      We look forward to hearing from you soon.    Sincerely,     Teodora Lakhani (Inactive)  Keshia Dmaon  Your Personal Health

## 2019-02-20 NOTE — LETTER
April 15, 2019     Cleo Ford  [address] [place] [id number]       Dear Cleo,    Thank you for enrolling in [facility] Digital Medicine Program. To participate, we ask that you submit information at least once weekly through your [facility] account and maintain regular contact with your Care Team. We have not received any data or heard from you in some time.     The Digital Medicine Care Team has attempted to reach you on multiple occasions to determine if you would like to continue participating in the program. While we encourage you to continue participating fully, we understand that circumstances may change.     To continue participating in the program, please contact me at . If we do not hear back, you will be un-enrolled, and your physician will be notified of your decision.    If you have submitted data and believe you are receiving this letter in error, please call the Digital Medicine Patient Support Line at [phone] for troubleshooting.      We look forward to hearing from you soon.    Sincerely,     Nafisa Tomas, PharmD.  [phone]

## 2019-02-25 RX ORDER — LOVASTATIN 40 MG/1
TABLET ORAL
Qty: 90 TABLET | Refills: 2 | Status: ON HOLD | OUTPATIENT
Start: 2019-02-25 | End: 2020-01-16 | Stop reason: HOSPADM

## 2019-03-08 ENCOUNTER — PATIENT OUTREACH (OUTPATIENT)
Dept: OTHER | Facility: OTHER | Age: 69
End: 2019-03-08

## 2019-03-08 NOTE — PROGRESS NOTES
Last 5 Patient Entered Readings                                      Current 30 Day Average: 142/85     Recent Readings 3/7/2019 2/22/2019 2/21/2019 2/11/2019 2/11/2019    SBP (mmHg) 152 145 119 131 131    DBP (mmHg) 96 91 80 80 80    Pulse 83 85 77 88 88        Called to intro as new HC.     Patient states that she feels good, and states that her doctor was fine with her readings. Encouraged patient to take more readings, particularly since her most recent BP was higher than usual. Patient agrees to do so. Patient is brief on the phone today, so we agreed to discuss lifestyle further at next call.

## 2019-04-04 ENCOUNTER — PATIENT OUTREACH (OUTPATIENT)
Dept: OTHER | Facility: OTHER | Age: 69
End: 2019-04-04

## 2019-04-04 NOTE — PROGRESS NOTES
Last 5 Patient Entered Readings                                      Current 30 Day Average: 141/82     Recent Readings 4/1/2019 3/21/2019 3/20/2019 3/18/2019 3/7/2019    SBP (mmHg) 145 139 134 135 152    DBP (mmHg) 77 82 80 75 96    Pulse 87 80 81 79 83        4/4- Left voicemail for follow up

## 2019-04-14 ENCOUNTER — PATIENT MESSAGE (OUTPATIENT)
Dept: INTERNAL MEDICINE | Facility: CLINIC | Age: 69
End: 2019-04-14

## 2019-04-14 DIAGNOSIS — M79.602 BILATERAL ARM PAIN: ICD-10-CM

## 2019-04-14 DIAGNOSIS — M25.569 KNEE PAIN, UNSPECIFIED CHRONICITY, UNSPECIFIED LATERALITY: ICD-10-CM

## 2019-04-14 DIAGNOSIS — M79.606 PAIN OF LOWER EXTREMITY, UNSPECIFIED LATERALITY: Primary | ICD-10-CM

## 2019-04-14 DIAGNOSIS — M79.601 BILATERAL ARM PAIN: ICD-10-CM

## 2019-04-17 ENCOUNTER — OFFICE VISIT (OUTPATIENT)
Dept: ORTHOPEDICS | Facility: CLINIC | Age: 69
End: 2019-04-17
Payer: MEDICARE

## 2019-04-17 ENCOUNTER — HOSPITAL ENCOUNTER (OUTPATIENT)
Dept: RADIOLOGY | Facility: HOSPITAL | Age: 69
Discharge: HOME | End: 2019-04-17
Attending: PHYSICAL MEDICINE & REHABILITATION
Payer: MEDICARE

## 2019-04-17 VITALS
BODY MASS INDEX: 31.41 KG/M2 | HEIGHT: 63 IN | DIASTOLIC BLOOD PRESSURE: 83 MMHG | SYSTOLIC BLOOD PRESSURE: 159 MMHG | HEART RATE: 83 BPM | WEIGHT: 177.25 LBS

## 2019-04-17 DIAGNOSIS — S83.8X1A MENISCAL INJURY, RIGHT, INITIAL ENCOUNTER: ICD-10-CM

## 2019-04-17 DIAGNOSIS — M79.604 LEG PAIN, POSTERIOR, RIGHT: ICD-10-CM

## 2019-04-17 DIAGNOSIS — G89.29 CHRONIC PAIN OF RIGHT KNEE: ICD-10-CM

## 2019-04-17 DIAGNOSIS — M25.561 CHRONIC PAIN OF RIGHT KNEE: ICD-10-CM

## 2019-04-17 DIAGNOSIS — M25.561 CHRONIC PAIN OF RIGHT KNEE: Primary | ICD-10-CM

## 2019-04-17 DIAGNOSIS — G89.29 CHRONIC PAIN OF RIGHT KNEE: Primary | ICD-10-CM

## 2019-04-17 PROCEDURE — 73564 XR KNEE ORTHO RIGHT WITH FLEXION: ICD-10-PCS | Mod: 26,RT,, | Performed by: RADIOLOGY

## 2019-04-17 PROCEDURE — 99204 OFFICE O/P NEW MOD 45 MIN: CPT | Mod: S$GLB,,, | Performed by: PHYSICAL MEDICINE & REHABILITATION

## 2019-04-17 PROCEDURE — 1101F PT FALLS ASSESS-DOCD LE1/YR: CPT | Mod: CPTII,S$GLB,, | Performed by: PHYSICAL MEDICINE & REHABILITATION

## 2019-04-17 PROCEDURE — 3077F SYST BP >= 140 MM HG: CPT | Mod: CPTII,S$GLB,, | Performed by: PHYSICAL MEDICINE & REHABILITATION

## 2019-04-17 PROCEDURE — 99999 PR PBB SHADOW E&M-EST. PATIENT-LVL IV: ICD-10-PCS | Mod: PBBFAC,,, | Performed by: PHYSICAL MEDICINE & REHABILITATION

## 2019-04-17 PROCEDURE — 73564 X-RAY EXAM KNEE 4 OR MORE: CPT | Mod: 26,RT,, | Performed by: RADIOLOGY

## 2019-04-17 PROCEDURE — 73562 X-RAY EXAM OF KNEE 3: CPT | Mod: TC,RT,59

## 2019-04-17 PROCEDURE — 73562 XR KNEE ORTHO RIGHT WITH FLEXION: ICD-10-PCS | Mod: 26,59,RT, | Performed by: RADIOLOGY

## 2019-04-17 PROCEDURE — 99999 PR PBB SHADOW E&M-EST. PATIENT-LVL IV: CPT | Mod: PBBFAC,,, | Performed by: PHYSICAL MEDICINE & REHABILITATION

## 2019-04-17 PROCEDURE — 3079F PR MOST RECENT DIASTOLIC BLOOD PRESSURE 80-89 MM HG: ICD-10-PCS | Mod: CPTII,S$GLB,, | Performed by: PHYSICAL MEDICINE & REHABILITATION

## 2019-04-17 PROCEDURE — 73562 X-RAY EXAM OF KNEE 3: CPT | Mod: 26,59,RT, | Performed by: RADIOLOGY

## 2019-04-17 PROCEDURE — 3077F PR MOST RECENT SYSTOLIC BLOOD PRESSURE >= 140 MM HG: ICD-10-PCS | Mod: CPTII,S$GLB,, | Performed by: PHYSICAL MEDICINE & REHABILITATION

## 2019-04-17 PROCEDURE — 1101F PR PT FALLS ASSESS DOC 0-1 FALLS W/OUT INJ PAST YR: ICD-10-PCS | Mod: CPTII,S$GLB,, | Performed by: PHYSICAL MEDICINE & REHABILITATION

## 2019-04-17 PROCEDURE — 3079F DIAST BP 80-89 MM HG: CPT | Mod: CPTII,S$GLB,, | Performed by: PHYSICAL MEDICINE & REHABILITATION

## 2019-04-17 PROCEDURE — 99204 PR OFFICE/OUTPT VISIT, NEW, LEVL IV, 45-59 MIN: ICD-10-PCS | Mod: S$GLB,,, | Performed by: PHYSICAL MEDICINE & REHABILITATION

## 2019-04-17 NOTE — LETTER
April 17, 2019      Elizabeth Julian MD  [id number] [address] beds at the [facility]  [address]  [place] [address]           [facility]  [address] [facility]  [place] [address]  Phone: [phone]  Fax: [phone]          Patient: Cleo Ford   MR Number: [id number]   YOB: 1950   Date of Visit: 4/17/2019       Dear Dr. Elizabeth Julian:    Thank you for referring Cleo Ford to me for evaluation. Attached you will find relevant portions of my assessment and plan of care.    If you have questions, please do not hesitate to call me. I look forward to following Cleo Ford along with you.    Sincerely,    Nadya Escamilla MD    Enclosure  CC:  No Recipients    If you would like to receive this communication electronically, please contact [email or web address] or [phone] to request more information on [facility] access.    For providers and/or their staff who would like to refer a patient to [facility], please contact us through our one-stop-shop provider referral line, [facility], at [phone].    If you feel you have received this communication in error or would no longer like to receive these types of communications, please e-mail [email or web address]

## 2019-04-17 NOTE — PATIENT INSTRUCTIONS
Knee Pain  Knee pain is very common. Its especially common in active people who put a lot of pressure on their knees, like runners. It affects women more often than men.  Your kneecap (patella) is a thick, round bone. It covers and protects the front portion of your knee joint. It moves along a groove in your thighbone (femur) as part of the patellofemoral joint. A layer of cartilage surrounds the underside of your kneecap. This layer protects it from grinding against your femur.  When this cartilage softens and breaks down, it can cause knee pain. This is partly because of repetitive stress. The stress irritates the lining of the joint. This causes pain in the underlying bone.  What causes knee pain?  Many things can cause knee pain. You may have more than one cause. Some of these include:  · Overuse of the knee joint  · The kneecap doesnt line up with the tissue around it  · Damage to small nerves in the area  · Damage to the ligament-like structure that holds the kneecap in place (retinaculum)  · Breakdown of the bone under the cartilage  · Swelling in the soft tissues around the kneecap  · Injury  You might be more likely to have knee pain if you:  · Exercise a lot  · Recently increased the intensity of your workouts  · Have a body mass index (BMI) greater than 25  · Have poor alignment of your kneecap  · Walk with your feet turned overly outward or inward  · Have weakness in surrounding muscle groups (inner quad or hip adductor muscles)  · Have too much tightness in surrounding muscle groups (hamstrings or iliotibial band)  · Have a recent history of injury to the area  · Are female  Symptoms of knee pain  This type of knee pain is a dull, aching pain in the front of the knee in the area under and around the kneecap. This pain may start quickly or slowly. Your pain might be worse when you squat, run, or sit for a long time. You might also sometimes feel like your knee is giving out. You may have symptoms in  one or both of your knees.  Diagnosing knee pain  Your healthcare provider will ask about your medical history and your symptoms. Be sure to describe any activities that make your knee pain worse. He or she will look at your knee. This will include tests of your range of motion, strength, and areas of pain of your knee. Your knee alignment will be checked.  Your healthcare provider will need to rule out other causes of your knee pain, such as arthritis. You may need an imaging test, such as an X-ray or MRI.  Treatment for knee pain  Treatments that can help ease your symptoms may include:  · Avoiding activities for a while that make your pain worse, returning to activity over time  · Icing the outside of your knee when it causes you pain  · Taking over-the-counter pain medicine  · Wearing a knee brace or taping your knee to support it  · Wearing special shoe inserts to help keep your feet in the proper alignment  · Doing special exercises to stretch and strengthen the muscles around your hip and your knee  These steps help most people manage knee pain. But some cases of knee pain need to be treated with surgery. You may need surgery right away. Or you may need it later if other treatments dont work. Your healthcare provider may refer you to an orthopedic surgeon. He or she will talk with you about your choices.  Preventing knee pain  Losing weight and correcting excess muscle tightness or muscle weakness may help lower your risk.  In some cases, you can prevent knee pain. To help prevent a flare-up of knee pain, you do these things:  · Regularly do all the exercises your doctor or physical therapist advises  · Support your knee as advised by your doctor or physical therapist  · Increase training gradually, and ease up on training when needed  · Have an expert check your gait for running or other sporting activities  · Stretch properly before and after exercise  · Replace your running shoes regularly  · Lose excess  weight     When to call your healthcare provider  Call your healthcare provider right away if:  · Your symptoms dont get better after a few weeks of treatment  · You have any new symptoms   Date Last Reviewed: 4/1/2017  © 0806-9432 [employer]. [address] [place] [address], [place] [address]. All rights reserved. This information is not intended as a substitute for professional medical care. Always follow your healthcare professional's instructions.

## 2019-04-17 NOTE — PROGRESS NOTES
PM&R NEW PATIENT HISTORY & PHYSICAL:    Referring Physician: Elizabeth Julian MD    Chief Complaint   Patient presents with    Knee Pain     Right knee pain       HPI: This is a 68 y.o.  female being seen in clinic today for evaluation of Knee Pain (Right knee pain)   The problem first began around December. She feels sore type pain in the back of her right leg. The symptoms are happening more frequently. She has tried advil with improvement. She has not tried physical therapy. She also gets frequent pain in right knee with occasional locking.    History obtained from patient.    Past family, medical, social, surgical history, and vital signs reviewed in chart.    Review of Systems   Constitutional: Negative for chills, fever and weight loss.   HENT: Negative for hearing loss and sore throat.    Eyes: Negative for blurred vision, photophobia and pain.   Respiratory: Negative for shortness of breath.    Cardiovascular: Negative for chest pain.   Gastrointestinal: Negative for abdominal pain.   Genitourinary: Negative for dysuria.   Musculoskeletal: Positive for myalgias (bilateral posterior arm pain).   Skin: Negative for rash.   Neurological: Negative for tingling and headaches.   Endo/Heme/Allergies: Does not bruise/bleed easily.   Psychiatric/Behavioral: Negative for depression.       Physical Exam   Constitutional: She is oriented to person, place, and time. She appears well-developed and well-nourished.   HENT:   Head: Normocephalic and atraumatic.   Eyes: Pupils are equal, round, and reactive to light. EOM are normal.   Neck: Normal range of motion. Neck supple.   Cardiovascular: Intact distal pulses.   Pulmonary/Chest: Effort normal.   Abdominal: She exhibits no distension.   Musculoskeletal:        Right knee: She exhibits decreased range of motion (mildly limited from full flexion. Has full extension.) and abnormal meniscus ( + click and pain with Brooke's). She exhibits no swelling, no effusion, no LCL  laxity and no MCL laxity. Tenderness found. Medial joint line tenderness noted. No lateral joint line, no MCL, no LCL and no patellar tendon tenderness noted.   Neurological: She is alert and oriented to person, place, and time.   Skin: Skin is warm and dry.   Psychiatric: She has a normal mood and affect.   Vitals reviewed.      IMPRESSION/PLAN: Cleo is a 68 y.o.  female with:    1. Chronic pain of right knee  - Ambulatory referral to Physical Therapy    2. Meniscal injury, right, initial encounter  - Ambulatory referral to Physical Therapy    3. Leg pain, posterior, right  - Ambulatory referral to Physical Therapy       The findings were discussed with Cleo in detail. I think she most likely has a meniscal tear, but am not totally sure what is causing the posterior leg pain. We both agreed that symptoms aren't currently bad enough to warrant an injection. She can continue OTC meds and I'll get her into PT for lower quarter strengthening. All of her questions were answered. She was provided this plan in writing. She will follow-up with me in 6 weeks.    Nadya Escamilla M.D.  Physical Medicine and Rehab

## 2019-04-18 NOTE — PROGRESS NOTES
Last 5 Patient Entered Readings                                      Current 30 Day Average: 139/80     Recent Readings 4/1/2019 3/21/2019 3/20/2019 3/18/2019 3/7/2019    SBP (mmHg) 145 139 134 135 152    DBP (mmHg) 77 82 80 75 96    Pulse 87 80 81 79 83        Deferred outreach due to pharmD following [facility] protocol

## 2019-04-22 ENCOUNTER — PATIENT MESSAGE (OUTPATIENT)
Dept: INTERNAL MEDICINE | Facility: CLINIC | Age: 69
End: 2019-04-22

## 2019-04-25 ENCOUNTER — HOSPITAL ENCOUNTER (OUTPATIENT)
Dept: RADIOLOGY | Facility: HOSPITAL | Age: 69
Discharge: HOME | End: 2019-04-25
Attending: FAMILY MEDICINE
Payer: MEDICARE

## 2019-04-25 DIAGNOSIS — Z12.31 BREAST CANCER SCREENING BY MAMMOGRAM: ICD-10-CM

## 2019-04-25 PROCEDURE — 77067 SCR MAMMO BI INCL CAD: CPT | Mod: TC

## 2019-04-25 PROCEDURE — 77067 SCR MAMMO BI INCL CAD: CPT | Mod: 26,,, | Performed by: RADIOLOGY

## 2019-04-25 PROCEDURE — 77063 BREAST TOMOSYNTHESIS BI: CPT | Mod: 26,,, | Performed by: RADIOLOGY

## 2019-04-25 PROCEDURE — 77067 MAMMO DIGITAL SCREENING BILAT WITH TOMOSYNTHESIS_CAD: ICD-10-PCS | Mod: 26,,, | Performed by: RADIOLOGY

## 2019-04-25 PROCEDURE — 77063 MAMMO DIGITAL SCREENING BILAT WITH TOMOSYNTHESIS_CAD: ICD-10-PCS | Mod: 26,,, | Performed by: RADIOLOGY

## 2019-04-30 ENCOUNTER — PATIENT MESSAGE (OUTPATIENT)
Dept: INTERNAL MEDICINE | Facility: CLINIC | Age: 69
End: 2019-04-30

## 2019-05-10 NOTE — PROGRESS NOTES
Last 5 Patient Entered Readings                                      Current 30 Day Average: 138/86     Recent Readings 5/7/2019 5/7/2019 5/2/2019 5/2/2019 4/1/2019    SBP (mmHg) 137 139 138 166 145    DBP (mmHg) 85 88 76 94 77    Pulse 84 81 78 74 87        Continuing to defer while pharmD follows [facility] protocol

## 2019-05-20 ENCOUNTER — TELEPHONE (OUTPATIENT)
Dept: PHARMACY | Facility: CLINIC | Age: 69
End: 2019-05-20

## 2019-05-20 RX ORDER — VALSARTAN 320 MG/1
320 TABLET ORAL DAILY
Qty: 90 TABLET | Refills: 3 | Status: SHIPPED | OUTPATIENT
Start: 2019-05-20 | End: 2020-05-21

## 2019-05-29 NOTE — PROGRESS NOTES
Last 5 Patient Entered Readings                                      Current 30 Day Average: 150/89     Recent Readings 5/28/2019 5/28/2019 5/25/2019 5/7/2019 5/7/2019    SBP (mmHg) 169 178 156 137 139    DBP (mmHg) 97 102 84 85 88    Pulse 73 74 86 84 81        HPI:  Called patient to follow up on her elevated readings.   Patient endorses adherence to medication regimen.   Patient denies hypotensive s/sx (lightheadedness, dizziness, nausea, fatigue); patient denies hypertensive s/sx (SOB, CP, severe headaches, changes in vision). Instructed patient to seek medical care if BP > 180/110 and is accompanied by hypertensive s/sx associated, patient confirms understanding.     Assessment:  Reviewed recent readings. Per 2017 ACC/ AHA HTN guidelines (goal of BP < 130/80), current 30-day average needs to be addressed more thoroughly today.     Plan:  Continue current medication regimen.   Increase the frequency of her BP readings to assess if further medication adjustments are needed.  I will continue to monitor regularly and will follow-up in 2 to 3 weeks, sooner if blood pressure begins to trend upward or downward.     Current medication regimen:  Hypertension Medications             carvedilol (COREG) 3.125 MG tablet Take 1 tablet (3.125 mg total) by mouth 2 (two) times daily with meals.    NIFEdipine (ADALAT CC) 90 MG TbSR Take 1 tablet (90 mg total) by mouth once daily.    valsartan (DIOVAN) 320 MG tablet Take 1 tablet (320 mg total) by mouth once daily.          Patient denies having questions or concerns. Patient has my contact information and knows to call with any concerns or clinical changes.

## 2019-05-31 NOTE — PROGRESS NOTES
Last 5 Patient Entered Readings                                      Current 30 Day Average: 151/89     Recent Readings 5/30/2019 5/29/2019 5/29/2019 5/29/2019 5/28/2019    SBP (mmHg) 157 151 161 164 169    DBP (mmHg) 88 91 86 89 97    Pulse 74 75 76 76 73        Note- pharmD completed outreach on 5/29. Will follow up with patient next week

## 2019-06-06 ENCOUNTER — PATIENT OUTREACH (OUTPATIENT)
Dept: OTHER | Facility: OTHER | Age: 69
End: 2019-06-06

## 2019-06-06 DIAGNOSIS — I10 ESSENTIAL HYPERTENSION: Chronic | ICD-10-CM

## 2019-06-06 RX ORDER — CARVEDILOL 6.25 MG/1
6.25 TABLET ORAL 2 TIMES DAILY WITH MEALS
Qty: 60 TABLET | Refills: 3
Start: 2019-06-06 | End: 2019-07-31 | Stop reason: SDUPTHER

## 2019-06-06 NOTE — PROGRESS NOTES
Last 5 Patient Entered Readings                                      Current 30 Day Average: 155/89     Recent Readings 6/3/2019 6/2/2019 6/1/2019 5/30/2019 5/29/2019    SBP (mmHg) 148 163 161 157 151    DBP (mmHg) 86 82 89 88 91    Pulse 76 72 83 74 75      HPI:  Called patient to follow up on her upward trend.   Patient endorses adherence to medication regimen.   Patient denies hypotensive s/sx (lightheadedness, dizziness, nausea, fatigue); patient denies hypertensive s/sx (SOB, CP, severe headaches, changes in vision). Instructed patient to seek medical care if BP > 180/110 and is accompanied by hypertensive s/sx associated, patient confirms understanding.     Assessment:  Reviewed recent readings. Per 2017 ACC/ AHA HTN guidelines (goal of BP < 130/80), current 30-day average needs to be addressed more thoroughly today.     Plan:  Increase carvedilol to 6.25mg twice daily.   I will continue to monitor regularly and will follow-up in 2 to 3 weeks, sooner if blood pressure begins to trend upward or downward.     Current medication regimen:  Hypertension Medications             carvedilol (COREG) 3.125 MG tablet Take 1 tablet (3.125 mg total) by mouth 2 (two) times daily with meals.    NIFEdipine (ADALAT CC) 90 MG TbSR Take 1 tablet (90 mg total) by mouth once daily.    valsartan (DIOVAN) 320 MG tablet Take 1 tablet (320 mg total) by mouth once daily.          Patient denies having questions or concerns. Patient has my contact information and knows to call with any concerns or clinical changes.

## 2019-06-12 DIAGNOSIS — F41.1 ANXIETY STATE: ICD-10-CM

## 2019-06-12 RX ORDER — ALPRAZOLAM 1 MG/1
1 TABLET ORAL 2 TIMES DAILY PRN
Qty: 180 TABLET | Refills: 0 | Status: SHIPPED | OUTPATIENT
Start: 2019-06-12 | End: 2019-10-29 | Stop reason: SDUPTHER

## 2019-06-14 NOTE — PROGRESS NOTES
Last 5 Patient Entered Readings                                      Current 30 Day Average: 153/89     Recent Readings 6/14/2019 6/8/2019 6/8/2019 6/7/2019 6/7/2019    SBP (mmHg) 138 154 154 133 138    DBP (mmHg) 82 94 94 78 93    Pulse 88 70 70 74 80        Digital Medicine: Health  Follow Up    Patient has been increasing her BP readings, and states that she is fine with her BP.     Lifestyle Modifications:    1.Dietary Modifications (Sodium intake <2,000mg/day, food labels, dining out):    Patient has been following the keto diet and has lost 12 lbs. Cautioned patient to follow a low sodium diet, and referenced that high fat options such as meats, cheeses, and butter can be high sodium options. Congratulated patient on her progress, and she intends to continue for the foreseeable future.     2.Physical Activity:    Denies any physical activity, or any interest change that at this time.     3.Medication Therapy: Patient has been compliant with the medication regimen.    4.Patient has the following medication side effects/concerns:   (Frequency/Alleviating factors/Precipitating factors, etc.)     Follow up with Ms. Cleo Ford completed. No further questions or concerns. Will continue to follow up to achieve health goals.

## 2019-06-20 ENCOUNTER — PATIENT OUTREACH (OUTPATIENT)
Dept: OTHER | Facility: OTHER | Age: 69
End: 2019-06-20

## 2019-06-20 DIAGNOSIS — I10 ESSENTIAL HYPERTENSION: Chronic | ICD-10-CM

## 2019-06-20 NOTE — LETTER
December 5, 2019     Cleo Ford  [address] [place] [id number]       Dear Cleo,    We have made several attempts to encourage your participation in [facility]s Digital [facility] Program. Unfortunately, we have been unsuccessful.     This is an official notice that you are no longer enrolled in the digital medicine program, and thus, we will no longer be managing your disease states. Please note this has no impact on your relationship with [facility] or your providers. Going forward, please reach out to your primary care provider with any questions or concerns regarding your health.    Please contact [phone] if you have any additional questions.    Sincerely,  The [facility] Team

## 2019-06-20 NOTE — PROGRESS NOTES
Last 5 Patient Entered Readings                                      Current 30 Day Average: 152/88     Recent Readings 6/18/2019 6/18/2019 6/14/2019 6/8/2019 6/8/2019    SBP (mmHg) 147 146 138 154 154    DBP (mmHg) 81 79 82 94 94    Pulse 70 75 88 70 70        LMFCB to discuss how she is tolerating the higher dose of carvedilol.

## 2019-06-20 NOTE — LETTER
December 5, 2019     Cloe Ford  [address] [place] [id number]       Dear Cleo,    We have made several attempts to encourage your participation in [facility]s Digital [facility]. Unfortunately, we have been unsuccessful.     This is an official notice that you are no longer enrolled in the digital medicine program, and thus, we will no longer be managing your disease states. Please note this has no impact on your relationship with [facility] or your providers. Going forward, please reach out to your primary care provider with any questions or concerns regarding your health.    Please contact [phone] if you have any additional questions.    Sincerely,  [facility]

## 2019-07-18 ENCOUNTER — PATIENT OUTREACH (OUTPATIENT)
Dept: OTHER | Facility: OTHER | Age: 69
End: 2019-07-18

## 2019-07-18 NOTE — PROGRESS NOTES
Last 5 Patient Entered Readings                                      Current 30 Day Average: 166/92     Recent Readings 7/15/2019 7/15/2019 7/2/2019 6/26/2019 6/18/2019    SBP (mmHg) 180 189 166 171 147    DBP (mmHg) 104 101 96 90 81    Pulse 74 74 78 70 70        Left voicemail for follow up    Note- upward trend

## 2019-07-30 ENCOUNTER — PATIENT MESSAGE (OUTPATIENT)
Dept: INTERNAL MEDICINE | Facility: CLINIC | Age: 69
End: 2019-07-30

## 2019-07-30 ENCOUNTER — PATIENT MESSAGE (OUTPATIENT)
Dept: OTHER | Facility: OTHER | Age: 69
End: 2019-07-30

## 2019-07-30 DIAGNOSIS — I10 ESSENTIAL HYPERTENSION: Chronic | ICD-10-CM

## 2019-07-30 RX ORDER — NIFEDIPINE 90 MG/1
90 TABLET, FILM COATED, EXTENDED RELEASE ORAL DAILY
Qty: 90 TABLET | Refills: 3 | Status: SHIPPED | OUTPATIENT
Start: 2019-07-30 | End: 2020-07-30

## 2019-07-31 RX ORDER — CARVEDILOL 6.25 MG/1
6.25 TABLET ORAL 2 TIMES DAILY WITH MEALS
Qty: 60 TABLET | Refills: 3 | Status: SHIPPED | OUTPATIENT
Start: 2019-07-31 | End: 2019-11-24 | Stop reason: SDUPTHER

## 2019-07-31 NOTE — PROGRESS NOTES
"Last 5 Patient Entered Readings                                      Current 30 Day Average: 165/95     Recent Readings 7/30/2019 7/30/2019 7/15/2019 7/15/2019 7/2/2019    SBP (mmHg) 149 153 180 189 166    DBP (mmHg) 87 87 104 101 96    Pulse 69 71 74 74 78        Digital Medicine: Health  Follow Up    Patient states that in spite of the elevated readings, she feels well.     Patient requests a call from her pharmD Nafisa tomorrow, 8/1. She notes that she will be leaving town for vacation Friday morning, and that if there are any changes to her medications, she'd like to address them as soon as possible. Task placed.     Patient reiterates that in spite of her elevated readings, she feels "better than she has in years". She declines any changes in lifestyle at this time, and refers to her medications when asked about controlling her BP. Will continue to follow up on lifestyle modifications.     "

## 2019-08-27 ENCOUNTER — PATIENT OUTREACH (OUTPATIENT)
Dept: OTHER | Facility: OTHER | Age: 69
End: 2019-08-27

## 2019-08-27 NOTE — PROGRESS NOTES
Last 5 Patient Entered Readings                                      Current 30 Day Average: 147/89     Recent Readings 8/14/2019 8/1/2019 8/1/2019 7/30/2019 7/30/2019    SBP (mmHg) 123 170 176 149 153    DBP (mmHg) 77 93 99 87 87    Pulse 76 75 78 69 71        Left voicemail for follow up

## 2019-10-21 NOTE — PROGRESS NOTES
Digital Medicine: Health  Follow-Up        Follow Up  Follow-up reason(s): reading review      Routine Education Topics: weight management and eating patterns  Patient feels that she is less busy now and that her lifestyle has improved. Anticipates challenges related to diet with holidays upcoming. Encouragement provided.         Diet:   She has the following dietary restrictions: low carb and ketogenic diet    Patient did keto for 4 months but then noticed her hair began falling out- she notes that she found out on the internet that this is a common side effect.     Intervention(s): carb reduction      SDOH    INTERVENTION(S)  encouragement/support    PLAN  patient amenable to changes and additional monitoring needed      There are no preventive care reminders to display for this patient.    Last 5 Patient Entered Readings                                      Current 30 Day Average: 145/86     Recent Readings 10/15/2019 9/24/2019 9/24/2019 8/14/2019 8/1/2019    SBP (mmHg) 131 159 176 123 170    DBP (mmHg) 75 89 94 77 93    Pulse 68 68 71 76 75

## 2019-10-27 RX ORDER — SERTRALINE HYDROCHLORIDE 50 MG/1
50 TABLET, FILM COATED ORAL DAILY
Qty: 90 TABLET | Refills: 2 | Status: SHIPPED | OUTPATIENT
Start: 2019-10-27 | End: 2020-07-30

## 2019-10-29 DIAGNOSIS — F41.1 ANXIETY STATE: ICD-10-CM

## 2019-10-30 RX ORDER — ALPRAZOLAM 1 MG/1
1 TABLET ORAL 2 TIMES DAILY PRN
Qty: 180 TABLET | Refills: 0 | Status: ON HOLD | OUTPATIENT
Start: 2019-10-30 | End: 2020-01-16 | Stop reason: HOSPADM

## 2019-11-01 ENCOUNTER — HOSPITAL ENCOUNTER (EMERGENCY)
Facility: HOSPITAL | Age: 69
Discharge: HOME | End: 2019-11-01
Attending: EMERGENCY MEDICINE
Payer: MEDICARE

## 2019-11-01 VITALS
OXYGEN SATURATION: 99 % | TEMPERATURE: 98 F | RESPIRATION RATE: 18 BRPM | DIASTOLIC BLOOD PRESSURE: 72 MMHG | HEART RATE: 68 BPM | HEIGHT: 63 IN | SYSTOLIC BLOOD PRESSURE: 141 MMHG | WEIGHT: 180 LBS | BODY MASS INDEX: 31.89 KG/M2

## 2019-11-01 DIAGNOSIS — M25.559 HIP PAIN: ICD-10-CM

## 2019-11-01 DIAGNOSIS — S70.02XA CONTUSION OF LEFT HIP, INITIAL ENCOUNTER: Primary | ICD-10-CM

## 2019-11-01 PROCEDURE — 25000003 PHARM REV CODE 250: Performed by: PHYSICIAN ASSISTANT

## 2019-11-01 PROCEDURE — 99284 EMERGENCY DEPT VISIT MOD MDM: CPT | Mod: 25

## 2019-11-01 RX ORDER — METHOCARBAMOL 500 MG/1
1000 TABLET, FILM COATED ORAL 3 TIMES DAILY
Qty: 30 TABLET | Refills: 0 | Status: SHIPPED | OUTPATIENT
Start: 2019-11-01 | End: 2019-11-06

## 2019-11-01 RX ORDER — DICLOFENAC SODIUM 50 MG/1
50 TABLET, DELAYED RELEASE ORAL 2 TIMES DAILY
Qty: 20 TABLET | Refills: 0 | Status: SHIPPED | OUTPATIENT
Start: 2019-11-01 | End: 2020-10-12

## 2019-11-01 RX ORDER — TRAMADOL HYDROCHLORIDE 50 MG/1
50 TABLET ORAL
Status: COMPLETED | OUTPATIENT
Start: 2019-11-01 | End: 2019-11-01

## 2019-11-01 RX ADMIN — TRAMADOL HYDROCHLORIDE 50 MG: 50 TABLET, FILM COATED ORAL at 11:11

## 2019-11-01 NOTE — ED NOTES
Patient identifiers verified and correct for Cleo Ford.    LOC: The patient is awake, alert and aware of environment with an appropriate affect, the patient is oriented x 3 and speaking appropriately.  APPEARANCE: Patient resting comfortably and in no acute distress, patient is clean and well groomed, patient's clothing is properly fastened.  SKIN: The skin is warm and dry, color consistent with ethnicity, patient has normal skin turgor and moist mucus membranes, skin intact, no breakdown or bruising noted.  RESPIRATORY: Airway is open and patent, respirations are spontaneous.  CARDIAC: Patient has a normal rate, no periphreal edema noted, capillary refill < 3 seconds.  ABDOMEN: Soft and non tender to palpation.     Pt reports falling to back off truck, denies hitting head or LOC. Reports L hip pain, especially w/ ambulation/wt bearing
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
## 2019-11-04 ENCOUNTER — OFFICE VISIT (OUTPATIENT)
Dept: INTERNAL MEDICINE | Facility: CLINIC | Age: 69
End: 2019-11-04
Payer: MEDICARE

## 2019-11-04 ENCOUNTER — PATIENT MESSAGE (OUTPATIENT)
Dept: INTERNAL MEDICINE | Facility: CLINIC | Age: 69
End: 2019-11-04

## 2019-11-04 VITALS
TEMPERATURE: 98 F | HEIGHT: 63 IN | SYSTOLIC BLOOD PRESSURE: 140 MMHG | HEART RATE: 68 BPM | BODY MASS INDEX: 32.07 KG/M2 | DIASTOLIC BLOOD PRESSURE: 84 MMHG | WEIGHT: 181 LBS

## 2019-11-04 DIAGNOSIS — S79.912S HIP INJURY, LEFT, SEQUELA: Primary | ICD-10-CM

## 2019-11-04 PROCEDURE — 99213 PR OFFICE/OUTPT VISIT, EST, LEVL III, 20-29 MIN: ICD-10-PCS | Mod: S$GLB,,, | Performed by: PHYSICIAN ASSISTANT

## 2019-11-04 PROCEDURE — 3079F PR MOST RECENT DIASTOLIC BLOOD PRESSURE 80-89 MM HG: ICD-10-PCS | Mod: CPTII,S$GLB,, | Performed by: PHYSICIAN ASSISTANT

## 2019-11-04 PROCEDURE — 3079F DIAST BP 80-89 MM HG: CPT | Mod: CPTII,S$GLB,, | Performed by: PHYSICIAN ASSISTANT

## 2019-11-04 PROCEDURE — 3288F PR FALLS RISK ASSESSMENT DOCUMENTED: ICD-10-PCS | Mod: CPTII,S$GLB,, | Performed by: PHYSICIAN ASSISTANT

## 2019-11-04 PROCEDURE — 3077F SYST BP >= 140 MM HG: CPT | Mod: CPTII,S$GLB,, | Performed by: PHYSICIAN ASSISTANT

## 2019-11-04 PROCEDURE — 1100F PTFALLS ASSESS-DOCD GE2>/YR: CPT | Mod: CPTII,S$GLB,, | Performed by: PHYSICIAN ASSISTANT

## 2019-11-04 PROCEDURE — 99999 PR PBB SHADOW E&M-EST. PATIENT-LVL IV: ICD-10-PCS | Mod: PBBFAC,,, | Performed by: PHYSICIAN ASSISTANT

## 2019-11-04 PROCEDURE — 1100F PR PT FALLS ASSESS DOC 2+ FALLS/FALL W/INJURY/YR: ICD-10-PCS | Mod: CPTII,S$GLB,, | Performed by: PHYSICIAN ASSISTANT

## 2019-11-04 PROCEDURE — 3288F FALL RISK ASSESSMENT DOCD: CPT | Mod: CPTII,S$GLB,, | Performed by: PHYSICIAN ASSISTANT

## 2019-11-04 PROCEDURE — 99213 OFFICE O/P EST LOW 20 MIN: CPT | Mod: S$GLB,,, | Performed by: PHYSICIAN ASSISTANT

## 2019-11-04 PROCEDURE — 3077F PR MOST RECENT SYSTOLIC BLOOD PRESSURE >= 140 MM HG: ICD-10-PCS | Mod: CPTII,S$GLB,, | Performed by: PHYSICIAN ASSISTANT

## 2019-11-04 PROCEDURE — 99999 PR PBB SHADOW E&M-EST. PATIENT-LVL IV: CPT | Mod: PBBFAC,,, | Performed by: PHYSICIAN ASSISTANT

## 2019-11-04 RX ORDER — TRAMADOL HYDROCHLORIDE 50 MG/1
50 TABLET ORAL EVERY 6 HOURS PRN
Qty: 12 TABLET | Refills: 0 | Status: SHIPPED | OUTPATIENT
Start: 2019-11-04 | End: 2019-11-09

## 2019-11-05 ENCOUNTER — PATIENT MESSAGE (OUTPATIENT)
Dept: INTERNAL MEDICINE | Facility: CLINIC | Age: 69
End: 2019-11-05

## 2019-11-05 DIAGNOSIS — S72.009A CLOSED FRACTURE OF HIP, UNSPECIFIED LATERALITY, INITIAL ENCOUNTER: Primary | ICD-10-CM

## 2019-11-05 NOTE — PROGRESS NOTES
Subjective:       Patient ID: Cleo Ford is a 69 y.o. female.    Chief Complaint: Fall    Patient comes in today for follow up fall     She fell at home and landed onher hip   She was ambulatory at scene   They went to ER     Xrays were done but did not show fx, showed some degeneration . She was given nsaids and muscle relaxant     She states that is not helping much     Does not hurt when sitting, groin hurts when pressure is put on hip           There are no preventive care reminders to display for this patient.    Past Medical History:   Diagnosis Date    Anxiety     Depression     Dysmetabolic syndrome X     Obesity     Other and unspecified hyperlipidemia     Unspecified essential hypertension     Unspecified hypothyroidism        Current Outpatient Medications   Medication Sig Dispense Refill    ALPRAZolam (XANAX) 1 MG tablet Take 1 tablet (1 mg total) by mouth 2 (two) times daily as needed for Anxiety. 180 tablet 1    aspirin (ECOTRIN) 81 MG EC tablet Take 81 mg by mouth once daily.      buPROPion (WELLBUTRIN XL) 150 MG TB24 tablet Take 1 tablet (150 mg total) by mouth once daily. 90 tablet 3    carvedilol (COREG) 6.25 MG tablet Take 1 tablet (6.25 mg total) by mouth 2 (two) times daily with meals. 60 tablet 3    diclofenac (VOLTAREN) 50 MG EC tablet Take 1 tablet (50 mg total) by mouth 2 (two) times daily. 20 tablet 0    levothyroxine (SYNTHROID) 112 MCG tablet Take 1 tablet by mouth  before breakfast 90 tablet 3    lovastatin (MEVACOR) 40 MG tablet Take 1 tablet by mouth  nightly 90 tablet 3    methocarbamol (ROBAXIN) 500 MG Tab Take 2 tablets (1,000 mg total) by mouth 3 (three) times daily. for 5 days 30 tablet 0    NIFEdipine (ADALAT CC) 90 MG TbSR Take 1 tablet (90 mg total) by mouth once daily. 90 tablet 3    omeprazole (PRILOSEC) 40 MG capsule Take 1 capsule (40 mg total) by mouth once daily. 90 capsule 3    sertraline (ZOLOFT) 50 MG tablet Take 1 tablet (50 mg total) by  "mouth once daily. 90 tablet 3    valsartan (DIOVAN) 320 MG tablet Take 1 tablet (320 mg total) by mouth once daily. 90 tablet 3    ALPRAZolam (XANAX) 1 MG tablet Take 1 tablet (1 mg total) by mouth 2 (two) times daily as needed for Anxiety. 180 tablet 0    amoxicillin-clavulanate 875-125mg (AUGMENTIN) 875-125 mg per tablet Take 1 tablet by mouth every 12 (twelve) hours. 20 tablet 0    lovastatin (MEVACOR) 40 MG tablet TAKE ONE TABLET BY MOUTH NIGHTLY 90 tablet 2    sertraline (ZOLOFT) 50 MG tablet Take 1 tablet (50 mg total) by mouth once daily. 90 tablet 2    traMADol (ULTRAM) 50 mg tablet Take 1 tablet (50 mg total) by mouth every 6 (six) hours as needed for Pain. 12 tablet 0     No current facility-administered medications for this visit.        Review of Systems   Constitutional: Negative for fatigue, fever and unexpected weight change.   HENT: Negative for sore throat and trouble swallowing.    Respiratory: Negative for cough and shortness of breath.    Cardiovascular: Negative for chest pain.   Gastrointestinal: Negative for abdominal pain.   Musculoskeletal: Positive for arthralgias.   Hematological: Negative for adenopathy. Does not bruise/bleed easily.   All other systems reviewed and are negative.      Objective:   BP (!) 140/84   Pulse 68   Temp 98.1 °F (36.7 °C) (Oral)   Ht 5' 3" (1.6 m)   Wt 82.1 kg (181 lb)   BMI 32.06 kg/m²      Physical Exam   Constitutional: She is oriented to person, place, and time. She appears well-developed and well-nourished. No distress.   HENT:   Head: Normocephalic and atraumatic.   Eyes: Pupils are equal, round, and reactive to light. EOM are normal.   Neck: Normal range of motion. Neck supple.   Cardiovascular: Normal rate and regular rhythm.   Pulmonary/Chest: Effort normal.   Abdominal: Soft.   Musculoskeletal: She exhibits no edema.   Minimal pain with ext/int rotation of hip or movement, mainly pain with ambulation. Pain is in groin    Neurological: She is " alert and oriented to person, place, and time.   Skin: Capillary refill takes less than 2 seconds.   Psychiatric: She has a normal mood and affect. Her behavior is normal.         Lab Results   Component Value Date    WBC 7.01 01/31/2019    HGB 13.0 01/31/2019    HCT 41.9 01/31/2019     (H) 01/31/2019    CHOL 198 01/31/2019    TRIG 59 01/31/2019    HDL 77 (H) 01/31/2019    ALT 17 01/31/2019    AST 25 01/31/2019     01/31/2019    K 3.6 01/31/2019     01/31/2019    CREATININE 0.9 01/31/2019    BUN 16 01/31/2019    CO2 25 01/31/2019    TSH 4.827 (H) 01/31/2019    HGBA1C 5.1 01/31/2019       Assessment:       1. Hip injury, left, sequela        Plan:   Hip injury, left, sequela  -     Ambulatory Referral to Physical/Occupational Therapy    Other orders  -     traMADol (ULTRAM) 50 mg tablet; Take 1 tablet (50 mg total) by mouth every 6 (six) hours as needed for Pain.  Dispense: 12 tablet; Refill: 0      Patient needs some rehab for hip flexors/muscles to help with pain     Ok to cont NSAId   But also can take tramadol as needed     No follow-ups on file.

## 2019-11-06 ENCOUNTER — TELEPHONE (OUTPATIENT)
Dept: SPORTS MEDICINE | Facility: CLINIC | Age: 69
End: 2019-11-06

## 2019-11-06 NOTE — TELEPHONE ENCOUNTER
Can cancel physical therapy.  Most likely will need to see actually an orthopedist instead.  Will place referral.

## 2019-11-07 ENCOUNTER — PATIENT MESSAGE (OUTPATIENT)
Dept: INTERNAL MEDICINE | Facility: CLINIC | Age: 69
End: 2019-11-07

## 2019-11-07 ENCOUNTER — TELEPHONE (OUTPATIENT)
Dept: RADIOLOGY | Facility: HOSPITAL | Age: 69
End: 2019-11-07

## 2019-11-07 ENCOUNTER — OFFICE VISIT (OUTPATIENT)
Dept: SPORTS MEDICINE | Facility: CLINIC | Age: 69
End: 2019-11-07
Payer: MEDICARE

## 2019-11-07 ENCOUNTER — HOSPITAL ENCOUNTER (OUTPATIENT)
Dept: RADIOLOGY | Facility: HOSPITAL | Age: 69
Discharge: HOME | End: 2019-11-07
Attending: ORTHOPAEDIC SURGERY
Payer: MEDICARE

## 2019-11-07 VITALS
DIASTOLIC BLOOD PRESSURE: 80 MMHG | HEART RATE: 69 BPM | WEIGHT: 181 LBS | HEIGHT: 63 IN | BODY MASS INDEX: 32.07 KG/M2 | SYSTOLIC BLOOD PRESSURE: 125 MMHG

## 2019-11-07 DIAGNOSIS — S72.009A CLOSED FRACTURE OF HIP, UNSPECIFIED LATERALITY, INITIAL ENCOUNTER: ICD-10-CM

## 2019-11-07 DIAGNOSIS — S32.592A CLOSED FRACTURE OF RAMUS OF LEFT PUBIS, INITIAL ENCOUNTER: ICD-10-CM

## 2019-11-07 DIAGNOSIS — S32.810A CLOSED PELVIC RING FRACTURE, INITIAL ENCOUNTER: Primary | ICD-10-CM

## 2019-11-07 PROCEDURE — 1101F PR PT FALLS ASSESS DOC 0-1 FALLS W/OUT INJ PAST YR: ICD-10-PCS | Mod: CPTII,S$GLB,, | Performed by: ORTHOPAEDIC SURGERY

## 2019-11-07 PROCEDURE — 3074F PR MOST RECENT SYSTOLIC BLOOD PRESSURE < 130 MM HG: ICD-10-PCS | Mod: CPTII,S$GLB,, | Performed by: ORTHOPAEDIC SURGERY

## 2019-11-07 PROCEDURE — 99999 PR PBB SHADOW E&M-EST. PATIENT-LVL V: CPT | Mod: PBBFAC,,, | Performed by: ORTHOPAEDIC SURGERY

## 2019-11-07 PROCEDURE — 73502 X-RAY EXAM HIP UNI 2-3 VIEWS: CPT | Mod: TC,LT

## 2019-11-07 PROCEDURE — 3074F SYST BP LT 130 MM HG: CPT | Mod: CPTII,S$GLB,, | Performed by: ORTHOPAEDIC SURGERY

## 2019-11-07 PROCEDURE — 73502 XR HIP 2 VIEW LEFT: ICD-10-PCS | Mod: 26,LT,, | Performed by: RADIOLOGY

## 2019-11-07 PROCEDURE — 73502 X-RAY EXAM HIP UNI 2-3 VIEWS: CPT | Mod: 26,LT,, | Performed by: RADIOLOGY

## 2019-11-07 PROCEDURE — 72190 X-RAY EXAM OF PELVIS: CPT | Mod: TC,59

## 2019-11-07 PROCEDURE — 99204 PR OFFICE/OUTPT VISIT, NEW, LEVL IV, 45-59 MIN: ICD-10-PCS | Mod: S$GLB,,, | Performed by: ORTHOPAEDIC SURGERY

## 2019-11-07 PROCEDURE — 99999 PR PBB SHADOW E&M-EST. PATIENT-LVL V: ICD-10-PCS | Mod: PBBFAC,,, | Performed by: ORTHOPAEDIC SURGERY

## 2019-11-07 PROCEDURE — 72190 X-RAY EXAM OF PELVIS: CPT | Mod: 26,59,, | Performed by: RADIOLOGY

## 2019-11-07 PROCEDURE — 3079F DIAST BP 80-89 MM HG: CPT | Mod: CPTII,S$GLB,, | Performed by: ORTHOPAEDIC SURGERY

## 2019-11-07 PROCEDURE — 99204 OFFICE O/P NEW MOD 45 MIN: CPT | Mod: S$GLB,,, | Performed by: ORTHOPAEDIC SURGERY

## 2019-11-07 PROCEDURE — 1101F PT FALLS ASSESS-DOCD LE1/YR: CPT | Mod: CPTII,S$GLB,, | Performed by: ORTHOPAEDIC SURGERY

## 2019-11-07 PROCEDURE — 3079F PR MOST RECENT DIASTOLIC BLOOD PRESSURE 80-89 MM HG: ICD-10-PCS | Mod: CPTII,S$GLB,, | Performed by: ORTHOPAEDIC SURGERY

## 2019-11-07 PROCEDURE — 72190 XR PELVIS AP INLET AND OUTLET: ICD-10-PCS | Mod: 26,59,, | Performed by: RADIOLOGY

## 2019-11-07 NOTE — PROGRESS NOTES
"Subjective:     Patient ID: Cleo Ford is a 69 y.o. female.    Chief Complaint: Pain of the Left Hip      Cleo Ford presents today as a consultation from Elizabeth Julian MD who will receive a copy of this report electronically.    She had a fall onto left side, specifically left buttock area. Acute pain, painful ambulating, can place weight on left lower extremity but with pain. Had negative xray left hip in ED, but states she saw her chiropractor who obtained another set of xrays and showed her evidence of "fracture" to the pubic ramus, she shows me today on her initial films.     In wheel chair today    Has walker at home from her , her  is "ten years older" she states.     Denies numbness and tingling to left leg    Pain is to the pubic symphysis area just left of midline and also to the "sit bones" area, left side        Pt states she was tailgating on 11/1/2019 and she fell on the tail gate. She went to the ED on 11/1/2019. She has also went to a chiropractic    Hip Pain    The pain is present in the left hip. This is a new problem. The current episode started in the past 7 days. There has been a history of trauma. The injury was the result of a falling action Location of injury: off of tailgate. The problem occurs constantly. The problem has been gradually worsening. The quality of the pain is described as aching, sharp and shooting. The pain is at a severity of 10/10. Pertinent negatives include no fever or itching. The symptoms are aggravated by activity, bearing weight, bending, walking and standing. She has tried chiropractic manipulation for the symptoms. The treatment provided mild ("about 50% less pain now that initial injury") relief. Physical therapy was not tried.      Past Medical History:   Diagnosis Date    Anxiety     Depression     Dysmetabolic syndrome X     Obesity     Other and unspecified hyperlipidemia     Unspecified essential hypertension     " Unspecified hypothyroidism      Past Surgical History:   Procedure Laterality Date    APPENDECTOMY      CHOLECYSTECTOMY      COLONOSCOPY N/A 9/15/2016    Procedure: COLONOSCOPY;  Surgeon: Jose Daniel MD;  Location: [facility];  Service: Endoscopy;  Laterality: N/A;    gastric sleeve      HYSTERECTOMY      OOPHORECTOMY      1 ovary removed     Family History   Problem Relation Age of Onset    Cancer Mother     Breast cancer Mother     Hypertension Father     Cancer Maternal Grandmother 80        colon    Colon cancer Unknown      Social History     Socioeconomic History    Marital status:      Spouse name: orlin    Number of children: 2    Years of education: Not on file    Highest education level: Not on file   Occupational History    Occupation: realtor   Social Needs    Financial resource strain: Not on file    Food insecurity:     Worry: Not on file     Inability: Not on file    Transportation needs:     Medical: Not on file     Non-medical: Not on file   Tobacco Use    Smoking status: Never Smoker    Smokeless tobacco: Never Used   Substance and Sexual Activity    Alcohol use: No    Drug use: No    Sexual activity: Yes     Partners: Female   Lifestyle    Physical activity:     Days per week: Not on file     Minutes per session: Not on file    Stress: Not on file   Relationships    Social connections:     Talks on phone: Not on file     Gets together: Not on file     Attends Mu-ism service: Not on file     Active member of club or organization: Not on file     Attends meetings of clubs or organizations: Not on file     Relationship status: Not on file   Other Topics Concern    Not on file   Social History Narrative    Not on file     Medication List with Changes/Refills   Current Medications    ALPRAZOLAM (XANAX) 1 MG TABLET    Take 1 tablet (1 mg total) by mouth 2 (two) times daily as needed for Anxiety.    ALPRAZOLAM (XANAX) 1 MG TABLET    Take 1 tablet (1 mg  total) by mouth 2 (two) times daily as needed for Anxiety.    AMOXICILLIN-CLAVULANATE 875-125MG (AUGMENTIN) 875-125 MG PER TABLET    Take 1 tablet by mouth every 12 (twelve) hours.    ASPIRIN (ECOTRIN) 81 MG EC TABLET    Take 81 mg by mouth once daily.    BUPROPION (WELLBUTRIN XL) 150 MG TB24 TABLET    Take 1 tablet (150 mg total) by mouth once daily.    CARVEDILOL (COREG) 6.25 MG TABLET    Take 1 tablet (6.25 mg total) by mouth 2 (two) times daily with meals.    DICLOFENAC (VOLTAREN) 50 MG EC TABLET    Take 1 tablet (50 mg total) by mouth 2 (two) times daily.    LEVOTHYROXINE (SYNTHROID) 112 MCG TABLET    Take 1 tablet by mouth  before breakfast    LOVASTATIN (MEVACOR) 40 MG TABLET    Take 1 tablet by mouth  nightly    LOVASTATIN (MEVACOR) 40 MG TABLET    TAKE ONE TABLET BY MOUTH NIGHTLY    NIFEDIPINE (ADALAT CC) 90 MG TBSR    Take 1 tablet (90 mg total) by mouth once daily.    OMEPRAZOLE (PRILOSEC) 40 MG CAPSULE    Take 1 capsule (40 mg total) by mouth once daily.    SERTRALINE (ZOLOFT) 50 MG TABLET    Take 1 tablet (50 mg total) by mouth once daily.    SERTRALINE (ZOLOFT) 50 MG TABLET    Take 1 tablet (50 mg total) by mouth once daily.    TRAMADOL (ULTRAM) 50 MG TABLET    Take 1 tablet (50 mg total) by mouth every 6 (six) hours as needed for Pain.    VALSARTAN (DIOVAN) 320 MG TABLET    Take 1 tablet (320 mg total) by mouth once daily.     Review of patient's allergies indicates:   Allergen Reactions    Tobramycin-dexamethasone      Eye Drops       Review of Systems   Constitution: Negative for fever.   HENT: Negative for sore throat.    Eyes: Negative for blurred vision.   Cardiovascular: Negative for dyspnea on exertion.   Respiratory: Negative for shortness of breath.    Hematologic/Lymphatic: Does not bruise/bleed easily.   Skin: Negative for itching.   Gastrointestinal: Negative for vomiting.   Genitourinary: Negative for dysuria.   Neurological: Negative for dizziness.   Psychiatric/Behavioral: The patient  does not have insomnia.        Objective:   Body mass index is 32.06 kg/m².  Vitals:    11/07/19 0850   BP: 125/80   Pulse: 69           General    Nursing note and vitals reviewed.  Constitutional: She is oriented to person, place, and time. She appears well-developed. No distress.   HENT:   Head: Normocephalic and atraumatic.   Eyes: EOM are normal.   Cardiovascular: Normal rate.    Pulmonary/Chest: Effort normal. No stridor. No respiratory distress.   Neurological: She is alert and oriented to person, place, and time.   Psychiatric: She has a normal mood and affect. Her behavior is normal.     General Musculoskeletal Exam   Gait: antalgic         Right Hip Exam     Range of Motion   Abduction: 20   Flexion: 90   External rotation: 40   Internal rotation: 15     Tests   Pain w/ forced internal rotation ([place]): absent  Pain w/ forced external rotation (FADIR): absent  Log Roll: negative    Other   Sensation: normal  Left Hip Exam     Inspection   No deformity of hip.  Erythema: absent    Range of Motion   Abduction: 20   Flexion: 90   External rotation: 40   Internal rotation: 15     Tests   Pain w/ forced internal rotation ([place]): absent  Pain w/ forced external rotation (FADIR): absent  Log Roll: negative    Other   Sensation: normal    Comments:  Gentle IR and ER of hip non painful   Axial load only minimal pain    Painful to palpation left ischial tuberosity and superior pubic ramus    No crepitus with hip ROM          Muscle Strength   Right Lower Extremity   Hip Abduction: 5/5   Hip Flexion: 5/5   Left Lower Extremity   Hip Flexion: 4/5 (with pain, limited by pain)     Vascular Exam       Capillary Refill  Right Hand: normal capillary refill  Left Hand: normal capillary refill      IMAGING Radiographs / Imaging : Results reviewed by me and interpreted by me, discussed with the patient and / or family     X-Ray Pelvis AP Inlet And Outlet  Narrative: EXAMINATION:  XR PELVIS AP INLET AND OUTLET    CLINICAL  HISTORY:  Fracture of unspecified part of neck of unspecified femur, initial encounter for closed fracture    TECHNIQUE:  Three views of the pelvis were obtained.    COMPARISON:  11/01/2019    FINDINGS:  There is a nondisplaced fracture of the left superior pubic ramus near the acetabulum which was not demonstrated on prior.  There is an equivocal nondisplaced fracture deformity of the left inferior pubic ramus.  Further evaluation could be obtained with CT as clinically warranted.  No evidence of dislocation.  Mild degenerative findings suspected at the right hip.  Degenerative changes also noted in the visualized lower lumbar spine.  Impression: As above    Electronically signed by: Thaddeus Burris MD  Date:    11/07/2019  Time:    09:35  X-Ray Hip 2 View Left  Narrative: EXAMINATION:  XR HIP 2 VIEW LEFT    CLINICAL HISTORY:  ap/lat; Fracture of unspecified part of neck of unspecified femur, initial encounter for closed fracture    TECHNIQUE:  AP view of the pelvis and frog leg lateral view of the left hip were performed.    COMPARISON:  None    FINDINGS:  There is a nondisplaced fracture involving the left superior ramus near the acetabulum which was not demonstrated on prior.  Left hip joint space appears preserved.  No evidence of dislocation  Impression: Left superior pubic ramus fracture as above.    Electronically signed by: Thaddeus Burris MD  Date:    11/07/2019  Time:    09:32    Repeat Pelvis and hip films show no evidence of femoral head, neck or intertrochanteric fracture. There is superior pubic ramus and inferior pubic ramus non displaced / minimally displaced fractures      Assessment:     Encounter Diagnoses   Name Primary?    Closed fracture of hip, unspecified laterality, initial encounter     Closed pelvic ring fracture, initial encounter Yes    Closed fracture of ramus of left pubis, initial encounter         Plan:     I had an in depth discussion today with Cleo medina regarding her  left hip / pelvis problem, going over her radiographs and the model to help further her understanding. I explained the anatomy and pathophysiology of the problem. I told Cleo  that I believe the problem relates to superior and inferior ramus fractures, likely sacral compression fracture given typical injury mechanism. Good news here is a see no obvious evidence for femoral neck or intertrochanteric hip fracture . We had an in depth discussion regarding appropriate treatment and management of her condition.     From a treatment standpoint, the decision was made to go forward with :     Keep NWB LLE for now, walker or wheelchair  Obtain CT pelvis to definitively characterize pelvic ring fracture pattern  Will call with results  Calcium vitamin D supplementation    She will follow up in 3 weeks for planned AP pelvis, outlet pelvis, inlet pelvis views to ensure no interval displacement

## 2019-11-07 NOTE — LETTER
November 7, 2019      Elizabeth Julian MD  [address] [facility]  [address]  [place] [address]           [facility]  [address] [facility]  [place] [address]  Phone: [phone]  Fax: [phone]          Patient: Cleo Ford   MR Number: [id number]   YOB: 1950   Date of Visit: 11/7/2019       Dear Dr. Elizabeth Julian:    Thank you for referring Cleo Ford to me for evaluation. Attached you will find relevant portions of my assessment and plan of care.    If you have questions, please do not hesitate to call me. I look forward to following Cleo Ford along with you.    Sincerely,    Chalino Kay MD    Enclosure  CC:  No Recipients    If you would like to receive this communication electronically, please contact [email or web address] or [phone] to request more information on [employer] access.    For providers and/or their staff who would like to refer a patient to [facility], please contact us through our one-stop-shop provider referral line, [facility], at [phone].    If you feel you have received this communication in error or would no longer like to receive these types of communications, please e-mail [email or web address]

## 2019-11-08 ENCOUNTER — TELEPHONE (OUTPATIENT)
Dept: ORTHOPEDICS | Facility: CLINIC | Age: 69
End: 2019-11-08

## 2019-11-08 ENCOUNTER — HOSPITAL ENCOUNTER (OUTPATIENT)
Dept: RADIOLOGY | Facility: HOSPITAL | Age: 69
Discharge: HOME | End: 2019-11-08
Attending: ORTHOPAEDIC SURGERY
Payer: MEDICARE

## 2019-11-08 DIAGNOSIS — S72.009A CLOSED FRACTURE OF HIP, UNSPECIFIED LATERALITY, INITIAL ENCOUNTER: ICD-10-CM

## 2019-11-08 PROCEDURE — 72192 CT PELVIS WITHOUT CONTRAST: ICD-10-PCS | Mod: 26,,, | Performed by: RADIOLOGY

## 2019-11-08 PROCEDURE — 72192 CT PELVIS W/O DYE: CPT | Mod: TC

## 2019-11-08 PROCEDURE — 72192 CT PELVIS W/O DYE: CPT | Mod: 26,,, | Performed by: RADIOLOGY

## 2019-11-11 ENCOUNTER — PATIENT MESSAGE (OUTPATIENT)
Dept: SPORTS MEDICINE | Facility: CLINIC | Age: 69
End: 2019-11-11

## 2019-11-24 DIAGNOSIS — I10 ESSENTIAL HYPERTENSION: Chronic | ICD-10-CM

## 2019-11-25 RX ORDER — CARVEDILOL 6.25 MG/1
6.25 TABLET ORAL 2 TIMES DAILY WITH MEALS
Qty: 60 TABLET | Refills: 3 | Status: SHIPPED | OUTPATIENT
Start: 2019-11-25 | End: 2020-03-25

## 2019-12-02 ENCOUNTER — PATIENT OUTREACH (OUTPATIENT)
Dept: OTHER | Facility: OTHER | Age: 69
End: 2019-12-02

## 2019-12-03 ENCOUNTER — DOCUMENTATION ONLY (OUTPATIENT)
Dept: ORTHOPEDICS | Facility: CLINIC | Age: 69
End: 2019-12-03

## 2019-12-03 ENCOUNTER — OFFICE VISIT (OUTPATIENT)
Dept: SPORTS MEDICINE | Facility: CLINIC | Age: 69
End: 2019-12-03
Payer: MEDICARE

## 2019-12-03 ENCOUNTER — HOSPITAL ENCOUNTER (OUTPATIENT)
Dept: RADIOLOGY | Facility: HOSPITAL | Age: 69
Discharge: HOME | End: 2019-12-03
Attending: ORTHOPAEDIC SURGERY
Payer: MEDICARE

## 2019-12-03 VITALS
HEART RATE: 84 BPM | HEIGHT: 63 IN | BODY MASS INDEX: 32.07 KG/M2 | WEIGHT: 181 LBS | SYSTOLIC BLOOD PRESSURE: 140 MMHG | DIASTOLIC BLOOD PRESSURE: 80 MMHG

## 2019-12-03 DIAGNOSIS — S32.810D CLOSED PELVIC RING FRACTURE WITH ROUTINE HEALING, SUBSEQUENT ENCOUNTER: ICD-10-CM

## 2019-12-03 DIAGNOSIS — S32.82XD MULTIPLE CLOSED FRACTURES OF PELVIS WITHOUT DISRUPTION OF PELVIC RING WITH ROUTINE HEALING: Primary | ICD-10-CM

## 2019-12-03 DIAGNOSIS — S72.009A CLOSED FRACTURE OF HIP, UNSPECIFIED LATERALITY, INITIAL ENCOUNTER: ICD-10-CM

## 2019-12-03 PROCEDURE — 99999 PR PBB SHADOW E&M-EST. PATIENT-LVL III: CPT | Mod: PBBFAC,,, | Performed by: ORTHOPAEDIC SURGERY

## 2019-12-03 PROCEDURE — 1101F PR PT FALLS ASSESS DOC 0-1 FALLS W/OUT INJ PAST YR: ICD-10-PCS | Mod: CPTII,S$GLB,, | Performed by: ORTHOPAEDIC SURGERY

## 2019-12-03 PROCEDURE — 1159F PR MEDICATION LIST DOCUMENTED IN MEDICAL RECORD: ICD-10-PCS | Mod: S$GLB,,, | Performed by: ORTHOPAEDIC SURGERY

## 2019-12-03 PROCEDURE — 1125F PR PAIN SEVERITY QUANTIFIED, PAIN PRESENT: ICD-10-PCS | Mod: S$GLB,,, | Performed by: ORTHOPAEDIC SURGERY

## 2019-12-03 PROCEDURE — 72190 XR PELVIS AP INLET AND OUTLET: ICD-10-PCS | Mod: 26,,, | Performed by: RADIOLOGY

## 2019-12-03 PROCEDURE — 99999 PR PBB SHADOW E&M-EST. PATIENT-LVL III: ICD-10-PCS | Mod: PBBFAC,,, | Performed by: ORTHOPAEDIC SURGERY

## 2019-12-03 PROCEDURE — 3079F DIAST BP 80-89 MM HG: CPT | Mod: CPTII,S$GLB,, | Performed by: ORTHOPAEDIC SURGERY

## 2019-12-03 PROCEDURE — 72190 X-RAY EXAM OF PELVIS: CPT | Mod: 26,,, | Performed by: RADIOLOGY

## 2019-12-03 PROCEDURE — 3077F SYST BP >= 140 MM HG: CPT | Mod: CPTII,S$GLB,, | Performed by: ORTHOPAEDIC SURGERY

## 2019-12-03 PROCEDURE — 72190 X-RAY EXAM OF PELVIS: CPT | Mod: TC

## 2019-12-03 PROCEDURE — 3077F PR MOST RECENT SYSTOLIC BLOOD PRESSURE >= 140 MM HG: ICD-10-PCS | Mod: CPTII,S$GLB,, | Performed by: ORTHOPAEDIC SURGERY

## 2019-12-03 PROCEDURE — 1125F AMNT PAIN NOTED PAIN PRSNT: CPT | Mod: S$GLB,,, | Performed by: ORTHOPAEDIC SURGERY

## 2019-12-03 PROCEDURE — 1159F MED LIST DOCD IN RCRD: CPT | Mod: S$GLB,,, | Performed by: ORTHOPAEDIC SURGERY

## 2019-12-03 PROCEDURE — 3079F PR MOST RECENT DIASTOLIC BLOOD PRESSURE 80-89 MM HG: ICD-10-PCS | Mod: CPTII,S$GLB,, | Performed by: ORTHOPAEDIC SURGERY

## 2019-12-03 PROCEDURE — 99214 PR OFFICE/OUTPT VISIT, EST, LEVL IV, 30-39 MIN: ICD-10-PCS | Mod: S$GLB,,, | Performed by: ORTHOPAEDIC SURGERY

## 2019-12-03 PROCEDURE — 1101F PT FALLS ASSESS-DOCD LE1/YR: CPT | Mod: CPTII,S$GLB,, | Performed by: ORTHOPAEDIC SURGERY

## 2019-12-03 PROCEDURE — 99214 OFFICE O/P EST MOD 30 MIN: CPT | Mod: S$GLB,,, | Performed by: ORTHOPAEDIC SURGERY

## 2019-12-03 NOTE — PROGRESS NOTES
"Subjective:     Patient ID: Cleo Ford is a 69 y.o. female.    Chief Complaint: Pelvic ring fractures      New: Cleo Ford presents today with her  for re-evaluation pelvic ring fractures.  She has had interim CT demonstrating lateral compression type 1 fractures left sacral ala, superior and inferior pubic rami fractures which are minimally displaced.  She has new inlet and outlet views today which demonstrate no significant change in alignment of fractures. No pubic symphysis diastasis or increased fracture displacement.  Pain has been improving, she is using a walker with no new injuries or significant episodes.  Doing calcium and vitamin-D supplementation        Prior: Cleo Ford presents today as a consultation from Elizabeth Julian MD who will receive a copy of this report electronically.    She had a fall onto left side, specifically left buttock area. Acute pain, painful ambulating, can place weight on left lower extremity but with pain. Had negative xray left hip in ED, but states she saw her chiropractor who obtained another set of xrays and showed her evidence of "fracture" to the pubic ramus, she shows me today on her initial films.     In wheel chair today    Has walker at home from her     Denies numbness and tingling to left leg    Pain is to the pubic symphysis area just left of midline and also to the "sit bones" area, left side        Pt states she was tailgating on 11/1/2019 and she fell on the tail gate. She went to the ED on 11/1/2019. She has also went to a chiropractic      Hip Pain    The pain is present in the left hip. This is a new problem. The current episode started more than 1 month ago. There has been a history of trauma. The injury was the result of a falling action Location of injury: off of tailgate. The problem occurs constantly. The problem has been gradually improving. The quality of the pain is described as aching, sharp, shooting and " "generalized. The pain is at a severity of 5/10. Pertinent negatives include no fever, itching or numbness. The symptoms are aggravated by activity, bearing weight, bending, walking and standing. She has tried chiropractic manipulation for the symptoms. The treatment provided significant ("about 50% less pain now that initial injury") relief. Physical therapy was not tried.      Past Medical History:   Diagnosis Date    Anxiety     Depression     Dysmetabolic syndrome X     Obesity     Other and unspecified hyperlipidemia     Unspecified essential hypertension     Unspecified hypothyroidism      Past Surgical History:   Procedure Laterality Date    APPENDECTOMY      CHOLECYSTECTOMY      COLONOSCOPY N/A 9/15/2016    Procedure: COLONOSCOPY;  Surgeon: Jose Daniel MD;  Location: [facility];  Service: Endoscopy;  Laterality: N/A;    gastric sleeve      HYSTERECTOMY      OOPHORECTOMY      1 ovary removed     Family History   Problem Relation Age of Onset    Cancer Mother     Breast cancer Mother     Hypertension Father     Cancer Maternal Grandmother 80        colon    Colon cancer Unknown      Social History     Socioeconomic History    Marital status:      Spouse name: orlin    Number of children: 2    Years of education: Not on file    Highest education level: Not on file   Occupational History    Occupation: realtor   Social Needs    Financial resource strain: Not on file    Food insecurity:     Worry: Not on file     Inability: Not on file    Transportation needs:     Medical: Not on file     Non-medical: Not on file   Tobacco Use    Smoking status: Never Smoker    Smokeless tobacco: Never Used   Substance and Sexual Activity    Alcohol use: No    Drug use: No    Sexual activity: Yes     Partners: Female   Lifestyle    Physical activity:     Days per week: Not on file     Minutes per session: Not on file    Stress: Not on file   Relationships    Social connections:     " Talks on phone: Not on file     Gets together: Not on file     Attends [employer] service: Not on file     Active member of club or organization: Not on file     Attends meetings of clubs or organizations: Not on file     Relationship status: Not on file   Other Topics Concern    Not on file   Social History Narrative    Not on file     Medication List with Changes/Refills   Current Medications    ALPRAZOLAM (XANAX) 1 MG TABLET    Take 1 tablet (1 mg total) by mouth 2 (two) times daily as needed for Anxiety.    ALPRAZOLAM (XANAX) 1 MG TABLET    Take 1 tablet (1 mg total) by mouth 2 (two) times daily as needed for Anxiety.    AMOXICILLIN-CLAVULANATE 875-125MG (AUGMENTIN) 875-125 MG PER TABLET    Take 1 tablet by mouth every 12 (twelve) hours.    ASPIRIN (ECOTRIN) 81 MG EC TABLET    Take 81 mg by mouth once daily.    BUPROPION (WELLBUTRIN XL) 150 MG TB24 TABLET    Take 1 tablet (150 mg total) by mouth once daily.    CARVEDILOL (COREG) 6.25 MG TABLET    Take 1 tablet (6.25 mg total) by mouth 2 (two) times daily with meals.    DICLOFENAC (VOLTAREN) 50 MG EC TABLET    Take 1 tablet (50 mg total) by mouth 2 (two) times daily.    LEVOTHYROXINE (SYNTHROID) 112 MCG TABLET    Take 1 tablet by mouth  before breakfast    LOVASTATIN (MEVACOR) 40 MG TABLET    Take 1 tablet by mouth  nightly    LOVASTATIN (MEVACOR) 40 MG TABLET    TAKE ONE TABLET BY MOUTH NIGHTLY    NIFEDIPINE (ADALAT CC) 90 MG TBSR    Take 1 tablet (90 mg total) by mouth once daily.    OMEPRAZOLE (PRILOSEC) 40 MG CAPSULE    Take 1 capsule (40 mg total) by mouth once daily.    SERTRALINE (ZOLOFT) 50 MG TABLET    Take 1 tablet (50 mg total) by mouth once daily.    SERTRALINE (ZOLOFT) 50 MG TABLET    Take 1 tablet (50 mg total) by mouth once daily.    VALSARTAN (DIOVAN) 320 MG TABLET    Take 1 tablet (320 mg total) by mouth once daily.     Review of patient's allergies indicates:   Allergen Reactions    Tobramycin-dexamethasone      Eye Drops       Review of  Systems   Constitution: Negative for chills and fever.   HENT: Negative for ear discharge, hearing loss and sore throat.    Eyes: Negative for blurred vision and visual disturbance.   Cardiovascular: Negative for chest pain, dyspnea on exertion and leg swelling.   Respiratory: Negative for cough and shortness of breath.    Endocrine: Negative for polyuria.   Hematologic/Lymphatic: Negative for bleeding problem. Does not bruise/bleed easily.   Skin: Negative for itching and rash.   Musculoskeletal: Negative for back pain, joint pain, joint swelling, muscle cramps and muscle weakness.   Gastrointestinal: Negative for nausea and vomiting.   Genitourinary: Negative for dysuria and hematuria.   Neurological: Negative for dizziness, loss of balance, numbness and paresthesias.   Psychiatric/Behavioral: Negative for altered mental status. The patient does not have insomnia.        Objective:   Body mass index is 32.06 kg/m².  Vitals:    12/03/19 1317   BP: (!) 140/80   Pulse: 84           General    Nursing note and vitals reviewed.  Constitutional: She is oriented to person, place, and time. She appears well-developed. No distress.   HENT:   Head: Normocephalic and atraumatic.   Eyes: EOM are normal.   Cardiovascular: Normal rate.    Pulmonary/Chest: Effort normal. No stridor. No respiratory distress.   Neurological: She is alert and oriented to person, place, and time.   Psychiatric: She has a normal mood and affect. Her behavior is normal.     General Musculoskeletal Exam   Gait: antalgic         Right Hip Exam     Range of Motion   Abduction: 20   Flexion: 90   External rotation: 40   Internal rotation: 15     Tests   Pain w/ forced internal rotation ([place]): absent  Pain w/ forced external rotation ([place]): absent  Log Roll: negative    Other   Sensation: normal  Left Hip Exam     Inspection   No deformity of hip.  Erythema: absent    Range of Motion   Abduction: 20   Flexion: 90   External rotation: 40   Internal  rotation: 15     Tests   Pain w/ forced internal rotation ([place]): absent  Pain w/ forced external rotation ([place]): absent  Log Roll: negative    Other   Sensation: normal    Comments:  Gentle IR and ER of hip non painful   Axial load no pain    Mildly Painful to palpation left ischial tuberosity and superior pubic ramus    No crepitus with hip ROM          Muscle Strength   Right Lower Extremity   Hip Abduction: 5/5   Hip Flexion: 5/5     Vascular Exam       Capillary Refill  Right Hand: normal capillary refill  Left Hand: normal capillary refill      IMAGING Radiographs / Imaging : Results reviewed by me and interpreted by me, discussed with the patient and / or family     X-Ray Pelvis AP Inlet And Outlet  Narrative: EXAMINATION:  XR PELVIS AP INLET AND OUTLET    CLINICAL HISTORY:  Fracture of unspecified part of neck of unspecified femur, initial encounter for closed fracture    TECHNIQUE:  Three views of the pelvis    COMPARISON:  CT pelvis from 11/07/2019    FINDINGS:  Left superior and inferior pubic rami fractures are again seen.  Interval slightly increasing callus formation about the fracture sites is noted.  A left sacral fracture was better appreciated on the CT.  No new abnormality or detrimental change.  Impression: As above    Electronically signed by: Kamari Cross MD  Date:    12/03/2019  Time:    13:23      Perhaps early callus formation to pelvic fractures, no interval displacement        Assessment:     Encounter Diagnoses   Name Primary?    Multiple closed fractures of pelvis without disruption of pelvic ring with routine healing Yes    Closed pelvic ring fracture with routine healing, subsequent encounter         Plan:     I had an in depth discussion today with Cleo and her  today regarding her pelvis problem, going over her radiographs CT and the model to help further her understanding. I explained the anatomy and pathophysiology of the problem. I told Cleo  that I believe  the problem relates to superior and inferior ramus fractures, likely sacral compression fracture given typical injury mechanism. Good news here is a see no obvious evidence for femoral neck or intertrochanteric hip fracture. We had an in depth discussion regarding appropriate treatment and management of her condition.     From a treatment standpoint, the decision was made to go forward with :     Okay to be WBAT LLE and RLE with walker as needed  Good healing and no interval displacement pelvic ring lateral compression type 1 injury pattern  No evidence for surgical intervention at this time  Calcium vitamin D supplementation    Start physical therapy for hip strengthening, lower extremity strengthening and gait retraining    She will follow up in 6 weeks if needed for new x-rays, only if symptoms are worsening or not improving.  They are very agreeable with this plan.    Cleo Ford and her  were very agreeable with above noted plan, and all questions answered to full satisfaction today.

## 2019-12-03 NOTE — LETTER
December 3, 2019      [facility]  [address]  [place] [address]  Phone: [phone]  Fax: [phone]       Patient: Cleo Ford   YOB: 1950  Date of Visit: 12/03/2019         To Whom It May Concern:    Mariana Ford  was at [facility] on 12/03/2019. She is currently under my care for healing pelvic fracture since 11/1/2019.  If you have any questions or concerns, or if I can be of further assistance, please do not hesitate to contact me.        Sincerely,    Dr. Chalino Kay/KASEY Terrazas LPN

## 2019-12-15 RX ORDER — LEVOTHYROXINE SODIUM 112 UG/1
TABLET ORAL
Qty: 90 TABLET | Refills: 3 | Status: SHIPPED | OUTPATIENT
Start: 2019-12-15 | End: 2021-01-06

## 2019-12-16 ENCOUNTER — CLINICAL SUPPORT (OUTPATIENT)
Dept: REHABILITATION | Facility: HOSPITAL | Age: 69
End: 2019-12-16
Attending: ORTHOPAEDIC SURGERY
Payer: MEDICARE

## 2019-12-16 DIAGNOSIS — M25.652 DECREASED RANGE OF LEFT HIP MOVEMENT: ICD-10-CM

## 2019-12-16 DIAGNOSIS — R26.9 GAIT ABNORMALITY: ICD-10-CM

## 2019-12-16 DIAGNOSIS — S32.82XD MULTIPLE CLOSED FRACTURES OF PELVIS WITHOUT DISRUPTION OF PELVIC RING WITH ROUTINE HEALING: ICD-10-CM

## 2019-12-16 DIAGNOSIS — M62.81 PROXIMAL MUSCLE WEAKNESS: ICD-10-CM

## 2019-12-16 PROCEDURE — 97110 THERAPEUTIC EXERCISES: CPT

## 2019-12-16 PROCEDURE — 97162 PT EVAL MOD COMPLEX 30 MIN: CPT

## 2019-12-16 PROCEDURE — 97535 SELF CARE MNGMENT TRAINING: CPT

## 2019-12-16 NOTE — PATIENT INSTRUCTIONS
Patient instructed on alternative sleeping positions to decrease stress placed on pelvic fractures. Patient to use pillows under the knees when sleeping on her back to maintain a neutral position of the lumbar spine and hip complex. Patient to place a pillow between the knees and and ankles when laying on her right side to maintain a neutral position of her lumbar spine and hip complex.

## 2019-12-16 NOTE — PLAN OF CARE
"[facility]  Physical Therapy Initial Evaluation    Name: Cleo Ford  Clinic Number: [id number]    Therapy Diagnosis:   Encounter Diagnoses   Name Primary?    Multiple closed fractures of pelvis without disruption of pelvic ring with routine healing     Gait abnormality     Proximal muscle weakness     Decreased range of left hip movement      Physician: Chalino Kay MD    Physician Orders: PT Eval and Treat (focus on hip ROM and stretching)   Medical Diagnosis from Referral: Multiple closed fractures of pelvis without disruption of pelvic ring   Evaluation Date: 12/16/2019  Authorization Period Expiration: 12/31/19  Plan of Care Expiration: 1/13/20  Visit # / Visits authorized: 1/1    Precautions: Standard, Fall and Weightbearing    Time In: 8:40 am  Time Out: 9:45 am  Total Billable Time: 65 minutes    SUBJECTIVE   Date of onset: ~6 weeks ago   History of current condition - Rut is a 69 y.o. female whom reports hx of fall ~ 6 weeks ago; states she fell off the tailgate of her truck and landed on the L hip. Pt was initially utilizing a wheelchair but has recently started walking with a walker. She states that she easily gets tired and pain in the hip when walking for prolonged periods. Pt states she was in [place] for vacation last week and forced to walk around a lot; she states that she think this helped her with her mobility tremendously. She is currently taking tylenol every 4 hours to "stay ahead of the pain." Reports most pain when sitting or standing for a long period of time, walking, laying in bed, and rolling over in bed. States that pain when sitting depends on which position she is sitting in; reports pain on the L sit-bone (ischial tuberosity). States she is doing better with getting in and out of the car because she has learned how to pivot on the right leg. Pt states she is not currently doing any other exercise besides walking. Pt states she is " currently sleeping on her back because this is the most comfortable position she can get in. She is not utilizing pillows under the knees. Pt states she prefers to sleep on her sides; she has attempted to lay on the R side but cannot stay in this position for long due to pain; states she has not attempted to lay on the side with pillows between her legs.        Medical History:   Past Medical History:   Diagnosis Date    Anxiety     Depression     Dysmetabolic syndrome X     Obesity     Other and unspecified hyperlipidemia     Unspecified essential hypertension     Unspecified hypothyroidism        Surgical History:   Cleo Ford  has a past surgical history that includes gastric sleeve; Hysterectomy; Appendectomy; Cholecystectomy; Colonoscopy (N/A, 9/15/2016); and Oophorectomy.    Medications:   Cleo has a current medication list which includes the following prescription(s): alprazolam, alprazolam, amoxicillin-clavulanate 875-125mg, aspirin, bupropion, carvedilol, diclofenac, levothyroxine, levothyroxine, lovastatin, lovastatin, nifedipine, omeprazole, sertraline, sertraline, and valsartan.    Allergies:   Review of patient's allergies indicates:   Allergen Reactions    Tobramycin-dexamethasone      Eye Drops          Imaging: CT of pelvis on 11/8/19. X-ray of pelvis on 12/3/19.     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   Current Level of Function: independent with all ADL, IADL with reports of increased pain and need for increased time and frequent breaks. Pt utilizes a Rolator for community mobility. Pt states she cannot clean the house or climb stairs due to pain.     Pain:  Current 0/10, worst 5/10, best 0/10   Location: L groin, sit bone and L low back (just lateral to L PSIS)    Description: sharp pain, aching,, tired, stuck   Aggravating Factors: walking, standing or sitting for prolonged period of time. Stiffness first thing in the morning. Aching at night. Laying on her R side in bed. Turning over in bed.   Easing Factors: tylenol     Dominant Extremity: Right    Pts goals: Pt reported goals are to decrease overall pain levels in order to return to maximal functional level.    OBJECTIVE   (x = not tested due to pain and/or inability to obtain test position)    RANGE OF MOTION:    Lumbar ROM Right  (spine)  12/16/2019 Left    12/16/2019 Pain/Dysfunction with Movement Goal   Lumbar Flexion (60) 60 ---     Lumbar Side Bending (25) 10 Not tested due to not full WB on L LE        Hip ROM Right  12/16/2019 Left  12/16/2019 Pain/Dysfunction with Movement Goal   Hip Flexion (120) 120 100 ROM measures on L depicts available range before onset of pain.     Hip Abduction (45) 45 20 ROM measures on L depicts available range before onset of pain.     Hip IR (45) 30 20 ROM measures on L depicts available range before onset of pain.     Hip ER (45) 45 20 ROM measures on L depicts available range before onset of pain.       STRENGTH:    L/E MMT Right  12/16/2019 Left  12/16/2019 Pain/Dysfunction with Movement Goal   Hip Flexion  4-/5 3+/5  5/5 B    Hip Extension  4-/5 4-/5 Testing modified in seated position  5/5 B   Hip Abduction  4/5 4/5 Testing modified in seated position  5/5 B   Knee Extension 4+/5 4-/5  5/5 B   Knee Flexion 4-/5 4-/5 Testing modified in seated position  5/5 B   Hip IR 4-/5 x  5/5 B   Hip ER 4-/5 x  5/5 B   Ankle DF 5/5 4+/5  5/5 B   Ankle PF 5/5 4+/5  5/5 B    20/20 heel raises B       MUSCLE LENGTH:     Muscle Tested  Right  12/16/2019 Left   12/16/2019 Goal   Hamstrings  decreased decreased Normal B   Piriformis  decreased decreased Normal B     Sensation:  Sensation is intact to light touch in B LE's     Posture:  Pt presents with postural abnormalities which include:  forward head and rounded shoulders     Gait Analysis: The patient ambulated with the following assistive device: Rollator; the pt presents with the following gait abnormalities: compensated trandelenberg, decreased stance time on L, bradykinetic, increased JOSE and decreased hip extension on L     Movement Analysis: pt demonstrated supine to side-lying with good technique and speed; however, expresses slight increase in pain with movement. Pt demonstrates sit to supine and side-lying to sit with use of B UE's to support L leg; otherwise good speed and technique noted with reports of slight increase in pain.        FUNCTION:     LOWER EXTREMITY FUNCTIONAL SCALE  Patient-reported functional assessment scores are rated as follows:  A score of 0/4 represents extreme difficulty or unable to perform the activity. A score of 1/4 represents quite a bit of difficulty. A score of 2/4 represents moderate difficulty. A score of 3/4 represents a little bit of difficulty. A score of 4/4 represents no difficulty.        12/16/2019   1. Any of your usual work, housework or school activities 2/4   2. Your usual hobbies, sporting 2/4   3. Getting in and out of tub 0/4   4. Walking between rooms 2/4   5. Putting on shoes or socks 2/4   6. Squatting 2/4   7. Lifting an object from the ground   2/4   8. Performing light activities around the home 2/4   9. Performing heavy activities around the home 0/4   10. Getting in and out of car 2/4   11. Walking 2 blocks 1/4   12.Walking a mile 1/4   13. Getting up and down 1 flight of stairs 2/4   14. Standing for 1 hour 1/4   15. Sitting for an hour  4/4   16. Running on even ground  0/4   17. Running on uneven ground 0/4   18. Making sharp turns when running fast 0/4   19. Hopping  0/4   20. Rolling over in bed 0/4       Patient reports 40% ability based on score of the Lower Extremity Functional Scale on 12/16/2019..         TREATMENT   Treatment Time In: 9:25 am  Treatment Time Out: 9:45  am  Total Treatment time separate from Evaluation: 20 minutes    Rut received therapeutic exercises to develop strength, endurance, ROM, flexibility and core stabilization for 12 minutes including:    Exercise 12/16/2019   Clamshells  Seated, yellow TB, 2 x 10    LAQ  2 x 10 B    Hamstring curls  Yellow TB, 2 x 10 B    Heel toe raises  2 x 20 B    Calf raises  2 x 10    Weight shifts  20x to L            x = exercise details same as prior session    Education/Self-Care provided: (8 minutes)   Patient educated on the impairments noted above and the effects of physical therapy intervention to improve overall condition and QOL.    Pt educated on use of pillow under knees when lying supine and pillow between knees and ankles when side-lying in order to maintain a more neutral position of lumbar spine and hip complex.    Pt educated on anatomy of the lumbo-pelvic complex    Written Home Exercises Provided: yes.  Exercises were reviewed and Rut was able to demonstrate them prior to the end of the session.  Rut demonstrated good  understanding of the education provided.     See EMR under Patient Instructions for exercises provided 12/16/2019.    ASSESSMENT   Cleo is a 69 y.o. female referred to outpatient Physical Therapy with a medical diagnosis of Multiple closed fractures of pelvis without disruption of pelvic ring. Pt presents with impairments including: decreased ROM, decreased strength, decreased muscle length, gait abnormalities and decreased overall function.    Pt prognosis is Good.   Pt will benefit from skilled outpatient Physical Therapy to address the deficits stated above and in the chart below, provide pt/family education, and to maximize pt's level of independence.     Plan of care discussed with patient: Yes  Pt's spiritual, cultural and educational needs considered and patient is agreeable to the plan of care and goals as stated below:     Anticipated Barriers for therapy: co-morbidities,  sedentary lifestyle and adherence to treatment plan    Medical Necessity is demonstrated by the following  History  Co-morbidities and personal factors that may impact the plan of care Co-morbidities:   anxiety, depression, high BMI and HTN    Personal Factors:   lifestyle     high   Examination  Body Structures and Functions, activity limitations and participation restrictions that may impact the plan of care Body Regions:   back  lower extremities  trunk    Body Systems:    ROM  strength  gross coordinated movement  balance  gait  transfers  transitions    Participation Restrictions:   See above    Activity limitations:   Learning and applying knowledge  no deficits    General Tasks and Commands  no deficits    Communication  no deficits    Mobility  lifting and carrying objects  walking  moving around using equipment (WC)  using transportation (bus, train, plane, car)  driving (bike, car, motorcycle)    Self care  washing oneself (bathing, drying, washing hands)  caring for body parts (brushing teeth, shaving, grooming)  toileting  looking after one's health    Domestic Life  shopping  cooking  doing house work (cleaning house, washing dishes, laundry)  assisting others    Interactions/Relationships  no deficits    Life Areas  no deficits    Community and Social Life  community life  recreation and leisure         high   Clinical Presentation evolving clinical presentation with changing clinical characteristics moderate   Decision Making/ Complexity Score: moderate       GOALS:    Short Term Goals:  2 weeks    1. Pain: Pt will demonstrate improved pain by reports of less than or equal to 4/10 worst pain on the verbal rating scale in order to progress toward maximal functional ability and improve QOL.    2. Function: Patient will demonstrate improved function as indicated by a score of greater than or equal to 55% on the Lower Extremity Functional Scale    3. Mobility: Patient will improve AROM to 50% of stated  goals, listed in objective measures above, in order to progress towards independence with functional activities.     4. Strength: Patient will improve strength to 50% of stated goals, listed in objective measures above, in order to progress towards independence with functional activities.     5. Gait: Patient will demonstrate improved gait mechanics including symmetrical stance time B, normalized gait speed and normalized JOSE, in order to improve functional mobility, improve quality of life, and decrease risk of further injury or fall.     6. HEP: Patient will demonstrate independence with HEP in order to progress toward functional independence.      Long Term Goals:  4 weeks    1. Pain: Pt will demonstrate improved pain by reports of less than or equal to 2/10 worst pain on the verbal rating scale in order to progress toward maximal functional ability and improve QOL.      2. Function: Patient will demonstrate improved function as indicated by a score of greater than or equal to 70% on the Lower Extremity Functional Scale    3. Mobility: Patient will improve AROM to stated goals, listed in objective measures above, in order to return to maximal functional potential and improve quality of life.    4. Strength: Patient will improve strength to stated goals, listed in objective measures above, in order to improve functional independence and quality of life.    5. Gait: Patient will demonstrate normalized gait mechanics with minimal compensation in order to return to PLOF.    6. Patient will return to normal ADL's, IADL's, community involvement, recreational activities, and work-related activities with less than or equal to 2/10 pain and maximal function.         PLAN   Plan of care Certification: 12/16/2019 to 1/13/20.    Outpatient Physical Therapy 2 times weekly for 4 weeks to include any combination of the following interventions: dry needling, modalities, electrical stimulation (IFC, Pre-Mod, Attended with  Functional Dry Needling), Cervical/Lumbar Traction, Gait Training, Manual Therapy, Neuromuscular Re-ed, Patient Education, Self Care, Therapeutic Activites and Therapeutic Exercise     Thank you for this referral.    These services are reasonable and necessary for the conditions set forth above while under my care.    Anabela Ho, PT, DPT

## 2019-12-20 ENCOUNTER — CLINICAL SUPPORT (OUTPATIENT)
Dept: REHABILITATION | Facility: HOSPITAL | Age: 69
End: 2019-12-20
Payer: MEDICARE

## 2019-12-20 DIAGNOSIS — M25.652 DECREASED RANGE OF LEFT HIP MOVEMENT: ICD-10-CM

## 2019-12-20 DIAGNOSIS — R26.9 GAIT ABNORMALITY: ICD-10-CM

## 2019-12-20 DIAGNOSIS — M62.81 PROXIMAL MUSCLE WEAKNESS: ICD-10-CM

## 2019-12-20 PROCEDURE — 97140 MANUAL THERAPY 1/> REGIONS: CPT

## 2019-12-20 PROCEDURE — 97112 NEUROMUSCULAR REEDUCATION: CPT

## 2019-12-20 PROCEDURE — 97110 THERAPEUTIC EXERCISES: CPT

## 2019-12-20 NOTE — PROGRESS NOTES
"  Physical Therapy Daily Treatment Note     Name: Cleo Ford  Clinic Number: [id number]    Therapy Diagnosis:   Encounter Diagnoses   Name Primary?    Gait abnormality     Proximal muscle weakness     Decreased range of left hip movement      Physician: Chalino Kay MD    Visit Date: 12/20/2019    Physician Orders: PT Eval and Treat (focus on hip ROM and stretching)   Medical Diagnosis from Referral: Multiple closed fractures of pelvis without disruption of pelvic ring   Evaluation Date: 12/16/2019  Authorization Period Expiration: 12/31/19  Plan of Care Expiration: 1/13/20  Visit # / Visits authorized: 2 (1 of 12)      Precautions: Standard, Fall and Weightbearing    Time In: 7:30 am  Time Out: 8:27 am  Total Billable Time: 57 minutes    SUBJECTIVE   Date of onset: ~6 weeks ago   History of current condition - Rut is a 69 y.o. female whom reports hx of fall ~ 6 weeks ago; states she fell off the tailgate of her truck and landed on the L hip. Pt was initially utilizing a wheelchair but has recently started walking with a walker. She states that she easily gets tired and pain in the hip when walking for prolonged periods. Pt states she was in [place] for vacation last week and forced to walk around a lot; she states that she think this helped her with her mobility tremendously. She is currently taking tylenol every 4 hours to "stay ahead of the pain." Reports most pain when sitting or standing for a long period of time, walking, laying in bed, and rolling over in bed. States that pain when sitting depends on which position she is sitting in; reports pain on the L sit-bone (ischial tuberosity). States she is doing better with getting in and out of the car because she has learned how to pivot on the right leg. Pt states she is not currently doing any other exercise besides walking. Pt states she is currently sleeping on her back because this is the most comfortable position she can get in. She is not " "utilizing pillows under the knees. Pt states she prefers to sleep on her sides; she has attempted to lay on the R side but cannot stay in this position for long due to pain; states she has not attempted to lay on the side with pillows between her legs.   Today, pt reports: she has been having no pain. Pt states she is limping a little currently because she is usually stiff in the morning but states that once she gets warmed up throughout the day she barely has a limp. Pt no longer using assistive device. Pt reports tenderness to touch of L calf musculature; states she has had some discomfort in this area since her fall.   She was compliant with home exercise program.  Response to previous treatment: pt reports she felt good following evaluation  Functional change: pt no longer utilizing assistive device for ambulation    Pre-Treatment Pain: 2/10  Post-Treatment Pain: 2/10  Location: L pelvis   TREATMENT     Rut received therapeutic exercises to develop strength, endurance, flexibility and core stabilization for 25 minutes including:    Exercise 12/20/2019   Nickolas VALENCIA  2#, 3 x 12 B    Hamstring Curls  Green TB, 3 x 10 B seated   Standing, 3 x 10 B    Heel Toe Raises     Calf Raises  3 x 10    Bike (for LE ROM, strength and endurance) Level 1, 5'    Calf Stretch  2 x 1'            Rut received the following manual therapy techniques: Soft tissue Mobilization were applied to the: L LE for 22 minutes, including:  PROM hip flexion, abduction, internal and external rotation   Passive hamstring stretch   Passive gastrocnemius stretch   STM of L gastrocnemius      Rut participated in neuromuscular re-education activities to improve: Balance, Kinesthetic and Proprioception for 10 minutes. The following activities were included:    Exercise 12/20/2019   [employer] Step Overs  25x forward and lateral on L    Tandem Stance     Single leg stance  20 x 5" hold                              Home Exercises Provided and " Patient Education Provided     Education/Self-Care provided:    Patient educated on biomechanical justification for therapeutic exercise and importance of compliance with HEP in order to improve overall impairments and QOL     Written Home Exercises Provided: yes.  Exercises were reviewed and Rut was able to demonstrate them prior to the end of the session.  Rut demonstrated good  understanding of the education provided.     See EMR under Media for exercises provided 12/20/2019.    ASSESSMENT   Pt tolerated manual therapy well with reports of decreased tension and mild soreness in musculature following intervention. Pt tolerated exercise well with reports of increased fatigue but no increased pain. Pt demonstrated good understanding of exercises and required minimal cueing to maintain proper form. Pt demonstrates improved gait today; increased weight bearing on L LE, decreased trunk lean to right, and no use of assistive device.       Rut is progressing well towards her goals.   Pt prognosis is Good.     Pt will continue to benefit from skilled outpatient physical therapy to address the deficits listed in the problem list box on initial evaluation, provide pt/family education and to maximize pt's level of independence in the home and community environment.     Pt's spiritual, cultural and educational needs considered and pt agreeable to plan of care and goals.     Anticipated Barriers for therapy: co-morbidities, sedentary lifestyle and adherence to treatment plan    GOALS:     Short Term Goals:  2 weeks     1. Pain: Pt will demonstrate improved pain by reports of less than or equal to 4/10 worst pain on the verbal rating scale in order to progress toward maximal functional ability and improve QOL.     2. Function: Patient will demonstrate improved function as indicated by a score of greater than or equal to 55% on the Lower Extremity Functional Scale     3. Mobility: Patient will improve AROM to 50% of  stated goals, listed in objective measures above, in order to progress towards independence with functional activities.      4. Strength: Patient will improve strength to 50% of stated goals, listed in objective measures above, in order to progress towards independence with functional activities.      5. Gait: Patient will demonstrate improved gait mechanics including symmetrical stance time B, normalized gait speed and normalized JOSE, in order to improve functional mobility, improve quality of life, and decrease risk of further injury or fall.      6. HEP: Patient will demonstrate independence with HEP in order to progress toward functional independence.        Long Term Goals:  4 weeks     1. Pain: Pt will demonstrate improved pain by reports of less than or equal to 2/10 worst pain on the verbal rating scale in order to progress toward maximal functional ability and improve QOL.       2. Function: Patient will demonstrate improved function as indicated by a score of greater than or equal to 70% on the Lower Extremity Functional Scale     3. Mobility: Patient will improve AROM to stated goals, listed in objective measures above, in order to return to maximal functional potential and improve quality of life.     4. Strength: Patient will improve strength to stated goals, listed in objective measures above, in order to improve functional independence and quality of life.     5. Gait: Patient will demonstrate normalized gait mechanics with minimal compensation in order to return to PLOF.     6. Patient will return to normal ADL's, IADL's, community involvement, recreational activities, and work-related activities with less than or equal to 2/10 pain and maximal function.             PLAN   Continue Plan of Care (POC) and progress per patient tolerance.    Evaluation: 12/6/19  POC Expiration: 1/13/19    Anabela Ho PT, DPT

## 2019-12-23 ENCOUNTER — CLINICAL SUPPORT (OUTPATIENT)
Dept: REHABILITATION | Facility: HOSPITAL | Age: 69
End: 2019-12-23
Payer: MEDICARE

## 2019-12-23 DIAGNOSIS — M62.81 PROXIMAL MUSCLE WEAKNESS: ICD-10-CM

## 2019-12-23 DIAGNOSIS — R26.9 GAIT ABNORMALITY: ICD-10-CM

## 2019-12-23 DIAGNOSIS — M25.652 DECREASED RANGE OF LEFT HIP MOVEMENT: ICD-10-CM

## 2019-12-23 PROCEDURE — 97140 MANUAL THERAPY 1/> REGIONS: CPT

## 2019-12-23 PROCEDURE — 97110 THERAPEUTIC EXERCISES: CPT

## 2019-12-27 ENCOUNTER — CLINICAL SUPPORT (OUTPATIENT)
Dept: REHABILITATION | Facility: HOSPITAL | Age: 69
End: 2019-12-27
Payer: MEDICARE

## 2019-12-27 DIAGNOSIS — M62.81 PROXIMAL MUSCLE WEAKNESS: ICD-10-CM

## 2019-12-27 DIAGNOSIS — R26.9 GAIT ABNORMALITY: ICD-10-CM

## 2019-12-27 DIAGNOSIS — M25.652 DECREASED RANGE OF LEFT HIP MOVEMENT: ICD-10-CM

## 2019-12-27 PROCEDURE — 97014 ELECTRIC STIMULATION THERAPY: CPT

## 2019-12-27 PROCEDURE — 97140 MANUAL THERAPY 1/> REGIONS: CPT

## 2019-12-27 PROCEDURE — 97110 THERAPEUTIC EXERCISES: CPT

## 2019-12-27 NOTE — PROGRESS NOTES
"  Physical Therapy Daily Treatment Note     Name: Cleo Ford  Clinic Number: [id number]    Therapy Diagnosis:   Encounter Diagnoses   Name Primary?    Gait abnormality     Proximal muscle weakness     Decreased range of left hip movement      Physician: Chalino Kay MD    Visit Date: 12/23/2019    Physician Orders: PT Eval and Treat (focus on hip ROM and stretching)   Medical Diagnosis from Referral: Multiple closed fractures of pelvis without disruption of pelvic ring   Evaluation Date: 12/16/2019  Authorization Period Expiration: 12/31/19  Plan of Care Expiration: 1/13/20  Visit # / Visits authorized: 3 (2 of 12)      Precautions: Standard, Fall and Weightbearing    Time In: 3:00 pm  Time Out: 3:55 pm  Total Billable Time: 50 minutes    SUBJECTIVE   Date of onset: ~6 weeks ago   History of current condition - Rut is a 69 y.o. female whom reports hx of fall ~ 6 weeks ago; states she fell off the tailgate of her truck and landed on the L hip. Pt was initially utilizing a wheelchair but has recently started walking with a walker. She states that she easily gets tired and pain in the hip when walking for prolonged periods. Pt states she was in [place] for vacation last week and forced to walk around a lot; she states that she think this helped her with her mobility tremendously. She is currently taking tylenol every 4 hours to "stay ahead of the pain." Reports most pain when sitting or standing for a long period of time, walking, laying in bed, and rolling over in bed. States that pain when sitting depends on which position she is sitting in; reports pain on the L sit-bone (ischial tuberosity). States she is doing better with getting in and out of the car because she has learned how to pivot on the right leg. Pt states she is not currently doing any other exercise besides walking. Pt states she is currently sleeping on her back because this is the most comfortable position she can get in. She is not " "utilizing pillows under the knees. Pt states she prefers to sleep on her sides; she has attempted to lay on the R side but cannot stay in this position for long due to pain; states she has not attempted to lay on the side with pillows between her legs.   Today, pt reports: she felt fine immediately following previous PT session but states that she had to do a lot of walking for work purposes for the remainder of the day. Pt states she thinks that walking so much in combination with PT made her very sore the following day; pt states she felt like she could barely walk. Pt feeling much better today but states soreness with walking.   She was compliant with home exercise program.  Response to previous treatment: soreness the following day   Functional change: no significant changes noted     Pre-Treatment Pain: 2/10  Post-Treatment Pain: 2/10  Location: L pelvis   TREATMENT     Rut received therapeutic exercises to develop strength, endurance, flexibility and core stabilization for 40 minutes including:    Exercise 12/23/2019   Nickolas SUTTONQ  1#, 3 x 8 B    Hamstring Curls  Red band, 3 x 10 B seated    Heel Toe Raises     Calf Raises  3 x 10    Bike (for LE ROM, strength and endurance) Level 1, 3'    Calf Stretch  2 x 1'    Abdominal bracing  3 x 10, 5 sec hold    Rocker Board  2 x 40 PF/DF   2 x 40 Inv/Ev   Ankle Plantarflexion     Ankle Dorsiflexion             Rut received the following manual therapy techniques: Soft tissue Mobilization were applied to the: L LE for 10 minutes, including:  Passive gastrocnemius stretch   STM of L gastrocnemius      Rut participated in neuromuscular re-education activities to improve: Balance, Kinesthetic and Proprioception for x minutes. The following activities were included:    Exercise 12/23/2019   Cone Step Overs  25x forward and lateral on L    Tandem Stance     Single leg stance  20 x 5" hold                              Home Exercises Provided and Patient " Education Provided     Education/Self-Care provided:    Patient educated on biomechanical justification for therapeutic exercise and importance of compliance with HEP in order to improve overall impairments and QOL     Written Home Exercises Provided: yes.  Exercises were reviewed and Rut was able to demonstrate them prior to the end of the session.  Rut demonstrated good  understanding of the education provided.     See EMR under Media for exercises provided 12/20/2019.    ASSESSMENT   Pt tolerated manual therapy well with reports of decreased tension and mild soreness in musculature following intervention. Pt tolerated exercise well with reports of no increased fatigue or pain. Pt demonstrated good understanding of exercises and required minimal cueing to maintain proper form. Pt demonstrates no significant changes in gait since previous session.    Rut is progressing well towards her goals.   Pt prognosis is Good.     Pt will continue to benefit from skilled outpatient physical therapy to address the deficits listed in the problem list box on initial evaluation, provide pt/family education and to maximize pt's level of independence in the home and community environment.     Pt's spiritual, cultural and educational needs considered and pt agreeable to plan of care and goals.     Anticipated Barriers for therapy: co-morbidities, sedentary lifestyle and adherence to treatment plan    GOALS:     Short Term Goals:  2 weeks     1. Pain: Pt will demonstrate improved pain by reports of less than or equal to 4/10 worst pain on the verbal rating scale in order to progress toward maximal functional ability and improve QOL.     2. Function: Patient will demonstrate improved function as indicated by a score of greater than or equal to 55% on the Lower Extremity Functional Scale     3. Mobility: Patient will improve AROM to 50% of stated goals, listed in objective measures above, in order to progress towards  independence with functional activities.      4. Strength: Patient will improve strength to 50% of stated goals, listed in objective measures above, in order to progress towards independence with functional activities.      5. Gait: Patient will demonstrate improved gait mechanics including symmetrical stance time B, normalized gait speed and normalized JOSE, in order to improve functional mobility, improve quality of life, and decrease risk of further injury or fall.      6. HEP: Patient will demonstrate independence with HEP in order to progress toward functional independence.        Long Term Goals:  4 weeks     1. Pain: Pt will demonstrate improved pain by reports of less than or equal to 2/10 worst pain on the verbal rating scale in order to progress toward maximal functional ability and improve QOL.       2. Function: Patient will demonstrate improved function as indicated by a score of greater than or equal to 70% on the Lower Extremity Functional Scale     3. Mobility: Patient will improve AROM to stated goals, listed in objective measures above, in order to return to maximal functional potential and improve quality of life.     4. Strength: Patient will improve strength to stated goals, listed in objective measures above, in order to improve functional independence and quality of life.     5. Gait: Patient will demonstrate normalized gait mechanics with minimal compensation in order to return to PLOF.     6. Patient will return to normal ADL's, IADL's, community involvement, recreational activities, and work-related activities with less than or equal to 2/10 pain and maximal function.             PLAN   Continue Plan of Care (POC) and progress per patient tolerance.    Evaluation: 12/6/19  POC Expiration: 1/13/19    Anabela Ho, PT, DPT

## 2019-12-27 NOTE — PROGRESS NOTES
"  Physical Therapy Daily Treatment Note     Name: Cleo Ford  Clinic Number: [id number]    Therapy Diagnosis:   Encounter Diagnoses   Name Primary?    Gait abnormality     Proximal muscle weakness     Decreased range of left hip movement      Physician: Chalino Kay MD    Visit Date: 12/27/2019    Physician Orders: PT Eval and Treat (focus on hip ROM and stretching)   Medical Diagnosis from Referral: Multiple closed fractures of pelvis without disruption of pelvic ring   Evaluation Date: 12/16/2019  Authorization Period Expiration: 12/31/19  Plan of Care Expiration: 1/13/20  Visit # / Visits authorized: 4 (3 of 12)      Precautions: Standard, Fall and Weightbearing    Time In: 8:30 am  Time Out: 9:25 am  Total Billable Time: 55 minutes    SUBJECTIVE   Date of onset: ~6 weeks ago   History of current condition - Rut is a 69 y.o. female whom reports hx of fall ~ 6 weeks ago; states she fell off the tailgate of her truck and landed on the L hip. Pt was initially utilizing a wheelchair but has recently started walking with a walker. She states that she easily gets tired and pain in the hip when walking for prolonged periods. Pt states she was in [place] for vacation last week and forced to walk around a lot; she states that she think this helped her with her mobility tremendously. She is currently taking tylenol every 4 hours to "stay ahead of the pain." Reports most pain when sitting or standing for a long period of time, walking, laying in bed, and rolling over in bed. States that pain when sitting depends on which position she is sitting in; reports pain on the L sit-bone (ischial tuberosity). States she is doing better with getting in and out of the car because she has learned how to pivot on the right leg. Pt states she is not currently doing any other exercise besides walking. Pt states she is currently sleeping on her back because this is the most comfortable position she can get in. She is not " "utilizing pillows under the knees. Pt states she prefers to sleep on her sides; she has attempted to lay on the R side but cannot stay in this position for long due to pain; states she has not attempted to lay on the side with pillows between her legs.   Today, pt reports: she is feeling very sore in L pelvis with walking today which she attributes to being on her feet so much over the last couple of days for the holiday. Pt states she felt "fine" following her previous PT session.   She was compliant with home exercise program.  Response to previous treatment: pt reports she felt good following previous session   Functional change: no significant changes noted.     Pre-Treatment Pain: 5/10 with walking  Post-Treatment Pain: 5/10 with walking   Location: L pelvis   TREATMENT     Rut received therapeutic exercises to develop strength, endurance, flexibility and core stabilization for 35 minutes including:    Exercise 12/27/2019   Clamshells  Red band, 3 x 10 seated    LAQ  1#, 3 x 10 B    Hamstring Curls  Red band, 3 x 10 B seated    Heel Toe Raises     Calf Raises  3 x 10    Bike (for LE ROM, strength and endurance) Level 1, 5'    Calf Stretch  2 x 1'    Abdominal Bracing     Rocker Board     Ankle Plantarflexion  Blue band, 3 x 10 on L    Ankle Dorsiflexion  Red band, 3 x 10 on L            Rut received the following manual therapy techniques: Soft tissue Mobilization were applied to the: L LE for 10 minutes, including:  Passive gastrocnemius stretch   STM of L gastrocnemius      Rut participated in neuromuscular re-education activities to improve: Balance, Kinesthetic and Proprioception for 10 minutes. The following activities were included:    Exercise 12/27/2019   Cone Step Overs  25x forward and lateral on L    Tandem Stance     Single leg stance  20 x 5" hold                              Ice and electrical stimulation applied to the L posterior hip for 10 minutes.       Home Exercises Provided and " "Patient Education Provided     Education/Self-Care provided:    Patient educated on biomechanical justification for therapeutic exercise and importance of compliance with HEP in order to improve overall impairments and QOL     Written Home Exercises Provided: yes.  Exercises were reviewed and Rut was able to demonstrate them prior to the end of the session.  Rut demonstrated good  understanding of the education provided.     See EMR under Media for exercises provided 12/20/2019.    ASSESSMENT   Pt tolerated manual therapy well with reports of decreased tension and mild soreness in musculature following intervention. Pt tolerated exercise well with reports of no increased fatigue or pain. Pt tolerated ice and electrical stimulation well and states that she "feels pretty good" following intervention.  Pt demonstrated good understanding of exercises and required minimal cueing to maintain proper form. Pt demonstrates no changes in gait since previous session.       Rut is progressing well towards her goals.   Pt prognosis is Good.     Pt will continue to benefit from skilled outpatient physical therapy to address the deficits listed in the problem list box on initial evaluation, provide pt/family education and to maximize pt's level of independence in the home and community environment.     Pt's spiritual, cultural and educational needs considered and pt agreeable to plan of care and goals.     Anticipated Barriers for therapy: co-morbidities, sedentary lifestyle and adherence to treatment plan    GOALS:     Short Term Goals:  2 weeks     1. Pain: Pt will demonstrate improved pain by reports of less than or equal to 4/10 worst pain on the verbal rating scale in order to progress toward maximal functional ability and improve QOL.     2. Function: Patient will demonstrate improved function as indicated by a score of greater than or equal to 55% on the Lower Extremity Functional Scale     3. Mobility: Patient will " improve AROM to 50% of stated goals, listed in objective measures above, in order to progress towards independence with functional activities.      4. Strength: Patient will improve strength to 50% of stated goals, listed in objective measures above, in order to progress towards independence with functional activities.      5. Gait: Patient will demonstrate improved gait mechanics including symmetrical stance time B, normalized gait speed and normalized JOSE, in order to improve functional mobility, improve quality of life, and decrease risk of further injury or fall.      6. HEP: Patient will demonstrate independence with HEP in order to progress toward functional independence.        Long Term Goals:  4 weeks     1. Pain: Pt will demonstrate improved pain by reports of less than or equal to 2/10 worst pain on the verbal rating scale in order to progress toward maximal functional ability and improve QOL.       2. Function: Patient will demonstrate improved function as indicated by a score of greater than or equal to 70% on the Lower Extremity Functional Scale     3. Mobility: Patient will improve AROM to stated goals, listed in objective measures above, in order to return to maximal functional potential and improve quality of life.     4. Strength: Patient will improve strength to stated goals, listed in objective measures above, in order to improve functional independence and quality of life.     5. Gait: Patient will demonstrate normalized gait mechanics with minimal compensation in order to return to PLOF.     6. Patient will return to normal ADL's, IADL's, community involvement, recreational activities, and work-related activities with less than or equal to 2/10 pain and maximal function.             PLAN   Continue Plan of Care (POC) and progress per patient tolerance.    Evaluation: 12/6/19  POC Expiration: 1/13/19    Anabela Ho, PT, DPT

## 2019-12-30 ENCOUNTER — CLINICAL SUPPORT (OUTPATIENT)
Dept: REHABILITATION | Facility: HOSPITAL | Age: 69
End: 2019-12-30
Payer: MEDICARE

## 2019-12-30 DIAGNOSIS — M62.81 PROXIMAL MUSCLE WEAKNESS: ICD-10-CM

## 2019-12-30 DIAGNOSIS — M25.652 DECREASED RANGE OF LEFT HIP MOVEMENT: ICD-10-CM

## 2019-12-30 DIAGNOSIS — R26.9 GAIT ABNORMALITY: ICD-10-CM

## 2019-12-30 PROCEDURE — 97110 THERAPEUTIC EXERCISES: CPT

## 2019-12-30 PROCEDURE — 97014 ELECTRIC STIMULATION THERAPY: CPT

## 2019-12-30 PROCEDURE — 97140 MANUAL THERAPY 1/> REGIONS: CPT

## 2019-12-30 NOTE — PROGRESS NOTES
"  Physical Therapy Daily Treatment Note     Name: Cleo Ford  Clinic Number: [id number]    Therapy Diagnosis:   Encounter Diagnoses   Name Primary?    Gait abnormality     Proximal muscle weakness     Decreased range of left hip movement      Physician: Chalino Kay MD    Visit Date: 12/30/2019    Physician Orders: PT Eval and Treat (focus on hip ROM and stretching)   Medical Diagnosis from Referral: Multiple closed fractures of pelvis without disruption of pelvic ring   Evaluation Date: 12/16/2019  Authorization Period Expiration: 12/31/19  Plan of Care Expiration: 1/13/20  Visit # / Visits authorized: 5 (4 of 12)      Precautions: Standard, Fall and Weightbearing    Time In: 9:30 am  Time Out: 10:33 am  Total Billable Time: 63 minutes    SUBJECTIVE   Date of onset: ~6 weeks ago   History of current condition - Rut is a 69 y.o. female whom reports hx of fall ~ 6 weeks ago; states she fell off the tailgate of her truck and landed on the L hip. Pt was initially utilizing a wheelchair but has recently started walking with a walker. She states that she easily gets tired and pain in the hip when walking for prolonged periods. Pt states she was in [place] for vacation last week and forced to walk around a lot; she states that she think this helped her with her mobility tremendously. She is currently taking tylenol every 4 hours to "stay ahead of the pain." Reports most pain when sitting or standing for a long period of time, walking, laying in bed, and rolling over in bed. States that pain when sitting depends on which position she is sitting in; reports pain on the L sit-bone (ischial tuberosity). States she is doing better with getting in and out of the car because she has learned how to pivot on the right leg. Pt states she is not currently doing any other exercise besides walking. Pt states she is currently sleeping on her back because this is the most comfortable position she can get in. She is " "not utilizing pillows under the knees. Pt states she prefers to sleep on her sides; she has attempted to lay on the R side but cannot stay in this position for long due to pain; states she has not attempted to lay on the side with pillows between her legs.   Today, pt reports: she felt great following ice and estim performed previous session; states that her  even commented that she was barely limping. Pt reports she is a little sore today which she believes is as a result of being on her feet so much yesterday. Pt reports her calf feels much better following manual therapy performed previous session; states she "barely even notices it anymore."   She was compliant with home exercise program.  Response to previous treatment: decreased pain following ice and estim  Functional change: pt demonstrates improved weight bearing and stance time on L with ambulation.      Pre-Treatment Pain: 4/10 with walking  Post-Treatment Pain: 3/10 with walking   Location: L pelvis   TREATMENT     Rut received therapeutic exercises to develop strength, endurance, flexibility and core stabilization for 38 minutes including:    Exercise 12/30/2019   Clamshells  Red band, 3 x 10 supine   LAQ  2#, 2 x 10 B    Hamstring Curls  Green band, 3 x 10 B seated    Heel Toe Raises     Calf Raises     Bike (for LE ROM, strength and endurance) Level 1, 8'    Calf Stretch  2 x 1'    Abdominal Bracing  20 x 5 sec hold    Rocker Board     Ankle Plantarflexion  Blue band, 3 x 10 on L    Ankle Dorsiflexion  Red band, 3 x 10 on L    Belt block  30 x 5 sec hold        Rut received the following manual therapy techniques: Soft tissue Mobilization were applied to the: L LE for 15 minutes, including:  Passive gastrocnemius stretch   STM and suction of L gastrocnemius  PROM L hip flexion, abduction, internal rotation, and external rotation       Rut participated in neuromuscular re-education activities to improve: Balance, Kinesthetic and " "Proprioception for x minutes. The following activities were included:    Exercise 12/30/2019   Cone Step Overs     Tandem Stance     Single leg stance                               Ice and electrical stimulation applied to the L posterior hip for 10 minutes.       Home Exercises Provided and Patient Education Provided     Education/Self-Care provided:    Patient educated on biomechanical justification for therapeutic exercise and importance of compliance with HEP in order to improve overall impairments and QOL     Written Home Exercises Provided: yes.  Exercises were reviewed and Rut was able to demonstrate them prior to the end of the session.  Rut demonstrated good  understanding of the education provided.     See EMR under Media for exercises provided 12/20/2019.    ASSESSMENT   Pt tolerated manual therapy well with reports of decreased tension and mild soreness in musculature following intervention. Pt tolerated exercise well with reports of no increased fatigue or pain. Pt tolerated ice and electrical stimulation well and states that she "feels good" following intervention.  Pt demonstrated good understanding of exercises and required minimal cueing to maintain proper form. Pt demonstrates no changes in gait since previous session.       Rut is progressing well towards her goals.   Pt prognosis is Good.     Pt will continue to benefit from skilled outpatient physical therapy to address the deficits listed in the problem list box on initial evaluation, provide pt/family education and to maximize pt's level of independence in the home and community environment.     Pt's spiritual, cultural and educational needs considered and pt agreeable to plan of care and goals.     Anticipated Barriers for therapy: co-morbidities, sedentary lifestyle and adherence to treatment plan    GOALS:     Short Term Goals:  2 weeks     1. Pain: Pt will demonstrate improved pain by reports of less than or equal to 4/10 worst pain " on the verbal rating scale in order to progress toward maximal functional ability and improve QOL.     2. Function: Patient will demonstrate improved function as indicated by a score of greater than or equal to 55% on the Lower Extremity Functional Scale     3. Mobility: Patient will improve AROM to 50% of stated goals, listed in objective measures above, in order to progress towards independence with functional activities.      4. Strength: Patient will improve strength to 50% of stated goals, listed in objective measures above, in order to progress towards independence with functional activities.      5. Gait: Patient will demonstrate improved gait mechanics including symmetrical stance time B, normalized gait speed and normalized JOSE, in order to improve functional mobility, improve quality of life, and decrease risk of further injury or fall.      6. HEP: Patient will demonstrate independence with HEP in order to progress toward functional independence.        Long Term Goals:  4 weeks     1. Pain: Pt will demonstrate improved pain by reports of less than or equal to 2/10 worst pain on the verbal rating scale in order to progress toward maximal functional ability and improve QOL.       2. Function: Patient will demonstrate improved function as indicated by a score of greater than or equal to 70% on the Lower Extremity Functional Scale     3. Mobility: Patient will improve AROM to stated goals, listed in objective measures above, in order to return to maximal functional potential and improve quality of life.     4. Strength: Patient will improve strength to stated goals, listed in objective measures above, in order to improve functional independence and quality of life.     5. Gait: Patient will demonstrate normalized gait mechanics with minimal compensation in order to return to PLOF.     6. Patient will return to normal ADL's, IADL's, community involvement, recreational activities, and work-related activities  with less than or equal to 2/10 pain and maximal function.             PLAN   Continue Plan of Care (POC) and progress per patient tolerance.    Evaluation: 12/6/19  POC Expiration: 1/13/19    Anabela Ho PT, DPT

## 2020-01-03 ENCOUNTER — PATIENT MESSAGE (OUTPATIENT)
Dept: REHABILITATION | Facility: HOSPITAL | Age: 70
End: 2020-01-03

## 2020-01-03 ENCOUNTER — HOSPITAL ENCOUNTER (OUTPATIENT)
Dept: RADIOLOGY | Facility: HOSPITAL | Age: 70
Discharge: HOME | End: 2020-01-03
Attending: ORTHOPAEDIC SURGERY
Payer: MEDICARE

## 2020-01-03 ENCOUNTER — OFFICE VISIT (OUTPATIENT)
Dept: SPORTS MEDICINE | Facility: CLINIC | Age: 70
End: 2020-01-03
Payer: MEDICARE

## 2020-01-03 ENCOUNTER — PATIENT MESSAGE (OUTPATIENT)
Dept: SPORTS MEDICINE | Facility: CLINIC | Age: 70
End: 2020-01-03

## 2020-01-03 VITALS
SYSTOLIC BLOOD PRESSURE: 137 MMHG | WEIGHT: 181 LBS | HEIGHT: 63 IN | DIASTOLIC BLOOD PRESSURE: 79 MMHG | BODY MASS INDEX: 32.07 KG/M2 | HEART RATE: 76 BPM

## 2020-01-03 DIAGNOSIS — M25.551 BILATERAL HIP PAIN: ICD-10-CM

## 2020-01-03 DIAGNOSIS — M25.551 RIGHT HIP PAIN: Primary | ICD-10-CM

## 2020-01-03 DIAGNOSIS — M25.551 RIGHT HIP PAIN: ICD-10-CM

## 2020-01-03 DIAGNOSIS — M54.50 LOW BACK PAIN, UNSPECIFIED BACK PAIN LATERALITY, UNSPECIFIED CHRONICITY, UNSPECIFIED WHETHER SCIATICA PRESENT: Primary | ICD-10-CM

## 2020-01-03 DIAGNOSIS — M25.552 BILATERAL HIP PAIN: ICD-10-CM

## 2020-01-03 PROCEDURE — 72190 X-RAY EXAM OF PELVIS: CPT | Mod: TC,59

## 2020-01-03 PROCEDURE — 1125F PR PAIN SEVERITY QUANTIFIED, PAIN PRESENT: ICD-10-PCS | Mod: S$GLB,,, | Performed by: ORTHOPAEDIC SURGERY

## 2020-01-03 PROCEDURE — 1159F MED LIST DOCD IN RCRD: CPT | Mod: S$GLB,,, | Performed by: ORTHOPAEDIC SURGERY

## 2020-01-03 PROCEDURE — 73522 X-RAY EXAM HIPS BI 3-4 VIEWS: CPT | Mod: 26,,, | Performed by: RADIOLOGY

## 2020-01-03 PROCEDURE — 1101F PT FALLS ASSESS-DOCD LE1/YR: CPT | Mod: CPTII,S$GLB,, | Performed by: ORTHOPAEDIC SURGERY

## 2020-01-03 PROCEDURE — 73522 X-RAY EXAM HIPS BI 3-4 VIEWS: CPT | Mod: 50,TC

## 2020-01-03 PROCEDURE — 72190 X-RAY EXAM OF PELVIS: CPT | Mod: 26,59,, | Performed by: RADIOLOGY

## 2020-01-03 PROCEDURE — 3075F PR MOST RECENT SYSTOLIC BLOOD PRESS GE 130-139MM HG: ICD-10-PCS | Mod: CPTII,S$GLB,, | Performed by: ORTHOPAEDIC SURGERY

## 2020-01-03 PROCEDURE — 99214 PR OFFICE/OUTPT VISIT, EST, LEVL IV, 30-39 MIN: ICD-10-PCS | Mod: S$GLB,,, | Performed by: ORTHOPAEDIC SURGERY

## 2020-01-03 PROCEDURE — 3078F PR MOST RECENT DIASTOLIC BLOOD PRESSURE < 80 MM HG: ICD-10-PCS | Mod: CPTII,S$GLB,, | Performed by: ORTHOPAEDIC SURGERY

## 2020-01-03 PROCEDURE — 1125F AMNT PAIN NOTED PAIN PRSNT: CPT | Mod: S$GLB,,, | Performed by: ORTHOPAEDIC SURGERY

## 2020-01-03 PROCEDURE — 1159F PR MEDICATION LIST DOCUMENTED IN MEDICAL RECORD: ICD-10-PCS | Mod: S$GLB,,, | Performed by: ORTHOPAEDIC SURGERY

## 2020-01-03 PROCEDURE — 99999 PR PBB SHADOW E&M-EST. PATIENT-LVL IV: CPT | Mod: PBBFAC,,, | Performed by: ORTHOPAEDIC SURGERY

## 2020-01-03 PROCEDURE — 99999 PR PBB SHADOW E&M-EST. PATIENT-LVL IV: ICD-10-PCS | Mod: PBBFAC,,, | Performed by: ORTHOPAEDIC SURGERY

## 2020-01-03 PROCEDURE — 1101F PR PT FALLS ASSESS DOC 0-1 FALLS W/OUT INJ PAST YR: ICD-10-PCS | Mod: CPTII,S$GLB,, | Performed by: ORTHOPAEDIC SURGERY

## 2020-01-03 PROCEDURE — 3078F DIAST BP <80 MM HG: CPT | Mod: CPTII,S$GLB,, | Performed by: ORTHOPAEDIC SURGERY

## 2020-01-03 PROCEDURE — 73522 XR HIP 3 OR 4 VIEWS BILATERAL: ICD-10-PCS | Mod: 26,,, | Performed by: RADIOLOGY

## 2020-01-03 PROCEDURE — 72190 XR PELVIS AP INLET AND OUTLET: ICD-10-PCS | Mod: 26,59,, | Performed by: RADIOLOGY

## 2020-01-03 PROCEDURE — 3075F SYST BP GE 130 - 139MM HG: CPT | Mod: CPTII,S$GLB,, | Performed by: ORTHOPAEDIC SURGERY

## 2020-01-03 PROCEDURE — 99214 OFFICE O/P EST MOD 30 MIN: CPT | Mod: S$GLB,,, | Performed by: ORTHOPAEDIC SURGERY

## 2020-01-03 NOTE — PROGRESS NOTES
Subjective:     Patient ID: Cleo Ford is a 69 y.o. female.    Chief Complaint: Pain of the Right Hip    Cleo Ford noticed she was having right hip pain on 1/1/2020. More posterior, different from the pain she was having before    No recent reinjury    Hip Pain    The pain is present in the right hip. The current episode started in the past 7 days. The problem occurs constantly. The problem has been gradually worsening. The quality of the pain is described as aching. The pain is at a severity of 10/10. Associated symptoms include an inability to bear weight and a limited range of motion. Pertinent negatives include no fever or itching. The symptoms are aggravated by activity, bearing weight, bending, walking and standing. She has tried nothing for the symptoms. Physical therapy was not tried.      Past Medical History:   Diagnosis Date    Anxiety     Depression     Dysmetabolic syndrome X     Obesity     Other and unspecified hyperlipidemia     Unspecified essential hypertension     Unspecified hypothyroidism      Past Surgical History:   Procedure Laterality Date    APPENDECTOMY      CHOLECYSTECTOMY      COLONOSCOPY N/A 9/15/2016    Procedure: COLONOSCOPY;  Surgeon: Jose Daniel MD;  Location: [facility];  Service: Endoscopy;  Laterality: N/A;    gastric sleeve      HYSTERECTOMY      OOPHORECTOMY      1 ovary removed     Family History   Problem Relation Age of Onset    Cancer Mother     Breast cancer Mother     Hypertension Father     Cancer Maternal Grandmother 80        colon    Colon cancer Unknown      Social History     Socioeconomic History    Marital status:      Spouse name: orlin    Number of children: 2    Years of education: Not on file    Highest education level: Not on file   Occupational History    Occupation: realtor   Social Needs    Financial resource strain: Not on file    Food insecurity:     Worry: Not on file     Inability: Not on file     Transportation needs:     Medical: Not on file     Non-medical: Not on file   Tobacco Use    Smoking status: Never Smoker    Smokeless tobacco: Never Used   Substance and Sexual Activity    Alcohol use: No    Drug use: No    Sexual activity: Yes     Partners: Female   Lifestyle    Physical activity:     Days per week: Not on file     Minutes per session: Not on file    Stress: Not on file   Relationships    Social connections:     Talks on phone: Not on file     Gets together: Not on file     Attends Congregation service: Not on file     Active member of club or organization: Not on file     Attends meetings of clubs or organizations: Not on file     Relationship status: Not on file   Other Topics Concern    Not on file   Social History Narrative    Not on file     Medication List with Changes/Refills   Current Medications    ALPRAZOLAM (XANAX) 1 MG TABLET    Take 1 tablet (1 mg total) by mouth 2 (two) times daily as needed for Anxiety.    ALPRAZOLAM (XANAX) 1 MG TABLET    Take 1 tablet (1 mg total) by mouth 2 (two) times daily as needed for Anxiety.    AMOXICILLIN-CLAVULANATE 875-125MG (AUGMENTIN) 875-125 MG PER TABLET    Take 1 tablet by mouth every 12 (twelve) hours.    ASPIRIN (ECOTRIN) 81 MG EC TABLET    Take 81 mg by mouth once daily.    BUPROPION (WELLBUTRIN XL) 150 MG TB24 TABLET    Take 1 tablet (150 mg total) by mouth once daily.    CARVEDILOL (COREG) 6.25 MG TABLET    Take 1 tablet (6.25 mg total) by mouth 2 (two) times daily with meals.    DICLOFENAC (VOLTAREN) 50 MG EC TABLET    Take 1 tablet (50 mg total) by mouth 2 (two) times daily.    LEVOTHYROXINE (SYNTHROID) 112 MCG TABLET    Take 1 tablet by mouth  before breakfast    LEVOTHYROXINE (SYNTHROID) 112 MCG TABLET    TAKE 1 TABLET BY MOUTH ONCE DAILY BEFORE BREAKFAST    LOVASTATIN (MEVACOR) 40 MG TABLET    Take 1 tablet by mouth  nightly    LOVASTATIN (MEVACOR) 40 MG TABLET    TAKE ONE TABLET BY MOUTH NIGHTLY    NIFEDIPINE (ADALAT CC) 90 MG  TBSR    Take 1 tablet (90 mg total) by mouth once daily.    OMEPRAZOLE (PRILOSEC) 40 MG CAPSULE    Take 1 capsule (40 mg total) by mouth once daily.    SERTRALINE (ZOLOFT) 50 MG TABLET    Take 1 tablet (50 mg total) by mouth once daily.    SERTRALINE (ZOLOFT) 50 MG TABLET    Take 1 tablet (50 mg total) by mouth once daily.    VALSARTAN (DIOVAN) 320 MG TABLET    Take 1 tablet (320 mg total) by mouth once daily.     Review of patient's allergies indicates:   Allergen Reactions    Tobramycin-dexamethasone      Eye Drops       Review of Systems   Constitution: Negative for fever.   HENT: Negative for sore throat.    Eyes: Negative for blurred vision.   Cardiovascular: Negative for dyspnea on exertion.   Respiratory: Negative for shortness of breath.    Hematologic/Lymphatic: Does not bruise/bleed easily.   Skin: Negative for itching.   Gastrointestinal: Negative for vomiting.   Genitourinary: Negative for dysuria.   Neurological: Negative for dizziness.   Psychiatric/Behavioral: The patient does not have insomnia.        Objective:   Body mass index is 32.06 kg/m².  Vitals:    01/03/20 1107   BP: 137/79   Pulse: 76           General    Nursing note and vitals reviewed.  Constitutional: She is oriented to person, place, and time. She appears well-developed. No distress.   HENT:   Head: Normocephalic and atraumatic.   Eyes: EOM are normal.   Cardiovascular: Normal rate.    Pulmonary/Chest: Effort normal. No stridor. No respiratory distress.   Neurological: She is alert and oriented to person, place, and time.   Psychiatric: She has a normal mood and affect. Her behavior is normal.             Right Hip Exam     Range of Motion   Abduction: 20   Flexion: 90   External rotation: 40   Internal rotation: 15     Tests   Pain w/ forced internal rotation ([place]): absent  Pain w/ forced external rotation (FADIR): absent  Log Roll: negative    Other   Sensation: normal  Left Hip Exam     Inspection   No deformity of  hip.  Erythema: absent    Range of Motion   Abduction: 20   Flexion: 90   External rotation: 40   Internal rotation: 15     Tests   Pain w/ forced internal rotation ([place]): absent  Pain w/ forced external rotation (FADIR): absent  Log Roll: negative    Other   Sensation: normal    Comments:  Gentle IR and ER of hip non painful   Axial load no pain    No ttp today left ischial tuberosity and superior pubic ramus    No crepitus with hip ROM      Back (L-Spine & T-Spine) / Neck (C-Spine) Exam     Tenderness Right paramedian tenderness of the Lower L-Spine and Sacrum. Left paramedian tenderness of the Lower L-Spine and Sacrum.       Muscle Strength   Right Lower Extremity   Hip Abduction: 5/5   Hip Flexion: 5/5     Vascular Exam       Capillary Refill  Right Hand: normal capillary refill  Left Hand: normal capillary refill      IMAGING Radiographs / Imaging : Results reviewed by me and interpreted by me, discussed with the patient and / or family     X-Ray Pelvis AP Inlet And Outlet  Narrative: EXAMINATION:  XR PELVIS AP INLET AND OUTLET    CLINICAL HISTORY:  Pain in right hip    TECHNIQUE:  12/03/2019    COMPARISON:  12/03/2019    FINDINGS:  Previously described fracture deformity involving the left pubic rami are again noted and similar in appearance.  There is a possible nondisplaced partially healed fracture deformity involving the right pubis which was not well demonstrated on prior.  Visualized osseous structures appear diffusely osteopenic.  Impression: As above    Electronically signed by: Thaddeus Burris MD  Date:    01/03/2020  Time:    11:22  X-Ray Hip 3 or 4 views Bilateral  Narrative: EXAMINATION:  XR HIP 3 OR 4 VIEWS BILATERAL    CLINICAL HISTORY:  Pain in right hip    TECHNIQUE:  AP view of the pelvis and frogleg lateral views of both hips were performed.    COMPARISON:  11/07/2019    FINDINGS:  Previously described fracture deformity of the left superior ramus appears more conspicuous with some  increased interval marginal callus production noted.  There are partially healed nondisplaced fracture deformities of the left inferior pubic ramus and right pubis which were not well demonstrated on prior.  There are mild degenerative findings present at the right hip.  Degenerative changes also noted throughout the visualized lower lumbar spine.  Impression: As Above.    Electronically signed by: Thaddeus Burris MD  Date:    01/03/2020  Time:    11:17        Assessment:     Encounter Diagnoses   Name Primary?    Low back pain, unspecified back pain laterality, unspecified chronicity, unspecified whether sciatica present Yes    Bilateral hip pain         Plan:     Discussed I think pelvic fractures are healing well  This pain seems to be more SI joint or lower back in etiology  Recommend referral to PM for evaluation and possible treatment  Cleo Ford is very agreeable with above noted plan, and all questions answered to full satisfaction today.

## 2020-01-06 ENCOUNTER — OFFICE VISIT (OUTPATIENT)
Dept: PAIN MEDICINE | Facility: CLINIC | Age: 70
End: 2020-01-06
Payer: MEDICARE

## 2020-01-06 VITALS
SYSTOLIC BLOOD PRESSURE: 145 MMHG | BODY MASS INDEX: 31.89 KG/M2 | RESPIRATION RATE: 20 BRPM | WEIGHT: 180 LBS | DIASTOLIC BLOOD PRESSURE: 86 MMHG | HEIGHT: 63 IN | HEART RATE: 75 BPM

## 2020-01-06 DIAGNOSIS — M70.71 ISCHIAL BURSITIS OF RIGHT SIDE: ICD-10-CM

## 2020-01-06 DIAGNOSIS — M47.816 LUMBAR SPONDYLOSIS: ICD-10-CM

## 2020-01-06 DIAGNOSIS — M53.3 SACROILIAC JOINT PAIN: Primary | ICD-10-CM

## 2020-01-06 PROCEDURE — 3079F PR MOST RECENT DIASTOLIC BLOOD PRESSURE 80-89 MM HG: ICD-10-PCS | Mod: CPTII,S$GLB,, | Performed by: ANESTHESIOLOGY

## 2020-01-06 PROCEDURE — 1159F PR MEDICATION LIST DOCUMENTED IN MEDICAL RECORD: ICD-10-PCS | Mod: S$GLB,,, | Performed by: ANESTHESIOLOGY

## 2020-01-06 PROCEDURE — 99204 PR OFFICE/OUTPT VISIT, NEW, LEVL IV, 45-59 MIN: ICD-10-PCS | Mod: S$GLB,,, | Performed by: ANESTHESIOLOGY

## 2020-01-06 PROCEDURE — 99204 OFFICE O/P NEW MOD 45 MIN: CPT | Mod: S$GLB,,, | Performed by: ANESTHESIOLOGY

## 2020-01-06 PROCEDURE — 3077F SYST BP >= 140 MM HG: CPT | Mod: CPTII,S$GLB,, | Performed by: ANESTHESIOLOGY

## 2020-01-06 PROCEDURE — 1125F PR PAIN SEVERITY QUANTIFIED, PAIN PRESENT: ICD-10-PCS | Mod: S$GLB,,, | Performed by: ANESTHESIOLOGY

## 2020-01-06 PROCEDURE — 99999 PR PBB SHADOW E&M-EST. PATIENT-LVL V: ICD-10-PCS | Mod: PBBFAC,,, | Performed by: ANESTHESIOLOGY

## 2020-01-06 PROCEDURE — 3077F PR MOST RECENT SYSTOLIC BLOOD PRESSURE >= 140 MM HG: ICD-10-PCS | Mod: CPTII,S$GLB,, | Performed by: ANESTHESIOLOGY

## 2020-01-06 PROCEDURE — 1101F PR PT FALLS ASSESS DOC 0-1 FALLS W/OUT INJ PAST YR: ICD-10-PCS | Mod: CPTII,S$GLB,, | Performed by: ANESTHESIOLOGY

## 2020-01-06 PROCEDURE — 1101F PT FALLS ASSESS-DOCD LE1/YR: CPT | Mod: CPTII,S$GLB,, | Performed by: ANESTHESIOLOGY

## 2020-01-06 PROCEDURE — 99999 PR PBB SHADOW E&M-EST. PATIENT-LVL V: CPT | Mod: PBBFAC,,, | Performed by: ANESTHESIOLOGY

## 2020-01-06 PROCEDURE — 3079F DIAST BP 80-89 MM HG: CPT | Mod: CPTII,S$GLB,, | Performed by: ANESTHESIOLOGY

## 2020-01-06 PROCEDURE — 1159F MED LIST DOCD IN RCRD: CPT | Mod: S$GLB,,, | Performed by: ANESTHESIOLOGY

## 2020-01-06 PROCEDURE — 1125F AMNT PAIN NOTED PAIN PRSNT: CPT | Mod: S$GLB,,, | Performed by: ANESTHESIOLOGY

## 2020-01-06 NOTE — LETTER
January 6, 2020      Chalino Kay MD  [address] [place] [address] [address] [place] [id number]           [facility]  [address] [facility]  [place] [address]  Phone: [phone]  Fax: [phone]          Patient: Cleo Ford   MR Number: [id number]   YOB: 1950   Date of Visit: 1/6/2020       Dear Dr. Chalino Kay:    Thank you for referring Cleo Ford to me for evaluation. Attached you will find relevant portions of my assessment and plan of care.    If you have questions, please do not hesitate to call me. I look forward to following Cleo Ford along with you.    Sincerely,    Rudi Fajardo MD    Enclosure  CC:  No Recipients    If you would like to receive this communication electronically, please contact [email or web address] or [phone] to request more information on [employer] Link access.    For providers and/or their staff who would like to refer a patient to [facility], please contact us through our one-stop-shop provider referral line, [facility], at [phone].    If you feel you have received this communication in error or would no longer like to receive these types of communications, please e-mail [email or web address]

## 2020-01-06 NOTE — H&P (VIEW-ONLY)
Chief Pain Complaint:  Low-back Pain and Hip Pain        History of Present Illness:   Cleo Ford is a 69 y.o. female  who is presenting with a chief complaint of Low-back Pain and Hip Pain  . The patient began experiencing this problem insidiously, and the pain has been gradually worsening over the past 2 week(s). The pain is described as throbbing, cramping, aching and heavy and is located in the right buttock. Pain is intermittent and lasts hours. The pain is nonradiating. The patient rates her pain a 7 out of ten and interferes with activities of daily living a 8 out of ten. Pain is exacerbated by getting up from a seated position, and is improved by rest. Patient reports prior trauma, no prior spinal surgery     - pertinent negatives: No fever, No chills, No weight loss, No bladder dysfunction, No bowel dysfunction, No saddle anesthesia  - pertinent positives: none    - medications, other therapies tried (physical therapy, injections):     >> NSAIDs and Tylenol    >> Has previously undergone Physical Therapy    >> Has NOT previously undergone spinal injection/s      Imaging / Labs / Studies (reviewed on 1/6/2020):    Results for orders placed during the hospital encounter of 06/12/18   X-Ray Lumbar Spine Ap And Lateral    Narrative EXAMINATION:  XR LUMBAR SPINE AP AND LATERAL    CLINICAL HISTORY:  flank pain, right hip and flank. consider leg length discrepency;Unspecified abdominal pain    TECHNIQUE:  AP, lateral and spot images were performed of the lumbar spine.    COMPARISON:  None    FINDINGS:  There is anatomic spinal alignment.  Degenerative vertebral endplate lipping and bilateral facet arthropathy present at multiple levels, most pronounced at L4-5 and L5-S1.  No significant disc space narrowing.  Mild grade 1 anterolisthesis of L4 on L5.  No compression fracture.      Impression As above.      Electronically signed by: ANTONIO Ma MD  Date:    06/12/2018  Time:    11:33     Results for  "orders placed during the hospital encounter of 10/30/18   X-Ray Cervical Spine AP And Lateral    Narrative EXAMINATION:  XR CERVICAL SPINE AP LATERAL    CLINICAL HISTORY:  Pain in right arm    TECHNIQUE:  AP, lateral and open mouth views of the cervical spine were performed.    COMPARISON:  None.    FINDINGS:  Bones are demineralized.  No acute fracture.  Multilevel lower cervical facet arthropathy.  No listhesis.  Odontoid is intact.  Prevertebral soft tissues are maintained.  Dental hardware is incidentally noted.      Impression As above      Electronically signed by: Kamari Cross MD  Date:    10/30/2018  Time:    16:11     Review of Systems:  CONSTITUTIONAL: patient denies any fever, chills, or weight loss  SKIN: patient denies any rash or itching  RESPIRATORY: patient denies having any shortness of breath  GASTROINTESTINAL: patient denies having any diarrhea, constipation, or bowel incontinence  GENITOURINARY: patient denies having any abnormal bladder function    MUSCULOSKELETAL:  - patient complains of the above noted pain/s (see chief pain complaint)    NEUROLOGICAL:   - pain as above  - strength in Lower extremities is intact, BILATERALLY  - sensation in Lower extremities is intact, BILATERALLY  - patient denies any loss of bowel or bladder control      PSYCHIATRIC: patient denies any change in mood    Other:  All other systems reviewed and are negative      Physical Exam:  BP (!) 145/86 (BP Location: Right arm, Patient Position: Sitting, BP Method: Medium (Automatic))   Pulse 75   Resp 20   Ht 5' 3" (1.6 m)   Wt 81.6 kg (180 lb)   BMI 31.89 kg/m²  (reviewed on 1/6/2020)  General: Alert and oriented, in no apparent distress.  Gait: normal gait.  Skin: No rashes, No discoloration, No obvious lesions  HEENT: Normocephalic, atraumatic. Pupils equal and round.  Cardiovascular: Regular rate and rhythm , no significant peripheral edema present  Respiratory: Without audible wheezing, without use of accessory " muscles of respiration.    Musculoskeletal:    Cervical Spine    - Pain on flexion of cervical spine Absent  - Spurling's Test:  Absent    - Pain on extension of cervical spine Absent  - TTP over the cervical facet joints Absent  - Cervical facet loading Absent      Lumbar Spine    - Pain on flexion of lumbar spine Absent  - Straight Leg Raise:  Absent    - Pain on extension of lumbar spine Absent  - TTP over the lumbar facet joints Absent  - Lumbar facet loading Absent    -Pain on palpation over the SI joint  Present on right  - [place]: Present  -TTP over right ischial bursa      Neuro:    Strength:  UE R/L: D: 5/5; B: 5/5; T: 5/5; WF: 5/5; WE: 5/5; IO: 5/5;  LE R/L: HF: 5/5, HE: 5/5, KF: 5/5; KE: 5/5; FE: 5/5; FF: 5/5    Extremity Reflexes: Brisk and symmetric throughout.      Extremity Sensory: Sensation to pinprick and temperature symmetric. Proprioception intact.      Psych:  Mood and affect is appropriate      Assessment:    Cleo Ford is a 69 y.o. year old female who is presenting with    Encounter Diagnoses   Name Primary?    Lumbar spondylosis Yes    Sacroiliac joint pain     Ischial bursitis of right side      Patient has a history of left superior and inferior ramus fractures and likely sacral compression fracture. Patient is no longer having pain on the left.     Plan:    1. Interventional: Schedule patient for Right SI joint and Right ischial bursa injection with RN IV sedation.    2. Pharmacologic: Tylenol PRN.    3. Rehabilitative: Continue PT.     4. Diagnostic: None for now.    5. Follow up: Post Injection.     20 minutes were spent in this encounter with more than 50% of the time used for counseling and review of the plan.  Imaging / studies reviewed, detailed above.  I discussed in detail the risks, benefits, and alternatives to any and all potential treatment options.  All questions and concerns were fully addressed today in clinic. Medical decision making moderate.    Thank you for  the opportunity to assist in the care of this patient.    Best wishes,    Signed:    Rudi Fajardo MD          Disclaimer:  This note may have been prepared using voice recognition software, it may have not been extensively proofed, as such there could be errors within the text such as sound alike errors.

## 2020-01-07 ENCOUNTER — CLINICAL SUPPORT (OUTPATIENT)
Dept: REHABILITATION | Facility: HOSPITAL | Age: 70
End: 2020-01-07
Payer: MEDICARE

## 2020-01-07 DIAGNOSIS — M62.81 PROXIMAL MUSCLE WEAKNESS: ICD-10-CM

## 2020-01-07 DIAGNOSIS — M25.652 DECREASED RANGE OF LEFT HIP MOVEMENT: ICD-10-CM

## 2020-01-07 DIAGNOSIS — R26.9 GAIT ABNORMALITY: ICD-10-CM

## 2020-01-07 PROCEDURE — 97110 THERAPEUTIC EXERCISES: CPT

## 2020-01-07 PROCEDURE — 97014 ELECTRIC STIMULATION THERAPY: CPT

## 2020-01-07 PROCEDURE — 97140 MANUAL THERAPY 1/> REGIONS: CPT

## 2020-01-09 NOTE — PRE-PROCEDURE INSTRUCTIONS
Spoke with  patient     regarding procedure scheduled on 1/16/2020  Arrival time 1/16/2020  Has patient been sick with fever or on antibiotics within the last 7 days? no  Has patient received a vaccination within the last 7 days?no  Has the patient stopped all medications as directed? Not required  Does patient have a pacemaker and or defibrillator? no  Does the patient have a ride to and from procedure and someone reliable to remain with patient? Yes,  Henrry  Is the patient diabetic? no  Does the patient have sleep apnea? Or use O2 at home? No, no  Is the patient receiving sedation? yes  Is the patient instructed to remain NPO beginning at midnight the night before their procedure? yes  Procedure location confirmed with patient? yes

## 2020-01-10 ENCOUNTER — CLINICAL SUPPORT (OUTPATIENT)
Dept: REHABILITATION | Facility: HOSPITAL | Age: 70
End: 2020-01-10
Payer: MEDICARE

## 2020-01-10 DIAGNOSIS — R26.9 GAIT ABNORMALITY: ICD-10-CM

## 2020-01-10 DIAGNOSIS — M62.81 PROXIMAL MUSCLE WEAKNESS: ICD-10-CM

## 2020-01-10 DIAGNOSIS — M25.652 DECREASED RANGE OF LEFT HIP MOVEMENT: ICD-10-CM

## 2020-01-10 PROCEDURE — 97110 THERAPEUTIC EXERCISES: CPT

## 2020-01-10 PROCEDURE — 97014 ELECTRIC STIMULATION THERAPY: CPT

## 2020-01-10 PROCEDURE — 97140 MANUAL THERAPY 1/> REGIONS: CPT

## 2020-01-12 ENCOUNTER — PATIENT MESSAGE (OUTPATIENT)
Dept: PAIN MEDICINE | Facility: HOSPITAL | Age: 70
End: 2020-01-12

## 2020-01-13 NOTE — PROGRESS NOTES
"  Physical Therapy Daily Treatment Note     Name: Cleo Ford  Clinic Number: [id number]    Therapy Diagnosis:   Encounter Diagnoses   Name Primary?    Gait abnormality     Proximal muscle weakness     Decreased range of left hip movement      Physician: Chalino Kay MD    Visit Date: 1/10/2020    Physician Orders: PT Eval and Treat (focus on hip ROM and stretching)   Medical Diagnosis from Referral: Multiple closed fractures of pelvis without disruption of pelvic ring   Evaluation Date: 12/16/2019  Authorization Period Expiration: 12/31/19  Plan of Care Expiration: 1/13/20  Visit # / Visits authorized: 7 (6 of 12)      Precautions: Standard, Fall and Weightbearing    Time In: 10:00 am  Time Out: 11:00 am  Total Billable Time: 60 minutes    SUBJECTIVE   Date of onset: ~6 weeks ago   History of current condition - Rut is a 69 y.o. female whom reports hx of fall ~ 6 weeks ago; states she fell off the tailgate of her truck and landed on the L hip. Pt was initially utilizing a wheelchair but has recently started walking with a walker. She states that she easily gets tired and pain in the hip when walking for prolonged periods. Pt states she was in [place] for vacation last week and forced to walk around a lot; she states that she think this helped her with her mobility tremendously. She is currently taking tylenol every 4 hours to "stay ahead of the pain." Reports most pain when sitting or standing for a long period of time, walking, laying in bed, and rolling over in bed. States that pain when sitting depends on which position she is sitting in; reports pain on the L sit-bone (ischial tuberosity). States she is doing better with getting in and out of the car because she has learned how to pivot on the right leg. Pt states she is not currently doing any other exercise besides walking. Pt states she is currently sleeping on her back because this is the most comfortable position she can get in. She is " "not utilizing pillows under the knees. Pt states she prefers to sleep on her sides; she has attempted to lay on the R side but cannot stay in this position for long due to pain; states she has not attempted to lay on the side with pillows between her legs.   Today, pt reports: she felt "pretty good" on the day following her previous PT session; however, states that she had to move around a lot yesterday which led to increased pain again. Pt continues to need her rollator for ambulation.   She was compliant with home exercise program.  Response to previous treatment: decreased pain in posterior R hip following manual therapy provided previous session.   Functional change: pt utilizing rollator for ambulation.     Pre-Treatment Pain: 2/10 L pelvis; 8/10 R hip   Post-Treatment Pain: 2/10 L pelvis; 8/10 R hip   Location: L pelvis   TREATMENT     Eboni received therapeutic exercises to develop strength, endurance, flexibility and core stabilization for 30 minutes including:    Exercise 1/10/2020   Clamshells     LAQ     Hamstring Curls     Heel Toe Raises     Calf Raises     Bike (for LE ROM, strength and endurance)    Calf Stretch         Rocker Board     Ankle Plantarflexion     Ankle Dorsiflexion     Belt block  30 x 3 sec hold    Piriformis Stretch  2 x 1' on R    Glute Stretch  2 x 1' on R    Hamstring stretch  2 x 1' on R    Lower trunk rotations  30 x B    LE ball roll outs  3 x 10, red band at UE    Supine clamshell  Red band, 3 x 10    Prone hip internal rotation  Red band, 3 x 10    Sit to stand  3 x 10, [facility]       Eboni received the following manual therapy techniques: Soft tissue Mobilization were applied to the: L LE for 20 minutes, including:  STM to R glutes and piriformis       Eboni participated in neuromuscular re-education activities to improve: Balance, Kinesthetic and Proprioception for x minutes. The following activities were included:    Exercise 1/10/2020   [facility] Step Overs     Tandem " Stance     Single leg stance                               Ice and electrical stimulation applied to the L posterior hip for 10 minutes.       Home Exercises Provided and Patient Education Provided     Education/Self-Care provided:    Patient educated on biomechanical justification for therapeutic exercise and importance of compliance with HEP in order to improve overall impairments and QOL     Written Home Exercises Provided: yes.  Exercises were reviewed and Eboni was able to demonstrate them prior to the end of the session.  Eboni demonstrated good  understanding of the education provided.     See EMR under Media for exercises provided 12/20/2019.    ASSESSMENT   Pt tolerated manual therapy well with reports of tenderness to touch with soft tissue mobilization and decreased pain following intervention. Patient able to tolerate increased exercise today due to decreased pain compared to previous session. Patient reports more soreness in L hip and pelvis with exercise today than on R.  Pt tolerates heat and electrical stimulation well and states that this helps with her pain. Patient continues to require use of rollator for ambulation due to pain.     Eboni is progressing well towards her goals.   Pt prognosis is Good.     Pt will continue to benefit from skilled outpatient physical therapy to address the deficits listed in the problem list box on initial evaluation, provide pt/family education and to maximize pt's level of independence in the home and community environment.     Pt's spiritual, cultural and educational needs considered and pt agreeable to plan of care and goals.     Anticipated Barriers for therapy: co-morbidities, sedentary lifestyle and adherence to treatment plan    GOALS:     Short Term Goals:  2 weeks     1. Pain: Pt will demonstrate improved pain by reports of less than or equal to 4/10 worst pain on the verbal rating scale in order to progress toward maximal functional ability and improve  QOL.     2. Function: Patient will demonstrate improved function as indicated by a score of greater than or equal to 55% on the Lower Extremity Functional Scale     3. Mobility: Patient will improve AROM to 50% of stated goals, listed in objective measures above, in order to progress towards independence with functional activities.      4. Strength: Patient will improve strength to 50% of stated goals, listed in objective measures above, in order to progress towards independence with functional activities.      5. Gait: Patient will demonstrate improved gait mechanics including symmetrical stance time B, normalized gait speed and normalized JOSE, in order to improve functional mobility, improve quality of life, and decrease risk of further injury or fall.      6. HEP: Patient will demonstrate independence with HEP in order to progress toward functional independence.        Long Term Goals:  4 weeks     1. Pain: Pt will demonstrate improved pain by reports of less than or equal to 2/10 worst pain on the verbal rating scale in order to progress toward maximal functional ability and improve QOL.       2. Function: Patient will demonstrate improved function as indicated by a score of greater than or equal to 70% on the Lower Extremity Functional Scale     3. Mobility: Patient will improve AROM to stated goals, listed in objective measures above, in order to return to maximal functional potential and improve quality of life.     4. Strength: Patient will improve strength to stated goals, listed in objective measures above, in order to improve functional independence and quality of life.     5. Gait: Patient will demonstrate normalized gait mechanics with minimal compensation in order to return to PLOF.     6. Patient will return to normal ADL's, IADL's, community involvement, recreational activities, and work-related activities with less than or equal to 2/10 pain and maximal function.             PLAN   Continue Plan of  Care (POC) and progress per patient tolerance.    Evaluation: 12/6/19  POC Expiration: 1/13/19    Anabela Ho PT, DPT

## 2020-01-13 NOTE — PROGRESS NOTES
"  Physical Therapy Daily Treatment Note     Name: Cleo Ford  Clinic Number: [id number]    Therapy Diagnosis:   Encounter Diagnoses   Name Primary?    Gait abnormality     Proximal muscle weakness     Decreased range of left hip movement      Physician: Chalino Kay MD    Visit Date: 1/7/2020    Physician Orders: PT Eval and Treat (focus on hip ROM and stretching)   Medical Diagnosis from Referral: Multiple closed fractures of pelvis without disruption of pelvic ring   Evaluation Date: 12/16/2019  Authorization Period Expiration: 12/31/19  Plan of Care Expiration: 1/13/20  Visit # / Visits authorized: 6 (5 of 12)      Precautions: Standard, Fall and Weightbearing    Time In: 9:00 am  Time Out: 10:00 am  Total Billable Time: 60 minutes    SUBJECTIVE   Date of onset: ~6 weeks ago   History of current condition - Rut is a 69 y.o. female whom reports hx of fall ~ 6 weeks ago; states she fell off the tailgate of her truck and landed on the L hip. Pt was initially utilizing a wheelchair but has recently started walking with a walker. She states that she easily gets tired and pain in the hip when walking for prolonged periods. Pt states she was in [place] for vacation last week and forced to walk around a lot; she states that she think this helped her with her mobility tremendously. She is currently taking tylenol every 4 hours to "stay ahead of the pain." Reports most pain when sitting or standing for a long period of time, walking, laying in bed, and rolling over in bed. States that pain when sitting depends on which position she is sitting in; reports pain on the L sit-bone (ischial tuberosity). States she is doing better with getting in and out of the car because she has learned how to pivot on the right leg. Pt states she is not currently doing any other exercise besides walking. Pt states she is currently sleeping on her back because this is the most comfortable position she can get in. She is not " "utilizing pillows under the knees. Pt states she prefers to sleep on her sides; she has attempted to lay on the R side but cannot stay in this position for long due to pain; states she has not attempted to lay on the side with pillows between her legs.   Today, pt reports: she had to cancel her previous appointment due to intense R hip pain which she states onset insidiously. Pt states L hip and pelvis had been feeling "pretty good" and that she was using no assistive device and walking better. She states that R hip pain feels worse than L hip pain ever has; so bad that pt has been using rollator for ambulation.  She was compliant with home exercise program.  Response to previous treatment: decreased pain following ice and estim  Functional change: pt utilizing rollator for ambulation.     Pre-Treatment Pain: 2/10 L pelvis; 8/10 R hip   Post-Treatment Pain: 2/10 L pelvis; 8/10 R hip   Location: L pelvis   TREATMENT     Eboni received therapeutic exercises to develop strength, endurance, flexibility and core stabilization for 30 minutes including:    Exercise 1/7/2020   Nickolas     LAQ  2#, 2 x 10 B    Hamstring Curls  Green band, 3 x 10 B seated    Heel Toe Raises     Calf Raises     Bike (for LE ROM, strength and endurance)    Calf Stretch     Abdominal Bracing  20 x 5 sec hold    Rocker Board     Ankle Plantarflexion     Ankle Dorsiflexion     Belt block  30 x 3 sec hold    Piriformis Stretch  2 x 1' on R    Glute Stretch  2 x 1' on R    Hamstring stretch  2 x 1' on R            Eboni received the following manual therapy techniques: Soft tissue Mobilization were applied to the: L LE for 15 minutes, including:  STM to R glutes and piriformis       Eboni participated in neuromuscular re-education activities to improve: Balance, Kinesthetic and Proprioception for x minutes. The following activities were included:    Exercise 1/7/2020   [facility] Step Overs     Tandem Stance     Single leg stance                       " "        Ice and electrical stimulation applied to the L posterior hip for 15 minutes.       Home Exercises Provided and Patient Education Provided     Education/Self-Care provided:    Patient educated on biomechanical justification for therapeutic exercise and importance of compliance with HEP in order to improve overall impairments and QOL     Written Home Exercises Provided: yes.  Exercises were reviewed and Eboni was able to demonstrate them prior to the end of the session.  Eboni demonstrated good  understanding of the education provided.     See EMR under Media for exercises provided 12/20/2019.    ASSESSMENT   Pt tolerated manual therapy well with reports of tenderness to touch with soft tissue mobilization and decreased pain while still laying down following intervention. However, pt states no change in pain when standing. Pt able to tolerate minimal exercise due to R posterior hip pain. She states the L hip and pelvis feel "pretty good" but she is unsure if this is due to the R hip pain being so intense that it is masking the L side. Pt tolerates heat and electrical stimulation well and states that this helps with her pain. Patient requires the use of a rollator for ambulation due to pain; otherwise, no significant changes in gait.       Eboni is progressing well towards her goals.   Pt prognosis is Good.     Pt will continue to benefit from skilled outpatient physical therapy to address the deficits listed in the problem list box on initial evaluation, provide pt/family education and to maximize pt's level of independence in the home and community environment.     Pt's spiritual, cultural and educational needs considered and pt agreeable to plan of care and goals.     Anticipated Barriers for therapy: co-morbidities, sedentary lifestyle and adherence to treatment plan    GOALS:     Short Term Goals:  2 weeks     1. Pain: Pt will demonstrate improved pain by reports of less than or equal to 4/10 worst pain " on the verbal rating scale in order to progress toward maximal functional ability and improve QOL.     2. Function: Patient will demonstrate improved function as indicated by a score of greater than or equal to 55% on the Lower Extremity Functional Scale     3. Mobility: Patient will improve AROM to 50% of stated goals, listed in objective measures above, in order to progress towards independence with functional activities.      4. Strength: Patient will improve strength to 50% of stated goals, listed in objective measures above, in order to progress towards independence with functional activities.      5. Gait: Patient will demonstrate improved gait mechanics including symmetrical stance time B, normalized gait speed and normalized JOSE, in order to improve functional mobility, improve quality of life, and decrease risk of further injury or fall.      6. HEP: Patient will demonstrate independence with HEP in order to progress toward functional independence.        Long Term Goals:  4 weeks     1. Pain: Pt will demonstrate improved pain by reports of less than or equal to 2/10 worst pain on the verbal rating scale in order to progress toward maximal functional ability and improve QOL.       2. Function: Patient will demonstrate improved function as indicated by a score of greater than or equal to 70% on the Lower Extremity Functional Scale     3. Mobility: Patient will improve AROM to stated goals, listed in objective measures above, in order to return to maximal functional potential and improve quality of life.     4. Strength: Patient will improve strength to stated goals, listed in objective measures above, in order to improve functional independence and quality of life.     5. Gait: Patient will demonstrate normalized gait mechanics with minimal compensation in order to return to PLOF.     6. Patient will return to normal ADL's, IADL's, community involvement, recreational activities, and work-related activities  with less than or equal to 2/10 pain and maximal function.             PLAN   Continue Plan of Care (POC) and progress per patient tolerance.    Evaluation: 12/6/19  POC Expiration: 1/13/19    Anabela Ho PT, DPT

## 2020-01-14 ENCOUNTER — CLINICAL SUPPORT (OUTPATIENT)
Dept: REHABILITATION | Facility: HOSPITAL | Age: 70
End: 2020-01-14
Payer: MEDICARE

## 2020-01-14 DIAGNOSIS — M25.652 DECREASED RANGE OF LEFT HIP MOVEMENT: ICD-10-CM

## 2020-01-14 DIAGNOSIS — R26.9 GAIT ABNORMALITY: ICD-10-CM

## 2020-01-14 DIAGNOSIS — M62.81 PROXIMAL MUSCLE WEAKNESS: ICD-10-CM

## 2020-01-14 PROCEDURE — 97110 THERAPEUTIC EXERCISES: CPT

## 2020-01-14 PROCEDURE — 97140 MANUAL THERAPY 1/> REGIONS: CPT

## 2020-01-14 PROCEDURE — 97112 NEUROMUSCULAR REEDUCATION: CPT

## 2020-01-16 ENCOUNTER — HOSPITAL ENCOUNTER (OUTPATIENT)
Facility: HOSPITAL | Age: 70
Discharge: HOME | End: 2020-01-16
Attending: ANESTHESIOLOGY | Admitting: ANESTHESIOLOGY
Payer: MEDICARE

## 2020-01-16 VITALS
TEMPERATURE: 98 F | SYSTOLIC BLOOD PRESSURE: 172 MMHG | RESPIRATION RATE: 17 BRPM | OXYGEN SATURATION: 99 % | HEART RATE: 85 BPM | DIASTOLIC BLOOD PRESSURE: 73 MMHG | HEIGHT: 63 IN | BODY MASS INDEX: 31.89 KG/M2 | WEIGHT: 180 LBS

## 2020-01-16 DIAGNOSIS — M53.3 SACROILIAC JOINT DYSFUNCTION: Primary | ICD-10-CM

## 2020-01-16 PROCEDURE — 27096 INJECT SACROILIAC JOINT: CPT | Mod: RT,,, | Performed by: ANESTHESIOLOGY

## 2020-01-16 PROCEDURE — 25000003 PHARM REV CODE 250: Performed by: ANESTHESIOLOGY

## 2020-01-16 PROCEDURE — 63600175 PHARM REV CODE 636 W HCPCS: Performed by: ANESTHESIOLOGY

## 2020-01-16 PROCEDURE — 99152 MOD SED SAME PHYS/QHP 5/>YRS: CPT | Performed by: ANESTHESIOLOGY

## 2020-01-16 PROCEDURE — 99152 MOD SED SAME PHYS/QHP 5/>YRS: CPT | Mod: ,,, | Performed by: ANESTHESIOLOGY

## 2020-01-16 PROCEDURE — 99152 PR MOD CONSCIOUS SEDATION, SAME PHYS, 5+ YRS, FIRST 15 MIN: ICD-10-PCS | Mod: ,,, | Performed by: ANESTHESIOLOGY

## 2020-01-16 PROCEDURE — 25500020 PHARM REV CODE 255: Performed by: ANESTHESIOLOGY

## 2020-01-16 PROCEDURE — 20610 DRAIN/INJ JOINT/BURSA W/O US: CPT | Mod: 59,RT,, | Performed by: ANESTHESIOLOGY

## 2020-01-16 PROCEDURE — 20610 DRAIN/INJ JOINT/BURSA W/O US: CPT | Performed by: ANESTHESIOLOGY

## 2020-01-16 PROCEDURE — 27096 INJECT SACROILIAC JOINT: CPT | Performed by: ANESTHESIOLOGY

## 2020-01-16 PROCEDURE — 20610 PR DRAIN/INJECT LARGE JOINT/BURSA: ICD-10-PCS | Mod: 59,RT,, | Performed by: ANESTHESIOLOGY

## 2020-01-16 PROCEDURE — 27096 PR INJECTION,SACROILIAC JOINT: ICD-10-PCS | Mod: RT,,, | Performed by: ANESTHESIOLOGY

## 2020-01-16 RX ORDER — METHYLPREDNISOLONE ACETATE 40 MG/ML
INJECTION, SUSPENSION INTRA-ARTICULAR; INTRALESIONAL; INTRAMUSCULAR; SOFT TISSUE
Status: DISCONTINUED | OUTPATIENT
Start: 2020-01-16 | End: 2020-01-16 | Stop reason: HOSPADM

## 2020-01-16 RX ORDER — LIDOCAINE HYDROCHLORIDE 20 MG/ML
INJECTION, SOLUTION EPIDURAL; INFILTRATION; INTRACAUDAL; PERINEURAL
Status: DISCONTINUED | OUTPATIENT
Start: 2020-01-16 | End: 2020-01-16 | Stop reason: HOSPADM

## 2020-01-16 RX ORDER — FENTANYL CITRATE 50 UG/ML
INJECTION, SOLUTION INTRAMUSCULAR; INTRAVENOUS
Status: DISCONTINUED | OUTPATIENT
Start: 2020-01-16 | End: 2020-01-16 | Stop reason: HOSPADM

## 2020-01-16 RX ORDER — MIDAZOLAM HYDROCHLORIDE 1 MG/ML
INJECTION, SOLUTION INTRAMUSCULAR; INTRAVENOUS
Status: DISCONTINUED | OUTPATIENT
Start: 2020-01-16 | End: 2020-01-16 | Stop reason: HOSPADM

## 2020-01-16 NOTE — OP NOTE
PROCEDURE: Sacroiliac joint, and Ischial bursa injection under fluoroscopic guidance       SIDE: Right     PROCEDURE DATE: 1/16/2020     PREOPERATIVE DIAGNOSIS: Sacroiliitis, Ischial bursitis  POSTOPERATIVE DIAGNOSIS: Sacroiliitis, Ischial bursitis    PROVIDER: Rudi Fajardo MD  Assistant(s): none    Anesthesia: IV Sedation    >> 2 mg of VERSED    >> 50 mcg of FENTANYL    INDICATION: The patient has a history of pain due to sacroiliitis and hip pain unresponsive to conservative treatments. Fluoroscopy was used to optimize visualization of needle placement and to maximize safety.    PROCEDURE DESCRIPTION: The patient was seen and identified in the preoperative area. Risks, benefits, complications, and alternatives were discussed with the patient. The patient agreed to proceed with the procedure and signed the consent. The site and side of the procedure was identified and marked.     The patient was taken to the procedural suite. The patient was positioned in prone orientation on procedure table. A time out was performed prior to any intervention. The procedure, site, side, and allergies were stated and agreed to by all present. The lumbosacral area extending to the skin overlying the femoral greater trochanter was widely prepped with ChloraPrep. The procedural site was draped in usual sterile fashion. Vital signs were closely monitored throughout this procedure.     The fluoroscopic camera was placed in contralateral oblique view and was adjusted until the anterior and posterior joint margins of the targeted sacroiliac joint aligned in linear array. The lower pole of the joint was identified, marked, and localized with 1% Lidocaine. A 25 gauge 3.5 inch spinal needle was introduced and advanced to the joint under fluoroscopic guidance. The joint space was entered and after negative aspiration, 2 mL of injectate was posited into the joint space. The needle was then withdrawn outside of the joint space and after negative  aspiration 1 mL of solution was injected outside of the joint space. The injectant solution used was comprised of 4 mL of 2 % Lidocaine and 2 mL of Methylprednisolone (40 mg/mL). This technique was performed for the above noted joint/s.    Following Sacroiliac Joint injection, the stylet was replaced and the needle was withdrawn intact. The Right ischial tuberosity was then identified with AP fluoroscopy. The target was the lower aspect of the ischial tuberosity, approximately 6:00 to the lateral border of the obturator foramen. This site was marked and 1% PF Lidocaine was used to infiltrate subcutaneous tissues at the site of entry. Subsequently, a 25 gauge 3.5 inch spinal needle was introduced and advanced to the aforementioned target area under fluoroscopic guidance. Once the osseous interface was met and following negative aspiration, 3 mL of solution was injected. No pain or paresthesia was noted on injection. Following injection, the stylet was replaced and the needle was withdrawn intact.  The skin was cleaned and bandages were applied to all needle entry sites.    Description of Findings: Not applicable    Prosthetic devices, grafts, tissues, or devices implanted: None    Specimen Removed: No    Estimated Blood Loss: minimal    COMPLICATIONS: None    DISPOSITION / PLANS: The patient was transferred to the recovery area in a stable condition for observation. The patient was reexamined prior to discharge. There was no evidence of acute neurologic injury following the procedure.  Patient was discharged from the recovery room after meeting discharge criteria. Home discharge instructions were given to the patient by the staff.

## 2020-01-16 NOTE — DISCHARGE SUMMARY
[facility]  Discharge Note       Description of Procedure: Right Sacroiliac joint and Right Ischial Bursa Injection under Fluoroscopic Guidance    Procedure Date: 1/16/2020    Admit Date: 1/16/2020  Discharge Date: 1/16/2020     Attending Physician: Scotty Grijalva   Discharge Provider: Scotty Grijalva    Preoperative Diagnosis: Sacroiliitis and Ischial Bursitis     Postoperative Diagnosis: as above, same as preoperative diagnosis    Discharged Condition: Stable    Hospital Course: Patient was admitted for an outpatient procedure. The procedure was tolerated well with no complications.    Final Diagnoses: Same as principal problem.    Disposition: Home, self-care.    Follow up/Patient Instructions:  Follow-up in clinic in 2-3 weeks.    Medications: No medications were prescribed today. The patient was advised to resume normal medication regimen without change.  Specific information was provided regarding restarting any anticoagulant/s.    Discharge Procedure Orders (must include Diet, Follow-up, Activity):  Light activity for the remainder of the day, resume normal activity tomorrow. Resume normal diet. Follow-up in clinic in 2-3 weeks.

## 2020-01-16 NOTE — DISCHARGE INSTRUCTIONS

## 2020-01-21 ENCOUNTER — CLINICAL SUPPORT (OUTPATIENT)
Dept: REHABILITATION | Facility: HOSPITAL | Age: 70
End: 2020-01-21
Payer: MEDICARE

## 2020-01-21 DIAGNOSIS — M25.652 DECREASED RANGE OF LEFT HIP MOVEMENT: ICD-10-CM

## 2020-01-21 DIAGNOSIS — M62.81 PROXIMAL MUSCLE WEAKNESS: ICD-10-CM

## 2020-01-21 DIAGNOSIS — R26.9 GAIT ABNORMALITY: ICD-10-CM

## 2020-01-21 PROCEDURE — 97140 MANUAL THERAPY 1/> REGIONS: CPT

## 2020-01-21 PROCEDURE — 97112 NEUROMUSCULAR REEDUCATION: CPT

## 2020-01-21 PROCEDURE — 97110 THERAPEUTIC EXERCISES: CPT

## 2020-01-21 PROCEDURE — 97116 GAIT TRAINING THERAPY: CPT

## 2020-01-21 NOTE — PROGRESS NOTES
"  Physical Therapy POC Update     Name: Cleo Ford  Clinic Number: [id number]    Therapy Diagnosis:   Encounter Diagnoses   Name Primary?    Gait abnormality     Proximal muscle weakness     Decreased range of left hip movement      Physician: Chalino Kay MD    Visit Date: 1/21/2020    Physician Orders: PT Eval and Treat (focus on hip ROM and stretching)   Medical Diagnosis from Referral: Multiple closed fractures of pelvis without disruption of pelvic ring   Evaluation Date: 12/16/2019  Authorization Period Expiration: 12/31/19  Plan of Care Expiration: 1/13/20  Visit # / Visits authorized: 8 (7 of 12)      Precautions: Standard, Fall and Weightbearing    Time In: 10:05 am  Time Out: 11:00 am  Total Billable Time: 55 minutes    SUBJECTIVE   Date of onset: ~6 weeks ago   History of current condition - Rut is a 69 y.o. female whom reports hx of fall ~ 6 weeks ago; states she fell off the tailgate of her truck and landed on the L hip. Pt was initially utilizing a wheelchair but has recently started walking with a walker. She states that she easily gets tired and pain in the hip when walking for prolonged periods. Pt states she was in [place] for vacation last week and forced to walk around a lot; she states that she think this helped her with her mobility tremendously. She is currently taking tylenol every 4 hours to "stay ahead of the pain." Reports most pain when sitting or standing for a long period of time, walking, laying in bed, and rolling over in bed. States that pain when sitting depends on which position she is sitting in; reports pain on the L sit-bone (ischial tuberosity). States she is doing better with getting in and out of the car because she has learned how to pivot on the right leg. Pt states she is not currently doing any other exercise besides walking. Pt states she is currently sleeping on her back because this is the most comfortable position she can get in. She is not " utilizing pillows under the knees. Pt states she prefers to sleep on her sides; she has attempted to lay on the R side but cannot stay in this position for long due to pain; states she has not attempted to lay on the side with pillows between her legs.   Today, pt reports: she has been feeling pretty good since injection in R hip last week; states she has not been using her walker for ambulation since this time   She was compliant with home exercise program.  Response to previous treatment: decreased pain in posterior R hip following manual therapy provided previous session.   Functional change: pt utilizing rollator for ambulation.     Pre-Treatment Pain: 2/10 L pelvis; 8/10 R hip   Post-Treatment Pain: 2/10 L pelvis; 8/10 R hip   Location: L pelvis     OBJECTIVE   (x = not tested due to pain and/or inability to obtain test position)     RANGE OF MOTION:     Lumbar ROM Right  (spine)  1/21/2020 Left     1/21/2020 Pain/Dysfunction with Movement Goal   Lumbar Flexion (60) 70 ---       Lumbar Side Bending (25) 15 15          Hip ROM Right  1/21/2020 Left  1/21/2020 Pain/Dysfunction with Movement Goal   Hip Flexion (120) 120 120      Hip Abduction (45) 45 45      Hip IR (45) 30 30      Hip ER (45) 45 45         STRENGTH:     L/E MMT Right  1/21/2020 Left  1/21/2020 Pain/Dysfunction with Movement Goal   Hip Flexion  4/5 4/5   5/5 B    Hip Extension  4/5 4/5 Testing modified in seated position  5/5 B   Hip Abduction  4/5 4/5 Testing modified in seated position  5/5 B   Knee Extension 4+/5 4+/5   5/5 B   Knee Flexion 4/5 4/5 Testing modified in seated position  5/5 B   Hip IR 4/5 4/5   5/5 B   Hip ER 4/5 4/5   5/5 B   Ankle DF 5/5 5/5   5/5 B   Ankle PF 5/5 5/5   5/5 B     20/20 heel raises B         MUSCLE LENGTH:      Muscle Tested  Right  1/21/2020 Left   1/21/2020 Goal   Hamstrings  decreased decreased Normal B   Piriformis  decreased decreased Normal B       Gait Analysis: The patient ambulated with the following  assistive device: none; the pt presents with the following gait abnormalities: decreased stance time on L, circumduction of L LE with trunk lean to R, bradykinetic, increased JOSE      FUNCTION:      LOWER EXTREMITY FUNCTIONAL SCALE  Patient-reported functional assessment scores are rated as follows:  A score of 0/4 represents extreme difficulty or unable to perform the activity. A score of 1/4 represents quite a bit of difficulty. A score of 2/4 represents moderate difficulty. A score of 3/4 represents a little bit of difficulty. A score of 4/4 represents no difficulty.                     12/16/2019 1/21/2020   1. Any of your usual work, housework or school activities 2/4 2/4   2. Your usual hobbies, sporting 2/4 1/4   3. Getting in and out of tub 0/4 4/4   4. Walking between rooms 2/4 4/4   5. Putting on shoes or socks 2/4 4/4   6. Squatting 2/4 3/4   7. Lifting an object from the ground             2/4 3/4   8. Performing light activities around the home 2/4 3/4   9. Performing heavy activities around the home 0/4 1/4   10. Getting in and out of car 2/4 4/4   11. Walking 2 blocks 1/4 0/4   12.Walking a mile 1/4 0/4   13. Getting up and down 1 flight of stairs 2/4 2/4   14. Standing for 1 hour 1/4 1/4   15. Sitting for an hour             4/4 2/4   16. Running on even ground             0/4 0/4   17. Running on uneven ground 0/4 0/4   18. Making sharp turns when running fast 0/4 0/4   19. Hopping             0/4 0/4   20. Rolling over in bed 0/4 3/4                             Patient reports 40% ability based on score of the Lower Extremity Functional Scale on 12/16/2019..    Patient reports 46.25% ability based on score of the Lower Extremity Functional Scale on 1/21/2020.       TREATMENT     Eboni received therapeutic exercises to develop strength, endurance, flexibility and core stabilization for 20 minutes including:    Exercise 1/21/2020   Hamstring stretch  2 min B    Bike  Level 1, 5 min   Step ups  4 in,  20 x B    Hamstring curls  Standing, 2 x 10 B with 2 second hold    Lateral resistors     Hip flexor stretch  2 x 1 min B                                    Eboni received the following manual therapy techniques: Soft tissue Mobilization were applied to the: L LE for 15 minutes, including:  STM to R glutes and piriformis. STM of proximal hamstring on L   L hip ROM and stretching in all directions   Contract relax stretch of L hamstring       Eboni participated in neuromuscular re-education activities to improve: Balance, Kinesthetic and Proprioception for 15 minutes. The following activities were included:    Exercise 1/21/2020   Tandem walking 12 x 6 feet forward and reverse, in parallel bars    Tandem stance  3 x 1 min B    Suitcase carries  7.5# weight, 6 x 70 feet   Focus on heel-toe walking                            Eboni participated in gait training to improve functional mobility and safety for 5  minutes, including:  Walking with focus on heel-to-toe pattern, equal weight bearing and step length B and minimized trunk lean.       Home Exercises Provided and Patient Education Provided     Education/Self-Care provided:    Patient educated on biomechanical justification for therapeutic exercise and importance of compliance with HEP in order to improve overall impairments and QOL     Written Home Exercises Provided: yes.  Exercises were reviewed and Eboni was able to demonstrate them prior to the end of the session.  Eboni demonstrated good  understanding of the education provided.     See EMR under Media for exercises provided 12/20/2019.    ASSESSMENT   Pt presents to clinic today without use of assistive device but continues to limp on the L LE. Pt states that she has no pain when walking but cannot figure out why she continues to limp or how to stop it. Patient instructed to walk with heel to toe pattern and demonstrated proper form and decreased compensations. Pt tolerated manual therapy well with reports  of tenderness to touch with soft tissue mobilization and decreased pain following intervention. Patient able to tolerate increased exercise tolerance today with mild soreness in L pelvis but no pain.     Eboni is progressing well towards her goals.   Pt prognosis is Good.     Pt will continue to benefit from skilled outpatient physical therapy to address the deficits listed in the problem list box on initial evaluation, provide pt/family education and to maximize pt's level of independence in the home and community environment.     Pt's spiritual, cultural and educational needs considered and pt agreeable to plan of care and goals.     Anticipated Barriers for therapy: co-morbidities, sedentary lifestyle and adherence to treatment plan    GOALS:     Short Term Goals:  2 weeks     1. Pain: Pt will demonstrate improved pain by reports of less than or equal to 4/10 worst pain on the verbal rating scale in order to progress toward maximal functional ability and improve QOL.     2. Function: Patient will demonstrate improved function as indicated by a score of greater than or equal to 55% on the Lower Extremity Functional Scale     3. Mobility: Patient will improve AROM to 50% of stated goals, listed in objective measures above, in order to progress towards independence with functional activities.      4. Strength: Patient will improve strength to 50% of stated goals, listed in objective measures above, in order to progress towards independence with functional activities.      5. Gait: Patient will demonstrate improved gait mechanics including symmetrical stance time B, normalized gait speed and normalized JOSE, in order to improve functional mobility, improve quality of life, and decrease risk of further injury or fall.      6. HEP: Patient will demonstrate independence with HEP in order to progress toward functional independence.        Long Term Goals:  4 weeks     1. Pain: Pt will demonstrate improved pain by reports  of less than or equal to 2/10 worst pain on the verbal rating scale in order to progress toward maximal functional ability and improve QOL.       2. Function: Patient will demonstrate improved function as indicated by a score of greater than or equal to 70% on the Lower Extremity Functional Scale     3. Mobility: Patient will improve AROM to stated goals, listed in objective measures above, in order to return to maximal functional potential and improve quality of life.     4. Strength: Patient will improve strength to stated goals, listed in objective measures above, in order to improve functional independence and quality of life.     5. Gait: Patient will demonstrate normalized gait mechanics with minimal compensation in order to return to PLOF.     6. Patient will return to normal ADL's, IADL's, community involvement, recreational activities, and work-related activities with less than or equal to 2/10 pain and maximal function.             PLAN   Plan of care Certification: 1/21/2020 to 2/21/2020     Outpatient Physical Therapy 2 times weekly for 4 weeks to include any combination of the following interventions: dry needling, modalities, electrical stimulation (IFC, Pre-Mod, Attended with Functional Dry Needling), Cervical/Lumbar Traction, Gait Training, Manual Therapy, Neuromuscular Re-ed, Patient Education, Self Care, Therapeutic Activites and Therapeutic Exercise     Evaluation: 12/6/19  POC Update: 1/21/2020  POC Expiration: 2/21/2020    Anabela Ho PT, DPT

## 2020-01-23 ENCOUNTER — HOSPITAL ENCOUNTER (OUTPATIENT)
Dept: RADIOLOGY | Facility: HOSPITAL | Age: 70
Discharge: HOME | End: 2020-01-23
Attending: ORTHOPAEDIC SURGERY
Payer: MEDICARE

## 2020-01-23 ENCOUNTER — DOCUMENTATION ONLY (OUTPATIENT)
Dept: REHABILITATION | Facility: HOSPITAL | Age: 70
End: 2020-01-23

## 2020-01-23 ENCOUNTER — OFFICE VISIT (OUTPATIENT)
Dept: ORTHOPEDICS | Facility: CLINIC | Age: 70
End: 2020-01-23
Payer: MEDICARE

## 2020-01-23 VITALS
DIASTOLIC BLOOD PRESSURE: 86 MMHG | WEIGHT: 180 LBS | SYSTOLIC BLOOD PRESSURE: 155 MMHG | HEIGHT: 63 IN | BODY MASS INDEX: 31.89 KG/M2 | HEART RATE: 81 BPM

## 2020-01-23 DIAGNOSIS — M25.552 ACUTE PAIN OF LEFT HIP: ICD-10-CM

## 2020-01-23 DIAGNOSIS — R52 PAIN: ICD-10-CM

## 2020-01-23 DIAGNOSIS — R10.2 PELVIC PAIN: ICD-10-CM

## 2020-01-23 DIAGNOSIS — S32.82XD MULTIPLE CLOSED FRACTURES OF PELVIS WITHOUT DISRUPTION OF PELVIC RING WITH ROUTINE HEALING: Primary | ICD-10-CM

## 2020-01-23 DIAGNOSIS — R52 PAIN: Primary | ICD-10-CM

## 2020-01-23 DIAGNOSIS — R10.2 ACUTE PELVIC PAIN: ICD-10-CM

## 2020-01-23 PROCEDURE — 1125F PR PAIN SEVERITY QUANTIFIED, PAIN PRESENT: ICD-10-PCS | Mod: S$GLB,,, | Performed by: ORTHOPAEDIC SURGERY

## 2020-01-23 PROCEDURE — 99214 PR OFFICE/OUTPT VISIT, EST, LEVL IV, 30-39 MIN: ICD-10-PCS | Mod: S$GLB,,, | Performed by: ORTHOPAEDIC SURGERY

## 2020-01-23 PROCEDURE — 72190 XR PELVIS AP INLET AND OUTLET: ICD-10-PCS | Mod: 26,,, | Performed by: RADIOLOGY

## 2020-01-23 PROCEDURE — 1101F PT FALLS ASSESS-DOCD LE1/YR: CPT | Mod: CPTII,S$GLB,, | Performed by: ORTHOPAEDIC SURGERY

## 2020-01-23 PROCEDURE — 72190 X-RAY EXAM OF PELVIS: CPT | Mod: 26,,, | Performed by: RADIOLOGY

## 2020-01-23 PROCEDURE — 3077F PR MOST RECENT SYSTOLIC BLOOD PRESSURE >= 140 MM HG: ICD-10-PCS | Mod: CPTII,S$GLB,, | Performed by: ORTHOPAEDIC SURGERY

## 2020-01-23 PROCEDURE — 1125F AMNT PAIN NOTED PAIN PRSNT: CPT | Mod: S$GLB,,, | Performed by: ORTHOPAEDIC SURGERY

## 2020-01-23 PROCEDURE — 1101F PR PT FALLS ASSESS DOC 0-1 FALLS W/OUT INJ PAST YR: ICD-10-PCS | Mod: CPTII,S$GLB,, | Performed by: ORTHOPAEDIC SURGERY

## 2020-01-23 PROCEDURE — 3079F PR MOST RECENT DIASTOLIC BLOOD PRESSURE 80-89 MM HG: ICD-10-PCS | Mod: CPTII,S$GLB,, | Performed by: ORTHOPAEDIC SURGERY

## 2020-01-23 PROCEDURE — 99214 OFFICE O/P EST MOD 30 MIN: CPT | Mod: S$GLB,,, | Performed by: ORTHOPAEDIC SURGERY

## 2020-01-23 PROCEDURE — 1159F PR MEDICATION LIST DOCUMENTED IN MEDICAL RECORD: ICD-10-PCS | Mod: S$GLB,,, | Performed by: ORTHOPAEDIC SURGERY

## 2020-01-23 PROCEDURE — 99999 PR PBB SHADOW E&M-EST. PATIENT-LVL IV: CPT | Mod: PBBFAC,,, | Performed by: ORTHOPAEDIC SURGERY

## 2020-01-23 PROCEDURE — 1159F MED LIST DOCD IN RCRD: CPT | Mod: S$GLB,,, | Performed by: ORTHOPAEDIC SURGERY

## 2020-01-23 PROCEDURE — 3077F SYST BP >= 140 MM HG: CPT | Mod: CPTII,S$GLB,, | Performed by: ORTHOPAEDIC SURGERY

## 2020-01-23 PROCEDURE — 99999 PR PBB SHADOW E&M-EST. PATIENT-LVL IV: ICD-10-PCS | Mod: PBBFAC,,, | Performed by: ORTHOPAEDIC SURGERY

## 2020-01-23 PROCEDURE — 72190 X-RAY EXAM OF PELVIS: CPT | Mod: TC

## 2020-01-23 PROCEDURE — 3079F DIAST BP 80-89 MM HG: CPT | Mod: CPTII,S$GLB,, | Performed by: ORTHOPAEDIC SURGERY

## 2020-01-23 RX ORDER — BUPROPION HYDROCHLORIDE 150 MG/1
150 TABLET ORAL DAILY
Qty: 90 TABLET | Refills: 0 | Status: SHIPPED | OUTPATIENT
Start: 2020-01-23 | End: 2020-04-27

## 2020-01-23 NOTE — PROGRESS NOTES
Subjective:     Patient ID: Cleo Ford is a 69 y.o. female.    Chief Complaint: Pain of the Left Hip    She has been having short amount of increased left pubis pain, cannot recall any specific injury. Has had to use walker more. Had to cancel her PT appointment today due to pain, wished to be evaluated today    Hip Pain    The pain is present in the right hip. The current episode started more than 1 month ago. The problem occurs constantly. The problem has been rapidly worsening. The quality of the pain is described as aching. The pain is at a severity of 10/10. Associated symptoms include an inability to bear weight and a limited range of motion. Pertinent negatives include no fever or itching. The symptoms are aggravated by activity, bearing weight, bending, walking and standing. She has tried nothing for the symptoms. Physical therapy was effective.      Past Medical History:   Diagnosis Date    Anxiety     Depression     Dysmetabolic syndrome X     Obesity     Other and unspecified hyperlipidemia     Unspecified essential hypertension     Unspecified hypothyroidism      Past Surgical History:   Procedure Laterality Date    APPENDECTOMY      CHOLECYSTECTOMY      COLONOSCOPY N/A 9/15/2016    Procedure: COLONOSCOPY;  Surgeon: Jose Daniel MD;  Location: [facility] ENDO;  Service: Endoscopy;  Laterality: N/A;    gastric sleeve      HYSTERECTOMY      INJECTION OF ANESTHETIC AGENT INTO SACROILIAC JOINT Right 1/16/2020    Procedure: Right BLOCK, SACROILIAC JOINT and Right ischial bursa inejction with RN IV sedation;  Surgeon: Rudi Fajardo MD;  Location: [facility] PAIN T;  Service: Pain Management;  Laterality: Right;    OOPHORECTOMY      1 ovary removed     Family History   Problem Relation Age of Onset    Cancer Mother     Breast cancer Mother     Hypertension Father     Cancer Maternal Grandmother 80        colon    Colon cancer Unknown      Social History     Socioeconomic History     Marital status:      Spouse name: orlin    Number of children: 2    Years of education: Not on file    Highest education level: Not on file   Occupational History    Occupation: realtor   Social Needs    Financial resource strain: Not on file    Food insecurity:     Worry: Not on file     Inability: Not on file    Transportation needs:     Medical: Not on file     Non-medical: Not on file   Tobacco Use    Smoking status: Never Smoker    Smokeless tobacco: Never Used   Substance and Sexual Activity    Alcohol use: No    Drug use: No    Sexual activity: Yes     Partners: Female   Lifestyle    Physical activity:     Days per week: Not on file     Minutes per session: Not on file    Stress: Not on file   Relationships    Social connections:     Talks on phone: Not on file     Gets together: Not on file     Attends Mosque service: Not on file     Active member of club or organization: Not on file     Attends meetings of clubs or organizations: Not on file     Relationship status: Not on file   Other Topics Concern    Not on file   Social History Narrative    Not on file     Medication List with Changes/Refills   Current Medications    ALPRAZOLAM (XANAX) 1 MG TABLET    Take 1 tablet (1 mg total) by mouth 2 (two) times daily as needed for Anxiety.    ASPIRIN (ECOTRIN) 81 MG EC TABLET    Take 81 mg by mouth once daily.    BUPROPION (WELLBUTRIN XL) 150 MG TB24 TABLET    Take 1 tablet (150 mg total) by mouth once daily.    CARVEDILOL (COREG) 6.25 MG TABLET    Take 1 tablet (6.25 mg total) by mouth 2 (two) times daily with meals.    DICLOFENAC (VOLTAREN) 50 MG EC TABLET    Take 1 tablet (50 mg total) by mouth 2 (two) times daily.    LEVOTHYROXINE (SYNTHROID) 112 MCG TABLET    Take 1 tablet by mouth  before breakfast    LEVOTHYROXINE (SYNTHROID) 112 MCG TABLET    TAKE 1 TABLET BY MOUTH ONCE DAILY BEFORE BREAKFAST    LOVASTATIN (MEVACOR) 40 MG TABLET    Take 1 tablet by mouth  nightly    NIFEDIPINE  (ADALAT CC) 90 MG TBSR    Take 1 tablet (90 mg total) by mouth once daily.    OMEPRAZOLE (PRILOSEC) 40 MG CAPSULE    Take 1 capsule (40 mg total) by mouth once daily.    SERTRALINE (ZOLOFT) 50 MG TABLET    Take 1 tablet (50 mg total) by mouth once daily.    VALSARTAN (DIOVAN) 320 MG TABLET    Take 1 tablet (320 mg total) by mouth once daily.     Review of patient's allergies indicates:   Allergen Reactions    Tobramycin-dexamethasone      Eye Drops       Review of Systems   Constitution: Negative for fever.   HENT: Negative for sore throat.    Eyes: Negative for blurred vision.   Cardiovascular: Negative for dyspnea on exertion.   Respiratory: Negative for shortness of breath.    Hematologic/Lymphatic: Does not bruise/bleed easily.   Skin: Negative for itching.   Gastrointestinal: Negative for vomiting.   Genitourinary: Negative for dysuria.   Neurological: Negative for dizziness.   Psychiatric/Behavioral: The patient does not have insomnia.        Objective:   Body mass index is 31.89 kg/m².  Vitals:    01/23/20 1608   BP: (!) 155/86   Pulse: 81           General    Nursing note and vitals reviewed.  Constitutional: She is oriented to person, place, and time. She appears well-developed. No distress.   HENT:   Head: Normocephalic and atraumatic.   Eyes: EOM are normal.   Cardiovascular: Normal rate.    Pulmonary/Chest: Effort normal. No stridor. No respiratory distress.   Neurological: She is alert and oriented to person, place, and time.   Psychiatric: She has a normal mood and affect. Her behavior is normal.             Right Hip Exam     Range of Motion   Abduction: 20   Flexion: 90   External rotation: 40   Internal rotation: 15     Tests   Pain w/ forced internal rotation ([place]): absent  Pain w/ forced external rotation (FADIR): absent  Log Roll: negative    Other   Sensation: normal  Left Hip Exam     Inspection   No deformity of hip.  Erythema: absent    Range of Motion   Abduction: 20   Flexion: 90    External rotation: 40   Internal rotation: 15     Tests   Pain w/ forced internal rotation ([place]): absent  Pain w/ forced external rotation (FADIR): absent  Log Roll: negative    Other   Sensation: normal    Comments:  Gentle IR and ER of hip non painful   Axial load no pain    She is mild to moderate TTP left superior pubic ramus, no crepitus.   No crepitus with hip ROM      Back (L-Spine & T-Spine) / Neck (C-Spine) Exam     Tenderness Right paramedian tenderness of the Lower L-Spine and Sacrum. Left paramedian tenderness of the Lower L-Spine and Sacrum.       Muscle Strength   Right Lower Extremity   Hip Abduction: 5/5   Hip Flexion: 5/5     Vascular Exam       Capillary Refill  Right Hand: normal capillary refill  Left Hand: normal capillary refill      IMAGING Radiographs / Imaging : Results reviewed by me and interpreted by me, discussed with the patient and / or family     X-Ray Pelvis AP Inlet And Outlet  Narrative: EXAMINATION:  XR PELVIS AP INLET AND OUTLET    CLINICAL HISTORY:  AP, Inlet and Outlet.;  Pain, unspecified    TECHNIQUE:  Three views of the pelvis    COMPARISON:  01/03/2020    FINDINGS:  Healing fractures of the left superior and inferior pubic rami are seen with adjacent callus formation.  Stable appearance of the right pubis.  A left sacral fracture was best appreciated on a prior CT from November 2019.  No new abnormality or detrimental change  Impression: As above    Electronically signed by: Kamari Cross MD  Date:    01/23/2020  Time:    16:20        Assessment:     Encounter Diagnoses   Name Primary?    Multiple closed fractures of pelvis without disruption of pelvic ring with routine healing Yes    Pelvic pain     Acute pain of left hip     Acute pelvic pain         Plan:     Given recent increase in pain and decreased function, recommend CT pelvis without contrast to evaluate integrity of healing to known pelvic fractures  I will call her with results  Cleo Ford is  very agreeable with above noted plan, and all questions answered to full satisfaction today.

## 2020-01-23 NOTE — PROGRESS NOTES
1/23/2020    Patient reported to therapy today with reports of increased L pelvic pain. She states that following her previous appointment she felt great and was able to walk for 2 days with no assistive device, no limp, and no pain. Patient states she went to sleep Tuesday night with no pain and woke up Wednesday morning in excruciating pain. Pt is using a Rolator to ambulate and states 10/10 pain with walking. She states that her pelvis feels like it did when she initially fractured it. Pain is deep at L pubic bone. I discussed these symptoms with Dr. Kay whom agreed to perform imaging to investigate the source of her pain. No treatment was performed today.       Anabela Ho PT, DPT

## 2020-01-28 ENCOUNTER — CLINICAL SUPPORT (OUTPATIENT)
Dept: REHABILITATION | Facility: HOSPITAL | Age: 70
End: 2020-01-28
Payer: MEDICARE

## 2020-01-28 ENCOUNTER — PATIENT MESSAGE (OUTPATIENT)
Dept: REHABILITATION | Facility: HOSPITAL | Age: 70
End: 2020-01-28

## 2020-01-28 DIAGNOSIS — M25.652 DECREASED RANGE OF LEFT HIP MOVEMENT: ICD-10-CM

## 2020-01-28 DIAGNOSIS — R26.9 GAIT ABNORMALITY: ICD-10-CM

## 2020-01-28 DIAGNOSIS — M62.81 PROXIMAL MUSCLE WEAKNESS: ICD-10-CM

## 2020-01-28 PROCEDURE — 97110 THERAPEUTIC EXERCISES: CPT

## 2020-01-28 PROCEDURE — 97112 NEUROMUSCULAR REEDUCATION: CPT

## 2020-01-28 PROCEDURE — 97140 MANUAL THERAPY 1/> REGIONS: CPT

## 2020-01-28 PROCEDURE — 97116 GAIT TRAINING THERAPY: CPT

## 2020-01-28 NOTE — PROGRESS NOTES
"  Physical Therapy Daily Treatment Note     Name: Cleo Ford  Clinic Number: [id number]    Therapy Diagnosis:   Encounter Diagnoses   Name Primary?    Gait abnormality     Proximal muscle weakness     Decreased range of left hip movement      Physician: Chalino Kay MD    Visit Date: 1/28/2020    Physician Orders: PT Eval and Treat (focus on hip ROM and stretching)   Medical Diagnosis from Referral: Multiple closed fractures of pelvis without disruption of pelvic ring   Evaluation Date: 12/16/2019  Authorization Period Expiration: 12/31/19  Plan of Care Expiration: 1/13/20  Visit # / Visits authorized: 9 (8 of 12)      Precautions: Standard, Fall and Weightbearing    Time In: 10:00 am  Time Out: 11:00 am  Total Billable Time: 60 minutes    SUBJECTIVE   Date of onset: ~6 weeks ago   History of current condition - Rut is a 69 y.o. female whom reports hx of fall ~ 6 weeks ago; states she fell off the tailgate of her truck and landed on the L hip. Pt was initially utilizing a wheelchair but has recently started walking with a walker. She states that she easily gets tired and pain in the hip when walking for prolonged periods. Pt states she was in [place] for vacation last week and forced to walk around a lot; she states that she think this helped her with her mobility tremendously. She is currently taking tylenol every 4 hours to "stay ahead of the pain." Reports most pain when sitting or standing for a long period of time, walking, laying in bed, and rolling over in bed. States that pain when sitting depends on which position she is sitting in; reports pain on the L sit-bone (ischial tuberosity). States she is doing better with getting in and out of the car because she has learned how to pivot on the right leg. Pt states she is not currently doing any other exercise besides walking. Pt states she is currently sleeping on her back because this is the most comfortable position she can get in. She is " not utilizing pillows under the knees. Pt states she prefers to sleep on her sides; she has attempted to lay on the R side but cannot stay in this position for long due to pain; states she has not attempted to lay on the side with pillows between her legs.   Today, pt reports: she had x-rays of her pelvis which showed she was healing well. Pt reports that she continued to be in pain for the rest of the day Thursday and also had pain for part of the day on Friday. Pt states mid-day on Friday the pain went away completely and she was able to walk without use of an assistive device. Pt states she did her exercises yesterday and feels some soreness in the L pubic region but no pain.   She was compliant with home exercise program.  Response to previous treatment: decreased pain in posterior R hip following manual therapy provided previous session.   Functional change: pt utilizing no assistive device with ambulation.     Pre-Treatment Pain: 1/10 L pelvis (soreness), 0/10 R hip   Post-Treatment Pain: 2/10 L pelvis; 0/10 R hip   Location: L pelvis       TREATMENT     Eboni received therapeutic exercises to develop strength, endurance, flexibility and core stabilization for 20 minutes including:    Exercise 1/28/2020   Hamstring stretch     Bike  Level 1, 5 min   Step ups     Hamstring curls     Lateral resistors  Yellow band, 12 x 6 feet (in parallel bars)    Hip flexor stretch  2 x 1 min on L with L knee    Sit to Stand 3 x 10 on HiLo mat with no UE support                                Eboni received the following manual therapy techniques: Soft tissue Mobilization were applied to the: L LE for 15 minutes, including:  STM of L illiacus, lower abdominals, and hip flexors.   L hip ROM and stretching in all directions   Passive stretching of L hip flexors and hip adductors       Eboni participated in neuromuscular re-education activities to improve: Balance, Kinesthetic and Proprioception for 15 minutes. The following  activities were included:    Exercise 1/28/2020   Tandem walking    Tandem stance  2 x 1 min B    Suitcase carries  10# weight, 8 x 70 feet   Focus on heel-toe walking    Belt Block  30 x 5 sec hold each                       Eboni participated in gait training to improve functional mobility and safety for 10  minutes, including:  Walking with focus on heel-to-toe pattern, equal weight bearing and step length B and minimized trunk lean.   Treadmill walking:   .8 mph for 5 minutes on 0 incline   .8 mph for 1.5 minute on level 2 incline   .5 mph for 1.5 minutes on 0 incline   .8 mph for 1 minute on level 2 incline       Home Exercises Provided and Patient Education Provided     Education/Self-Care provided:    Patient educated on biomechanical justification for therapeutic exercise and importance of compliance with HEP in order to improve overall impairments and QOL     Written Home Exercises Provided: yes.  Exercises were reviewed and Eboni was able to demonstrate them prior to the end of the session.  Eboni demonstrated good  understanding of the education provided.     See EMR under Media for exercises provided 12/20/2019.    ASSESSMENT   Pt presents to clinic today without use of no assistive device and decreased limp compared to previous visit. Pt tolerates manual therapy well with reports of tenderness to palpation of musculature and decreased tension and soreness in musculature following intervention. Pt tolerates treadmill walking well but demonstrates excessive trendelenburg, forward trunk lean, and decreased hip extension and toe off. Patient demonstrates improved form following continued cueing and visual feedback. Patient tolerates both walking on even ground and small incline with reports of being tired afterward. Patient tolerates all exercises well today with reports of mild fatigue but no increase in pain.     Eboni is progressing well towards her goals.   Pt prognosis is Good.     Pt will continue to  benefit from skilled outpatient physical therapy to address the deficits listed in the problem list box on initial evaluation, provide pt/family education and to maximize pt's level of independence in the home and community environment.     Pt's spiritual, cultural and educational needs considered and pt agreeable to plan of care and goals.     Anticipated Barriers for therapy: co-morbidities, sedentary lifestyle and adherence to treatment plan    GOALS:     Short Term Goals:  2 weeks     1. Pain: Pt will demonstrate improved pain by reports of less than or equal to 4/10 worst pain on the verbal rating scale in order to progress toward maximal functional ability and improve QOL.     2. Function: Patient will demonstrate improved function as indicated by a score of greater than or equal to 55% on the Lower Extremity Functional Scale     3. Mobility: Patient will improve AROM to 50% of stated goals, listed in objective measures above, in order to progress towards independence with functional activities.      4. Strength: Patient will improve strength to 50% of stated goals, listed in objective measures above, in order to progress towards independence with functional activities.      5. Gait: Patient will demonstrate improved gait mechanics including symmetrical stance time B, normalized gait speed and normalized JOSE, in order to improve functional mobility, improve quality of life, and decrease risk of further injury or fall.      6. HEP: Patient will demonstrate independence with HEP in order to progress toward functional independence.        Long Term Goals:  4 weeks     1. Pain: Pt will demonstrate improved pain by reports of less than or equal to 2/10 worst pain on the verbal rating scale in order to progress toward maximal functional ability and improve QOL.       2. Function: Patient will demonstrate improved function as indicated by a score of greater than or equal to 70% on the Lower Extremity Functional Scale      3. Mobility: Patient will improve AROM to stated goals, listed in objective measures above, in order to return to maximal functional potential and improve quality of life.     4. Strength: Patient will improve strength to stated goals, listed in objective measures above, in order to improve functional independence and quality of life.     5. Gait: Patient will demonstrate normalized gait mechanics with minimal compensation in order to return to PLOF.     6. Patient will return to normal ADL's, IADL's, community involvement, recreational activities, and work-related activities with less than or equal to 2/10 pain and maximal function.             PLAN   Plan of care Certification: 1/21/2020 to 2/21/2020     Outpatient Physical Therapy 2 times weekly for 4 weeks to include any combination of the following interventions: dry needling, modalities, electrical stimulation (IFC, Pre-Mod, Attended with Functional Dry Needling), Cervical/Lumbar Traction, Gait Training, Manual Therapy, Neuromuscular Re-ed, Patient Education, Self Care, Therapeutic Activites and Therapeutic Exercise     Evaluation: 12/6/19  POC Update: 1/21/2020  POC Expiration: 2/21/2020    Anabela Ho PT, DPT

## 2020-01-30 ENCOUNTER — CLINICAL SUPPORT (OUTPATIENT)
Dept: REHABILITATION | Facility: HOSPITAL | Age: 70
End: 2020-01-30
Payer: MEDICARE

## 2020-01-30 DIAGNOSIS — M62.81 PROXIMAL MUSCLE WEAKNESS: ICD-10-CM

## 2020-01-30 DIAGNOSIS — R26.9 GAIT ABNORMALITY: ICD-10-CM

## 2020-01-30 DIAGNOSIS — M25.652 DECREASED RANGE OF LEFT HIP MOVEMENT: ICD-10-CM

## 2020-01-30 PROCEDURE — 97140 MANUAL THERAPY 1/> REGIONS: CPT

## 2020-01-30 PROCEDURE — 97112 NEUROMUSCULAR REEDUCATION: CPT

## 2020-01-30 PROCEDURE — 97110 THERAPEUTIC EXERCISES: CPT

## 2020-01-30 NOTE — PROGRESS NOTES
"  Physical Therapy Daily Treatment Note     Name: Cleo Ford  Clinic Number: [id number]    Therapy Diagnosis:   Encounter Diagnoses   Name Primary?    Gait abnormality     Proximal muscle weakness     Decreased range of left hip movement      Physician: Rudi Fajardo MD    Visit Date: 1/30/2020    Physician Orders: PT Eval and Treat (focus on hip ROM and stretching)   Medical Diagnosis from Referral: Multiple closed fractures of pelvis without disruption of pelvic ring   Evaluation Date: 12/16/2019  Authorization Period Expiration: 12/31/19  Plan of Care Expiration: 1/13/20  Visit # / Visits authorized: 10 (9 of 12)      Precautions: Standard, Fall and Weightbearing    Time In: 3:26 pm  Time Out: 4:20 am  Total Billable Time: 54 minutes    SUBJECTIVE   Date of onset: ~6 weeks ago   History of current condition - Rut is a 69 y.o. female whom reports hx of fall ~ 6 weeks ago; states she fell off the tailgate of her truck and landed on the L hip. Pt was initially utilizing a wheelchair but has recently started walking with a walker. She states that she easily gets tired and pain in the hip when walking for prolonged periods. Pt states she was in [place] for vacation last week and forced to walk around a lot; she states that she think this helped her with her mobility tremendously. She is currently taking tylenol every 4 hours to "stay ahead of the pain." Reports most pain when sitting or standing for a long period of time, walking, laying in bed, and rolling over in bed. States that pain when sitting depends on which position she is sitting in; reports pain on the L sit-bone (ischial tuberosity). States she is doing better with getting in and out of the car because she has learned how to pivot on the right leg. Pt states she is not currently doing any other exercise besides walking. Pt states she is currently sleeping on her back because this is the most comfortable position she can get in. She is not " utilizing pillows under the knees. Pt states she prefers to sleep on her sides; she has attempted to lay on the R side but cannot stay in this position for long due to pain; states she has not attempted to lay on the side with pillows between her legs.   Today, pt reports: she is feeling okay today; reports that she continues to have L pelvis pain with walking and that it feels very tired and sore at the end of the day.  She was compliant with home exercise program.  Response to previous treatment: decreased pain in L anterior hip following manual therapy provided previous session.   Functional change: pt utilizing no assistive device with ambulation and demonstrates improved heel to toe motion    Pre-Treatment Pain: 2/10 L pelvis with walking, 0/10 R hip   Post-Treatment Pain: 2/10 L pelvis; 0/10 R hip   Location: L pelvis       TREATMENT     Eboni received therapeutic exercises to develop strength, endurance, flexibility and core stabilization for 19 minutes including:    Exercise 1/30/2020   Hamstring stretch     Bike  Level 1, 5 min   Step ups  4 inch, 1 x 10   6 inch 2 x 10    Hamstring curls     Lateral resistors  Red band, 12 x 6 feet (in parallel bars)    Hip flexor stretch  2 x 1 min on L with L knee    Sit to Stand    Supine Marches  1# ankle weight, 3 x 10 B                            Eboni received the following manual therapy techniques: Soft tissue Mobilization were applied to the: L LE for 20 minutes, including:  STM of L illiacus, lower abdominals, and hip flexors.   L hip ROM and stretching in all directions   Passive stretching of L hip flexors and hip adductors       Eboni participated in neuromuscular re-education activities to improve: Balance, Kinesthetic and Proprioception for 15 minutes. The following activities were included:    Exercise 1/30/2020   Tandem walking    Tandem stance  2 x 1 min B    Suitcase carries  10# weight, 8 x 70 feet   Focus on heel-toe walking    Belt Block  30 x 5 sec  "hold in each direction                       Eboni participated in gait training to improve functional mobility and safety for x  minutes, including:  Walking with focus on heel-to-toe pattern, equal weight bearing and step length B and minimized trunk lean.   Treadmill walking:   .8 mph for 5 minutes on 0 incline   .8 mph for 1.5 minute on level 2 incline   .5 mph for 1.5 minutes on 0 incline   .8 mph for 1 minute on level 2 incline       Home Exercises Provided and Patient Education Provided     Education/Self-Care provided:    Patient educated on biomechanical justification for therapeutic exercise and importance of compliance with HEP in order to improve overall impairments and QOL     Written Home Exercises Provided: yes.  Exercises were reviewed and Eboni was able to demonstrate them prior to the end of the session.  Eboni demonstrated good  understanding of the education provided.     See EMR under Media for exercises provided 12/20/2019.    ASSESSMENT   Pt presents to clinic today without use of no assistive device and decreased improved heel to toe motion with ambulation. Pt tolerates manual therapy well and states that the hip feels "so much looser" following intervention. Patient tolerates all exercises well with reports of fatigue in musculature but no increased pain in L anterior hip. Patient requested to leave session a little early due to fear of getting stuck in afternoon traffic; therefore we were not able to practice gait training on the treadmill today.      Eboni is progressing well towards her goals.   Pt prognosis is Good.     Pt will continue to benefit from skilled outpatient physical therapy to address the deficits listed in the problem list box on initial evaluation, provide pt/family education and to maximize pt's level of independence in the home and community environment.     Pt's spiritual, cultural and educational needs considered and pt agreeable to plan of care and goals.   "   Anticipated Barriers for therapy: co-morbidities, sedentary lifestyle and adherence to treatment plan    GOALS:     Short Term Goals:  2 weeks     1. Pain: Pt will demonstrate improved pain by reports of less than or equal to 4/10 worst pain on the verbal rating scale in order to progress toward maximal functional ability and improve QOL.     2. Function: Patient will demonstrate improved function as indicated by a score of greater than or equal to 55% on the Lower Extremity Functional Scale     3. Mobility: Patient will improve AROM to 50% of stated goals, listed in objective measures above, in order to progress towards independence with functional activities.      4. Strength: Patient will improve strength to 50% of stated goals, listed in objective measures above, in order to progress towards independence with functional activities.      5. Gait: Patient will demonstrate improved gait mechanics including symmetrical stance time B, normalized gait speed and normalized JOSE, in order to improve functional mobility, improve quality of life, and decrease risk of further injury or fall.      6. HEP: Patient will demonstrate independence with HEP in order to progress toward functional independence.        Long Term Goals:  4 weeks     1. Pain: Pt will demonstrate improved pain by reports of less than or equal to 2/10 worst pain on the verbal rating scale in order to progress toward maximal functional ability and improve QOL.       2. Function: Patient will demonstrate improved function as indicated by a score of greater than or equal to 70% on the Lower Extremity Functional Scale     3. Mobility: Patient will improve AROM to stated goals, listed in objective measures above, in order to return to maximal functional potential and improve quality of life.     4. Strength: Patient will improve strength to stated goals, listed in objective measures above, in order to improve functional independence and quality of life.      5. Gait: Patient will demonstrate normalized gait mechanics with minimal compensation in order to return to PLOF.     6. Patient will return to normal ADL's, IADL's, community involvement, recreational activities, and work-related activities with less than or equal to 2/10 pain and maximal function.             PLAN   Plan of care Certification: 1/21/2020 to 2/21/2020     Outpatient Physical Therapy 2 times weekly for 4 weeks to include any combination of the following interventions: dry needling, modalities, electrical stimulation (IFC, Pre-Mod, Attended with Functional Dry Needling), Cervical/Lumbar Traction, Gait Training, Manual Therapy, Neuromuscular Re-ed, Patient Education, Self Care, Therapeutic Activites and Therapeutic Exercise     Evaluation: 12/6/19  POC Update: 1/21/2020  POC Expiration: 2/21/2020    Anabela Ho PT, DPT

## 2020-02-03 ENCOUNTER — CLINICAL SUPPORT (OUTPATIENT)
Dept: REHABILITATION | Facility: HOSPITAL | Age: 70
End: 2020-02-03
Payer: MEDICARE

## 2020-02-03 ENCOUNTER — OFFICE VISIT (OUTPATIENT)
Dept: OPHTHALMOLOGY | Facility: CLINIC | Age: 70
End: 2020-02-03
Payer: MEDICARE

## 2020-02-03 DIAGNOSIS — I10 ESSENTIAL HYPERTENSION: Chronic | ICD-10-CM

## 2020-02-03 DIAGNOSIS — H52.4 BILATERAL PRESBYOPIA: ICD-10-CM

## 2020-02-03 DIAGNOSIS — H25.13 CATARACT, NUCLEAR SCLEROTIC SENILE, BILATERAL: Primary | ICD-10-CM

## 2020-02-03 DIAGNOSIS — M62.81 PROXIMAL MUSCLE WEAKNESS: ICD-10-CM

## 2020-02-03 DIAGNOSIS — M25.652 DECREASED RANGE OF LEFT HIP MOVEMENT: ICD-10-CM

## 2020-02-03 DIAGNOSIS — R26.9 GAIT ABNORMALITY: ICD-10-CM

## 2020-02-03 PROCEDURE — 92015 PR REFRACTION: ICD-10-PCS | Mod: S$GLB,,, | Performed by: OPTOMETRIST

## 2020-02-03 PROCEDURE — 97112 NEUROMUSCULAR REEDUCATION: CPT

## 2020-02-03 PROCEDURE — 92015 DETERMINE REFRACTIVE STATE: CPT | Mod: S$GLB,,, | Performed by: OPTOMETRIST

## 2020-02-03 PROCEDURE — 97140 MANUAL THERAPY 1/> REGIONS: CPT

## 2020-02-03 PROCEDURE — 99999 PR PBB SHADOW E&M-EST. PATIENT-LVL I: ICD-10-PCS | Mod: PBBFAC,,, | Performed by: OPTOMETRIST

## 2020-02-03 PROCEDURE — 97116 GAIT TRAINING THERAPY: CPT

## 2020-02-03 PROCEDURE — 92014 COMPRE OPH EXAM EST PT 1/>: CPT | Mod: S$GLB,,, | Performed by: OPTOMETRIST

## 2020-02-03 PROCEDURE — 99999 PR PBB SHADOW E&M-EST. PATIENT-LVL I: CPT | Mod: PBBFAC,,, | Performed by: OPTOMETRIST

## 2020-02-03 PROCEDURE — 92014 PR EYE EXAM, EST PATIENT,COMPREHESV: ICD-10-PCS | Mod: S$GLB,,, | Performed by: OPTOMETRIST

## 2020-02-03 NOTE — PROGRESS NOTES
HPI     HPI  Any vision changes since last exam: yes, at distances   Eye pain: no  Other ocular symptoms: no  Do you wear currently wear glasses or contacts? glasses  Interested in contacts today? no  Do you plan on getting new glasses today? yes    Last edited by Amarjit Gutierrez, OD on 2/3/2020  1:19 PM. (History)            Assessment /Plan     For exam results, see Encounter Report.    Cataract, nuclear sclerotic senile, bilateral    Essential hypertension    Bilateral presbyopia      Moderate cataracts OU, not surgical    No HTN Retinopathy    Dispense Final Rx for glasses or may use OTC glasses.  RTC 1 year  Discussed above and answered questions.

## 2020-02-03 NOTE — PROGRESS NOTES
"  Physical Therapy Daily Treatment Note     Name: Cleo Ford  Clinic Number: [id number]    Therapy Diagnosis:   Encounter Diagnoses   Name Primary?    Gait abnormality     Proximal muscle weakness     Decreased range of left hip movement      Physician: Rudi Fajardo MD    Visit Date: 2/3/2020    Physician Orders: PT Eval and Treat (focus on hip ROM and stretching)   Medical Diagnosis from Referral: Multiple closed fractures of pelvis without disruption of pelvic ring   Evaluation Date: 12/16/2019  Authorization Period Expiration: 12/31/19  Plan of Care Expiration: 1/13/20  Visit # / Visits authorized: 11 (10 of 12)      Precautions: Standard, Fall and Weightbearing    Time In: 3:00 pm  Time Out: 4:00 am  Total Billable Time: 60 minutes    SUBJECTIVE   Date of onset: ~6 weeks ago   History of current condition - Rut is a 69 y.o. female whom reports hx of fall ~ 6 weeks ago; states she fell off the tailgate of her truck and landed on the L hip. Pt was initially utilizing a wheelchair but has recently started walking with a walker. She states that she easily gets tired and pain in the hip when walking for prolonged periods. Pt states she was in [place] for vacation last week and forced to walk around a lot; she states that she think this helped her with her mobility tremendously. She is currently taking tylenol every 4 hours to "stay ahead of the pain." Reports most pain when sitting or standing for a long period of time, walking, laying in bed, and rolling over in bed. States that pain when sitting depends on which position she is sitting in; reports pain on the L sit-bone (ischial tuberosity). States she is doing better with getting in and out of the car because she has learned how to pivot on the right leg. Pt states she is not currently doing any other exercise besides walking. Pt states she is currently sleeping on her back because this is the most comfortable position she can get in. She is not " utilizing pillows under the knees. Pt states she prefers to sleep on her sides; she has attempted to lay on the R side but cannot stay in this position for long due to pain; states she has not attempted to lay on the side with pillows between her legs.   Today, pt reports: she is feeling pretty good today. States manual therapy helped tremendously last session. Pt states she had no pain yesterday. She gets some pain, 2/10 max, when she is on her feet a lot throughout the day.   She was compliant with home exercise program.  Response to previous treatment: decreased pain in L anterior hip following manual therapy provided previous session.   Functional change: decreased limp on L LE with ambulation.     Pre-Treatment Pain: 0/10 L pelvis with walking, 0/10 R hip   Post-Treatment Pain: 0/10 L pelvis; 0/10 R hip   Location: L pelvis       TREATMENT     Eboni received therapeutic exercises to develop strength, endurance, flexibility and core stabilization for 25 minutes including:    Exercise 2/3/2020   Hamstring stretch     Bike  Level 3, 5 min   Step ups  6 inch (stairs)   Reciprocal up (forward) and down   8 x   Hamstring curls     Lateral resistors  Red band, 12 x 6 feet (in parallel bars)    Hip flexor stretch  2 x 1 min on L   Sit to Stand 4# weight, 3 x 10    Standing Marches  2x 10 B    Ball Squats  3 x 8    Standing Hip Extensions 20x on L with foot on slider                    Eboni received the following manual therapy techniques: Soft tissue Mobilization were applied to the: L LE for 12 minutes, including:  STM of L illiacus, lower abdominals, and hip flexors.   L hip ROM and stretching in all directions   Passive stretching of L hip flexors and hip adductors       Eboni participated in neuromuscular re-education activities to improve: Balance, Kinesthetic and Proprioception for 15 minutes. The following activities were included:    Exercise 2/3/2020   Tandem walking    Tandem stance     Suitcase carries  10#  "weight, 8 x 70 feet   Focus on heel-toe walking    Belt Block (seated)  30 x 5 sec hold in each direction   SLS 2 x 45 sec B                    Eboni participated in gait training to improve functional mobility and safety for 8  minutes, including:  Walking with focus on heel-to-toe pattern, equal weight bearing and step length B and minimized trunk lean.   Treadmill walking:   .8 mph for 5 minutes on 0 incline   .8 mph for 1 minute on level 3 incline   .5 mph for 1 minutes on 0 incline   .8 mph for 1 minute on level 3 incline       Home Exercises Provided and Patient Education Provided     Education/Self-Care provided:    Patient educated on biomechanical justification for therapeutic exercise and importance of compliance with HEP in order to improve overall impairments and QOL     Written Home Exercises Provided: yes.  Exercises were reviewed and Eboni was able to demonstrate them prior to the end of the session.  Eboni demonstrated good  understanding of the education provided.     See EMR under Media for exercises provided 12/20/2019.    ASSESSMENT   Pt tolerates manual therapy well and states that the hip feels "much looser" following intervention. Patient tolerates all exercises well with reports of fatigue in musculature but no increased pain in L anterior hip.     Eboni is progressing well towards her goals.   Pt prognosis is Good.     Pt will continue to benefit from skilled outpatient physical therapy to address the deficits listed in the problem list box on initial evaluation, provide pt/family education and to maximize pt's level of independence in the home and community environment.     Pt's spiritual, cultural and educational needs considered and pt agreeable to plan of care and goals.     Anticipated Barriers for therapy: co-morbidities, sedentary lifestyle and adherence to treatment plan    GOALS:     Short Term Goals:  2 weeks     1. Pain: Pt will demonstrate improved pain by reports of less than " or equal to 4/10 worst pain on the verbal rating scale in order to progress toward maximal functional ability and improve QOL.     2. Function: Patient will demonstrate improved function as indicated by a score of greater than or equal to 55% on the Lower Extremity Functional Scale     3. Mobility: Patient will improve AROM to 50% of stated goals, listed in objective measures above, in order to progress towards independence with functional activities.      4. Strength: Patient will improve strength to 50% of stated goals, listed in objective measures above, in order to progress towards independence with functional activities.      5. Gait: Patient will demonstrate improved gait mechanics including symmetrical stance time B, normalized gait speed and normalized JOSE, in order to improve functional mobility, improve quality of life, and decrease risk of further injury or fall.      6. HEP: Patient will demonstrate independence with HEP in order to progress toward functional independence.        Long Term Goals:  4 weeks     1. Pain: Pt will demonstrate improved pain by reports of less than or equal to 2/10 worst pain on the verbal rating scale in order to progress toward maximal functional ability and improve QOL.       2. Function: Patient will demonstrate improved function as indicated by a score of greater than or equal to 70% on the Lower Extremity Functional Scale     3. Mobility: Patient will improve AROM to stated goals, listed in objective measures above, in order to return to maximal functional potential and improve quality of life.     4. Strength: Patient will improve strength to stated goals, listed in objective measures above, in order to improve functional independence and quality of life.     5. Gait: Patient will demonstrate normalized gait mechanics with minimal compensation in order to return to PLOF.     6. Patient will return to normal ADL's, IADL's, community involvement, recreational activities,  and work-related activities with less than or equal to 2/10 pain and maximal function.             PLAN   Continue POC and progress per pt tolerance.     Evaluation: 12/6/19  POC Update: 1/21/2020  POC Expiration: 2/21/2020    Anabela Ho, PT, DPT

## 2020-02-04 NOTE — PROGRESS NOTES
"  Physical Therapy Daily Treatment Note     Name: Cleo Ford  Clinic Number: [id number]    Therapy Diagnosis:   Encounter Diagnoses   Name Primary?    Gait abnormality     Proximal muscle weakness     Decreased range of left hip movement      Physician: Chalino Kay MD    Visit Date: 1/14/2020    Physician Orders: PT Eval and Treat (focus on hip ROM and stretching)   Medical Diagnosis from Referral: Multiple closed fractures of pelvis without disruption of pelvic ring   Evaluation Date: 12/16/2019  Authorization Period Expiration: 12/31/19  Plan of Care Expiration: 1/13/20  Visit # / Visits authorized: 8 (7 of 12)      Precautions: Standard, Fall and Weightbearing    Time In: 10:00 am  Time Out: 11:00 am  Total Billable Time: 60 minutes    SUBJECTIVE   Date of onset: ~6 weeks ago   History of current condition - Rut is a 69 y.o. female whom reports hx of fall ~ 6 weeks ago; states she fell off the tailgate of her truck and landed on the L hip. Pt was initially utilizing a wheelchair but has recently started walking with a walker. She states that she easily gets tired and pain in the hip when walking for prolonged periods. Pt states she was in [place] for vacation last week and forced to walk around a lot; she states that she think this helped her with her mobility tremendously. She is currently taking tylenol every 4 hours to "stay ahead of the pain." Reports most pain when sitting or standing for a long period of time, walking, laying in bed, and rolling over in bed. States that pain when sitting depends on which position she is sitting in; reports pain on the L sit-bone (ischial tuberosity). States she is doing better with getting in and out of the car because she has learned how to pivot on the right leg. Pt states she is not currently doing any other exercise besides walking. Pt states she is currently sleeping on her back because this is the most comfortable position she can get in. She is " "not utilizing pillows under the knees. Pt states she prefers to sleep on her sides; she has attempted to lay on the R side but cannot stay in this position for long due to pain; states she has not attempted to lay on the side with pillows between her legs.   Today, pt reports: she felt "pretty good" on the day following her previous PT session; however, states that she had to move around a lot yesterday which led to increased pain again. Pt continues to need her rollator for ambulation.   She was compliant with home exercise program.  Response to previous treatment: decreased pain in posterior R hip following manual therapy provided previous session.   Functional change: pt utilizing rollator for ambulation.     Pre-Treatment Pain: 2/10 L pelvis; 8/10 R hip   Post-Treatment Pain: 2/10 L pelvis; 8/10 R hip   Location: L pelvis   TREATMENT     Eboni received therapeutic exercises to develop strength, endurance, flexibility and core stabilization for 35 minutes including:    Exercise 1/14/2020   Clamshells     LAQ     Hamstring Curls  Green band, 2 x 10    Heel Toe Raises     Calf Raises     Bike (for LE ROM, strength and endurance) Level 1, 5 minutes    Calf Stretch         Rocker Board     Ankle Plantarflexion     Ankle Dorsiflexion     Belt block  30 x 3 sec hold    Piriformis Stretch  2 x 1' on R    Glute Stretch  2 x 1' on R    Hamstring stretch  2 x 1' on R    Lower trunk rotations  30 x B    LE ball roll outs  3 x 10, red band at UE    Supine clamshell  Red band, 3 x 10    Prone hip internal rotation  Red band, 3 x 10    Sit to stand  3 x 10, from [facility]       Eboni received the following manual therapy techniques: Soft tissue Mobilization were applied to the: L LE for 15 minutes, including:  STM to R glutes and piriformis       Eboni participated in neuromuscular re-education activities to improve: Balance, Kinesthetic and Proprioception for 10 minutes. The following activities were included:    Exercise " 1/14/2020   [employer] Step Overs  2 x 10 forward and lateral   Tandem Stance  2 x 30 sec B    Single leg stance  10 x 10 sec hold                              Ice and electrical stimulation applied to the L posterior hip for x minutes.       Home Exercises Provided and Patient Education Provided     Education/Self-Care provided:    Patient educated on biomechanical justification for therapeutic exercise and importance of compliance with HEP in order to improve overall impairments and QOL     Written Home Exercises Provided: yes.  Exercises were reviewed and Eboni was able to demonstrate them prior to the end of the session.  Eboni demonstrated good  understanding of the education provided.     See EMR under Media for exercises provided 12/20/2019.    ASSESSMENT   Pt tolerated manual therapy well with reports of tenderness to touch with soft tissue mobilization and decreased pain following intervention. Patient able to tolerate increased exercise today due to decreased pain compared to previous session. Patient reports more soreness in L hip and pelvis with exercise today than on R.  Pt tolerates heat and electrical stimulation well and states that this helps with her pain. Patient continues to require use of rollator for ambulation due to pain.     Eboni is progressing well towards her goals.   Pt prognosis is Good.     Pt will continue to benefit from skilled outpatient physical therapy to address the deficits listed in the problem list box on initial evaluation, provide pt/family education and to maximize pt's level of independence in the home and community environment.     Pt's spiritual, cultural and educational needs considered and pt agreeable to plan of care and goals.     Anticipated Barriers for therapy: co-morbidities, sedentary lifestyle and adherence to treatment plan    GOALS:     Short Term Goals:  2 weeks     1. Pain: Pt will demonstrate improved pain by reports of less than or equal to 4/10 worst pain on  the verbal rating scale in order to progress toward maximal functional ability and improve QOL.     2. Function: Patient will demonstrate improved function as indicated by a score of greater than or equal to 55% on the Lower Extremity Functional Scale     3. Mobility: Patient will improve AROM to 50% of stated goals, listed in objective measures above, in order to progress towards independence with functional activities.      4. Strength: Patient will improve strength to 50% of stated goals, listed in objective measures above, in order to progress towards independence with functional activities.      5. Gait: Patient will demonstrate improved gait mechanics including symmetrical stance time B, normalized gait speed and normalized JOSE, in order to improve functional mobility, improve quality of life, and decrease risk of further injury or fall.      6. HEP: Patient will demonstrate independence with HEP in order to progress toward functional independence.        Long Term Goals:  4 weeks     1. Pain: Pt will demonstrate improved pain by reports of less than or equal to 2/10 worst pain on the verbal rating scale in order to progress toward maximal functional ability and improve QOL.       2. Function: Patient will demonstrate improved function as indicated by a score of greater than or equal to 70% on the Lower Extremity Functional Scale     3. Mobility: Patient will improve AROM to stated goals, listed in objective measures above, in order to return to maximal functional potential and improve quality of life.     4. Strength: Patient will improve strength to stated goals, listed in objective measures above, in order to improve functional independence and quality of life.     5. Gait: Patient will demonstrate normalized gait mechanics with minimal compensation in order to return to PLOF.     6. Patient will return to normal ADL's, IADL's, community involvement, recreational activities, and work-related activities with  less than or equal to 2/10 pain and maximal function.             PLAN   Continue Plan of Care (POC) and progress per patient tolerance.    Evaluation: 12/6/19  POC Expiration: 1/13/19    Anabela Ho PT, DPT

## 2020-02-06 ENCOUNTER — CLINICAL SUPPORT (OUTPATIENT)
Dept: REHABILITATION | Facility: HOSPITAL | Age: 70
End: 2020-02-06
Payer: MEDICARE

## 2020-02-06 DIAGNOSIS — M25.652 DECREASED RANGE OF LEFT HIP MOVEMENT: ICD-10-CM

## 2020-02-06 DIAGNOSIS — R26.9 GAIT ABNORMALITY: ICD-10-CM

## 2020-02-06 DIAGNOSIS — M62.81 PROXIMAL MUSCLE WEAKNESS: ICD-10-CM

## 2020-02-06 PROCEDURE — 97112 NEUROMUSCULAR REEDUCATION: CPT

## 2020-02-06 PROCEDURE — 97140 MANUAL THERAPY 1/> REGIONS: CPT

## 2020-02-06 PROCEDURE — 97110 THERAPEUTIC EXERCISES: CPT

## 2020-02-06 NOTE — PROGRESS NOTES
"  Physical Therapy Daily Treatment Note     Name: Cleo Ford  Clinic Number: [id number]    Therapy Diagnosis:   Encounter Diagnoses   Name Primary?    Gait abnormality     Proximal muscle weakness     Decreased range of left hip movement      Physician: Rudi Fajardo MD    Visit Date: 2/6/2020    Physician Orders: PT Eval and Treat (focus on hip ROM and stretching)   Medical Diagnosis from Referral: Multiple closed fractures of pelvis without disruption of pelvic ring   Evaluation Date: 12/16/2019  Authorization Period Expiration: 12/31/19  Plan of Care Expiration: 2/21/2020  Visit # / Visits authorized: 12 (11 of 12)      Precautions: Standard, Fall and Weightbearing    Time In: 11:am  Time Out: 12:03 pm  Total Billable Time: 63 minutes    SUBJECTIVE   Date of onset: ~6 weeks ago   History of current condition - Rut is a 69 y.o. female whom reports hx of fall ~ 6 weeks ago; states she fell off the tailgate of her truck and landed on the L hip. Pt was initially utilizing a wheelchair but has recently started walking with a walker. She states that she easily gets tired and pain in the hip when walking for prolonged periods. Pt states she was in [place] for vacation last week and forced to walk around a lot; she states that she think this helped her with her mobility tremendously. She is currently taking tylenol every 4 hours to "stay ahead of the pain." Reports most pain when sitting or standing for a long period of time, walking, laying in bed, and rolling over in bed. States that pain when sitting depends on which position she is sitting in; reports pain on the L sit-bone (ischial tuberosity). States she is doing better with getting in and out of the car because she has learned how to pivot on the right leg. Pt states she is not currently doing any other exercise besides walking. Pt states she is currently sleeping on her back because this is the most comfortable position she can get in. She is not " utilizing pillows under the knees. Pt states she prefers to sleep on her sides; she has attempted to lay on the R side but cannot stay in this position for long due to pain; states she has not attempted to lay on the side with pillows between her legs.   Today, pt reports: she has felt great since her last appointment which she attributes to manual therapy performed that day. Patient states that she has no pain in the hips or pelvis, even with walking.    She was compliant with home exercise program.  Response to previous treatment: no pain in L anterior hip following manual therapy provided previous session.   Functional change: no limp noted today with ambulation.     Pre-Treatment Pain: 0/10 L pelvis with walking, 0/10 R hip   Post-Treatment Pain: 0/10 L pelvis; 0/10 R hip   Location: L pelvis       TREATMENT     Eboni received therapeutic exercises to develop strength, endurance, flexibility and core stabilization for 33 minutes including:    Exercise 2/6/2020   Hamstring stretch     Bike     Step ups     Hamstring curls     Lateral resistors     Hip flexor stretch  2 x 1 min on L   Sit to Stand 8# weight, 3 x 10    Standing Marches  2x 10 B    Ball Squats     Standing Hip Extensions    Supine Marches  3#, 3 x 10 B    Clamshells  3 x 8 on L    Supine Leg Curls  3 x 12   With exercise ball under feet  Green band, 90 degrees shoulder flexion isometric    Leg press  Level 3, 3 x 10                            Eboni received the following manual therapy techniques: Soft tissue Mobilization were applied to the: L LE for 15 minutes, including:  STM of L illiacus, lower abdominals, and hip flexors.   L hip ROM and stretching in all directions   Passive stretching of L hip flexors and hip adductors       Eboni participated in neuromuscular re-education activities to improve: Balance, Kinesthetic and Proprioception for 15 minutes. The following activities were included:    Exercise 2/6/2020   Tandem walking    Tandem  "stance     Suitcase carries  10# weight, 8 x 70 feet   Focus on heel-toe walking    Belt Block (seated)  30 x 5 sec hold in each direction   SLS 10 x 20 sec hold B                    Eboni participated in gait training to improve functional mobility and safety for 8  minutes, including:  Walking with focus on heel-to-toe pattern, equal weight bearing and step length B and minimized trunk lean.   Treadmill walking:   .8 mph for 5 minutes on 0 incline   .8 mph for 1 minute on level 3 incline   .5 mph for 1 minutes on 0 incline   .8 mph for 1 minute on level 3 incline       Home Exercises Provided and Patient Education Provided     Education/Self-Care provided:    Patient educated on biomechanical justification for therapeutic exercise and importance of compliance with HEP in order to improve overall impairments and QOL     Written Home Exercises Provided: yes.  Exercises were reviewed and Eboni was able to demonstrate them prior to the end of the session.  Eboni demonstrated good  understanding of the education provided.     See EMR under Media for exercises provided 12/20/2019.    ASSESSMENT   Pt tolerates manual therapy well and states that the hip feels "much looser" following intervention. Patient tolerates all exercises well with reports of fatigue in musculature but no increased pain in L anterior hip. Pt demonstrates no limp with ambulation today.     Eboni is progressing well towards her goals.   Pt prognosis is Good.     Pt will continue to benefit from skilled outpatient physical therapy to address the deficits listed in the problem list box on initial evaluation, provide pt/family education and to maximize pt's level of independence in the home and community environment.     Pt's spiritual, cultural and educational needs considered and pt agreeable to plan of care and goals.     Anticipated Barriers for therapy: co-morbidities, sedentary lifestyle and adherence to treatment plan    GOALS:     Short Term " Goals:  2 weeks     1. Pain: Pt will demonstrate improved pain by reports of less than or equal to 4/10 worst pain on the verbal rating scale in order to progress toward maximal functional ability and improve QOL.     2. Function: Patient will demonstrate improved function as indicated by a score of greater than or equal to 55% on the Lower Extremity Functional Scale     3. Mobility: Patient will improve AROM to 50% of stated goals, listed in objective measures above, in order to progress towards independence with functional activities.      4. Strength: Patient will improve strength to 50% of stated goals, listed in objective measures above, in order to progress towards independence with functional activities.      5. Gait: Patient will demonstrate improved gait mechanics including symmetrical stance time B, normalized gait speed and normalized JOSE, in order to improve functional mobility, improve quality of life, and decrease risk of further injury or fall.      6. HEP: Patient will demonstrate independence with HEP in order to progress toward functional independence.        Long Term Goals:  4 weeks     1. Pain: Pt will demonstrate improved pain by reports of less than or equal to 2/10 worst pain on the verbal rating scale in order to progress toward maximal functional ability and improve QOL.       2. Function: Patient will demonstrate improved function as indicated by a score of greater than or equal to 70% on the Lower Extremity Functional Scale     3. Mobility: Patient will improve AROM to stated goals, listed in objective measures above, in order to return to maximal functional potential and improve quality of life.     4. Strength: Patient will improve strength to stated goals, listed in objective measures above, in order to improve functional independence and quality of life.     5. Gait: Patient will demonstrate normalized gait mechanics with minimal compensation in order to return to PLOF.     6. Patient  will return to normal ADL's, IADL's, community involvement, recreational activities, and work-related activities with less than or equal to 2/10 pain and maximal function.             PLAN   Continue POC and progress per pt tolerance.     Evaluation: 12/6/19  POC Update: 1/21/2020  POC Expiration: 2/21/2020    Anabela Ho, PT, DPT

## 2020-02-10 ENCOUNTER — CLINICAL SUPPORT (OUTPATIENT)
Dept: REHABILITATION | Facility: HOSPITAL | Age: 70
End: 2020-02-10
Payer: MEDICARE

## 2020-02-10 DIAGNOSIS — M25.652 DECREASED RANGE OF LEFT HIP MOVEMENT: ICD-10-CM

## 2020-02-10 DIAGNOSIS — M62.81 PROXIMAL MUSCLE WEAKNESS: ICD-10-CM

## 2020-02-10 DIAGNOSIS — R26.9 GAIT ABNORMALITY: ICD-10-CM

## 2020-02-10 PROCEDURE — 97110 THERAPEUTIC EXERCISES: CPT

## 2020-02-10 PROCEDURE — 97112 NEUROMUSCULAR REEDUCATION: CPT

## 2020-02-10 PROCEDURE — 97140 MANUAL THERAPY 1/> REGIONS: CPT

## 2020-02-10 NOTE — PROGRESS NOTES
"  Physical Therapy Daily Treatment Note     Name: Cleo Ford  Clinic Number: [id number]    Therapy Diagnosis:   Encounter Diagnoses   Name Primary?    Gait abnormality     Proximal muscle weakness     Decreased range of left hip movement      Physician: Rudi Fajardo MD    Visit Date: 2/10/2020    Physician Orders: PT Eval and Treat (focus on hip ROM and stretching)   Medical Diagnosis from Referral: Multiple closed fractures of pelvis without disruption of pelvic ring   Evaluation Date: 12/16/2019  Authorization Period Expiration: 12/31/19  Plan of Care Expiration: 2/21/2020  Visit # / Visits authorized: 12 (11 of 12)      Precautions: Standard, Fall and Weightbearing    Time In: 3:00 pm  Time Out: 3:45 pm  Total Billable Time: 45 minutes    SUBJECTIVE   Date of onset: ~6 weeks ago   History of current condition - Rut is a 69 y.o. female whom reports hx of fall ~ 6 weeks ago; states she fell off the tailgate of her truck and landed on the L hip. Pt was initially utilizing a wheelchair but has recently started walking with a walker. She states that she easily gets tired and pain in the hip when walking for prolonged periods. Pt states she was in [place] for vacation last week and forced to walk around a lot; she states that she think this helped her with her mobility tremendously. She is currently taking tylenol every 4 hours to "stay ahead of the pain." Reports most pain when sitting or standing for a long period of time, walking, laying in bed, and rolling over in bed. States that pain when sitting depends on which position she is sitting in; reports pain on the L sit-bone (ischial tuberosity). States she is doing better with getting in and out of the car because she has learned how to pivot on the right leg. Pt states she is not currently doing any other exercise besides walking. Pt states she is currently sleeping on her back because this is the most comfortable position she can get in. She is not " utilizing pillows under the knees. Pt states she prefers to sleep on her sides; she has attempted to lay on the R side but cannot stay in this position for long due to pain; states she has not attempted to lay on the side with pillows between her legs.   Today, pt reports: she had been feeling pretty good but is a little sore and tired today as a result of staying in the ER with her  last night. Pt states that she did not get much sleep last night and that she was not able to sleep with a pillow between her LEs as usual. Her entire body feels sore and tired as a result    She was compliant with home exercise program.  Response to previous treatment: no pain in L anterior hip following manual therapy provided previous session.   Functional change: no limp noted today with ambulation.     Pre-Treatment Pain: 0/10 L pelvis with walking, 0/10 R hip   Post-Treatment Pain: 0/10 L pelvis; 0/10 R hip   Location: L pelvis       TREATMENT     Eboni received therapeutic exercises to develop strength, endurance, flexibility and core stabilization for 13 minutes including:    Exercise 2/10/2020   Hamstring stretch     Bike (for LE strength and endurance)  Level 3, 5 minutes    Step ups     Hamstring curls     Lateral resistors     Hip flexor stretch  2 x 1 min on L   Sit to Stand    Supine Marches  2x 10 B    Ball Squats     Standing Hip Extensions    Supine Marches     Clamshells  3 x 10 on L    Supine Leg Curls     Leg press     Tabletop static hold  20 x 5 sec hold                        Eboni received the following manual therapy techniques: Soft tissue Mobilization were applied to the: L LE for 20 minutes, including:  STM of L illiacus, lower abdominals, and hip flexors.   L hip ROM and stretching in all directions   Passive stretching of L hip flexors and hip adductors       Eboni participated in neuromuscular re-education activities to improve: Balance, Kinesthetic and Proprioception for 12 minutes. The following  "activities were included:    Exercise 2/10/2020   Tandem walking    Tandem stance     Suitcase carries  10# weight, 8 x 70 feet   Focus on heel-toe walking    Belt Block (seated)  30 x 5 sec hold in each direction   SLS    Pelvic Mobility on Exercise ball  30 x anterior and posterior pelvic tilts  30 x lateral pelvic tilts   30 x clockwise and counterclockwise rotations                Eboni participated in gait training to improve functional mobility and safety for x  minutes, including:  Walking with focus on heel-to-toe pattern, equal weight bearing and step length B and minimized trunk lean.   Treadmill walking:   .8 mph for 5 minutes on 0 incline   .8 mph for 1 minute on level 3 incline   .5 mph for 1 minutes on 0 incline   .8 mph for 1 minute on level 3 incline       Home Exercises Provided and Patient Education Provided     Education/Self-Care provided:    Patient educated on biomechanical justification for therapeutic exercise and importance of compliance with HEP in order to improve overall impairments and QOL     Written Home Exercises Provided: yes.  Exercises were reviewed and Eboin was able to demonstrate them prior to the end of the session.  Eboni demonstrated good  understanding of the education provided.     See EMR under Media for exercises provided 12/20/2019.    ASSESSMENT   Pt tolerates initial manual therapy well and states that the hip feels "much looser" following intervention. Patient reports she is very fatigued with all exercises; states her body is very tired as a result of staying up all night in the ER with her . Pt reports increased tightness in L anterior hip following clamshells and tabletop static hold, states 8/10 pain. Manual therapy again performed, increased tone noted in musculature following these exercises; pt reports relief of pain and tension (0/10 pain) following manual therapy. Pt requires both verbal and tactile cueing in order to perform pelvic mobility on " exercise ball. Pt tolerates all other exercise well with reports of increased fatigue but no increase in pain. Pt decided to leave therapy early today due to feeling tired, sore and fatigued.     Eboni is progressing well towards her goals.   Pt prognosis is Good.     Pt will continue to benefit from skilled outpatient physical therapy to address the deficits listed in the problem list box on initial evaluation, provide pt/family education and to maximize pt's level of independence in the home and community environment.     Pt's spiritual, cultural and educational needs considered and pt agreeable to plan of care and goals.     Anticipated Barriers for therapy: co-morbidities, sedentary lifestyle and adherence to treatment plan    GOALS:     Short Term Goals:  2 weeks     1. Pain: Pt will demonstrate improved pain by reports of less than or equal to 4/10 worst pain on the verbal rating scale in order to progress toward maximal functional ability and improve QOL.     2. Function: Patient will demonstrate improved function as indicated by a score of greater than or equal to 55% on the Lower Extremity Functional Scale     3. Mobility: Patient will improve AROM to 50% of stated goals, listed in objective measures above, in order to progress towards independence with functional activities.      4. Strength: Patient will improve strength to 50% of stated goals, listed in objective measures above, in order to progress towards independence with functional activities.      5. Gait: Patient will demonstrate improved gait mechanics including symmetrical stance time B, normalized gait speed and normalized JOSE, in order to improve functional mobility, improve quality of life, and decrease risk of further injury or fall.      6. HEP: Patient will demonstrate independence with HEP in order to progress toward functional independence.        Long Term Goals:  4 weeks     1. Pain: Pt will demonstrate improved pain by reports of less  than or equal to 2/10 worst pain on the verbal rating scale in order to progress toward maximal functional ability and improve QOL.       2. Function: Patient will demonstrate improved function as indicated by a score of greater than or equal to 70% on the Lower Extremity Functional Scale     3. Mobility: Patient will improve AROM to stated goals, listed in objective measures above, in order to return to maximal functional potential and improve quality of life.     4. Strength: Patient will improve strength to stated goals, listed in objective measures above, in order to improve functional independence and quality of life.     5. Gait: Patient will demonstrate normalized gait mechanics with minimal compensation in order to return to PLOF.     6. Patient will return to normal ADL's, IADL's, community involvement, recreational activities, and work-related activities with less than or equal to 2/10 pain and maximal function.             PLAN   Continue POC and progress per pt tolerance.     Evaluation: 12/6/19  POC Update: 1/21/2020  POC Expiration: 2/21/2020    Anabela Ho, PT, DPT

## 2020-02-13 ENCOUNTER — CLINICAL SUPPORT (OUTPATIENT)
Dept: REHABILITATION | Facility: HOSPITAL | Age: 70
End: 2020-02-13
Payer: MEDICARE

## 2020-02-13 DIAGNOSIS — R26.9 GAIT ABNORMALITY: ICD-10-CM

## 2020-02-13 DIAGNOSIS — M62.81 PROXIMAL MUSCLE WEAKNESS: ICD-10-CM

## 2020-02-13 DIAGNOSIS — M25.652 DECREASED RANGE OF LEFT HIP MOVEMENT: ICD-10-CM

## 2020-02-13 PROCEDURE — 97112 NEUROMUSCULAR REEDUCATION: CPT

## 2020-02-13 PROCEDURE — 97140 MANUAL THERAPY 1/> REGIONS: CPT

## 2020-02-13 PROCEDURE — 97110 THERAPEUTIC EXERCISES: CPT

## 2020-02-13 NOTE — PROGRESS NOTES
"  Physical Therapy Daily Treatment Note     Name: Cleo Ford  Clinic Number: [id number]    Therapy Diagnosis:   Encounter Diagnoses   Name Primary?    Gait abnormality     Proximal muscle weakness     Decreased range of left hip movement      Physician: Rudi Fajardo MD    Visit Date: 2/13/2020    Physician Orders: PT Eval and Treat (focus on hip ROM and stretching)   Medical Diagnosis from Referral: Multiple closed fractures of pelvis without disruption of pelvic ring   Evaluation Date: 12/16/2019  Authorization Period Expiration: 12/31/19  Plan of Care Expiration: 2/21/2020  Visit # / Visits authorized: 13 (12 of 12)      Precautions: Standard, Fall and Weightbearing    Time In: 11:00 am  Time Out: 11:55 am  Total Billable Time: 55 minutes    SUBJECTIVE   Date of onset: ~6 weeks ago   History of current condition - Rut is a 69 y.o. female whom reports hx of fall ~ 6 weeks ago; states she fell off the tailgate of her truck and landed on the L hip. Pt was initially utilizing a wheelchair but has recently started walking with a walker. She states that she easily gets tired and pain in the hip when walking for prolonged periods. Pt states she was in [place] for vacation last week and forced to walk around a lot; she states that she think this helped her with her mobility tremendously. She is currently taking tylenol every 4 hours to "stay ahead of the pain." Reports most pain when sitting or standing for a long period of time, walking, laying in bed, and rolling over in bed. States that pain when sitting depends on which position she is sitting in; reports pain on the L sit-bone (ischial tuberosity). States she is doing better with getting in and out of the car because she has learned how to pivot on the right leg. Pt states she is not currently doing any other exercise besides walking. Pt states she is currently sleeping on her back because this is the most comfortable position she can get in. She is not " utilizing pillows under the knees. Pt states she prefers to sleep on her sides; she has attempted to lay on the R side but cannot stay in this position for long due to pain; states she has not attempted to lay on the side with pillows between her legs.   Today, pt reports: she had been feeling great with no pain; however, she forgot to sleep with her pillow between her knees last night and reports 2/10 soreness in L anterior hip as a result.    She was compliant with home exercise program.  Response to previous treatment: none noted. Pt states she believes her fatigue previous session was due to not having slept the night before  Functional change: no significant changes noted     Pre-Treatment Pain: 0/10 L pelvis with walking, 0/10 R hip   Post-Treatment Pain: 0/10 L pelvis; 0/10 R hip   Location: L pelvis       TREATMENT     Eboni received therapeutic exercises to develop strength, endurance, flexibility and core stabilization for 25 minutes including:    Exercise 2/13/2020   Hamstring stretch     Bike (for LE strength and endurance)  Level 3, 5 minutes    Step ups     Hamstring curls     Lateral resistors     Hip flexor stretch  2 x 1 min on L   Sit to Stand 6#, 3 x 10    Supine Marches     Ball Squats     Standing Hip Extensions    Supine Marches     Clamshells  3 x 10 on L    Supine Leg Curls     Leg press     Tabletop static hold  20 x 5 sec single leg hold   Performed bilateral    LTR  20 x B with 5# dumbbell in contralateral UE                    Eboni received the following manual therapy techniques: Soft tissue Mobilization were applied to the: L LE for 15 minutes, including:  STM of L illiacus, lower abdominals, and hip flexors.   L hip ROM and stretching in all directions   Passive stretching of L hip flexors and hip adductors       Eboni participated in neuromuscular re-education activities to improve: Balance, Kinesthetic and Proprioception for 15 minutes. The following activities were  "included:    Exercise 2/13/2020   Tandem walking    Tandem stance     Suitcase carries  10# weight, 10 x 70 feet   Focus on heel-toe walking    Belt Block (seated)  30 x 5 sec hold in each direction   SLS    Pelvic Mobility on Exercise ball  30 x anterior and posterior pelvic tilts  30 x lateral pelvic tilts   30 x clockwise and counterclockwise rotations                Eboni participated in gait training to improve functional mobility and safety for x  minutes, including:  Walking with focus on heel-to-toe pattern, equal weight bearing and step length B and minimized trunk lean.   Treadmill walking:   .8 mph for 5 minutes on 0 incline   .8 mph for 1 minute on level 3 incline   .5 mph for 1 minutes on 0 incline   .8 mph for 1 minute on level 3 incline       Home Exercises Provided and Patient Education Provided     Education/Self-Care provided:    Patient educated on biomechanical justification for therapeutic exercise and importance of compliance with HEP in order to improve overall impairments and QOL     Written Home Exercises Provided: yes.  Exercises were reviewed and Eboni was able to demonstrate them prior to the end of the session.  Eboni demonstrated good  understanding of the education provided.     See EMR under Media for exercises provided 12/20/2019.    ASSESSMENT   Pt tolerates manual therapy well and states that the hip feels "much looser" following intervention, 0/10 pain. Pt tolerates all exercises well today and states she is very tired and fatigued following interventions; pt requested to defer treadmill walking today as a result.  Pt again requires both verbal and tactile cueing in order to perform pelvic mobility on exercise ball. Pt tolerates all other exercise well with reports of increased fatigue but no increase in pain.     Eboni is progressing well towards her goals.   Pt prognosis is Good.     Pt will continue to benefit from skilled outpatient physical therapy to address the deficits " listed in the problem list box on initial evaluation, provide pt/family education and to maximize pt's level of independence in the home and community environment.     Pt's spiritual, cultural and educational needs considered and pt agreeable to plan of care and goals.     Anticipated Barriers for therapy: co-morbidities, sedentary lifestyle and adherence to treatment plan    GOALS:     Short Term Goals:  2 weeks     1. Pain: Pt will demonstrate improved pain by reports of less than or equal to 4/10 worst pain on the verbal rating scale in order to progress toward maximal functional ability and improve QOL.     2. Function: Patient will demonstrate improved function as indicated by a score of greater than or equal to 55% on the Lower Extremity Functional Scale     3. Mobility: Patient will improve AROM to 50% of stated goals, listed in objective measures above, in order to progress towards independence with functional activities.      4. Strength: Patient will improve strength to 50% of stated goals, listed in objective measures above, in order to progress towards independence with functional activities.      5. Gait: Patient will demonstrate improved gait mechanics including symmetrical stance time B, normalized gait speed and normalized JOSE, in order to improve functional mobility, improve quality of life, and decrease risk of further injury or fall.      6. HEP: Patient will demonstrate independence with HEP in order to progress toward functional independence.        Long Term Goals:  4 weeks     1. Pain: Pt will demonstrate improved pain by reports of less than or equal to 2/10 worst pain on the verbal rating scale in order to progress toward maximal functional ability and improve QOL.       2. Function: Patient will demonstrate improved function as indicated by a score of greater than or equal to 70% on the Lower Extremity Functional Scale     3. Mobility: Patient will improve AROM to stated goals, listed in  objective measures above, in order to return to maximal functional potential and improve quality of life.     4. Strength: Patient will improve strength to stated goals, listed in objective measures above, in order to improve functional independence and quality of life.     5. Gait: Patient will demonstrate normalized gait mechanics with minimal compensation in order to return to PLOF.     6. Patient will return to normal ADL's, IADL's, community involvement, recreational activities, and work-related activities with less than or equal to 2/10 pain and maximal function.             PLAN   Continue POC and progress per pt tolerance.     Evaluation: 12/6/19  POC Update: 1/21/2020  POC Expiration: 2/21/2020    Anabela Ho, PT, DPT

## 2020-02-17 ENCOUNTER — OFFICE VISIT (OUTPATIENT)
Dept: PAIN MEDICINE | Facility: CLINIC | Age: 70
End: 2020-02-17
Payer: MEDICARE

## 2020-02-17 VITALS
DIASTOLIC BLOOD PRESSURE: 89 MMHG | SYSTOLIC BLOOD PRESSURE: 155 MMHG | HEART RATE: 69 BPM | RESPIRATION RATE: 20 BRPM | HEIGHT: 63 IN | WEIGHT: 185 LBS | BODY MASS INDEX: 32.78 KG/M2

## 2020-02-17 DIAGNOSIS — M70.71 ISCHIAL BURSITIS OF RIGHT SIDE: ICD-10-CM

## 2020-02-17 DIAGNOSIS — M53.3 SACROILIAC JOINT PAIN: ICD-10-CM

## 2020-02-17 DIAGNOSIS — M47.816 LUMBAR SPONDYLOSIS: Primary | ICD-10-CM

## 2020-02-17 PROCEDURE — 1126F AMNT PAIN NOTED NONE PRSNT: CPT | Mod: S$GLB,,, | Performed by: PHYSICIAN ASSISTANT

## 2020-02-17 PROCEDURE — 99214 OFFICE O/P EST MOD 30 MIN: CPT | Mod: S$GLB,,, | Performed by: PHYSICIAN ASSISTANT

## 2020-02-17 PROCEDURE — 3077F PR MOST RECENT SYSTOLIC BLOOD PRESSURE >= 140 MM HG: ICD-10-PCS | Mod: CPTII,S$GLB,, | Performed by: PHYSICIAN ASSISTANT

## 2020-02-17 PROCEDURE — 99999 PR PBB SHADOW E&M-EST. PATIENT-LVL IV: CPT | Mod: PBBFAC,,, | Performed by: PHYSICIAN ASSISTANT

## 2020-02-17 PROCEDURE — 99214 PR OFFICE/OUTPT VISIT, EST, LEVL IV, 30-39 MIN: ICD-10-PCS | Mod: S$GLB,,, | Performed by: PHYSICIAN ASSISTANT

## 2020-02-17 PROCEDURE — 1126F PR PAIN SEVERITY QUANTIFIED, NO PAIN PRESENT: ICD-10-PCS | Mod: S$GLB,,, | Performed by: PHYSICIAN ASSISTANT

## 2020-02-17 PROCEDURE — 99999 PR PBB SHADOW E&M-EST. PATIENT-LVL IV: ICD-10-PCS | Mod: PBBFAC,,, | Performed by: PHYSICIAN ASSISTANT

## 2020-02-17 PROCEDURE — 3077F SYST BP >= 140 MM HG: CPT | Mod: CPTII,S$GLB,, | Performed by: PHYSICIAN ASSISTANT

## 2020-02-17 PROCEDURE — 1159F PR MEDICATION LIST DOCUMENTED IN MEDICAL RECORD: ICD-10-PCS | Mod: S$GLB,,, | Performed by: PHYSICIAN ASSISTANT

## 2020-02-17 PROCEDURE — 3079F DIAST BP 80-89 MM HG: CPT | Mod: CPTII,S$GLB,, | Performed by: PHYSICIAN ASSISTANT

## 2020-02-17 PROCEDURE — 1159F MED LIST DOCD IN RCRD: CPT | Mod: S$GLB,,, | Performed by: PHYSICIAN ASSISTANT

## 2020-02-17 PROCEDURE — 1101F PT FALLS ASSESS-DOCD LE1/YR: CPT | Mod: CPTII,S$GLB,, | Performed by: PHYSICIAN ASSISTANT

## 2020-02-17 PROCEDURE — 1101F PR PT FALLS ASSESS DOC 0-1 FALLS W/OUT INJ PAST YR: ICD-10-PCS | Mod: CPTII,S$GLB,, | Performed by: PHYSICIAN ASSISTANT

## 2020-02-17 PROCEDURE — 3079F PR MOST RECENT DIASTOLIC BLOOD PRESSURE 80-89 MM HG: ICD-10-PCS | Mod: CPTII,S$GLB,, | Performed by: PHYSICIAN ASSISTANT

## 2020-02-18 NOTE — PROGRESS NOTES
Chief Pain Complaint:  Low-back Pain    Interval History: Patient was seen on 1/16/20. At that time she underwent Right SI joint + Right ischial bursa injection with RN IV sedation.  The patient reports that she is/was better following the procedure.  she reports 100% pain relief.  The changes lasted 4 weeks so far.  The changes have continued through this visit.    History of Present Illness:   Cleo Ford is a 69 y.o. female  who is presenting with a chief complaint of Low-back Pain. The patient began experiencing this problem insidiously, and the pain has been gradually worsening over the past 2 week(s). The pain is described as throbbing, cramping, aching and heavy and is located in the right buttock. Pain is intermittent and lasts hours. The pain is nonradiating. The patient rates her pain a 7 out of ten and interferes with activities of daily living a 8 out of ten. Pain is exacerbated by getting up from a seated position, and is improved by rest. Patient reports prior trauma, no prior spinal surgery     - pertinent negatives: No fever, No chills, No weight loss, No bladder dysfunction, No bowel dysfunction, No saddle anesthesia  - pertinent positives: none    - medications, other therapies tried (physical therapy, injections):     >> NSAIDs and Tylenol    >> Has previously undergone Physical Therapy    >> Has previously undergone spinal injection/s    - Right SI joint + Right ischial bursa injection with RN IV sedation on 1/16/20 with 100% pain relief      Imaging / Labs / Studies (reviewed on 2/18/2020):    Results for orders placed during the hospital encounter of 06/12/18   X-Ray Lumbar Spine Ap And Lateral    Narrative FINDINGS:  There is anatomic spinal alignment.  Degenerative vertebral endplate lipping and bilateral facet arthropathy present at multiple levels, most pronounced at L4-5 and L5-S1.  No significant disc space narrowing.  Mild grade 1 anterolisthesis of L4 on L5.  No compression  "fracture.     Results for orders placed during the hospital encounter of 10/30/18   X-Ray Cervical Spine AP And Lateral    Narrative COMPARISON:  None.  FINDINGS:  Bones are demineralized.  No acute fracture.  Multilevel lower cervical facet arthropathy.  No listhesis.  Odontoid is intact.  Prevertebral soft tissues are maintained.  Dental hardware is incidentally noted.         Review of Systems:  CONSTITUTIONAL: patient denies any fever, chills, or weight loss  SKIN: patient denies any rash or itching  RESPIRATORY: patient denies having any shortness of breath  GASTROINTESTINAL: patient denies having any diarrhea, constipation, or bowel incontinence  GENITOURINARY: patient denies having any abnormal bladder function    MUSCULOSKELETAL:  - patient complains of the above noted pain/s (see chief pain complaint)    NEUROLOGICAL:   - pain as above  - strength in Lower extremities is intact, BILATERALLY  - sensation in Lower extremities is intact, BILATERALLY  - patient denies any loss of bowel or bladder control      PSYCHIATRIC: patient denies any change in mood    Other:  All other systems reviewed and are negative      Physical Exam:  Vitals:  BP (!) 155/89 (BP Location: Right arm, Patient Position: Sitting, BP Method: Medium (Automatic))   Pulse 69   Resp 20   Ht 5' 3" (1.6 m)   Wt 83.9 kg (185 lb)   BMI 32.77 kg/m²   (reviewed on 2/18/2020)    General: alert and oriented, in no apparent distress.  Gait: normal gait.  Skin: no rashes, no discoloration, no obvious lesions  HEENT: normocephalic, atraumatic. Pupils equal and round.  Cardiovascular: no significant peripheral edema present.  Respiratory: without use of accessory muscles of respiration.    Musculoskeletal - Lumbar Spine:  - Pain on flexion of lumbar spine: Absent   - Pain on extension of lumbar spine: Absent         - Lumbar facet loading: Absent   - TTP over the lumbar facet joints: Absent  - TTP over the lumbar paraspinals: Absent  - TTP over the " SI joints:  Absent on right, improved since procedure   - TTP over ischial bursa: Absent on right, improved since procedure   - Straight Leg Raise: Negative  - [place]: Present     Musculoskeletal - Cervical Spine:  - Pain on flexion of cervical spine: Absent   - Pain on extension of cervical spine: Absent   - Cervical facet loading: Absent   - TTP over the cervical facet joints: Absent  - TTP over the cervical paraspinals: Absent  - Spurling's: Negative    Neuro - Lower Extremities:  - BLE Strength: R/L: HF: 5/5, HE: 5/5, KF: 5/5; KE: 5/5; FE: 5/5; FF: 5/5  - Extremity Reflexes: Brisk and symmetric throughout  - Sensory: Sensation to light touch intact bilaterally      Psych:  Mood and affect is appropriate            Assessment:  Cleo Ford is a 69 y.o. year old female who is presenting with    Encounter Diagnoses   Name Primary?    Lumbar spondylosis Yes    Sacroiliac joint pain     Ischial bursitis of right side      Patient has a history of left superior and inferior ramus fractures and likely sacral compression fracture. Patient is no longer having pain on the left.       Plan:  1. Interventional: S/p Right SI joint + Right ischial bursa injection with RN IV sedation on 1/16/20 with 100% pain relief.     2. Pharmacologic: Continue Voltaren 50mg BID PRN.     3. Rehabilitative: Continue physical therapy, which has been helping.  She believes she is almost to max medical improvement, but she will wait to be released.     4. Diagnostic: None for now.    5. Follow up: PRN    - I discussed the risks, benefits, and alternatives to potential treatment options. All questions and concerns were fully addressed today in clinic. Dr. Fajardo was consulted regarding the patient plan and agrees.

## 2020-02-20 ENCOUNTER — CLINICAL SUPPORT (OUTPATIENT)
Dept: REHABILITATION | Facility: HOSPITAL | Age: 70
End: 2020-02-20
Payer: MEDICARE

## 2020-02-20 DIAGNOSIS — M62.81 PROXIMAL MUSCLE WEAKNESS: ICD-10-CM

## 2020-02-20 DIAGNOSIS — M25.652 DECREASED RANGE OF LEFT HIP MOVEMENT: ICD-10-CM

## 2020-02-20 DIAGNOSIS — R26.9 GAIT ABNORMALITY: ICD-10-CM

## 2020-02-20 PROCEDURE — 97110 THERAPEUTIC EXERCISES: CPT

## 2020-02-20 PROCEDURE — 97112 NEUROMUSCULAR REEDUCATION: CPT

## 2020-02-20 PROCEDURE — 97140 MANUAL THERAPY 1/> REGIONS: CPT

## 2020-02-20 NOTE — PLAN OF CARE
"  Outpatient Therapy Updated Plan of Care     Visit Date: 2/20/2020  Name: Cleo Ford  Clinic Number: [id number]    Therapy Diagnosis:   Encounter Diagnoses   Name Primary?    Gait abnormality     Proximal muscle weakness     Decreased range of left hip movement      Physician: Rudi Fajardo MD       Physician Orders: PT Eval and Treat (focus on hip ROM and stretching)   Medical Diagnosis from Referral: Multiple closed fractures of pelvis without disruption of pelvic ring   Evaluation Date: 12/16/2019    Total Visits Received: 16  Cancelled Visits: 5  No Show Visits: 1    Current Certification Period: 1/21/2020 to 2/21/2020  Precautions:  Standard, Fall and Weightbearing  Visits from Evaluation Date:  16  Functional Level Prior to Evaluation:  independent with all ADL, IADL with reports of increased pain and need for increased time and frequent breaks. Pt utilizes a Rolator for community mobility. Pt states she cannot clean the house or climb stairs due to pain.     Subjective     Update: she has been feeling great recently and very little pain. States she "stepped funny" the other day and had a sharp pain for a split second, but it quickly went away and she has not had issues since. Pt is going to be in [place] this weekend for [place] and states that this will be the real test for how well she is doing because she will be on her feet so much.    Objective     Update:      RANGE OF MOTION:     Lumbar ROM Right  (spine)  2/20/2020 Left     2/20/2020 Pain/Dysfunction with Movement Goal   Lumbar Flexion (60) 70 ---       Lumbar Side Bending (25) 20 20          Hip ROM Right  2/20/2020 Left  2/20/2020 Pain/Dysfunction with Movement Goal   Hip Flexion (120) 120 120       Hip Abduction (45) 45 45       Hip IR (45) 30 30       Hip ER (45) 45 45          STRENGTH:     L/E MMT Right  2/20/2020 Left  2/20/2020 Pain/Dysfunction with Movement Goal   Hip Flexion  4/5 4/5   5/5 B    Hip Extension  4/5 4/5  5/5 B "   Hip Abduction  4/5 4/5  5/5 B   Knee Extension 5/5 5/5   5/5 B   Knee Flexion 4+/5 4+/5  5/5 B   Hip IR 4/5 4/5   5/5 B   Hip ER 4/5 4/5   5/5 B   Ankle DF 5/5 5/5   5/5 B   Ankle PF 5/5 5/5   5/5 B         MUSCLE LENGTH:      Muscle Tested  Right  2/20/2020 Left   2/20/2020 Goal   Hamstrings  decreased decreased Normal B   Piriformis  decreased decreased Normal B       Gait Analysis: The patient ambulated with the following assistive device: none; the pt presents with the following gait abnormalities: decreased stance time on L, trunk lean to R during R stance phase, bradykinetic.      FUNCTION:      LOWER EXTREMITY FUNCTIONAL SCALE  Patient-reported functional assessment scores are rated as follows:  A score of 0/4 represents extreme difficulty or unable to perform the activity. A score of 1/4 represents quite a bit of difficulty. A score of 2/4 represents moderate difficulty. A score of 3/4 represents a little bit of difficulty. A score of 4/4 represents no difficulty.          12/16/2019 1/21/2020 2/20/2020   1. Any of your usual work, housework or school activities 2/4 2/4 4/4   2. Your usual hobbies, sporting 2/4 1/4 4/4   3. Getting in and out of tub 0/4 4/4 4/4   4. Walking between rooms 2/4 4/4 4/4   5. Putting on shoes or socks 2/4 4/4 4/4   6. Squatting 2/4 3/4 4/4   7. Lifting an object from the ground             2/4 3/4 4/4   8. Performing light activities around the home 2/4 3/4 4/4   9. Performing heavy activities around the home 0/4 1/4 0/4   10. Getting in and out of car 2/4 4/4 4/4   11. Walking 2 blocks 1/4 0/4 4/4   12.Walking a mile 1/4 0/4 4/4   13. Getting up and down 1 flight of stairs 2/4 2/4 3/4   14. Standing for 1 hour 1/4 1/4 4/4   15. Sitting for an hour             4/4 2/4 4/4   16. Running on even ground             0/4 0/4 0/4   17. Running on uneven ground 0/4 0/4 0/4   18. Making sharp turns when running fast 0/4 0/4 0/4   19. Hopping             0/4 0/4 0/4   20. Rolling over  in bed 0/4 3/4 4/4     Patient reports 40% ability based on score of the Lower Extremity Functional Scale on 12/16/2019..    Patient reports 46.25% ability based on score of the Lower Extremity Functional Scale on 1/21/2020.    Patient reports 73.75% ability based on score of the Lower Extremity Functional Scale on 2/20/2020.     Assessment     Update: patient is progressing well towards her goals. She has had several setbacks with pain since starting Physical Therapy; however, symptoms have recently stabilized. Patient demonstrates some improvement in overall strength; however, her tolerance for exercises, especially standing exercises, has increased significantly. As a result, patient reports being able to walk longer distances and perform standing activities for longer without pain. Patient reports significant increase in function based on scores reported on the LEFS. Gait mechanics have also improved and decreased compensatory strategies noted. Improved heel strike on L, decreased trunk lean to R, decreased overall JOSE and increased gait speed.        GOALS:     Short Term Goals:  2 weeks     1. Pain: Pt will demonstrate improved pain by reports of less than or equal to 4/10 worst pain on the verbal rating scale in order to progress toward maximal functional ability and improve QOL.  Met    2. Function: Patient will demonstrate improved function as indicated by a score of greater than or equal to 55% on the Lower Extremity Functional Scale   Met    3. Mobility: Patient will improve AROM to 50% of stated goals, listed in objective measures above, in order to progress towards independence with functional activities.    Met    4. Strength: Patient will improve strength to 50% of stated goals, listed in objective measures above, in order to progress towards independence with functional activities.    Met    5. Gait: Patient will demonstrate improved gait mechanics including symmetrical stance time B, normalized gait  speed and normalized JOSE, in order to improve functional mobility, improve quality of life, and decrease risk of further injury or fall.    Met    6. HEP: Patient will demonstrate independence with HEP in order to progress toward functional independence.   Met       Long Term Goals:  4 weeks     1. Pain: Pt will demonstrate improved pain by reports of less than or equal to 2/10 worst pain on the verbal rating scale in order to progress toward maximal functional ability and improve QOL.     Met    2. Function: Patient will demonstrate improved function as indicated by a score of greater than or equal to 70% on the Lower Extremity Functional Scale  Progressing toward    3. Mobility: Patient will improve AROM to stated goals, listed in objective measures above, in order to return to maximal functional potential and improve quality of life.  Progressing toward   4. Strength: Patient will improve strength to stated goals, listed in objective measures above, in order to improve functional independence and quality of life.  Progressing toward   5. Gait: Patient will demonstrate normalized gait mechanics with minimal compensation in order to return to PLOF.  Progressing toward   6. Patient will return to normal ADL's, IADL's, community involvement, recreational activities, and work-related activities with less than or equal to 2/10 pain and maximal function.    Met      Long Term Goal Status:   continue per initial plan of care.  Reasons for Recertification of Therapy:   Pt will continue to benefit from skilled outpatient physical therapy to address the deficits listed in the problem list box on initial evaluation, provide pt/family education and to maximize pt's level of independence in the home and community environment.     Plan     Updated Certification Period: 2/20/2020 to 3/20/2020  Recommended Treatment Plan: 2 times per week for 4 weeks: Aquatic Therapy, Cervical/Lumbar Traction, Gait Training, Manual Therapy, Moist  Heat/ Ice, Neuromuscular Re-ed, Patient Education, Self Care, Therapeutic Activites and Therapeutic Exercise    Anabela Ho, PT, DPT  2/20/2020      I CERTIFY THE NEED FOR THESE SERVICES FURNISHED UNDER THIS PLAN OF TREATMENT AND WHILE UNDER MY CARE    Physician's comments:        Physician's Signature: ___________________________________________________

## 2020-02-20 NOTE — PROGRESS NOTES
"  Physical Therapy Daily Treatment Note     Name: Cleo Ford  Clinic Number: [id number]    Therapy Diagnosis:   Encounter Diagnoses   Name Primary?    Gait abnormality     Proximal muscle weakness     Decreased range of left hip movement      Physician: Rudi Fajardo MD    Visit Date: 2/20/2020    Physician Orders: PT Eval and Treat (focus on hip ROM and stretching)   Medical Diagnosis from Referral: Multiple closed fractures of pelvis without disruption of pelvic ring   Evaluation Date: 12/16/2019  Authorization Period Expiration: 12/31/19  Plan of Care Expiration: 3/20/2020  Visit # / Visits authorized: 14 (1 of 10)      Precautions: Standard, Fall and Weightbearing    Time In: 10:00 am  Time Out: 11:05 am  Total Billable Time: 65 minutes    SUBJECTIVE   Date of onset: ~6 weeks ago   History of current condition - Rut is a 69 y.o. female whom reports hx of fall ~ 6 weeks ago; states she fell off the tailgate of her truck and landed on the L hip. Pt was initially utilizing a wheelchair but has recently started walking with a walker. She states that she easily gets tired and pain in the hip when walking for prolonged periods. Pt states she was in [place] for vacation last week and forced to walk around a lot; she states that she think this helped her with her mobility tremendously. She is currently taking tylenol every 4 hours to "stay ahead of the pain." Reports most pain when sitting or standing for a long period of time, walking, laying in bed, and rolling over in bed. States that pain when sitting depends on which position she is sitting in; reports pain on the L sit-bone (ischial tuberosity). States she is doing better with getting in and out of the car because she has learned how to pivot on the right leg. Pt states she is not currently doing any other exercise besides walking. Pt states she is currently sleeping on her back because this is the most comfortable position she can get in. She is not " "utilizing pillows under the knees. Pt states she prefers to sleep on her sides; she has attempted to lay on the R side but cannot stay in this position for long due to pain; states she has not attempted to lay on the side with pillows between her legs.   Today, pt reports: she has been feeling great recently and very little pain. States she "stepped funny" the other day and had a sharp pain for a split second, but it quickly went away and she has not had issues since. Pt is going to be in [place] this weekend for [place] and states that this will be the real test for how well she is doing because she will be on her feet so much.     She was compliant with home exercise program.  Response to previous treatment: soreness following manual therapy performed.   Functional change: increased tolerance for standing interventions.     Pre-Treatment Pain: 0/10 L pelvis with walking, 0/10 R hip   Post-Treatment Pain: 0/10 L pelvis; 0/10 R hip   Location: L pelvis       TREATMENT     Eboni received therapeutic exercises to develop strength, endurance, flexibility and core stabilization for 30 minutes including:    Exercise 2/20/2020   Hamstring stretch     Bike (for LE strength and endurance)  Level 3, 8 minutes    Step ups     Hamstring curls  Standing, 3 x 10 B    Lateral resistors  Yellow band at ankles, 12 x 6 feet in parallel bars    Hip flexor stretch  2 x 1 min on L   Sit to Stand 6#, 3 x 10    Supine Marches     Ball Squats  3 x 10    Standing Hip Extensions    Supine Marches     Clamshells     Supine Leg Curls     Leg press     Tabletop static hold     LTR     Shuttle  Level 3   Squats for 5 minutes   3 x 10 calf raises                Eboni received the following manual therapy techniques: Soft tissue Mobilization were applied to the: L LE for 15 minutes, including:  STM of L illiacus, lower abdominals, and hip flexors.   Passive stretching of L hip flexors and hip adductors       Eboni participated in " neuromuscular re-education activities to improve: Balance, Kinesthetic and Proprioception for 20 minutes. The following activities were included:    Exercise 2/20/2020   Tandem walking Walking forward and reverse   10 laps each (6 feet) on airex pad    Tandem stance     Suitcase carries  10# weight, 10 x 70 feet   Focus on heel-toe walking    Belt Block (seated)  30 x 5 sec hold in each direction   SLS    Pelvic Mobility on Exercise ball  30 x anterior and posterior pelvic tilts  30 x lateral pelvic tilts   30 x clockwise and counterclockwise rotations                Eboni participated in gait training to improve functional mobility and safety for x  minutes, including:  Walking with focus on heel-to-toe pattern, equal weight bearing and step length B and minimized trunk lean.   Treadmill walking:   .8 mph for 5 minutes on 0 incline   .8 mph for 1 minute on level 3 incline   .5 mph for 1 minutes on 0 incline   .8 mph for 1 minute on level 3 incline       Home Exercises Provided and Patient Education Provided     Education/Self-Care provided:    Patient educated on biomechanical justification for therapeutic exercise and importance of compliance with HEP in order to improve overall impairments and QOL     Written Home Exercises Provided: yes.  Exercises were reviewed and Eboni was able to demonstrate them prior to the end of the session.  Eboni demonstrated good  understanding of the education provided.     See EMR under Media for exercises provided 12/20/2019.    ASSESSMENT   Pt tolerates manual therapy well and states that the hip feels looser following intervention, 0/10 pain. Pt tolerates all exercises well today and states she is tired and fatigued following interventions but has no increase in pain at the pelvis. Pt again requires both verbal and tactile cueing in order to perform pelvic mobility on exercise ball. Pt tolerates all other exercise well with reports of increased fatigue but no increase in pain.      Eboni is progressing well towards her goals.   Pt prognosis is Good.     Pt will continue to benefit from skilled outpatient physical therapy to address the deficits listed in the problem list box on initial evaluation, provide pt/family education and to maximize pt's level of independence in the home and community environment.     Pt's spiritual, cultural and educational needs considered and pt agreeable to plan of care and goals.     Anticipated Barriers for therapy: co-morbidities, sedentary lifestyle and adherence to treatment plan    GOALS:     Short Term Goals:  2 weeks     1. Pain: Pt will demonstrate improved pain by reports of less than or equal to 4/10 worst pain on the verbal rating scale in order to progress toward maximal functional ability and improve QOL.     2. Function: Patient will demonstrate improved function as indicated by a score of greater than or equal to 55% on the Lower Extremity Functional Scale     3. Mobility: Patient will improve AROM to 50% of stated goals, listed in objective measures above, in order to progress towards independence with functional activities.      4. Strength: Patient will improve strength to 50% of stated goals, listed in objective measures above, in order to progress towards independence with functional activities.      5. Gait: Patient will demonstrate improved gait mechanics including symmetrical stance time B, normalized gait speed and normalized JOSE, in order to improve functional mobility, improve quality of life, and decrease risk of further injury or fall.      6. HEP: Patient will demonstrate independence with HEP in order to progress toward functional independence.        Long Term Goals:  4 weeks     1. Pain: Pt will demonstrate improved pain by reports of less than or equal to 2/10 worst pain on the verbal rating scale in order to progress toward maximal functional ability and improve QOL.       2. Function: Patient will demonstrate improved  function as indicated by a score of greater than or equal to 70% on the Lower Extremity Functional Scale     3. Mobility: Patient will improve AROM to stated goals, listed in objective measures above, in order to return to maximal functional potential and improve quality of life.     4. Strength: Patient will improve strength to stated goals, listed in objective measures above, in order to improve functional independence and quality of life.     5. Gait: Patient will demonstrate normalized gait mechanics with minimal compensation in order to return to PLOF.     6. Patient will return to normal ADL's, IADL's, community involvement, recreational activities, and work-related activities with less than or equal to 2/10 pain and maximal function.             PLAN   Continue POC and progress per pt tolerance.     Evaluation: 12/6/19  POC Update: 1/21/2020  POC Update: 2/20/2020  POC Expiration: 3/20/2020    Anabela Ho PT, DPT

## 2020-02-27 RX ORDER — LOVASTATIN 40 MG/1
TABLET ORAL
Qty: 90 TABLET | Refills: 0 | Status: SHIPPED | OUTPATIENT
Start: 2020-02-27 | End: 2020-05-25

## 2020-02-28 ENCOUNTER — CLINICAL SUPPORT (OUTPATIENT)
Dept: REHABILITATION | Facility: HOSPITAL | Age: 70
End: 2020-02-28
Payer: MEDICARE

## 2020-02-28 DIAGNOSIS — M62.81 PROXIMAL MUSCLE WEAKNESS: ICD-10-CM

## 2020-02-28 DIAGNOSIS — R26.9 GAIT ABNORMALITY: ICD-10-CM

## 2020-02-28 DIAGNOSIS — M25.652 DECREASED RANGE OF LEFT HIP MOVEMENT: ICD-10-CM

## 2020-02-28 PROCEDURE — 97110 THERAPEUTIC EXERCISES: CPT

## 2020-02-28 PROCEDURE — 97116 GAIT TRAINING THERAPY: CPT

## 2020-02-28 PROCEDURE — 97112 NEUROMUSCULAR REEDUCATION: CPT

## 2020-02-28 NOTE — PROGRESS NOTES
"  Physical Therapy Daily Treatment Note     Name: Cleo Ford  Clinic Number: [id number]    Therapy Diagnosis:   Encounter Diagnoses   Name Primary?    Gait abnormality     Proximal muscle weakness     Decreased range of left hip movement      Physician: Rudi Fajardo MD    Visit Date: 2/28/2020    Physician Orders: PT Eval and Treat (focus on hip ROM and stretching)   Medical Diagnosis from Referral: Multiple closed fractures of pelvis without disruption of pelvic ring   Evaluation Date: 12/16/2019  Authorization Period Expiration: 12/31/19  Plan of Care Expiration: 3/20/2020  Visit # / Visits authorized: 15 (2 of 10)      Precautions: Standard, Fall and Weightbearing    Time In: 9:30 am  Time Out: 10:25 am  Total Billable Time: 55 minutes    SUBJECTIVE   Date of onset: ~6 weeks ago   History of current condition - Rut is a 69 y.o. female whom reports hx of fall ~ 6 weeks ago; states she fell off the tailgate of her truck and landed on the L hip. Pt was initially utilizing a wheelchair but has recently started walking with a walker. She states that she easily gets tired and pain in the hip when walking for prolonged periods. Pt states she was in [place] for vacation last week and forced to walk around a lot; she states that she think this helped her with her mobility tremendously. She is currently taking tylenol every 4 hours to "stay ahead of the pain." Reports most pain when sitting or standing for a long period of time, walking, laying in bed, and rolling over in bed. States that pain when sitting depends on which position she is sitting in; reports pain on the L sit-bone (ischial tuberosity). States she is doing better with getting in and out of the car because she has learned how to pivot on the right leg. Pt states she is not currently doing any other exercise besides walking. Pt states she is currently sleeping on her back because this is the most comfortable position she can get in. She is not " utilizing pillows under the knees. Pt states she prefers to sleep on her sides; she has attempted to lay on the R side but cannot stay in this position for long due to pain; states she has not attempted to lay on the side with pillows between her legs.   Today, pt reports: she went to [place] for [place] last weekend; states she was standing and walking for 3 days straight with no pain. Pt states she would like to discharge today.   She was compliant with home exercise program.  Response to previous treatment: none noted.  Functional change: increased tolerance for standing interventions and walking on incline with no increased pain     Pre-Treatment Pain: 0/10 L pelvis with walking, 0/10 R hip   Post-Treatment Pain: 0/10 L pelvis; 0/10 R hip   Location: L pelvis     TREATMENT     Eboni received therapeutic exercises to develop strength, endurance, flexibility and core stabilization for 32 minutes including:    Exercise 2/28/2020   Hamstring stretch     Bike (for LE strength and endurance)  Level 3, 8 minutes    Step ups  Stairs, reciprocal steps forward and backwards   10 x (4 steps up and down)    Hamstring curls  Standing, 3 x 10 B    Lateral resistors  Yellow band at ankles, 12 x 6 feet in parallel bars    Hip flexor stretch  2 x 1 min on L   Sit to Stand 8#, 3 x 10    Supine Marches     Ball Squats  3 x 10    Standing Hip Extensions    Supine Marches     Clamshells     Supine Leg Curls     Leg press  Level 4, 3 x 10    Tabletop static hold     LTR     Shuttle  Level 3   Squats for 5 minutes   3 x 10 calf raises                Eboni received the following manual therapy techniques: Soft tissue Mobilization were applied to the: L LE for 5 minutes, including:  STM of L illiacus, lower abdominals, and hip flexors.   Passive stretching of L hip flexors and hip adductors       Eboni participated in neuromuscular re-education activities to improve: Balance, Kinesthetic and Proprioception for 8 minutes. The  following activities were included:    Exercise 2/28/2020   Tandem walking Walking forward and reverse   10 laps each (6 feet) on airex pad    Tandem stance     Suitcase carries  15# weight, 10 x 70 feet   Focus on heel-toe walking    Belt Block (seated)     SLS    Pelvic Mobility on Exercise ball                 Eboni participated in gait training to improve functional mobility and safety for 10 minutes, including:  Treadmill walking at 2.5 mph for 10 minutes. Intervals of 2 minutes walking a 0 incline and 3 incline      Home Exercises Provided and Patient Education Provided     Education/Self-Care provided:    Patient educated on biomechanical justification for therapeutic exercise and importance of compliance with HEP in order to improve overall impairments and QOL     Written Home Exercises Provided: yes.  Exercises were reviewed and Eboni was able to demonstrate them prior to the end of the session.  Eboni demonstrated good  understanding of the education provided.     See EMR under Media for exercises provided 12/20/2019.    ASSESSMENT   Pt tolerates all exercises well today and states she is tired and fatigued following interventions but has no increase in pain or tightness at the pelvis. Pt tolerates treadmill walking with incline well with no reports of increased pain. Manual therapy performed at the end of session; pt reports very little discomfort with intervention.     Pt to be discharged today. Patient is appropriate for discharge today due to now being asymptomatic and having met all of her personal goals. Patient instructed to reach out to myself or Dr. Kay shoulder she have any more complications     Eboni is progressing well towards her goals.   Pt prognosis is Good.     Pt will continue to benefit from skilled outpatient physical therapy to address the deficits listed in the problem list box on initial evaluation, provide pt/family education and to maximize pt's level of independence in the  home and community environment.     Pt's spiritual, cultural and educational needs considered and pt agreeable to plan of care and goals.     Anticipated Barriers for therapy: co-morbidities, sedentary lifestyle and adherence to treatment plan    GOALS:     Short Term Goals:  2 weeks     1. Pain: Pt will demonstrate improved pain by reports of less than or equal to 4/10 worst pain on the verbal rating scale in order to progress toward maximal functional ability and improve QOL.     2. Function: Patient will demonstrate improved function as indicated by a score of greater than or equal to 55% on the Lower Extremity Functional Scale     3. Mobility: Patient will improve AROM to 50% of stated goals, listed in objective measures above, in order to progress towards independence with functional activities.      4. Strength: Patient will improve strength to 50% of stated goals, listed in objective measures above, in order to progress towards independence with functional activities.      5. Gait: Patient will demonstrate improved gait mechanics including symmetrical stance time B, normalized gait speed and normalized JOSE, in order to improve functional mobility, improve quality of life, and decrease risk of further injury or fall.      6. HEP: Patient will demonstrate independence with HEP in order to progress toward functional independence.        Long Term Goals:  4 weeks     1. Pain: Pt will demonstrate improved pain by reports of less than or equal to 2/10 worst pain on the verbal rating scale in order to progress toward maximal functional ability and improve QOL.       2. Function: Patient will demonstrate improved function as indicated by a score of greater than or equal to 70% on the Lower Extremity Functional Scale     3. Mobility: Patient will improve AROM to stated goals, listed in objective measures above, in order to return to maximal functional potential and improve quality of life.     4. Strength: Patient  will improve strength to stated goals, listed in objective measures above, in order to improve functional independence and quality of life.     5. Gait: Patient will demonstrate normalized gait mechanics with minimal compensation in order to return to PLOF.     6. Patient will return to normal ADL's, IADL's, community involvement, recreational activities, and work-related activities with less than or equal to 2/10 pain and maximal function.             PLAN   Pt to be discharged today     Evaluation: 12/6/19  POC Update: 1/21/2020  POC Update: 2/20/2020  POC Expiration: 3/20/2020    Anabela Ho, PT, DPT

## 2020-02-28 NOTE — PLAN OF CARE
Outpatient Therapy Discharge Summary     Name: Cleo Ford  [facility] Number: [id number]    Therapy Diagnosis:   Encounter Diagnoses   Name Primary?    Gait abnormality     Proximal muscle weakness     Decreased range of left hip movement      Physician: Rudi Fajardo MD    Physician Orders: PT Eval and Treat (focus on hip ROM and stretching)   Medical Diagnosis from Referral: Multiple closed fractures of pelvis without disruption of pelvic ring   Evaluation Date: 12/16/2019    Date of Last visit: 2/28/20201  Total Visits Received: 17  Cancelled Visits: 5  No Show Visits: 1       Objective      Update:      RANGE OF MOTION:     Lumbar ROM Right  (spine)  2/20/2020 Left     2/20/2020 Pain/Dysfunction with Movement Goal   Lumbar Flexion (60) 70 ---       Lumbar Side Bending (25) 20 20          Hip ROM Right  2/20/2020 Left  2/20/2020 Pain/Dysfunction with Movement Goal   Hip Flexion (120) 120 120       Hip Abduction (45) 45 45       Hip IR (45) 30 30       Hip ER (45) 45 45          STRENGTH:     L/E MMT Right  2/20/2020 Left  2/20/2020 Pain/Dysfunction with Movement Goal   Hip Flexion  4/5 4/5   5/5 B    Hip Extension  4/5 4/5   5/5 B   Hip Abduction  4/5 4/5   5/5 B   Knee Extension 5/5 5/5   5/5 B   Knee Flexion 4+/5 4+/5   5/5 B   Hip IR 4/5 4/5   5/5 B   Hip ER 4/5 4/5   5/5 B   Ankle DF 5/5 5/5   5/5 B   Ankle PF 5/5 5/5   5/5 B         MUSCLE LENGTH:      Muscle Tested  Right  2/20/2020 Left   2/20/2020 Goal   Hamstrings  decreased decreased Normal B   Piriformis  decreased decreased Normal B       Gait Analysis: The patient ambulated with the following assistive device: none; the pt presents with the following gait abnormalities: decreased stance time on L, trunk lean to R during R stance phase, bradykinetic.      FUNCTION:      LOWER EXTREMITY FUNCTIONAL SCALE  Patient-reported functional assessment scores are rated as follows:  A score of 0/4 represents extreme difficulty or unable to perform the  activity. A score of 1/4 represents quite a bit of difficulty. A score of 2/4 represents moderate difficulty. A score of 3/4 represents a little bit of difficulty. A score of 4/4 represents no difficulty.          12/16/2019 1/21/2020 2/20/2020   1. Any of your usual work, housework or school activities 2/4 2/4 4/4   2. Your usual hobbies, sporting 2/4 1/4 4/4   3. Getting in and out of tub 0/4 4/4 4/4   4. Walking between rooms 2/4 4/4 4/4   5. Putting on shoes or socks 2/4 4/4 4/4   6. Squatting 2/4 3/4 4/4   7. Lifting an object from the ground             2/4 3/4 4/4   8. Performing light activities around the home 2/4 3/4 4/4   9. Performing heavy activities around the home 0/4 1/4 0/4   10. Getting in and out of car 2/4 4/4 4/4   11. Walking 2 blocks 1/4 0/4 4/4   12.Walking a mile 1/4 0/4 4/4   13. Getting up and down 1 flight of stairs 2/4 2/4 3/4   14. Standing for 1 hour 1/4 1/4 4/4   15. Sitting for an hour             4/4 2/4 4/4   16. Running on even ground             0/4 0/4 0/4   17. Running on uneven ground 0/4 0/4 0/4   18. Making sharp turns when running fast 0/4 0/4 0/4   19. Hopping             0/4 0/4 0/4   20. Rolling over in bed 0/4 3/4 4/4      Patient reports 40% ability based on score of the Lower Extremity Functional Scale on 12/16/2019..    Patient reports 46.25% ability based on score of the Lower Extremity Functional Scale on 1/21/2020.    Patient reports 73.75% ability based on score of the Lower Extremity Functional Scale on 2/20/2020.       Assessment      GOALS:     Short Term Goals:  2 weeks     1. Pain: Pt will demonstrate improved pain by reports of less than or equal to 4/10 worst pain on the verbal rating scale in order to progress toward maximal functional ability and improve QOL.  Met    2. Function: Patient will demonstrate improved function as indicated by a score of greater than or equal to 55% on the Lower Extremity Functional Scale   Met    3. Mobility: Patient will  improve AROM to 50% of stated goals, listed in objective measures above, in order to progress towards independence with functional activities.    Met    4. Strength: Patient will improve strength to 50% of stated goals, listed in objective measures above, in order to progress towards independence with functional activities.    Met    5. Gait: Patient will demonstrate improved gait mechanics including symmetrical stance time B, normalized gait speed and normalized JOSE, in order to improve functional mobility, improve quality of life, and decrease risk of further injury or fall.    Met    6. HEP: Patient will demonstrate independence with HEP in order to progress toward functional independence.   Met       Long Term Goals:  4 weeks     1. Pain: Pt will demonstrate improved pain by reports of less than or equal to 2/10 worst pain on the verbal rating scale in order to progress toward maximal functional ability and improve QOL.     Met    2. Function: Patient will demonstrate improved function as indicated by a score of greater than or equal to 70% on the Lower Extremity Functional Scale  Met    3. Mobility: Patient will improve AROM to stated goals, listed in objective measures above, in order to return to maximal functional potential and improve quality of life.  Met   4. Strength: Patient will improve strength to stated goals, listed in objective measures above, in order to improve functional independence and quality of life.  Progressing toward   5. Gait: Patient will demonstrate normalized gait mechanics with minimal compensation in order to return to PLOF. Met   6. Patient will return to normal ADL's, IADL's, community involvement, recreational activities, and work-related activities with less than or equal to 2/10 pain and maximal function.  Met       Discharge reason: Patient is now asymptomatic, Patient has met all of his/her goals and Patient requested discharge    Plan   This patient is discharged from  PT.    Anabela Ho, PT, DPT

## 2020-03-24 DIAGNOSIS — F41.1 ANXIETY STATE: ICD-10-CM

## 2020-03-24 RX ORDER — LEVOTHYROXINE SODIUM 112 UG/1
TABLET ORAL
Qty: 90 TABLET | Refills: 3 | Status: CANCELLED | OUTPATIENT
Start: 2020-03-24

## 2020-03-24 RX ORDER — ALPRAZOLAM 1 MG/1
1 TABLET ORAL 2 TIMES DAILY PRN
Qty: 180 TABLET | Refills: 1 | Status: CANCELLED | OUTPATIENT
Start: 2020-03-24

## 2020-03-24 RX ORDER — OMEPRAZOLE 40 MG/1
40 CAPSULE, DELAYED RELEASE ORAL DAILY
Qty: 90 CAPSULE | Refills: 3 | Status: SHIPPED | OUTPATIENT
Start: 2020-03-24 | End: 2021-12-01 | Stop reason: ALTCHOICE

## 2020-03-25 DIAGNOSIS — I10 ESSENTIAL HYPERTENSION: Chronic | ICD-10-CM

## 2020-03-25 RX ORDER — CARVEDILOL 6.25 MG/1
6.25 TABLET ORAL 2 TIMES DAILY WITH MEALS
Qty: 60 TABLET | Refills: 0 | Status: SHIPPED | OUTPATIENT
Start: 2020-03-25 | End: 2020-04-21

## 2020-03-26 ENCOUNTER — OFFICE VISIT (OUTPATIENT)
Dept: INTERNAL MEDICINE | Facility: CLINIC | Age: 70
End: 2020-03-26
Payer: MEDICARE

## 2020-03-26 VITALS — SYSTOLIC BLOOD PRESSURE: 149 MMHG | DIASTOLIC BLOOD PRESSURE: 81 MMHG

## 2020-03-26 DIAGNOSIS — F41.9 ANXIETY: Primary | ICD-10-CM

## 2020-03-26 DIAGNOSIS — E03.8 OTHER SPECIFIED HYPOTHYROIDISM: Chronic | ICD-10-CM

## 2020-03-26 DIAGNOSIS — E55.9 VITAMIN D DEFICIENCY: ICD-10-CM

## 2020-03-26 DIAGNOSIS — Z12.31 ENCOUNTER FOR SCREENING MAMMOGRAM FOR BREAST CANCER: ICD-10-CM

## 2020-03-26 DIAGNOSIS — Z00.00 ROUTINE GENERAL MEDICAL EXAMINATION AT A HEALTH CARE FACILITY: ICD-10-CM

## 2020-03-26 DIAGNOSIS — I10 ESSENTIAL HYPERTENSION: Chronic | ICD-10-CM

## 2020-03-26 DIAGNOSIS — F41.1 ANXIETY STATE: ICD-10-CM

## 2020-03-26 DIAGNOSIS — E78.2 MIXED HYPERLIPIDEMIA: ICD-10-CM

## 2020-03-26 PROCEDURE — 1159F PR MEDICATION LIST DOCUMENTED IN MEDICAL RECORD: ICD-10-PCS | Mod: S$GLB,,, | Performed by: FAMILY MEDICINE

## 2020-03-26 PROCEDURE — 3079F PR MOST RECENT DIASTOLIC BLOOD PRESSURE 80-89 MM HG: ICD-10-PCS | Mod: CPTII,S$GLB,, | Performed by: FAMILY MEDICINE

## 2020-03-26 PROCEDURE — 3077F SYST BP >= 140 MM HG: CPT | Mod: CPTII,S$GLB,, | Performed by: FAMILY MEDICINE

## 2020-03-26 PROCEDURE — 1101F PT FALLS ASSESS-DOCD LE1/YR: CPT | Mod: CPTII,S$GLB,, | Performed by: FAMILY MEDICINE

## 2020-03-26 PROCEDURE — 1101F PR PT FALLS ASSESS DOC 0-1 FALLS W/OUT INJ PAST YR: ICD-10-PCS | Mod: CPTII,S$GLB,, | Performed by: FAMILY MEDICINE

## 2020-03-26 PROCEDURE — 3077F PR MOST RECENT SYSTOLIC BLOOD PRESSURE >= 140 MM HG: ICD-10-PCS | Mod: CPTII,S$GLB,, | Performed by: FAMILY MEDICINE

## 2020-03-26 PROCEDURE — 1159F MED LIST DOCD IN RCRD: CPT | Mod: S$GLB,,, | Performed by: FAMILY MEDICINE

## 2020-03-26 PROCEDURE — 99214 OFFICE O/P EST MOD 30 MIN: CPT | Mod: 95,,, | Performed by: FAMILY MEDICINE

## 2020-03-26 PROCEDURE — 3079F DIAST BP 80-89 MM HG: CPT | Mod: CPTII,S$GLB,, | Performed by: FAMILY MEDICINE

## 2020-03-26 PROCEDURE — 99214 PR OFFICE/OUTPT VISIT, EST, LEVL IV, 30-39 MIN: ICD-10-PCS | Mod: 95,,, | Performed by: FAMILY MEDICINE

## 2020-03-26 RX ORDER — LEVOTHYROXINE SODIUM 112 UG/1
TABLET ORAL
Qty: 90 TABLET | Refills: 3 | Status: SHIPPED | OUTPATIENT
Start: 2020-03-26 | End: 2020-10-12

## 2020-03-26 RX ORDER — ALPRAZOLAM 1 MG/1
1 TABLET ORAL 2 TIMES DAILY PRN
Qty: 60 TABLET | Refills: 1 | Status: SHIPPED | OUTPATIENT
Start: 2020-03-26 | End: 2020-07-06

## 2020-03-26 NOTE — PROGRESS NOTES
Subjective:      Patient ID: Cleo Ford is a 69 y.o. female.    Chief Complaint: med refills    Disclaimer:  This note is prepared using voice recognition software and as such is likely to have errors and has not been proof read. Please contact me for questions.     The patient location is:home  The chief complaint leading to consultation is: followup / my chart request/ med refills.   Visit type: Virtual visit with synchronous audio and video  Total time spent with patient:800am  Each patient to whom he or she provides medical services by telemedicine is:  (1) informed of the relationship between the physician and patient and the respective role of any other health care provider with respect to management of the patient; and (2) notified that he or she may decline to receive medical services by telemedicine and may withdraw from such care at any time.    Notes:     Needing refills on xanax and thyroid. Mentally and physically are doing ok.   Did have fracture in Nov and almost done with PT. Took a while to get over it. Took 3 months to recover. End of feb that she could do it from home.  Doing home exercises from home.  Had a muscle in the groin in a spasm and worked it out.      Is doing the digital BP, half the time isn't working.   149/81 just took meds this am.  Feels great currently.   Got the right combo for mental.     Still doing the thyroid meds and needing it now of the [facility] [place] [facility]. Doing well. Willing to do labs in 2 months.     Is on wellbutrin, zoloft and xanax. Needs refill to [facility] for xanax. Using twice a day.            Lab Results   Component Value Date    WBC 7.01 01/31/2019    HGB 13.0 01/31/2019    HCT 41.9 01/31/2019     (H) 01/31/2019    CHOL 198 01/31/2019    TRIG 59 01/31/2019    HDL 77 (H) 01/31/2019    ALT 17 01/31/2019    AST 25 01/31/2019     01/31/2019    K 3.6 01/31/2019     01/31/2019    CREATININE 0.9 01/31/2019    BUN 16 01/31/2019    CO2 25 01/31/2019     TSH 4.827 (H) 01/31/2019    HGBA1C 5.1 01/31/2019       X-Ray Pelvis AP Inlet And Outlet  Narrative: EXAMINATION:  XR PELVIS AP INLET AND OUTLET    CLINICAL HISTORY:  AP, Inlet and Outlet.;  Pain, unspecified    TECHNIQUE:  Three views of the pelvis    COMPARISON:  01/03/2020    FINDINGS:  Healing fractures of the left superior and inferior pubic rami are seen with adjacent callus formation.  Stable appearance of the right pubis.  A left sacral fracture was best appreciated on a prior CT from November 2019.  No new abnormality or detrimental change  Impression: As above    Electronically signed by: Kamari Cross MD  Date:    01/23/2020  Time:    16:20        Review of Systems   Constitutional: Negative for activity change and unexpected weight change.   HENT: Negative for hearing loss, rhinorrhea and trouble swallowing.    Eyes: Negative for discharge and visual disturbance.   Respiratory: Negative for chest tightness and wheezing.    Cardiovascular: Negative for chest pain and palpitations.   Gastrointestinal: Negative for blood in stool, constipation, diarrhea and vomiting.   Endocrine: Negative for polydipsia and polyuria.   Genitourinary: Negative for difficulty urinating, dysuria, hematuria and menstrual problem.   Musculoskeletal: Negative for arthralgias, joint swelling and neck pain.   Neurological: Negative for weakness and headaches.   Psychiatric/Behavioral: Negative for confusion and dysphoric mood.     Objective:     Vitals:    03/26/20 0812   BP: (!) 149/81     Physical Exam   Constitutional: She appears well-developed and well-nourished. She is active and cooperative. She does not have a sickly appearance. She does not appear ill. No distress.   HENT:   Head: Normocephalic and atraumatic.   Neurological: She is alert.   Psychiatric: She has a normal mood and affect. Her behavior is normal. Judgment and thought content normal. Her mood appears not anxious. Her affect is not angry, not blunt, not  labile and not inappropriate. Her speech is not rapid and/or pressured, not delayed, not tangential and not slurred. She is not agitated, not aggressive, not hyperactive, not slowed, not withdrawn, not actively hallucinating and not combative. Thought content is not paranoid and not delusional. Cognition and memory are normal. Cognition and memory are not impaired. She does not express impulsivity or inappropriate judgment. She does not exhibit a depressed mood. She expresses no homicidal and no suicidal ideation. She expresses no suicidal plans and no homicidal plans. She is communicative. She exhibits normal recent memory and normal remote memory. She is attentive.   Vitals reviewed.    Assessment:     1. Anxiety    2. Other specified hypothyroidism    3. Mixed hyperlipidemia    4. Essential hypertension    5. Anxiety state    6. Encounter for screening mammogram for breast cancer    7. Routine general medical examination at a health care facility    8. Vitamin D deficiency      Plan:   Cleo was seen today for med refills.    Diagnoses and all orders for this visit:    Anxiety  Comments:  stable wtih meds, needs refill of xanax. using bid.   Orders:  -     TSH; Future  -     T4, free; Future  -     Lipid panel; Future  -     Microalbumin/creatinine urine ratio; Future  -     Hemoglobin A1c; Future  -     Insulin, random; Future  -     Comprehensive metabolic panel; Future  -     CBC auto differential; Future  -     Vitamin D; Future    Other specified hypothyroidism  Comments:  stable, needing refills of 112mcg dose. refilled today to [facility] in 2 months.   Orders:  -     TSH; Future  -     T4, free; Future  -     Lipid panel; Future  -     Microalbumin/creatinine urine ratio; Future  -     Hemoglobin A1c; Future  -     Insulin, random; Future  -     Comprehensive metabolic panel; Future  -     CBC auto differential; Future  -     Vitamin D; Future    Mixed hyperlipidemia  Comments:  stable, labs in 2  months. cont with meds.   Orders:  -     TSH; Future  -     T4, free; Future  -     Lipid panel; Future  -     Microalbumin/creatinine urine ratio; Future  -     Hemoglobin A1c; Future  -     Insulin, random; Future  -     Comprehensive metabolic panel; Future  -     CBC auto differential; Future  -     Vitamin D; Future    Essential hypertension  Comments:  stable, labs in 2 months. cont with meds. cont with digital htn program  Orders:  -     TSH; Future  -     T4, free; Future  -     Lipid panel; Future  -     Microalbumin/creatinine urine ratio; Future  -     Hemoglobin A1c; Future  -     Insulin, random; Future  -     Comprehensive metabolic panel; Future  -     CBC auto differential; Future  -     Vitamin D; Future    Anxiety state  -     ALPRAZolam (XANAX) 1 MG tablet; Take 1 tablet (1 mg total) by mouth 2 (two) times daily as needed for Anxiety.  -     TSH; Future  -     T4, free; Future  -     Lipid panel; Future  -     Microalbumin/creatinine urine ratio; Future  -     Hemoglobin A1c; Future  -     Insulin, random; Future  -     Comprehensive metabolic panel; Future  -     CBC auto differential; Future  -     Vitamin D; Future    Encounter for screening mammogram for breast cancer  Comments:  ok to schedule in 2mo  Orders:  -     Mammo Digital Screening Bilat; Future    Routine general medical examination at a health care facility  Comments:  stable, labs in 2 months. cont with meds.   Orders:  -     TSH; Future  -     T4, free; Future  -     Lipid panel; Future  -     Microalbumin/creatinine urine ratio; Future  -     Hemoglobin A1c; Future  -     Insulin, random; Future  -     Comprehensive metabolic panel; Future  -     CBC auto differential; Future  -     Vitamin D; Future    Vitamin D deficiency  Comments:  stable, labs in 2 months. cont with meds.   Orders:  -     Vitamin D; Future    Other orders  -     levothyroxine (SYNTHROID) 112 MCG tablet; Take 1 tablet by mouth  before breakfast            No  follow-ups on file.    There are no Patient Instructions on file for this visit.

## 2020-04-21 DIAGNOSIS — I10 ESSENTIAL HYPERTENSION: Chronic | ICD-10-CM

## 2020-04-21 RX ORDER — CARVEDILOL 6.25 MG/1
6.25 TABLET ORAL 2 TIMES DAILY WITH MEALS
Qty: 60 TABLET | Refills: 0 | Status: SHIPPED | OUTPATIENT
Start: 2020-04-21 | End: 2020-06-08

## 2020-04-27 RX ORDER — BUPROPION HYDROCHLORIDE 150 MG/1
TABLET ORAL
Qty: 90 TABLET | Refills: 0 | Status: SHIPPED | OUTPATIENT
Start: 2020-04-27 | End: 2020-07-30

## 2020-05-21 RX ORDER — VALSARTAN 320 MG/1
320 TABLET ORAL DAILY
Qty: 90 TABLET | Refills: 0 | Status: SHIPPED | OUTPATIENT
Start: 2020-05-21 | End: 2020-06-08

## 2020-05-25 RX ORDER — LOVASTATIN 40 MG/1
TABLET ORAL
Qty: 90 TABLET | Refills: 0 | Status: SHIPPED | OUTPATIENT
Start: 2020-05-25 | End: 2020-07-24

## 2020-06-07 DIAGNOSIS — I10 ESSENTIAL HYPERTENSION: Chronic | ICD-10-CM

## 2020-06-08 ENCOUNTER — TELEPHONE (OUTPATIENT)
Dept: INTERNAL MEDICINE | Facility: CLINIC | Age: 70
End: 2020-06-08

## 2020-06-08 DIAGNOSIS — I10 ESSENTIAL HYPERTENSION: Chronic | ICD-10-CM

## 2020-06-08 RX ORDER — CARVEDILOL 6.25 MG/1
6.25 TABLET ORAL 2 TIMES DAILY WITH MEALS
Qty: 60 TABLET | Refills: 3 | Status: SHIPPED | OUTPATIENT
Start: 2020-06-08 | End: 2020-06-08 | Stop reason: SDUPTHER

## 2020-06-08 RX ORDER — IRBESARTAN 300 MG/1
300 TABLET ORAL NIGHTLY
Qty: 90 TABLET | Refills: 3 | Status: SHIPPED | OUTPATIENT
Start: 2020-06-08 | End: 2021-06-21

## 2020-06-08 RX ORDER — CARVEDILOL 6.25 MG/1
6.25 TABLET ORAL 2 TIMES DAILY WITH MEALS
Qty: 180 TABLET | Refills: 3 | Status: SHIPPED | OUTPATIENT
Start: 2020-06-08 | End: 2021-07-16

## 2020-06-08 NOTE — TELEPHONE ENCOUNTER
----- Message from Raudel Hernandez sent at 6/8/2020  8:05 AM CDT -----  Type:  Pharmacy Calling to Clarify an RX    Name of Caller maddie  Pharmacy Name:   [id number] - [place] - [id number]  [address]  [facility] [address]  Phone: [phone] Fax: [phone]    Prescription Name: valsartan  320 MG  What do they need to clarify?:   Best Call Back Number:  Additional Information:  Pt med is on backorder             .Type:  RX Refill Request    Who Called:  Maddie   Refill or New Rx: refill  RX Name and Strength carvediloL 6.25 MG  How is the patient currently taking it? (ex. 1XDay) twice daily  Is this a 30 day or 90 day RX: 301 day  Preferred Pharmacy with phone number:     [id number] - [place] - [address]  [address] [address]  Phone: [phone] Fax: [phone]    Local or Mail Order: local  Ordering Provider: love  Would the patient rather a call back or a response via [facility]?  call  Best Call Back Number: [phone] (home)   Additional Information:

## 2020-06-08 NOTE — TELEPHONE ENCOUNTER
Changed to ibersartan. If that is on backorder, then please find out from pharmacy which arb's that they have in stock for replacement. I also sent in the coreg as well.

## 2020-09-01 ENCOUNTER — HOSPITAL ENCOUNTER (OUTPATIENT)
Dept: RADIOLOGY | Facility: HOSPITAL | Age: 70
Discharge: HOME | End: 2020-09-01
Attending: FAMILY MEDICINE
Payer: MEDICARE

## 2020-09-01 VITALS — BODY MASS INDEX: 32.77 KG/M2 | WEIGHT: 184.94 LBS | HEIGHT: 63 IN

## 2020-09-01 DIAGNOSIS — Z12.31 ENCOUNTER FOR SCREENING MAMMOGRAM FOR BREAST CANCER: ICD-10-CM

## 2020-09-01 PROCEDURE — 77067 SCR MAMMO BI INCL CAD: CPT | Mod: 26,,, | Performed by: RADIOLOGY

## 2020-09-01 PROCEDURE — 77063 BREAST TOMOSYNTHESIS BI: CPT | Mod: 26,,, | Performed by: RADIOLOGY

## 2020-09-01 PROCEDURE — 77067 MAMMO DIGITAL SCREENING BILAT WITH TOMOSYNTHESIS_CAD: ICD-10-PCS | Mod: 26,,, | Performed by: RADIOLOGY

## 2020-09-01 PROCEDURE — 77063 MAMMO DIGITAL SCREENING BILAT WITH TOMOSYNTHESIS_CAD: ICD-10-PCS | Mod: 26,,, | Performed by: RADIOLOGY

## 2020-09-01 PROCEDURE — 77067 SCR MAMMO BI INCL CAD: CPT | Mod: TC

## 2020-09-16 ENCOUNTER — LAB VISIT (OUTPATIENT)
Dept: LAB | Facility: HOSPITAL | Age: 70
End: 2020-09-16
Attending: PHYSICIAN ASSISTANT
Payer: MEDICARE

## 2020-09-16 DIAGNOSIS — E55.9 VITAMIN D DEFICIENCY: ICD-10-CM

## 2020-09-16 DIAGNOSIS — M85.89 OTHER SPECIFIED DISORDERS OF BONE DENSITY AND STRUCTURE, MULTIPLE SITES: ICD-10-CM

## 2020-09-16 DIAGNOSIS — M85.89 OTHER SPECIFIED DISORDERS OF BONE DENSITY AND STRUCTURE, MULTIPLE SITES: Primary | ICD-10-CM

## 2020-09-16 LAB
ALBUMIN SERPL BCP-MCNC: 4.3 G/DL (ref 3.5–5.2)
ALP SERPL-CCNC: 104 U/L (ref 55–135)
ALT SERPL W/O P-5'-P-CCNC: 16 U/L (ref 10–44)
ANION GAP SERPL CALC-SCNC: 8 MMOL/L (ref 8–16)
AST SERPL-CCNC: 23 U/L (ref 10–40)
BILIRUB SERPL-MCNC: 0.5 MG/DL (ref 0.1–1)
BUN SERPL-MCNC: 20 MG/DL (ref 8–23)
CALCIUM SERPL-MCNC: 10 MG/DL (ref 8.7–10.5)
CHLORIDE SERPL-SCNC: 107 MMOL/L (ref 95–110)
CO2 SERPL-SCNC: 26 MMOL/L (ref 23–29)
CREAT SERPL-MCNC: 1.1 MG/DL (ref 0.5–1.4)
EST. GFR  (AFRICAN AMERICAN): 60 ML/MIN/1.73 M^2
EST. GFR  (NON AFRICAN AMERICAN): 52 ML/MIN/1.73 M^2
GLUCOSE SERPL-MCNC: 90 MG/DL (ref 70–110)
POTASSIUM SERPL-SCNC: 3.7 MMOL/L (ref 3.5–5.1)
PROT SERPL-MCNC: 7.9 G/DL (ref 6–8.4)
SODIUM SERPL-SCNC: 141 MMOL/L (ref 136–145)

## 2020-09-16 PROCEDURE — 83970 ASSAY OF PARATHORMONE: CPT

## 2020-09-16 PROCEDURE — 82306 VITAMIN D 25 HYDROXY: CPT

## 2020-09-16 PROCEDURE — 80053 COMPREHEN METABOLIC PANEL: CPT

## 2020-09-16 PROCEDURE — 36415 COLL VENOUS BLD VENIPUNCTURE: CPT

## 2020-09-17 ENCOUNTER — APPOINTMENT (OUTPATIENT)
Dept: RADIOLOGY | Facility: HOSPITAL | Age: 70
End: 2020-09-17
Attending: PHYSICIAN ASSISTANT
Payer: MEDICARE

## 2020-09-17 ENCOUNTER — OFFICE VISIT (OUTPATIENT)
Dept: RHEUMATOLOGY | Facility: CLINIC | Age: 70
End: 2020-09-17
Payer: MEDICARE

## 2020-09-17 ENCOUNTER — PATIENT OUTREACH (OUTPATIENT)
Dept: ADMINISTRATIVE | Facility: OTHER | Age: 70
End: 2020-09-17

## 2020-09-17 VITALS
BODY MASS INDEX: 31.68 KG/M2 | HEART RATE: 80 BPM | SYSTOLIC BLOOD PRESSURE: 139 MMHG | HEIGHT: 63 IN | DIASTOLIC BLOOD PRESSURE: 77 MMHG | WEIGHT: 178.81 LBS

## 2020-09-17 DIAGNOSIS — M81.0 AGE-RELATED OSTEOPOROSIS WITHOUT CURRENT PATHOLOGICAL FRACTURE: Primary | ICD-10-CM

## 2020-09-17 DIAGNOSIS — E55.9 VITAMIN D DEFICIENCY: ICD-10-CM

## 2020-09-17 DIAGNOSIS — N18.30 STAGE 3 CHRONIC KIDNEY DISEASE: ICD-10-CM

## 2020-09-17 DIAGNOSIS — M85.89 OTHER SPECIFIED DISORDERS OF BONE DENSITY AND STRUCTURE, MULTIPLE SITES: ICD-10-CM

## 2020-09-17 LAB
25(OH)D3+25(OH)D2 SERPL-MCNC: 41 NG/ML (ref 30–96)
PTH-INTACT SERPL-MCNC: 73 PG/ML (ref 9–77)

## 2020-09-17 PROCEDURE — 3078F DIAST BP <80 MM HG: CPT | Mod: CPTII,S$GLB,, | Performed by: PHYSICIAN ASSISTANT

## 2020-09-17 PROCEDURE — 99204 OFFICE O/P NEW MOD 45 MIN: CPT | Mod: S$GLB,,, | Performed by: PHYSICIAN ASSISTANT

## 2020-09-17 PROCEDURE — 3075F PR MOST RECENT SYSTOLIC BLOOD PRESS GE 130-139MM HG: ICD-10-PCS | Mod: CPTII,S$GLB,, | Performed by: PHYSICIAN ASSISTANT

## 2020-09-17 PROCEDURE — 3008F BODY MASS INDEX DOCD: CPT | Mod: CPTII,S$GLB,, | Performed by: PHYSICIAN ASSISTANT

## 2020-09-17 PROCEDURE — 3075F SYST BP GE 130 - 139MM HG: CPT | Mod: CPTII,S$GLB,, | Performed by: PHYSICIAN ASSISTANT

## 2020-09-17 PROCEDURE — 99204 PR OFFICE/OUTPT VISIT, NEW, LEVL IV, 45-59 MIN: ICD-10-PCS | Mod: S$GLB,,, | Performed by: PHYSICIAN ASSISTANT

## 2020-09-17 PROCEDURE — 99999 PR PBB SHADOW E&M-EST. PATIENT-LVL III: ICD-10-PCS | Mod: PBBFAC,,, | Performed by: PHYSICIAN ASSISTANT

## 2020-09-17 PROCEDURE — 3078F PR MOST RECENT DIASTOLIC BLOOD PRESSURE < 80 MM HG: ICD-10-PCS | Mod: CPTII,S$GLB,, | Performed by: PHYSICIAN ASSISTANT

## 2020-09-17 PROCEDURE — 77080 DXA BONE DENSITY AXIAL: CPT | Mod: 26,,, | Performed by: RADIOLOGY

## 2020-09-17 PROCEDURE — 1101F PT FALLS ASSESS-DOCD LE1/YR: CPT | Mod: CPTII,S$GLB,, | Performed by: PHYSICIAN ASSISTANT

## 2020-09-17 PROCEDURE — 77080 DEXA BONE DENSITY SPINE HIP: ICD-10-PCS | Mod: 26,,, | Performed by: RADIOLOGY

## 2020-09-17 PROCEDURE — 3008F PR BODY MASS INDEX (BMI) DOCUMENTED: ICD-10-PCS | Mod: CPTII,S$GLB,, | Performed by: PHYSICIAN ASSISTANT

## 2020-09-17 PROCEDURE — 1126F AMNT PAIN NOTED NONE PRSNT: CPT | Mod: S$GLB,,, | Performed by: PHYSICIAN ASSISTANT

## 2020-09-17 PROCEDURE — 1101F PR PT FALLS ASSESS DOC 0-1 FALLS W/OUT INJ PAST YR: ICD-10-PCS | Mod: CPTII,S$GLB,, | Performed by: PHYSICIAN ASSISTANT

## 2020-09-17 PROCEDURE — 1159F PR MEDICATION LIST DOCUMENTED IN MEDICAL RECORD: ICD-10-PCS | Mod: S$GLB,,, | Performed by: PHYSICIAN ASSISTANT

## 2020-09-17 PROCEDURE — 99999 PR PBB SHADOW E&M-EST. PATIENT-LVL III: CPT | Mod: PBBFAC,,, | Performed by: PHYSICIAN ASSISTANT

## 2020-09-17 PROCEDURE — 1126F PR PAIN SEVERITY QUANTIFIED, NO PAIN PRESENT: ICD-10-PCS | Mod: S$GLB,,, | Performed by: PHYSICIAN ASSISTANT

## 2020-09-17 PROCEDURE — 77080 DXA BONE DENSITY AXIAL: CPT | Mod: TC

## 2020-09-17 PROCEDURE — 1159F MED LIST DOCD IN RCRD: CPT | Mod: S$GLB,,, | Performed by: PHYSICIAN ASSISTANT

## 2020-09-17 NOTE — PATIENT INSTRUCTIONS
Increase vit D to 1000units   ( taking 50K u once a week)     Ca supplement     Start Prolia 60mg SC ev 6 months      Recheck dexa scan in a year to monitor therapy

## 2020-09-17 NOTE — PROGRESS NOTES
Subjective:       Patient ID: Cleo Ford is a 70 y.o. female.    Chief Complaint: Consult and Fracture (pelvis)    Eboni is a 69 y/o f self referred for evaluation of her bmd. She has h/o osteopenia but suffered pelvis fx last year (sacrum and inf/sup pubic rami) after a fall onto the ground out of a truck.  She takes daily vit D 500u and daily ca supplement.  She gets plenty of ca in her diet as well.  No recent falls, no other major fxs.  Gait is steady      Current meds for osteopenia/ osteoporosis: none   Prior meds for osteopenia/ osteoporosis: none  Prev dexa scan: openia  Vit D supplement: 500units   Menopause: in 40's d/t hysterectomy   Hystrectomy:   HRT: 20 years   Smoker/tobacco: none   Etoh: no   Falls: no   Activity level: no formal exercise   Kidney problems :  mild ckd   Prev fracture : 11/2019 few pelvis fxs s/p fall  Steroids : no   Family history : none   PPI/gerd: prilosec stable  Dental issues:  no current issues  Anticipated dental work :  needs crowns replaced   H/o cancer/tx: none       Past Medical History:   Diagnosis Date    Anxiety     Depression     Dysmetabolic syndrome X     Obesity     Other and unspecified hyperlipidemia     Unspecified essential hypertension     Unspecified hypothyroidism      Past Surgical History:   Procedure Laterality Date    APPENDECTOMY      CHOLECYSTECTOMY      COLONOSCOPY N/A 9/15/2016    Procedure: COLONOSCOPY;  Surgeon: Jose Daniel MD;  Location: [facility];  Service: Endoscopy;  Laterality: N/A;    gastric sleeve      HYSTERECTOMY      INJECTION OF ANESTHETIC AGENT INTO SACROILIAC JOINT Right 1/16/2020    Procedure: Right BLOCK, SACROILIAC JOINT and Right ischial bursa inejction with RN IV sedation;  Surgeon: Rudi Fajardo MD;  Location: [facility];  Service: Pain Management;  Laterality: Right;    OOPHORECTOMY      1 ovary removed     Family History   Problem Relation Age of Onset    Cancer Mother     Breast cancer  Mother     Hypertension Father     Cancer Maternal Grandmother 80        colon    Colon cancer Unknown      Social History     Socioeconomic History    Marital status:      Spouse name: orlin    Number of children: 2    Years of education: Not on file    Highest education level: Not on file   Occupational History    Occupation: realtor   Social Needs    Financial resource strain: Not on file    Food insecurity     Worry: Not on file     Inability: Not on file    Transportation needs     Medical: Not on file     Non-medical: Not on file   Tobacco Use    Smoking status: Never Smoker    Smokeless tobacco: Never Used   Substance and Sexual Activity    Alcohol use: No    Drug use: No    Sexual activity: Yes     Partners: Female   Lifestyle    Physical activity     Days per week: Not on file     Minutes per session: Not on file    Stress: Not on file   Relationships    Social connections     Talks on phone: Not on file     Gets together: Not on file     Attends Spiritism service: Not on file     Active member of club or organization: Not on file     Attends meetings of clubs or organizations: Not on file     Relationship status: Not on file   Other Topics Concern    Not on file   Social History Narrative    Not on file     Review of patient's allergies indicates:   Allergen Reactions    Tobramycin-dexamethasone      Eye Drops       Review of Systems   Constitutional: Negative for chills, fatigue and fever.   HENT: Negative for mouth sores, rhinorrhea and sore throat.    Eyes: Negative for pain and redness.   Respiratory: Negative for cough and shortness of breath.    Cardiovascular: Negative for chest pain.   Gastrointestinal: Negative for abdominal pain, constipation, diarrhea, nausea and vomiting.   Genitourinary: Negative for dysuria and hematuria.   Musculoskeletal: Negative for arthralgias, joint swelling and myalgias.   Skin: Negative for rash.   Neurological: Negative for weakness,  "numbness and headaches.   Psychiatric/Behavioral: The patient is not nervous/anxious.          Objective:   /77   Pulse 80   Ht 5' 3" (1.6 m)   Wt 81.1 kg (178 lb 12.7 oz)   BMI 31.67 kg/m²   Immunization History   Administered Date(s) Administered    Influenza 10/11/2019    Influenza - High Dose - PF (65 years and older) 10/30/2017, 10/03/2018    Influenza - Quadrivalent 10/09/2015, 10/25/2016    Influenza - Quadrivalent - PF *Preferred* (6 months and older) 10/09/2015    Influenza - Trivalent (ADULT) 11/27/2006, 11/06/2007, 11/16/2007, 10/13/2008, 10/06/2009, 10/28/2010, 10/19/2012, 10/23/2013    Influenza Split 11/27/2006, 11/06/2007, 10/13/2008, 10/06/2009, 10/28/2010, 10/19/2012, 10/23/2013    Pneumococcal Conjugate - 13 Valent 10/30/2017    Pneumococcal Polysaccharide - 23 Valent 10/30/2018    Tdap 08/11/2011    Zoster 08/11/2011    Zoster Recombinant 01/16/2019, 06/24/2019              Physical Exam   Constitutional: She is oriented to person, place, and time and well-developed, well-nourished, and in no distress.   HENT:   Head: Normocephalic and atraumatic.   Eyes: Pupils are equal, round, and reactive to light. Right eye exhibits no discharge.   Neck: Normal range of motion.   Cardiovascular: Normal rate, regular rhythm and normal heart sounds.  Exam reveals no friction rub.    Pulmonary/Chest: Effort normal and breath sounds normal. No respiratory distress.   Abdominal: Soft. She exhibits no distension. There is no abdominal tenderness.   Lymphadenopathy:     She has no cervical adenopathy.   Neurological: She is alert and oriented to person, place, and time.   Skin: No rash noted. No erythema.     Psychiatric: Mood normal.   Musculoskeletal: Normal range of motion. No deformity or edema.      Comments: Gait is steady   Walks w/o assistance,       Able to get out of chair independently               Recent Results (from the past 336 hour(s))   Vitamin D    Collection Time: 09/16/20  " 2:13 PM   Result Value Ref Range    Vit D, 25-Hydroxy 41 30 - 96 ng/mL   Comprehensive metabolic panel    Collection Time: 09/16/20  2:13 PM   Result Value Ref Range    Sodium 141 136 - 145 mmol/L    Potassium 3.7 3.5 - 5.1 mmol/L    Chloride 107 95 - 110 mmol/L    CO2 26 23 - 29 mmol/L    Glucose 90 70 - 110 mg/dL    BUN, Bld 20 8 - 23 mg/dL    Creatinine 1.1 0.5 - 1.4 mg/dL    Calcium 10.0 8.7 - 10.5 mg/dL    Total Protein 7.9 6.0 - 8.4 g/dL    Albumin 4.3 3.5 - 5.2 g/dL    Total Bilirubin 0.5 0.1 - 1.0 mg/dL    Alkaline Phosphatase 104 55 - 135 U/L    AST 23 10 - 40 U/L    ALT 16 10 - 44 U/L    Anion Gap 8 8 - 16 mmol/L    eGFR if African American 60 >60 mL/min/1.73 m^2    eGFR if non African American 52 (A) >60 mL/min/1.73 m^2   PTH, intact    Collection Time: 09/16/20  2:13 PM   Result Value Ref Range    PTH, Intact 73.0 9.0 - 77.0 pg/mL        No results found for: TBGOLDPLUS   Lab Results   Component Value Date    HEPCAB Negative 10/17/2017      dexa 9.17.20: L1 -2.0, LN -2.4, LT-1.8, frax major 21%, hip 5%  Assessment:       1. Age-related osteoporosis without current pathological fracture    2. Stage 3 chronic kidney disease          Impression:   Osteoporosis with low bone density and h/o pelvis fx (11/2019): declining bmd on dexa; normal vit d level     CKD 3 gfrs 40's-50's: normal ca level     Plan:       Discussed osteoporosis/low bone density disease state in detail with patient.  Reviewed treatment options along with potential side effects of these treatments.  Dexa scan was reviewed with patient.  Treatment plan agreed upon together with patient     After discussion about therapy options we have decided on Prolia therapy due to her ckd   New drug discussed with patient including SE, drug info printed and given to patient    Labs have been done and reviewed w patient    Therapy plan placed   Will start prolia when approved   Recheck dexa in a year to monitor therapy effectiveness     Continue vit D  but increase to 1000units  Continue calcium supplement   Encourage WB activity     See back in 2 wks to start prolia then in 6 mos with cmp and prolia

## 2020-09-30 ENCOUNTER — INFUSION (OUTPATIENT)
Dept: INFUSION THERAPY | Facility: HOSPITAL | Age: 70
End: 2020-09-30
Attending: PHYSICIAN ASSISTANT
Payer: MEDICARE

## 2020-09-30 VITALS
WEIGHT: 176.13 LBS | RESPIRATION RATE: 18 BRPM | BODY MASS INDEX: 31.21 KG/M2 | DIASTOLIC BLOOD PRESSURE: 87 MMHG | HEART RATE: 84 BPM | TEMPERATURE: 98 F | SYSTOLIC BLOOD PRESSURE: 122 MMHG | OXYGEN SATURATION: 100 % | HEIGHT: 63 IN

## 2020-09-30 DIAGNOSIS — M81.0 AGE-RELATED OSTEOPOROSIS WITHOUT CURRENT PATHOLOGICAL FRACTURE: Primary | ICD-10-CM

## 2020-09-30 PROCEDURE — 63600175 PHARM REV CODE 636 W HCPCS: Mod: JG | Performed by: PHYSICIAN ASSISTANT

## 2020-09-30 PROCEDURE — 96372 THER/PROPH/DIAG INJ SC/IM: CPT

## 2020-09-30 RX ADMIN — DENOSUMAB 60 MG: 60 INJECTION SUBCUTANEOUS at 10:09

## 2020-09-30 NOTE — NURSING
Printed information regarding Prolia given to pt. Instructed on s/s to report. Verbalized understanding. Explained actions of Prolia to patient and given printed material.   Administered Prolia 60 mg/ml SQ in left abdomen.  Instructed to wait in clinic x 15 min. For monitoring.

## 2020-09-30 NOTE — PATIENT INSTRUCTIONS
Denosumab injection  What is this medicine?  DENOSUMAB (den oh  mab) slows bone breakdown. Prolia is used to treat osteoporosis in women after menopause and in men. Xgeva is used to prevent bone fractures and other bone problems caused by cancer bone metastases. Xgeva is also used to treat giant cell tumor of the bone.  How should I use this medicine?  This medicine is for injection under the skin. It is given by a health care professional in a hospital or clinic setting.  If you are getting Prolia, a special MedGuide will be given to you by the pharmacist with each prescription and refill. Be sure to read this information carefully each time.  For Prolia, talk to your pediatrician regarding the use of this medicine in children. Special care may be needed. For Xgeva, talk to your pediatrician regarding the use of this medicine in children. While this drug may be prescribed for children as young as 13 years for selected conditions, precautions do apply.  What side effects may I notice from receiving this medicine?  Side effects that you should report to your doctor or health care professional as soon as possible:  · allergic reactions like skin rash, itching or hives, swelling of the face, lips, or tongue  · breathing problems  · chest pain  · fast, irregular heartbeat  · feeling faint or lightheaded, falls  · fever, chills, or any other sign of infection  · muscle spasms, tightening, or twitches  · numbness or tingling  · skin blisters or bumps, or is dry, peels, or red  · slow healing or unexplained pain in the mouth or jaw  · unusual bleeding or bruising  Side effects that usually do not require medical attention (Report these to your doctor or health care professional if they continue or are bothersome.):  · muscle pain  · stomach upset, gas  What may interact with this medicine?  Do not take this medicine with any of the following medications:  · other medicines containing denosumab  This medicine may also  interact with the following medications:  · medicines that suppress the immune system  · medicines that treat cancer  · steroid medicines like prednisone or cortisone  What if I miss a dose?  It is important not to miss your dose. Call your doctor or health care professional if you are unable to keep an appointment.  Where should I keep my medicine?  This medicine is only given in a clinic, doctor's office, or other health care setting and will not be stored at home.  What should I tell my health care provider before I take this medicine?  They need to know if you have any of these conditions:  · dental disease  · eczema  · infection or history of infections  · kidney disease or on dialysis  · low blood calcium or vitamin D  · malabsorption syndrome  · scheduled to have surgery or tooth extraction  · taking medicine that contains denosumab  · thyroid or parathyroid disease  · an unusual reaction to denosumab, other medicines, foods, dyes, or preservatives  · pregnant or trying to get pregnant  · breast-feeding  What should I watch for while using this medicine?  Visit your doctor or health care professional for regular checks on your progress. Your doctor or health care professional may order blood tests and other tests to see how you are doing.  Call your doctor or health care professional if you get a cold or other infection while receiving this medicine. Do not treat yourself. This medicine may decrease your body's ability to fight infection.  You should make sure you get enough calcium and vitamin D while you are taking this medicine, unless your doctor tells you not to. Discuss the foods you eat and the vitamins you take with your health care professional.  See your dentist regularly. Brush and floss your teeth as directed. Before you have any dental work done, tell your dentist you are receiving this medicine.  Do not become pregnant while taking this medicine or for 5 months after stopping it. Women should  inform their doctor if they wish to become pregnant or think they might be pregnant. There is a potential for serious side effects to an unborn child. Talk to your health care professional or pharmacist for more information.  NOTE:This sheet is a summary. It may not cover all possible information. If you have questions about this medicine, talk to your doctor, pharmacist, or health care provider. Copyright© 2017 [employer]

## 2020-10-03 ENCOUNTER — PATIENT MESSAGE (OUTPATIENT)
Dept: PRIMARY CARE CLINIC | Facility: CLINIC | Age: 70
End: 2020-10-03

## 2020-10-03 ENCOUNTER — IMMUNIZATION (OUTPATIENT)
Dept: INTERNAL MEDICINE | Facility: CLINIC | Age: 70
End: 2020-10-03
Payer: MEDICARE

## 2020-10-03 PROCEDURE — G0008 ADMIN INFLUENZA VIRUS VAC: HCPCS | Mod: S$GLB,,, | Performed by: PEDIATRICS

## 2020-10-03 PROCEDURE — 90694 FLU VACCINE - QUADRIVALENT - ADJUVANTED: ICD-10-PCS | Mod: S$GLB,,, | Performed by: PEDIATRICS

## 2020-10-03 PROCEDURE — G0008 FLU VACCINE - QUADRIVALENT - ADJUVANTED: ICD-10-PCS | Mod: S$GLB,,, | Performed by: PEDIATRICS

## 2020-10-03 PROCEDURE — 90694 VACC AIIV4 NO PRSRV 0.5ML IM: CPT | Mod: S$GLB,,, | Performed by: PEDIATRICS

## 2020-10-12 ENCOUNTER — OFFICE VISIT (OUTPATIENT)
Dept: PRIMARY CARE CLINIC | Facility: CLINIC | Age: 70
End: 2020-10-12
Payer: MEDICARE

## 2020-10-12 ENCOUNTER — LAB VISIT (OUTPATIENT)
Dept: LAB | Facility: HOSPITAL | Age: 70
End: 2020-10-12
Attending: FAMILY MEDICINE
Payer: MEDICARE

## 2020-10-12 VITALS
TEMPERATURE: 98 F | SYSTOLIC BLOOD PRESSURE: 130 MMHG | WEIGHT: 174.38 LBS | HEART RATE: 82 BPM | DIASTOLIC BLOOD PRESSURE: 80 MMHG | BODY MASS INDEX: 30.89 KG/M2 | OXYGEN SATURATION: 98 %

## 2020-10-12 DIAGNOSIS — F41.9 ANXIETY: ICD-10-CM

## 2020-10-12 DIAGNOSIS — I10 ESSENTIAL HYPERTENSION: Primary | Chronic | ICD-10-CM

## 2020-10-12 DIAGNOSIS — E78.2 MIXED HYPERLIPIDEMIA: ICD-10-CM

## 2020-10-12 DIAGNOSIS — F39 MOOD DISORDER: ICD-10-CM

## 2020-10-12 DIAGNOSIS — M81.0 AGE-RELATED OSTEOPOROSIS WITHOUT CURRENT PATHOLOGICAL FRACTURE: ICD-10-CM

## 2020-10-12 DIAGNOSIS — E03.8 OTHER SPECIFIED HYPOTHYROIDISM: Chronic | ICD-10-CM

## 2020-10-12 DIAGNOSIS — F41.1 ANXIETY STATE: ICD-10-CM

## 2020-10-12 DIAGNOSIS — Z00.00 ROUTINE GENERAL MEDICAL EXAMINATION AT A HEALTH CARE FACILITY: ICD-10-CM

## 2020-10-12 DIAGNOSIS — E55.9 VITAMIN D DEFICIENCY: ICD-10-CM

## 2020-10-12 DIAGNOSIS — E88.810 DYSMETABOLIC SYNDROME X: ICD-10-CM

## 2020-10-12 DIAGNOSIS — I10 ESSENTIAL HYPERTENSION: Chronic | ICD-10-CM

## 2020-10-12 PROCEDURE — 83525 ASSAY OF INSULIN: CPT

## 2020-10-12 PROCEDURE — 84439 ASSAY OF FREE THYROXINE: CPT

## 2020-10-12 PROCEDURE — 99214 OFFICE O/P EST MOD 30 MIN: CPT | Mod: S$GLB,,, | Performed by: FAMILY MEDICINE

## 2020-10-12 PROCEDURE — 84443 ASSAY THYROID STIM HORMONE: CPT

## 2020-10-12 PROCEDURE — 82306 VITAMIN D 25 HYDROXY: CPT

## 2020-10-12 PROCEDURE — 1159F PR MEDICATION LIST DOCUMENTED IN MEDICAL RECORD: ICD-10-PCS | Mod: S$GLB,,, | Performed by: FAMILY MEDICINE

## 2020-10-12 PROCEDURE — 3008F BODY MASS INDEX DOCD: CPT | Mod: CPTII,S$GLB,, | Performed by: FAMILY MEDICINE

## 2020-10-12 PROCEDURE — 80061 LIPID PANEL: CPT

## 2020-10-12 PROCEDURE — 1126F AMNT PAIN NOTED NONE PRSNT: CPT | Mod: S$GLB,,, | Performed by: FAMILY MEDICINE

## 2020-10-12 PROCEDURE — 99999 PR PBB SHADOW E&M-EST. PATIENT-LVL III: CPT | Mod: PBBFAC,,, | Performed by: FAMILY MEDICINE

## 2020-10-12 PROCEDURE — 99999 PR PBB SHADOW E&M-EST. PATIENT-LVL III: ICD-10-PCS | Mod: PBBFAC,,, | Performed by: FAMILY MEDICINE

## 2020-10-12 PROCEDURE — 1126F PR PAIN SEVERITY QUANTIFIED, NO PAIN PRESENT: ICD-10-PCS | Mod: S$GLB,,, | Performed by: FAMILY MEDICINE

## 2020-10-12 PROCEDURE — 3075F PR MOST RECENT SYSTOLIC BLOOD PRESS GE 130-139MM HG: ICD-10-PCS | Mod: CPTII,S$GLB,, | Performed by: FAMILY MEDICINE

## 2020-10-12 PROCEDURE — 1101F PT FALLS ASSESS-DOCD LE1/YR: CPT | Mod: CPTII,S$GLB,, | Performed by: FAMILY MEDICINE

## 2020-10-12 PROCEDURE — 3008F PR BODY MASS INDEX (BMI) DOCUMENTED: ICD-10-PCS | Mod: CPTII,S$GLB,, | Performed by: FAMILY MEDICINE

## 2020-10-12 PROCEDURE — 1101F PR PT FALLS ASSESS DOC 0-1 FALLS W/OUT INJ PAST YR: ICD-10-PCS | Mod: CPTII,S$GLB,, | Performed by: FAMILY MEDICINE

## 2020-10-12 PROCEDURE — 80053 COMPREHEN METABOLIC PANEL: CPT

## 2020-10-12 PROCEDURE — 3079F PR MOST RECENT DIASTOLIC BLOOD PRESSURE 80-89 MM HG: ICD-10-PCS | Mod: CPTII,S$GLB,, | Performed by: FAMILY MEDICINE

## 2020-10-12 PROCEDURE — 3079F DIAST BP 80-89 MM HG: CPT | Mod: CPTII,S$GLB,, | Performed by: FAMILY MEDICINE

## 2020-10-12 PROCEDURE — 3075F SYST BP GE 130 - 139MM HG: CPT | Mod: CPTII,S$GLB,, | Performed by: FAMILY MEDICINE

## 2020-10-12 PROCEDURE — 99214 PR OFFICE/OUTPT VISIT, EST, LEVL IV, 30-39 MIN: ICD-10-PCS | Mod: S$GLB,,, | Performed by: FAMILY MEDICINE

## 2020-10-12 PROCEDURE — 1159F MED LIST DOCD IN RCRD: CPT | Mod: S$GLB,,, | Performed by: FAMILY MEDICINE

## 2020-10-12 RX ORDER — BUPROPION HYDROCHLORIDE 150 MG/1
TABLET ORAL
Qty: 90 TABLET | Refills: 3 | Status: SHIPPED | OUTPATIENT
Start: 2020-10-12 | End: 2021-10-07

## 2020-10-12 RX ORDER — NIFEDIPINE 90 MG/1
90 TABLET, FILM COATED, EXTENDED RELEASE ORAL DAILY
Qty: 90 TABLET | Refills: 3 | Status: SHIPPED | OUTPATIENT
Start: 2020-10-12 | End: 2021-10-07

## 2020-10-12 RX ORDER — SERTRALINE HYDROCHLORIDE 50 MG/1
50 TABLET, FILM COATED ORAL DAILY
Qty: 90 TABLET | Refills: 3 | Status: SHIPPED | OUTPATIENT
Start: 2020-10-12 | End: 2021-11-16

## 2020-10-12 RX ORDER — ALPRAZOLAM 1 MG/1
1 TABLET ORAL 2 TIMES DAILY
Qty: 60 TABLET | Refills: 2 | Status: SHIPPED | OUTPATIENT
Start: 2020-10-12 | End: 2021-04-05

## 2020-10-12 NOTE — PROGRESS NOTES
Subjective:      Patient ID: Cleo Ford is a 70 y.o. female.    Chief Complaint: Annual Exam    Disclaimer:  This note is prepared using voice recognition software and as such is likely to have errors and has not been proof read. Please contact me for questions.     Pt is here for prevention exam and annual exam.  not doing well. Did have knee replacement, had covid, on oxygen, had hip fracture and pelvic fracture.at home now.     Did prolia injection for osteoporosis and met with Lore BARRERA.       Needing refills on xanax and thyroid. Mentally and physically are doing ok.   Did have fracture in Nov and almost done with PT. Took a while to get over it. Took 3 months to recover. End of feb that she could do it from home.  Doing home exercises from home.  Had a muscle in the groin in a spasm and worked it out.      Is doing the digital BP, feels like medicines are working better now.    Still doing the thyroid meds and needing it now of the 112g. Doing well.     Is on wellbutrin, zoloft and xanax. Needs refill to [facility] for x[facility]ax. Using twice a day.            Lab Results   Component Value Date    WBC 7.01 01/31/2019    HGB 13.0 01/31/2019    HCT 41.9 01/31/2019     (H) 01/31/2019    CHOL 177 10/12/2020    TRIG 66 10/12/2020    HDL 63 10/12/2020    ALT 13 10/12/2020    AST 23 10/12/2020     (L) 10/12/2020    K 4.1 10/12/2020     10/12/2020    CREATININE 1.0 10/12/2020    BUN 22 10/12/2020    CO2 20 (L) 10/12/2020    TSH 2.634 10/12/2020    HGBA1C 5.1 01/31/2019       DXA Bone Density Spine And Hip  Narrative: EXAMINATION:  DEXA BONE DENSITY SPINE HIP    CLINICAL HISTORY:  Other specified disorders of bone density and structure, multiple sites    TECHNIQUE:  DXA scanning was performed over the left hip and lumbar spine.  Review of the images confirms satisfactory positioning and technique.    COMPARISON:  April 6, 2018    FINDINGS:  The L1 to L4 vertebral bone mineral  density is equal to 1.08 g/cm squared with a T score of -0.9.  There has been a -3.9% statistically significant change relative to the prior study.    The left femoral neck bone mineral density is equal to 0.69 g/cm squared with a T score of -2.4.  There has been  a -11.8% statistically significant change relative to the prior study.    The total hip bone mineral density is equal to 0.78 g/cm squared with a T score of -1.8.    There is a 21.2% risk of a major osteoporotic fracture and a 4.7% risk of hip fracture in the next 10 years (FRAX).  Impression: Osteopenia    Consider FDA approved medical therapies in postmenopausal women and men aged 50 years and older, based on the following:    *A hip or vertebral (clinical or morphometric) fracture  *T score less than or equal to -2.5 at the femoral neck or spine after appropriate evaluation to exclude secondary causes.  *Low bone mass -- also known as osteopenia (T score between -1.0 and -2.5 at the femoral neck or spine) and a 10 year probability of hip fracture greater than or equal to 3% or a 10 year probability of major osteoporosis-related fracture greater than or equal to 20% based on the [place]-adapted WHO algorithm.  *Clinicians judgment and/or patient preference may indicate treatment for people with 10 year fracture probabilities is above or below these levels.    Electronically signed by: Alphosno Soliman  Date:    09/17/2020  Time:    14:35        Review of Systems   Constitutional: Negative for activity change and unexpected weight change.   HENT: Negative for hearing loss, rhinorrhea and trouble swallowing.    Eyes: Negative for discharge and visual disturbance.   Respiratory: Negative for chest tightness and wheezing.    Cardiovascular: Negative for chest pain and palpitations.   Gastrointestinal: Negative for blood in stool, constipation, diarrhea and vomiting.   Endocrine: Negative for polydipsia and polyuria.   Genitourinary: Negative for difficulty  urinating, dysuria, hematuria and menstrual problem.   Musculoskeletal: Negative for arthralgias, joint swelling and neck pain.   Neurological: Negative for weakness and headaches.   Psychiatric/Behavioral: Negative for confusion and dysphoric mood.     Objective:     Vitals:    10/12/20 1054   BP: 130/80   Pulse: 82   Temp: 97.5 °F (36.4 °C)   SpO2: 98%   Weight: 79.1 kg (174 lb 6.1 oz)     Physical Exam  Vitals signs reviewed.   Constitutional:       Appearance: Normal appearance. She is well-developed. She is obese.   HENT:      Head: Normocephalic and atraumatic.      Right Ear: Tympanic membrane and external ear normal.      Left Ear: Tympanic membrane and external ear normal.      Nose: Nose normal.      Mouth/Throat:      Mouth: Mucous membranes are moist.      Pharynx: Oropharynx is clear.   Eyes:      Conjunctiva/sclera: Conjunctivae normal.      Pupils: Pupils are equal, round, and reactive to light.   Neck:      Musculoskeletal: Normal range of motion and neck supple.      Thyroid: No thyromegaly.   Cardiovascular:      Rate and Rhythm: Normal rate and regular rhythm.      Heart sounds: No murmur. No friction rub. No gallop.    Pulmonary:      Effort: Pulmonary effort is normal. No respiratory distress.      Breath sounds: No wheezing or rales.   Abdominal:      General: Bowel sounds are normal. There is no distension.      Palpations: Abdomen is soft.      Tenderness: There is no abdominal tenderness. There is no rebound.   Musculoskeletal: Normal range of motion.   Lymphadenopathy:      Cervical: No cervical adenopathy.   Skin:     General: Skin is warm and dry.      Findings: No rash.   Neurological:      Mental Status: She is alert and oriented to person, place, and time.   Psychiatric:         Attention and Perception: Attention and perception normal.         Mood and Affect: Mood and affect normal.         Speech: Speech normal.         Behavior: Behavior normal.         Thought Content: Thought  content normal.         Cognition and Memory: Cognition and memory normal.         Judgment: Judgment normal.       Assessment:     1. Essential hypertension    2. Other specified hypothyroidism    3. Mixed hyperlipidemia    4. Anxiety    5. Mood disorder    6. Age-related osteoporosis without current pathological fracture    7. Dysmetabolic syndrome X      Plan:   Cleo was seen today for annual exam.    Diagnoses and all orders for this visit:    Essential hypertension  Comments:  On digital blood pressure program set up labs.  Discussed health maintenance refill medicines  Orders:  -     NIFEdipine (ADALAT CC) 90 MG TbSR; Take 1 tablet (90 mg total) by mouth once daily.    Other specified hypothyroidism  Comments:  Stable this time refilled medications labs ordered and will be reviewed adjust accordingly    Mixed hyperlipidemia  Comments:  Stable this time refilled medications labs ordered and will be reviewed adjust accordingly    Anxiety  Comments:  Stable this time refilled medications labs ordered and will be reviewed adjust accordingly    Mood disorder  Comments:  Benefitting greatly from Wellbutrin and Zoloft combo p.r.n. use of Xanax  Orders:  -     ALPRAZolam (XANAX) 1 MG tablet; Take 1 tablet (1 mg total) by mouth 2 (two) times daily.    Age-related osteoporosis without current pathological fracture  Comments:  Setting and has reviewed DEXA now on Prolia    Dysmetabolic syndrome X  Comments:  Working on additional weight loss has had gastric sleeve procedure    Other orders  -     buPROPion (WELLBUTRIN XL) 150 MG TB24 tablet; TAKE 1 TABLET (150MG TOTAL) BY MOUTH ONCE DAILY  -     sertraline (ZOLOFT) 50 MG tablet; Take 1 tablet (50 mg total) by mouth once daily.            Follow up in about 6 months (around 4/12/2021) for f/u BRUCE Peterson/ f/u 6mo xanax.    There are no Patient Instructions on file for this visit.

## 2020-10-13 ENCOUNTER — TELEPHONE (OUTPATIENT)
Dept: PRIMARY CARE CLINIC | Facility: CLINIC | Age: 70
End: 2020-10-13

## 2020-10-13 DIAGNOSIS — R73.09 ABNORMAL GLUCOSE: ICD-10-CM

## 2020-10-13 DIAGNOSIS — I10 ESSENTIAL HYPERTENSION: Primary | ICD-10-CM

## 2020-10-13 DIAGNOSIS — E88.810 DYSMETABOLIC SYNDROME X: ICD-10-CM

## 2020-10-13 LAB
25(OH)D3+25(OH)D2 SERPL-MCNC: 39 NG/ML (ref 30–96)
ALBUMIN SERPL BCP-MCNC: 4.2 G/DL (ref 3.5–5.2)
ALP SERPL-CCNC: 87 U/L (ref 55–135)
ALT SERPL W/O P-5'-P-CCNC: 13 U/L (ref 10–44)
ANION GAP SERPL CALC-SCNC: 12 MMOL/L (ref 8–16)
AST SERPL-CCNC: 23 U/L (ref 10–40)
BILIRUB SERPL-MCNC: 0.4 MG/DL (ref 0.1–1)
BUN SERPL-MCNC: 22 MG/DL (ref 8–23)
CALCIUM SERPL-MCNC: 9.3 MG/DL (ref 8.7–10.5)
CHLORIDE SERPL-SCNC: 102 MMOL/L (ref 95–110)
CHOLEST SERPL-MCNC: 177 MG/DL (ref 120–199)
CHOLEST/HDLC SERPL: 2.8 {RATIO} (ref 2–5)
CO2 SERPL-SCNC: 20 MMOL/L (ref 23–29)
CREAT SERPL-MCNC: 1 MG/DL (ref 0.5–1.4)
EST. GFR  (AFRICAN AMERICAN): >60 ML/MIN/1.73 M^2
EST. GFR  (NON AFRICAN AMERICAN): 58 ML/MIN/1.73 M^2
GLUCOSE SERPL-MCNC: 71 MG/DL (ref 70–110)
HDLC SERPL-MCNC: 63 MG/DL (ref 40–75)
HDLC SERPL: 35.6 % (ref 20–50)
INSULIN COLLECTION INTERVAL: NORMAL
INSULIN SERPL-ACNC: 4.9 UU/ML
LDLC SERPL CALC-MCNC: 100.8 MG/DL (ref 63–159)
NONHDLC SERPL-MCNC: 114 MG/DL
POTASSIUM SERPL-SCNC: 4.1 MMOL/L (ref 3.5–5.1)
PROT SERPL-MCNC: 7.7 G/DL (ref 6–8.4)
SODIUM SERPL-SCNC: 134 MMOL/L (ref 136–145)
T4 FREE SERPL-MCNC: 1.45 NG/DL (ref 0.71–1.51)
TRIGL SERPL-MCNC: 66 MG/DL (ref 30–150)
TSH SERPL DL<=0.005 MIU/L-ACNC: 2.63 UIU/ML (ref 0.4–4)

## 2020-10-13 NOTE — TELEPHONE ENCOUNTER
----- Message from Elizabeth Julian MD sent at 10/13/2020  7:11 AM CDT -----  Regarding: FW: Lab Client Services  Contact: [phone]  Arden can you check into this? Thanks. Elizabeth Julian MD  ----- Message -----  From: Mercedes Ho  Sent: 10/13/2020   3:00 AM CDT  To: Elizabeth Julian MD, Iesha PARKER Staff  Subject: Lab Client Services                              Good Morning,    My name is Mercedes Vic I work in the Lab Client Services. We had a problem with some lab work on this patient. If someone from your office could call us at [phone] or ext. [phone] that would be great. Anyone in my department can help.     Thank you

## 2020-10-14 ENCOUNTER — LAB VISIT (OUTPATIENT)
Dept: LAB | Facility: HOSPITAL | Age: 70
End: 2020-10-14
Attending: FAMILY MEDICINE
Payer: MEDICARE

## 2020-10-14 DIAGNOSIS — E88.810 DYSMETABOLIC SYNDROME X: ICD-10-CM

## 2020-10-14 DIAGNOSIS — R73.09 ABNORMAL GLUCOSE: ICD-10-CM

## 2020-10-14 DIAGNOSIS — I10 ESSENTIAL HYPERTENSION: ICD-10-CM

## 2020-10-14 LAB
BASOPHILS # BLD AUTO: 0.05 K/UL (ref 0–0.2)
BASOPHILS NFR BLD: 0.7 % (ref 0–1.9)
DIFFERENTIAL METHOD: ABNORMAL
EOSINOPHIL # BLD AUTO: 0.2 K/UL (ref 0–0.5)
EOSINOPHIL NFR BLD: 2.2 % (ref 0–8)
ERYTHROCYTE [DISTWIDTH] IN BLOOD BY AUTOMATED COUNT: 12.6 % (ref 11.5–14.5)
ESTIMATED AVG GLUCOSE: 103 MG/DL (ref 68–131)
HBA1C MFR BLD HPLC: 5.2 % (ref 4–5.6)
HCT VFR BLD AUTO: 37 % (ref 37–48.5)
HGB BLD-MCNC: 11.6 G/DL (ref 12–16)
IMM GRANULOCYTES # BLD AUTO: 0.02 K/UL (ref 0–0.04)
IMM GRANULOCYTES NFR BLD AUTO: 0.3 % (ref 0–0.5)
LYMPHOCYTES # BLD AUTO: 2.5 K/UL (ref 1–4.8)
LYMPHOCYTES NFR BLD: 33.4 % (ref 18–48)
MCH RBC QN AUTO: 30.3 PG (ref 27–31)
MCHC RBC AUTO-ENTMCNC: 31.4 G/DL (ref 32–36)
MCV RBC AUTO: 97 FL (ref 82–98)
MONOCYTES # BLD AUTO: 0.7 K/UL (ref 0.3–1)
MONOCYTES NFR BLD: 9 % (ref 4–15)
NEUTROPHILS # BLD AUTO: 4 K/UL (ref 1.8–7.7)
NEUTROPHILS NFR BLD: 54.4 % (ref 38–73)
NRBC BLD-RTO: 0 /100 WBC
PLATELET # BLD AUTO: 334 K/UL (ref 150–350)
PMV BLD AUTO: 10.5 FL (ref 9.2–12.9)
RBC # BLD AUTO: 3.83 M/UL (ref 4–5.4)
WBC # BLD AUTO: 7.36 K/UL (ref 3.9–12.7)

## 2020-10-14 PROCEDURE — 85025 COMPLETE CBC W/AUTO DIFF WBC: CPT

## 2020-10-14 PROCEDURE — 36415 COLL VENOUS BLD VENIPUNCTURE: CPT | Mod: PN

## 2020-10-14 PROCEDURE — 83036 HEMOGLOBIN GLYCOSYLATED A1C: CPT

## 2020-11-01 ENCOUNTER — CLINICAL SUPPORT (OUTPATIENT)
Dept: URGENT CARE | Facility: CLINIC | Age: 70
End: 2020-11-01
Payer: MEDICARE

## 2020-11-01 DIAGNOSIS — Z23 NEED FOR TD VACCINE: Primary | ICD-10-CM

## 2020-11-01 PROCEDURE — 90714 TD VACC NO PRESV 7 YRS+ IM: CPT | Mod: S$GLB,,, | Performed by: PHYSICIAN ASSISTANT

## 2020-11-01 PROCEDURE — 90714 TD VACCINE GREATER THAN OR EQUAL TO 7YO WITH PRESERVATIVE IM: ICD-10-PCS | Mod: S$GLB,,, | Performed by: PHYSICIAN ASSISTANT

## 2020-11-01 PROCEDURE — 90471 IMMUNIZATION ADMIN: CPT | Mod: S$GLB,,, | Performed by: PHYSICIAN ASSISTANT

## 2020-11-01 PROCEDURE — 90471 TD VACCINE GREATER THAN OR EQUAL TO 7YO WITH PRESERVATIVE IM: ICD-10-PCS | Mod: S$GLB,,, | Performed by: PHYSICIAN ASSISTANT

## 2020-11-19 ENCOUNTER — PATIENT MESSAGE (OUTPATIENT)
Dept: PRIMARY CARE CLINIC | Facility: CLINIC | Age: 70
End: 2020-11-19

## 2020-11-19 ENCOUNTER — LAB VISIT (OUTPATIENT)
Dept: LAB | Facility: HOSPITAL | Age: 70
End: 2020-11-19
Attending: FAMILY MEDICINE
Payer: MEDICARE

## 2020-11-19 ENCOUNTER — PATIENT MESSAGE (OUTPATIENT)
Dept: ADMINISTRATIVE | Facility: OTHER | Age: 70
End: 2020-11-19

## 2020-11-19 DIAGNOSIS — Z01.84 ENCOUNTER FOR ANTIBODY RESPONSE EXAMINATION: ICD-10-CM

## 2020-11-19 PROCEDURE — 86769 SARS-COV-2 COVID-19 ANTIBODY: CPT

## 2020-11-19 PROCEDURE — 36415 COLL VENOUS BLD VENIPUNCTURE: CPT | Mod: PN

## 2020-11-20 LAB — SARS-COV-2 IGG SERPLBLD QL IA.RAPID: NEGATIVE

## 2020-11-30 ENCOUNTER — OFFICE VISIT (OUTPATIENT)
Dept: URGENT CARE | Facility: CLINIC | Age: 70
End: 2020-11-30
Payer: MEDICARE

## 2020-11-30 VITALS
HEART RATE: 88 BPM | DIASTOLIC BLOOD PRESSURE: 70 MMHG | TEMPERATURE: 99 F | BODY MASS INDEX: 32.78 KG/M2 | WEIGHT: 185 LBS | HEIGHT: 63 IN | OXYGEN SATURATION: 98 % | SYSTOLIC BLOOD PRESSURE: 150 MMHG

## 2020-11-30 DIAGNOSIS — Z11.9 ENCOUNTER FOR SCREENING EXAMINATION FOR INFECTIOUS DISEASE: ICD-10-CM

## 2020-11-30 DIAGNOSIS — U07.1 COVID-19 VIRUS DETECTED: Primary | ICD-10-CM

## 2020-11-30 LAB
CTP QC/QA: YES
SARS-COV-2 RDRP RESP QL NAA+PROBE: POSITIVE

## 2020-11-30 PROCEDURE — U0002 COVID-19 LAB TEST NON-CDC: HCPCS | Mod: QW,S$GLB,, | Performed by: NURSE PRACTITIONER

## 2020-11-30 PROCEDURE — 3008F PR BODY MASS INDEX (BMI) DOCUMENTED: ICD-10-PCS | Mod: CPTII,S$GLB,, | Performed by: NURSE PRACTITIONER

## 2020-11-30 PROCEDURE — U0002: ICD-10-PCS | Mod: QW,S$GLB,, | Performed by: NURSE PRACTITIONER

## 2020-11-30 PROCEDURE — 3008F BODY MASS INDEX DOCD: CPT | Mod: CPTII,S$GLB,, | Performed by: NURSE PRACTITIONER

## 2020-11-30 PROCEDURE — 99213 PR OFFICE/OUTPT VISIT, EST, LEVL III, 20-29 MIN: ICD-10-PCS | Mod: S$GLB,,, | Performed by: NURSE PRACTITIONER

## 2020-11-30 PROCEDURE — 1125F PR PAIN SEVERITY QUANTIFIED, PAIN PRESENT: ICD-10-PCS | Mod: S$GLB,,, | Performed by: NURSE PRACTITIONER

## 2020-11-30 PROCEDURE — 1125F AMNT PAIN NOTED PAIN PRSNT: CPT | Mod: S$GLB,,, | Performed by: NURSE PRACTITIONER

## 2020-11-30 PROCEDURE — 99213 OFFICE O/P EST LOW 20 MIN: CPT | Mod: S$GLB,,, | Performed by: NURSE PRACTITIONER

## 2020-11-30 NOTE — PROGRESS NOTES
"Subjective:       Patient ID: Cleo Ford is a 70 y.o. female.    Vitals:  height is 5' 3" (1.6 m) and weight is 83.9 kg (185 lb). Her temporal temperature is 99.3 °F (37.4 °C). Her blood pressure is 150/70 (abnormal) and her pulse is 88. Her oxygen saturation is 98%.     Chief Complaint: COVID-19 Concerns    Pt presents today with URI symptoms x 2 days.  She states that she has been exposed through her grandson.    URI   This is a new problem. The current episode started yesterday. The problem has been gradually worsening. There has been no fever. Associated symptoms include coughing. Pertinent negatives include no congestion, ear pain, nausea, rash, sinus pain, sore throat, vomiting or wheezing. Treatments tried: mucinex, quinine water, amoxicillin. The treatment provided significant relief.       Constitution: Negative for chills, sweating, fatigue and fever.   HENT: Negative for ear pain, congestion, sinus pain, sinus pressure, sore throat and voice change.    Neck: Negative for painful lymph nodes.   Eyes: Negative for eye redness.   Respiratory: Positive for cough. Negative for chest tightness, sputum production, bloody sputum, COPD, shortness of breath, stridor, wheezing and asthma.    Gastrointestinal: Negative for nausea and vomiting.   Musculoskeletal: Positive for muscle ache.   Skin: Negative for rash.   Allergic/Immunologic: Negative for seasonal allergies and asthma.   Hematologic/Lymphatic: Negative for swollen lymph nodes.       Objective:      Physical Exam   Constitutional: She is oriented to person, place, and time. She appears well-developed. She is cooperative.  Non-toxic appearance. She does not appear ill. No distress.   HENT:   Head: Normocephalic and atraumatic.   Ears:   Right Ear: Hearing, tympanic membrane, external ear and ear canal normal.   Left Ear: Hearing, tympanic membrane, external ear and ear canal normal.   Nose: Nose normal. No mucosal edema, rhinorrhea or nasal " deformity. No epistaxis. Right sinus exhibits no maxillary sinus tenderness and no frontal sinus tenderness. Left sinus exhibits no maxillary sinus tenderness and no frontal sinus tenderness.   Mouth/Throat: Uvula is midline, oropharynx is clear and moist and mucous membranes are normal. No trismus in the jaw. Normal dentition. No uvula swelling. No oropharyngeal exudate, posterior oropharyngeal edema or posterior oropharyngeal erythema.   Eyes: Conjunctivae and lids are normal. No scleral icterus.   Neck: Trachea normal, full passive range of motion without pain and phonation normal. Neck supple. No neck rigidity. No edema and no erythema present.   Cardiovascular: Normal rate, regular rhythm, normal heart sounds and normal pulses.   Pulmonary/Chest: Effort normal and breath sounds normal. No respiratory distress. She has no decreased breath sounds. She has no rhonchi.   Abdominal: Normal appearance.   Musculoskeletal: Normal range of motion.         General: No deformity.   Neurological: She is alert and oriented to person, place, and time. She exhibits normal muscle tone. Coordination normal.   Skin: Skin is warm, dry, intact, not diaphoretic and not pale. Psychiatric: Her speech is normal and behavior is normal. Judgment and thought content normal.   Nursing note and vitals reviewed.        Assessment:       1. COVID-19 virus detected    2. Encounter for screening examination for infectious disease        Plan:         COVID-19 virus detected  -     COVID-19 Home Symptom Monitoring  - Duration (days): 14    Encounter for screening examination for infectious disease  -     POCT COVID-19 Rapid Screening      Results for orders placed or performed in visit on 11/30/20   POCT COVID-19 Rapid Screening   Result Value Ref Range    POC Rapid COVID Positive (A) Negative     Acceptable Yes         · Your symptoms are likely due to a viral infection. These infections can last up to 14 days, but you should  notice some improvement of your symptoms within the first 7-10 days. Viral infections will not improve with antibiotics. If your symptoms persist >10 days without improvement or if you have any new or worsening symptoms, this is an indication that you may have developed a bacterial infection and should return to your primary care provider or to Urgent Care.   · Getting plenty of rest is very important to fighting infections.  · Increase fluids.   · OTC Plain Mucinex as advised  · May apply warm compresses as needed for facial pain and congestion.   · Saline nasal spray to loosen nasal congestion.  · Flonase or Nasacort to reduce inflammation in the sinus cavities.  · You may take an over the counter antihistamine for allergy symptoms such as sneezing, itchy/watery eyes, scratchy throat, or congestion.  · Take Tylenol as needed for sore throat, body aches, or fever.  · Don't drive, drink alcohol, or take any other medication or substance that causes sedation while taking cough syrup.   · Follow up with your primary care provider if symptoms persist >10 days or sooner for any new or worsening symptoms.   Go to the ER for any fever that does not improve with Tylenol, neck stiffness, rash, severe headache, vision changes, shortness of breath, chest pain, facial swelling, severe facial pain, or any other new and concerning symptoms.

## 2020-11-30 NOTE — PATIENT INSTRUCTIONS
Instructions for Patients with Confirmed or Suspected COVID-19    If you are awaiting your test result, you will either be called or it will be released to the patient portal.  If you have any questions about your test, please visit [email or web address] or call our COVID-19 information line at [phone].      Instructions for non-hospitalized or discharged patients with confirmed or suspected COVID-19:       Stay home except to get medical care.    Separate yourself from other people and animals in your home.    Call ahead before visiting your doctor.    Wear a face mask.    Cover your coughs and sneezes.    Clean your hands often.    Avoid sharing personal household items.    Clean all high-touch surfaces every day.    Monitor your symptoms. Seek prompt medical attention if your illness is worsening (e.g., difficulty breathing). Before seeking care, call your healthcare provider.    If you have a medical emergency and must call 911, notify the dispatcher that you have or are being evaluated for COVID-19. If possible, put on a face mask before emergency medical services arrive.    Use the following symptom-based strategy to return to normal activity following a suspected or confirmed case of COVID-19. Continue isolation until:   o At least 3 days (72 hours) have passed since recovery defined as resolution of fever without the use of fever-reducing medications and improvement in respiratory symptoms (e.g. cough, shortness of breath), and   o At least 10 days have passed since the first positive test.       As one of the next steps, you will receive a call or text from the [place] Department of Health ([facility]) COVID-19 Tracing Team. See the contact information below so you know not to ignore the health departments call. It is important that you contact them back immediately so they can help.     Contact Tracer Number:  [phone]  Caller ID for most carriers: [place] Dept [facility]    What is  contact tracing?   Contact tracing is a process that helps identify everyone who has been in close contact with an infected person. Contact tracers let those people know they may have been exposed and guide them on next steps. Confidentiality is important for everyone; no one will be told who may have exposed them to the virus.   Your involvement is important. The more we know about where and how this virus is spreading, the better chance we have at stopping it from spreading further.  What does exposure mean?   Exposure means you have been within 6 feet for more than 15 minutes with a person who has or had COVID-19.  What kind of questions do the contact tracers ask?   A contact tracer will confirm your basic contact information including name, address, phone number, and next of kin, as well as asking about any symptoms you may have had. Theyll also ask you how you think you may have gotten sick, such as places where you may have been exposed to the virus, and people you were with. Those names will never be shared with anyone outside of that call, and will only be used to help trace and stop the spread of the virus.   I have privacy concerns. How will the state use my information?   Your privacy about your health is important. All calls are completed using call centers that use the appropriate health privacy protection measures (HIPAA compliance), meaning that your patient information is safe. No one will ever ask you any questions related to immigration status. Your health comes first.   Do I have to participate?   You do not have to participate, but we strongly encourage you to. Contact tracing can help us catch and control new outbreaks as theyre developing to keep your friends and family safe.   What if I dont hear from anyone?   If you dont receive a call within 24 hours, you can call the number above right away to inquire about your status. That line is open from 8:00 am - 8:00 p.m., 7 days a  week.  Contact tracing saves lives! Together, we have the power to beat this virus and keep our loved ones and neighbors safe.       Instructions for household members, intimate partners and caregivers in a non-healthcare setting of a patient with confirmed or suspected COVID-19:         Close contacts should monitor their health and call their healthcare provider right away if they develop symptoms suggestive of COVID-19 (e.g., fever, cough, shortness of breath).    Stay home except to get medical care. Separate yourself from other people and animals in the home.   Monitor the patients symptoms. If the patient is getting sicker, call his or her healthcare provider. If the patient has a medical emergency and you need to call 911, notify the dispatch personnel that the patient has or is being evaluated for COVID-19.    Wear a facemask when around other people such as sharing a room or vehicle and before entering a healthcare provider's office.   Cover coughs and sneezes with a tissue. Throw used tissues in a lined trash can immediately and wash hands.   Clean hands often with soap and water for at least 20 seconds or with an alcohol-based hand , rubbing hands together until they feel dry. Avoid touching your eyes, nose, and mouth with unwashed hands.   Clean all high-touch; surfaces every day, including counters, tabletops, doorknobs, bathroom fixtures, toilets, phones, keyboards, tablets, bedside tables, etc. Use a household cleaning spray or wipe according to label instructions.   Avoid sharing personal household items such as dishes, drinking glasses, cups, towels, bedding, etc. After these items are used, they should be washed thoroughly with soap and water.   Continue isolation until:   At least 3 days (72 hours) have passed since recovery defined as resolution of fever without the use of fever-reducing medications and improvement in respiratory symptoms (e.g. cough, shortness of breath),  and    At least 10 days have passed since the patients first positive test.    https://www.cdc.gov/coronavirus/2019-ncov/your-health/index.htm

## 2020-12-08 ENCOUNTER — NURSE TRIAGE (OUTPATIENT)
Dept: ADMINISTRATIVE | Facility: CLINIC | Age: 70
End: 2020-12-08

## 2020-12-08 NOTE — TELEPHONE ENCOUNTER
Reason for Disposition   COVID-19 Home Isolation, questions about    Additional Information   Negative: Severe difficulty breathing (e.g., struggling for each breath, speaks in single words)   Negative: Difficult to awaken or acting confused (e.g., disoriented, slurred speech)   Negative: Bluish (or gray) lips or face now   Negative: Shock suspected (e.g., cold/pale/clammy skin, too weak to stand, low BP, rapid pulse)   Negative: Sounds like a life-threatening emergency to the triager   Negative: [1] COVID-19 suspected (e.g., cough, fever, shortness of breath) AND [2] mild symptoms AND [3] public health department recommends testing   Negative: [1] COVID-19 exposure AND [2] no symptoms   Negative: COVID-19 and Breastfeeding, questions about   Negative: SEVERE or constant chest pain (Exception: mild central chest pain, present only when coughing)   Negative: MODERATE difficulty breathing (e.g., speaks in phrases, SOB even at rest, pulse 100-120)   Negative: Patient sounds very sick or weak to the triager   Negative: MILD difficulty breathing (e.g., minimal/no SOB at rest, SOB with walking, pulse <100)   Negative: Chest pain   Negative: Fever > 103 F (39.4 C)   Negative: [1] Fever > 101 F (38.3 C) AND [2] age > 60   Negative: [1] Fever > 100.0 F (37.8 C) AND [2] bedridden (e.g., nursing home patient, CVA, chronic illness, recovering from surgery)   Negative: HIGH RISK patient (e.g., age > 64 years, diabetes, heart or lung disease, weak immune system)   Negative: Fever present > 3 days (72 hours)   Negative: [1] Fever returns after gone for over 24 hours AND [2] symptoms worse or not improved   Negative: [1] Continuous (nonstop) coughing interferes with work or school AND [2] no improvement using cough treatment per protocol   Negative: Cough present > 3 weeks    Protocols used: CORONAVIRUS (COVID-19) - DIAGNOSED OR [id number]

## 2020-12-12 ENCOUNTER — PATIENT MESSAGE (OUTPATIENT)
Dept: PRIMARY CARE CLINIC | Facility: CLINIC | Age: 70
End: 2020-12-12

## 2020-12-14 ENCOUNTER — OFFICE VISIT (OUTPATIENT)
Dept: PRIMARY CARE CLINIC | Facility: CLINIC | Age: 70
End: 2020-12-14
Payer: MEDICARE

## 2020-12-14 ENCOUNTER — PATIENT MESSAGE (OUTPATIENT)
Dept: PRIMARY CARE CLINIC | Facility: CLINIC | Age: 70
End: 2020-12-14

## 2020-12-14 ENCOUNTER — LAB VISIT (OUTPATIENT)
Dept: LAB | Facility: HOSPITAL | Age: 70
End: 2020-12-14
Payer: MEDICARE

## 2020-12-14 DIAGNOSIS — R63.8 OTHER SYMPTOMS AND SIGNS CONCERNING FOOD AND FLUID INTAKE: ICD-10-CM

## 2020-12-14 DIAGNOSIS — Z98.84 HISTORY OF BARIATRIC SURGERY: ICD-10-CM

## 2020-12-14 DIAGNOSIS — K14.6 BURNING MOUTH SYNDROME: Primary | ICD-10-CM

## 2020-12-14 DIAGNOSIS — K14.6 BURNING MOUTH SYNDROME: ICD-10-CM

## 2020-12-14 PROCEDURE — 1159F PR MEDICATION LIST DOCUMENTED IN MEDICAL RECORD: ICD-10-PCS | Mod: ,,, | Performed by: FAMILY MEDICINE

## 2020-12-14 PROCEDURE — 82728 ASSAY OF FERRITIN: CPT

## 2020-12-14 PROCEDURE — 99214 OFFICE O/P EST MOD 30 MIN: CPT | Mod: 95,,, | Performed by: FAMILY MEDICINE

## 2020-12-14 PROCEDURE — 84207 ASSAY OF VITAMIN B-6: CPT

## 2020-12-14 PROCEDURE — 36415 COLL VENOUS BLD VENIPUNCTURE: CPT | Mod: PN

## 2020-12-14 PROCEDURE — 82607 VITAMIN B-12: CPT

## 2020-12-14 PROCEDURE — 99214 PR OFFICE/OUTPT VISIT, EST, LEVL IV, 30-39 MIN: ICD-10-PCS | Mod: 95,,, | Performed by: FAMILY MEDICINE

## 2020-12-14 PROCEDURE — 83540 ASSAY OF IRON: CPT

## 2020-12-14 PROCEDURE — 1159F MED LIST DOCD IN RCRD: CPT | Mod: ,,, | Performed by: FAMILY MEDICINE

## 2020-12-14 PROCEDURE — 84630 ASSAY OF ZINC: CPT

## 2020-12-14 RX ORDER — PRAMIPEXOLE DIHYDROCHLORIDE 0.12 MG/1
0.12 TABLET ORAL 3 TIMES DAILY
Qty: 90 TABLET | Refills: 11 | Status: SHIPPED | OUTPATIENT
Start: 2020-12-14 | End: 2021-12-01

## 2020-12-14 NOTE — PROGRESS NOTES
"Subjective:      Patient ID: Cleo Ford is a 70 y.o. female.    Chief Complaint: Oral Pain    Disclaimer:  This note is prepared using voice recognition software and as such is likely to have errors and has not been proof read. Please contact me for questions.     The patient location is:home  The chief complaint leading to consultation is: followup / my chart request/ burning mouth   Visit type: Virtual visit with synchronous audio and video  Total time spent with patient:1230pm -1244pm   Each patient to whom he or she provides medical services by telemedicine is:  (1) informed of the relationship between the physician and patient and the respective role of any other health care provider with respect to management of the patient; and (2) notified that he or she may decline to receive medical services by telemedicine and may withdraw from such care at any time.    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 Worse at night. Gums burn, tongue burn, lips burn.  Had covid and was assymptomatic and got worse since covid. Sat brushed teeth with baking soda and   Had covid 2 weeks ago today.  got sick but is his second time to have it.  First time was at the hospital in Feb and just had pna. Now with much worse symptoms.     Has had gastric sleeve.  Willing to check for vitamin deficiencies.  Symptoms have been ongoing for over 1 year but worsen recently with COVID.  Is now on SSRIs including Zoloft and Wellbutrin.  Per up-to-date recommended screening for vitamin deficiencies as well as B12 B6 zinc and iron.  If negative recommended trial of Mirapex.  Willing to send in.  Did recently go to the dentist had no teeth lesions that she is aware.          Lab Results   Component Value Date    WBC 7.36 10/14/2020    HGB 11.6 (L) 10/14/2020    HCT 37.0 10/14/2020     10/14/2020    CHOL 177 10/12/2020    TRIG 66 10/12/2020    HDL 63 10/12/2020    ALT 13 10/12/2020    AST 23 10/12/2020     (L) " 10/12/2020    K 4.1 10/12/2020     10/12/2020    CREATININE 1.0 10/12/2020    BUN 22 10/12/2020    CO2 20 (L) 10/12/2020    TSH 2.634 10/12/2020    HGBA1C 5.2 10/14/2020       DXA Bone Density Spine And Hip  Narrative: EXAMINATION:  DEXA BONE DENSITY SPINE HIP    CLINICAL HISTORY:  Other specified disorders of bone density and structure, multiple sites    TECHNIQUE:  DXA scanning was performed over the left hip and lumbar spine.  Review of the images confirms satisfactory positioning and technique.    COMPARISON:  April 6, 2018    FINDINGS:  The L1 to L4 vertebral bone mineral density is equal to 1.08 g/cm squared with a T score of -0.9.  There has been a -3.9% statistically significant change relative to the prior study.    The left femoral neck bone mineral density is equal to 0.69 g/cm squared with a T score of -2.4.  There has been  a -11.8% statistically significant change relative to the prior study.    The total hip bone mineral density is equal to 0.78 g/cm squared with a T score of -1.8.    There is a 21.2% risk of a major osteoporotic fracture and a 4.7% risk of hip fracture in the next 10 years (FRAX).  Impression: Osteopenia    Consider FDA approved medical therapies in postmenopausal women and men aged 50 years and older, based on the following:    *A hip or vertebral (clinical or morphometric) fracture  *T score less than or equal to -2.5 at the femoral neck or spine after appropriate evaluation to exclude secondary causes.  *Low bone mass -- also known as osteopenia (T score between -1.0 and -2.5 at the femoral neck or spine) and a 10 year probability of hip fracture greater than or equal to 3% or a 10 year probability of major osteoporosis-related fracture greater than or equal to 20% based on the [place]-adapted WHO algorithm.  *Clinicians judgment and/or patient preference may indicate treatment for people with 10 year fracture probabilities is above or below these levels.    Electronically signed  by: Alphonso Soliman  Date:    09/17/2020  Time:    14:35        Review of Systems   Constitutional: Negative for activity change and unexpected weight change.   HENT: Negative for hearing loss, rhinorrhea and trouble swallowing.    Eyes: Negative for discharge and visual disturbance.   Respiratory: Negative for chest tightness and wheezing.    Cardiovascular: Negative for chest pain and palpitations.   Gastrointestinal: Negative for blood in stool, constipation, diarrhea and vomiting.   Endocrine: Negative for polydipsia and polyuria.   Genitourinary: Negative for difficulty urinating, dysuria, hematuria and menstrual problem.   Musculoskeletal: Negative for arthralgias, joint swelling and neck pain.   Neurological: Negative for weakness and headaches.   Psychiatric/Behavioral: Negative for confusion and dysphoric mood.     Objective:   There were no vitals filed for this visit.  Physical Exam  Vitals signs reviewed.   Constitutional:       General: She is awake. She is not in acute distress.     Appearance: Normal appearance. She is well-developed, well-groomed and normal weight. She is not ill-appearing.   HENT:      Head: Normocephalic and atraumatic.      Right Ear: External ear normal.      Left Ear: External ear normal.      Nose: Nose normal.      Mouth/Throat:      Lips: Pink.      Mouth: Mucous membranes are moist.      Pharynx: Oropharynx is clear.   Eyes:      Conjunctiva/sclera: Conjunctivae normal.   Pulmonary:      Effort: Pulmonary effort is normal.   Neurological:      Mental Status: She is alert.   Psychiatric:         Attention and Perception: Attention and perception normal. She is attentive.         Mood and Affect: Mood and affect normal. Mood is not anxious or depressed. Affect is not labile, blunt, angry or inappropriate.         Speech: Speech normal. She is communicative. Speech is not rapid and pressured, delayed, slurred or tangential.         Behavior: Behavior normal. Behavior is not  agitated, slowed, aggressive, withdrawn, hyperactive or combative. Behavior is cooperative.         Thought Content: Thought content normal. Thought content is not paranoid or delusional. Thought content does not include homicidal or suicidal ideation. Thought content does not include homicidal or suicidal plan.         Cognition and Memory: Cognition and memory normal. Memory is not impaired. She does not exhibit impaired recent memory or impaired remote memory.         Judgment: Judgment normal. Judgment is not impulsive or inappropriate.       Assessment:     1. Burning mouth syndrome    2. History of bariatric surgery    3. Other symptoms and signs concerning food and fluid intake       Plan:   Cleo was seen today for oral pain.    Diagnoses and all orders for this visit:    Burning mouth syndrome  Comments:  New, obtain lab work to rule out vitamin deficiencies trial of Mirapex per UpToDate guidelines if not improving then could consider Valtrex or gabapentin  Orders:  -     Iron and TIBC; Future  -     Ferritin; Future  -     Vitamin B12; Future  -     Zinc; Future  -     VITAMIN B6; Future  -     pramipexole (MIRAPEX) 0.125 MG tablet; Take 1 tablet (0.125 mg total) by mouth 3 (three) times daily.    History of bariatric surgery  Comments:  Screening for vitamin deficiencies due to the burning mouth discomfort trial of Mirapex  Orders:  -     Iron and TIBC; Future  -     Ferritin; Future  -     Vitamin B12; Future  -     Zinc; Future  -     VITAMIN B6; Future    Other symptoms and signs concerning food and fluid intake   Comments:  For coverage for the iron studies history of bariatric surgery  Orders:  -     Iron and TIBC; Future  -     Ferritin; Future            Follow up if symptoms worsen or fail to improve.    There are no Patient Instructions on file for this visit.

## 2020-12-15 LAB
FERRITIN SERPL-MCNC: 86 NG/ML (ref 20–300)
IRON SERPL-MCNC: 74 UG/DL (ref 30–160)
SATURATED IRON: 20 % (ref 20–50)
TOTAL IRON BINDING CAPACITY: 361 UG/DL (ref 250–450)
TRANSFERRIN SERPL-MCNC: 244 MG/DL (ref 200–375)
VIT B12 SERPL-MCNC: 734 PG/ML (ref 210–950)

## 2020-12-17 LAB — ZINC SERPL-MCNC: 106 UG/DL (ref 60–130)

## 2020-12-18 LAB — PYRIDOXAL SERPL-MCNC: 60 UG/L (ref 5–50)

## 2021-01-06 RX ORDER — LEVOTHYROXINE SODIUM 112 UG/1
TABLET ORAL
Qty: 90 TABLET | Refills: 3 | Status: SHIPPED | OUTPATIENT
Start: 2021-01-06 | End: 2021-04-05

## 2021-01-20 ENCOUNTER — PATIENT MESSAGE (OUTPATIENT)
Dept: PRIMARY CARE CLINIC | Facility: CLINIC | Age: 71
End: 2021-01-20

## 2021-01-21 ENCOUNTER — PATIENT MESSAGE (OUTPATIENT)
Dept: PRIMARY CARE CLINIC | Facility: CLINIC | Age: 71
End: 2021-01-21

## 2021-01-21 ENCOUNTER — HOSPITAL ENCOUNTER (OUTPATIENT)
Dept: CARDIOLOGY | Facility: HOSPITAL | Age: 71
Discharge: HOME | End: 2021-01-21
Payer: MEDICARE

## 2021-01-21 DIAGNOSIS — Z01.818 PRE-OP TESTING: Primary | ICD-10-CM

## 2021-01-21 DIAGNOSIS — Z01.818 PRE-OP TESTING: ICD-10-CM

## 2021-01-21 PROCEDURE — 93005 ELECTROCARDIOGRAM TRACING: CPT | Mod: PO

## 2021-01-21 PROCEDURE — 93010 ELECTROCARDIOGRAM REPORT: CPT | Mod: ,,, | Performed by: INTERNAL MEDICINE

## 2021-01-21 PROCEDURE — 93010 EKG 12-LEAD: ICD-10-PCS | Mod: ,,, | Performed by: INTERNAL MEDICINE

## 2021-01-25 ENCOUNTER — OFFICE VISIT (OUTPATIENT)
Dept: PRIMARY CARE CLINIC | Facility: CLINIC | Age: 71
End: 2021-01-25
Payer: MEDICARE

## 2021-01-25 VITALS
SYSTOLIC BLOOD PRESSURE: 148 MMHG | WEIGHT: 175.25 LBS | OXYGEN SATURATION: 96 % | BODY MASS INDEX: 31.05 KG/M2 | HEART RATE: 83 BPM | DIASTOLIC BLOOD PRESSURE: 84 MMHG | TEMPERATURE: 98 F

## 2021-01-25 DIAGNOSIS — Z01.818 PREOPERATIVE CLEARANCE: Primary | ICD-10-CM

## 2021-01-25 DIAGNOSIS — E78.2 MIXED HYPERLIPIDEMIA: ICD-10-CM

## 2021-01-25 DIAGNOSIS — N17.9 AKI (ACUTE KIDNEY INJURY): ICD-10-CM

## 2021-01-25 DIAGNOSIS — K14.6 BURNING MOUTH SYNDROME: ICD-10-CM

## 2021-01-25 DIAGNOSIS — I10 ESSENTIAL HYPERTENSION: ICD-10-CM

## 2021-01-25 PROCEDURE — 3079F PR MOST RECENT DIASTOLIC BLOOD PRESSURE 80-89 MM HG: ICD-10-PCS | Mod: CPTII,S$GLB,, | Performed by: PHYSICIAN ASSISTANT

## 2021-01-25 PROCEDURE — 3008F PR BODY MASS INDEX (BMI) DOCUMENTED: ICD-10-PCS | Mod: CPTII,S$GLB,, | Performed by: PHYSICIAN ASSISTANT

## 2021-01-25 PROCEDURE — 3288F PR FALLS RISK ASSESSMENT DOCUMENTED: ICD-10-PCS | Mod: CPTII,S$GLB,, | Performed by: PHYSICIAN ASSISTANT

## 2021-01-25 PROCEDURE — 99214 OFFICE O/P EST MOD 30 MIN: CPT | Mod: S$GLB,,, | Performed by: PHYSICIAN ASSISTANT

## 2021-01-25 PROCEDURE — 1126F AMNT PAIN NOTED NONE PRSNT: CPT | Mod: S$GLB,,, | Performed by: PHYSICIAN ASSISTANT

## 2021-01-25 PROCEDURE — 99214 PR OFFICE/OUTPT VISIT, EST, LEVL IV, 30-39 MIN: ICD-10-PCS | Mod: S$GLB,,, | Performed by: PHYSICIAN ASSISTANT

## 2021-01-25 PROCEDURE — 3288F FALL RISK ASSESSMENT DOCD: CPT | Mod: CPTII,S$GLB,, | Performed by: PHYSICIAN ASSISTANT

## 2021-01-25 PROCEDURE — 3079F DIAST BP 80-89 MM HG: CPT | Mod: CPTII,S$GLB,, | Performed by: PHYSICIAN ASSISTANT

## 2021-01-25 PROCEDURE — 1101F PR PT FALLS ASSESS DOC 0-1 FALLS W/OUT INJ PAST YR: ICD-10-PCS | Mod: CPTII,S$GLB,, | Performed by: PHYSICIAN ASSISTANT

## 2021-01-25 PROCEDURE — 99999 PR PBB SHADOW E&M-EST. PATIENT-LVL IV: ICD-10-PCS | Mod: PBBFAC,,, | Performed by: PHYSICIAN ASSISTANT

## 2021-01-25 PROCEDURE — 1159F PR MEDICATION LIST DOCUMENTED IN MEDICAL RECORD: ICD-10-PCS | Mod: S$GLB,,, | Performed by: PHYSICIAN ASSISTANT

## 2021-01-25 PROCEDURE — 1101F PT FALLS ASSESS-DOCD LE1/YR: CPT | Mod: CPTII,S$GLB,, | Performed by: PHYSICIAN ASSISTANT

## 2021-01-25 PROCEDURE — 3077F SYST BP >= 140 MM HG: CPT | Mod: CPTII,S$GLB,, | Performed by: PHYSICIAN ASSISTANT

## 2021-01-25 PROCEDURE — 3008F BODY MASS INDEX DOCD: CPT | Mod: CPTII,S$GLB,, | Performed by: PHYSICIAN ASSISTANT

## 2021-01-25 PROCEDURE — 3077F PR MOST RECENT SYSTOLIC BLOOD PRESSURE >= 140 MM HG: ICD-10-PCS | Mod: CPTII,S$GLB,, | Performed by: PHYSICIAN ASSISTANT

## 2021-01-25 PROCEDURE — 1159F MED LIST DOCD IN RCRD: CPT | Mod: S$GLB,,, | Performed by: PHYSICIAN ASSISTANT

## 2021-01-25 PROCEDURE — 99999 PR PBB SHADOW E&M-EST. PATIENT-LVL IV: CPT | Mod: PBBFAC,,, | Performed by: PHYSICIAN ASSISTANT

## 2021-01-25 PROCEDURE — 1126F PR PAIN SEVERITY QUANTIFIED, NO PAIN PRESENT: ICD-10-PCS | Mod: S$GLB,,, | Performed by: PHYSICIAN ASSISTANT

## 2021-01-25 RX ORDER — OXYCODONE AND ACETAMINOPHEN 5; 325 MG/1; MG/1
TABLET ORAL
COMMUNITY
Start: 2021-01-21 | End: 2021-04-12

## 2021-01-25 RX ORDER — GABAPENTIN 100 MG/1
100 CAPSULE ORAL 3 TIMES DAILY
Qty: 90 CAPSULE | Refills: 11 | Status: SHIPPED | OUTPATIENT
Start: 2021-01-25 | End: 2021-04-12 | Stop reason: CLARIF

## 2021-01-25 RX ORDER — ONDANSETRON 4 MG/1
TABLET, ORALLY DISINTEGRATING ORAL
COMMUNITY
Start: 2021-01-21 | End: 2021-04-12

## 2021-01-25 RX ORDER — VALACYCLOVIR HYDROCHLORIDE 500 MG/1
500 TABLET, FILM COATED ORAL 2 TIMES DAILY
Qty: 60 TABLET | Refills: 0 | Status: SHIPPED | OUTPATIENT
Start: 2021-01-25 | End: 2021-02-22

## 2021-01-25 RX ORDER — CEPHALEXIN 500 MG/1
CAPSULE ORAL
COMMUNITY
Start: 2021-01-21 | End: 2021-10-14

## 2021-01-26 ENCOUNTER — PATIENT MESSAGE (OUTPATIENT)
Dept: PRIMARY CARE CLINIC | Facility: CLINIC | Age: 71
End: 2021-01-26

## 2021-02-01 ENCOUNTER — LAB VISIT (OUTPATIENT)
Dept: LAB | Facility: HOSPITAL | Age: 71
End: 2021-02-01
Attending: PHYSICIAN ASSISTANT
Payer: MEDICARE

## 2021-02-01 DIAGNOSIS — N17.9 AKI (ACUTE KIDNEY INJURY): ICD-10-CM

## 2021-02-01 PROCEDURE — 80053 COMPREHEN METABOLIC PANEL: CPT

## 2021-02-01 PROCEDURE — 36415 COLL VENOUS BLD VENIPUNCTURE: CPT | Mod: PN

## 2021-02-02 LAB
ALBUMIN SERPL BCP-MCNC: 4 G/DL (ref 3.5–5.2)
ALP SERPL-CCNC: 50 U/L (ref 55–135)
ALT SERPL W/O P-5'-P-CCNC: 17 U/L (ref 10–44)
ANION GAP SERPL CALC-SCNC: 12 MMOL/L (ref 8–16)
AST SERPL-CCNC: 27 U/L (ref 10–40)
BILIRUB SERPL-MCNC: 0.4 MG/DL (ref 0.1–1)
BUN SERPL-MCNC: 23 MG/DL (ref 8–23)
CALCIUM SERPL-MCNC: 9.3 MG/DL (ref 8.7–10.5)
CHLORIDE SERPL-SCNC: 107 MMOL/L (ref 95–110)
CO2 SERPL-SCNC: 18 MMOL/L (ref 23–29)
CREAT SERPL-MCNC: 1.3 MG/DL (ref 0.5–1.4)
EST. GFR  (AFRICAN AMERICAN): 48.7 ML/MIN/1.73 M^2
EST. GFR  (NON AFRICAN AMERICAN): 42.3 ML/MIN/1.73 M^2
GLUCOSE SERPL-MCNC: 79 MG/DL (ref 70–110)
POTASSIUM SERPL-SCNC: 4.1 MMOL/L (ref 3.5–5.1)
PROT SERPL-MCNC: 7.3 G/DL (ref 6–8.4)
SODIUM SERPL-SCNC: 137 MMOL/L (ref 136–145)

## 2021-02-24 ENCOUNTER — PATIENT MESSAGE (OUTPATIENT)
Dept: PRIMARY CARE CLINIC | Facility: CLINIC | Age: 71
End: 2021-02-24

## 2021-04-12 ENCOUNTER — OFFICE VISIT (OUTPATIENT)
Dept: PRIMARY CARE CLINIC | Facility: CLINIC | Age: 71
End: 2021-04-12
Payer: MEDICARE

## 2021-04-12 DIAGNOSIS — I10 ESSENTIAL HYPERTENSION: ICD-10-CM

## 2021-04-12 DIAGNOSIS — K14.6 BURNING MOUTH SYNDROME: ICD-10-CM

## 2021-04-12 DIAGNOSIS — F41.9 ANXIETY: Primary | ICD-10-CM

## 2021-04-12 PROCEDURE — 1159F PR MEDICATION LIST DOCUMENTED IN MEDICAL RECORD: ICD-10-PCS | Mod: ,,, | Performed by: PHYSICIAN ASSISTANT

## 2021-04-12 PROCEDURE — 1159F MED LIST DOCD IN RCRD: CPT | Mod: ,,, | Performed by: PHYSICIAN ASSISTANT

## 2021-04-12 PROCEDURE — 99214 OFFICE O/P EST MOD 30 MIN: CPT | Mod: 95,,, | Performed by: PHYSICIAN ASSISTANT

## 2021-04-12 PROCEDURE — 99214 PR OFFICE/OUTPT VISIT, EST, LEVL IV, 30-39 MIN: ICD-10-PCS | Mod: 95,,, | Performed by: PHYSICIAN ASSISTANT

## 2021-04-12 RX ORDER — GABAPENTIN 300 MG/1
300 CAPSULE ORAL 3 TIMES DAILY PRN
Qty: 90 CAPSULE | Refills: 11 | Status: SHIPPED | OUTPATIENT
Start: 2021-04-12 | End: 2021-12-01

## 2021-04-14 ENCOUNTER — PATIENT MESSAGE (OUTPATIENT)
Dept: RHEUMATOLOGY | Facility: CLINIC | Age: 71
End: 2021-04-14

## 2021-04-30 ENCOUNTER — PATIENT MESSAGE (OUTPATIENT)
Dept: RHEUMATOLOGY | Facility: CLINIC | Age: 71
End: 2021-04-30

## 2021-04-30 ENCOUNTER — INFUSION (OUTPATIENT)
Dept: INFUSION THERAPY | Facility: HOSPITAL | Age: 71
End: 2021-04-30
Attending: PHYSICIAN ASSISTANT
Payer: MEDICARE

## 2021-04-30 ENCOUNTER — OFFICE VISIT (OUTPATIENT)
Dept: RHEUMATOLOGY | Facility: CLINIC | Age: 71
End: 2021-04-30
Payer: MEDICARE

## 2021-04-30 VITALS
TEMPERATURE: 98 F | RESPIRATION RATE: 16 BRPM | SYSTOLIC BLOOD PRESSURE: 146 MMHG | HEART RATE: 71 BPM | DIASTOLIC BLOOD PRESSURE: 88 MMHG | OXYGEN SATURATION: 99 %

## 2021-04-30 DIAGNOSIS — N18.32 STAGE 3B CHRONIC KIDNEY DISEASE: ICD-10-CM

## 2021-04-30 DIAGNOSIS — Z51.81 MEDICATION MONITORING ENCOUNTER: ICD-10-CM

## 2021-04-30 DIAGNOSIS — M81.0 AGE-RELATED OSTEOPOROSIS WITHOUT CURRENT PATHOLOGICAL FRACTURE: Primary | ICD-10-CM

## 2021-04-30 PROCEDURE — 99214 OFFICE O/P EST MOD 30 MIN: CPT | Mod: S$GLB,,, | Performed by: PHYSICIAN ASSISTANT

## 2021-04-30 PROCEDURE — 99999 PR PBB SHADOW E&M-EST. PATIENT-LVL III: CPT | Mod: PBBFAC,,, | Performed by: PHYSICIAN ASSISTANT

## 2021-04-30 PROCEDURE — 1159F MED LIST DOCD IN RCRD: CPT | Mod: S$GLB,,, | Performed by: PHYSICIAN ASSISTANT

## 2021-04-30 PROCEDURE — 63600175 PHARM REV CODE 636 W HCPCS: Mod: JG | Performed by: PHYSICIAN ASSISTANT

## 2021-04-30 PROCEDURE — 99214 PR OFFICE/OUTPT VISIT, EST, LEVL IV, 30-39 MIN: ICD-10-PCS | Mod: S$GLB,,, | Performed by: PHYSICIAN ASSISTANT

## 2021-04-30 PROCEDURE — 1159F PR MEDICATION LIST DOCUMENTED IN MEDICAL RECORD: ICD-10-PCS | Mod: S$GLB,,, | Performed by: PHYSICIAN ASSISTANT

## 2021-04-30 PROCEDURE — 96372 THER/PROPH/DIAG INJ SC/IM: CPT

## 2021-04-30 PROCEDURE — 99999 PR PBB SHADOW E&M-EST. PATIENT-LVL III: ICD-10-PCS | Mod: PBBFAC,,, | Performed by: PHYSICIAN ASSISTANT

## 2021-04-30 RX ADMIN — DENOSUMAB 60 MG: 60 INJECTION SUBCUTANEOUS at 12:04

## 2021-05-08 ENCOUNTER — PATIENT MESSAGE (OUTPATIENT)
Dept: RHEUMATOLOGY | Facility: CLINIC | Age: 71
End: 2021-05-08

## 2021-06-04 DIAGNOSIS — F39 MOOD DISORDER: ICD-10-CM

## 2021-06-04 RX ORDER — ALPRAZOLAM 1 MG/1
1 TABLET ORAL 2 TIMES DAILY
Qty: 60 TABLET | Refills: 1 | Status: SHIPPED | OUTPATIENT
Start: 2021-06-04 | End: 2021-09-01

## 2021-06-24 NOTE — ADDENDUM NOTE
Addended by: ASA MARTINEZ on: 12/14/2017 02:06 PM     Modules accepted: Orders     Render Post-Care Instructions In Note?: yes Medical Necessity Clause: This procedure was medically necessary because the lesions that were treated were: Number Of Freeze-Thaw Cycles: 2 freeze-thaw cycles Medical Necessity Information: It is in your best interest to select a reason for this procedure from the list below. All of these items fulfill various CMS LCD requirements except the new and changing color options. Consent: The patient's consent was obtained including but not limited to risks of crusting, scabbing, blistering, scarring, darker or lighter pigmentary change, recurrence, incomplete removal and infection. Add 52 Modifier (Optional): no Detail Level: Detailed Duration Of Freeze Thaw-Cycle (Seconds): 5-10 Post-Care Instructions: I reviewed with the patient in detail post-care instructions. Patient is to wear sunprotection, and avoid picking at any of the treated lesions. Pt may apply Vaseline to crusted or scabbing areas.

## 2021-06-28 ENCOUNTER — OFFICE VISIT (OUTPATIENT)
Dept: URGENT CARE | Facility: CLINIC | Age: 71
End: 2021-06-28
Payer: MEDICARE

## 2021-06-28 ENCOUNTER — LAB VISIT (OUTPATIENT)
Dept: LAB | Facility: HOSPITAL | Age: 71
End: 2021-06-28
Attending: FAMILY MEDICINE
Payer: MEDICARE

## 2021-06-28 ENCOUNTER — PATIENT MESSAGE (OUTPATIENT)
Dept: PRIMARY CARE CLINIC | Facility: CLINIC | Age: 71
End: 2021-06-28

## 2021-06-28 VITALS
DIASTOLIC BLOOD PRESSURE: 86 MMHG | TEMPERATURE: 101 F | RESPIRATION RATE: 20 BRPM | HEART RATE: 97 BPM | HEIGHT: 63 IN | BODY MASS INDEX: 31.89 KG/M2 | SYSTOLIC BLOOD PRESSURE: 192 MMHG | OXYGEN SATURATION: 99 % | WEIGHT: 180 LBS

## 2021-06-28 DIAGNOSIS — R03.0 ELEVATED BLOOD PRESSURE READING: ICD-10-CM

## 2021-06-28 DIAGNOSIS — R68.83 CHILLS: Primary | ICD-10-CM

## 2021-06-28 DIAGNOSIS — R53.83 FATIGUE, UNSPECIFIED TYPE: ICD-10-CM

## 2021-06-28 LAB
BILIRUB UR QL STRIP: NEGATIVE
GLUCOSE SERPL-MCNC: 78 MG/DL (ref 70–110)
GLUCOSE UR QL STRIP: NEGATIVE
KETONES UR QL STRIP: NEGATIVE
LEUKOCYTE ESTERASE UR QL STRIP: NEGATIVE
PH, POC UA: 5.5
POC ANION GAP: 16 MMOL/L
POC BLOOD, URINE: NEGATIVE
POC BUN: 23 MMOL/L
POC CHLORIDE: 109 MMOL/L
POC CREATININE: 0.8 MG/DL (ref 0.6–1.3)
POC HEMATOCRIT: 41 %PCV (ref 37–47)
POC HEMOGLOBIN: 13.9 G/DL (ref 12.5–16)
POC ICA: 1.03 MMOL/L
POC NITRATES, URINE: NEGATIVE
POC POTASSIUM: 4 MMOL/L
POC SODIUM: 139 MMOL/L
POC TCO2: 20 MMOL/L
PROT UR QL STRIP: NEGATIVE
SP GR UR STRIP: 1.02 (ref 1–1.03)
UROBILINOGEN UR STRIP-ACNC: NORMAL (ref 0.1–1.1)

## 2021-06-28 PROCEDURE — 81003 POCT URINALYSIS, DIPSTICK, AUTOMATED, W/O SCOPE: ICD-10-PCS | Mod: QW,S$GLB,, | Performed by: NURSE PRACTITIONER

## 2021-06-28 PROCEDURE — 3008F BODY MASS INDEX DOCD: CPT | Mod: CPTII,S$GLB,, | Performed by: NURSE PRACTITIONER

## 2021-06-28 PROCEDURE — 84480 ASSAY TRIIODOTHYRONINE (T3): CPT | Performed by: NURSE PRACTITIONER

## 2021-06-28 PROCEDURE — 1126F PR PAIN SEVERITY QUANTIFIED, NO PAIN PRESENT: ICD-10-PCS | Mod: S$GLB,,, | Performed by: NURSE PRACTITIONER

## 2021-06-28 PROCEDURE — 84443 ASSAY THYROID STIM HORMONE: CPT | Performed by: NURSE PRACTITIONER

## 2021-06-28 PROCEDURE — 80047 BASIC METABLC PNL IONIZED CA: CPT | Mod: QW,S$GLB,, | Performed by: NURSE PRACTITIONER

## 2021-06-28 PROCEDURE — 84436 ASSAY OF TOTAL THYROXINE: CPT | Performed by: NURSE PRACTITIONER

## 2021-06-28 PROCEDURE — 84481 FREE ASSAY (FT-3): CPT | Performed by: NURSE PRACTITIONER

## 2021-06-28 PROCEDURE — 3008F PR BODY MASS INDEX (BMI) DOCUMENTED: ICD-10-PCS | Mod: CPTII,S$GLB,, | Performed by: NURSE PRACTITIONER

## 2021-06-28 PROCEDURE — 86376 MICROSOMAL ANTIBODY EACH: CPT | Performed by: NURSE PRACTITIONER

## 2021-06-28 PROCEDURE — 36415 COLL VENOUS BLD VENIPUNCTURE: CPT | Mod: PN | Performed by: NURSE PRACTITIONER

## 2021-06-28 PROCEDURE — 1126F AMNT PAIN NOTED NONE PRSNT: CPT | Mod: S$GLB,,, | Performed by: NURSE PRACTITIONER

## 2021-06-28 PROCEDURE — 87086 URINE CULTURE/COLONY COUNT: CPT | Performed by: NURSE PRACTITIONER

## 2021-06-28 PROCEDURE — 84439 ASSAY OF FREE THYROXINE: CPT | Performed by: NURSE PRACTITIONER

## 2021-06-28 PROCEDURE — 81003 URINALYSIS AUTO W/O SCOPE: CPT | Mod: QW,S$GLB,, | Performed by: NURSE PRACTITIONER

## 2021-06-28 PROCEDURE — 80047 POCT CHEMISTRY PANEL: ICD-10-PCS | Mod: QW,S$GLB,, | Performed by: NURSE PRACTITIONER

## 2021-06-28 PROCEDURE — 99213 PR OFFICE/OUTPT VISIT, EST, LEVL III, 20-29 MIN: ICD-10-PCS | Mod: 25,S$GLB,, | Performed by: NURSE PRACTITIONER

## 2021-06-28 PROCEDURE — 99213 OFFICE O/P EST LOW 20 MIN: CPT | Mod: 25,S$GLB,, | Performed by: NURSE PRACTITIONER

## 2021-06-29 LAB
T3 SERPL-MCNC: 55 NG/DL (ref 60–180)
T3FREE SERPL-MCNC: 2 PG/ML (ref 2.3–4.2)
T4 FREE SERPL-MCNC: 1.04 NG/DL (ref 0.71–1.51)
T4 SERPL-MCNC: 6.8 UG/DL (ref 4.5–11.5)
THYROPEROXIDASE IGG SERPL-ACNC: <6 IU/ML
TSH SERPL DL<=0.005 MIU/L-ACNC: 2.69 UIU/ML (ref 0.4–4)

## 2021-06-30 ENCOUNTER — TELEPHONE (OUTPATIENT)
Dept: URGENT CARE | Facility: CLINIC | Age: 71
End: 2021-06-30

## 2021-06-30 LAB — BACTERIA UR CULT: NO GROWTH

## 2021-08-25 ENCOUNTER — PATIENT MESSAGE (OUTPATIENT)
Dept: PRIMARY CARE CLINIC | Facility: CLINIC | Age: 71
End: 2021-08-25

## 2021-08-25 DIAGNOSIS — Z12.31 ENCOUNTER FOR SCREENING MAMMOGRAM FOR BREAST CANCER: Primary | ICD-10-CM

## 2021-08-26 ENCOUNTER — PATIENT MESSAGE (OUTPATIENT)
Dept: PRIMARY CARE CLINIC | Facility: CLINIC | Age: 71
End: 2021-08-26

## 2021-09-10 ENCOUNTER — PATIENT MESSAGE (OUTPATIENT)
Dept: PRIMARY CARE CLINIC | Facility: CLINIC | Age: 71
End: 2021-09-10

## 2021-09-10 ENCOUNTER — OFFICE VISIT (OUTPATIENT)
Dept: PRIMARY CARE CLINIC | Facility: CLINIC | Age: 71
End: 2021-09-10
Payer: MEDICARE

## 2021-09-10 DIAGNOSIS — R30.0 DYSURIA: Primary | ICD-10-CM

## 2021-09-10 DIAGNOSIS — R35.0 URINARY FREQUENCY: ICD-10-CM

## 2021-09-10 PROCEDURE — 4010F PR ACE/ARB THEARPY RXD/TAKEN: ICD-10-PCS | Mod: CPTII,95,, | Performed by: NURSE PRACTITIONER

## 2021-09-10 PROCEDURE — 1159F PR MEDICATION LIST DOCUMENTED IN MEDICAL RECORD: ICD-10-PCS | Mod: CPTII,95,, | Performed by: NURSE PRACTITIONER

## 2021-09-10 PROCEDURE — 99213 OFFICE O/P EST LOW 20 MIN: CPT | Mod: 95,,, | Performed by: NURSE PRACTITIONER

## 2021-09-10 PROCEDURE — 1159F MED LIST DOCD IN RCRD: CPT | Mod: CPTII,95,, | Performed by: NURSE PRACTITIONER

## 2021-09-10 PROCEDURE — 99213 PR OFFICE/OUTPT VISIT, EST, LEVL III, 20-29 MIN: ICD-10-PCS | Mod: 95,,, | Performed by: NURSE PRACTITIONER

## 2021-09-10 PROCEDURE — 1160F RVW MEDS BY RX/DR IN RCRD: CPT | Mod: CPTII,95,, | Performed by: NURSE PRACTITIONER

## 2021-09-10 PROCEDURE — 1160F PR REVIEW ALL MEDS BY PRESCRIBER/CLIN PHARMACIST DOCUMENTED: ICD-10-PCS | Mod: CPTII,95,, | Performed by: NURSE PRACTITIONER

## 2021-09-10 PROCEDURE — 4010F ACE/ARB THERAPY RXD/TAKEN: CPT | Mod: CPTII,95,, | Performed by: NURSE PRACTITIONER

## 2021-09-10 RX ORDER — PHENAZOPYRIDINE HYDROCHLORIDE 200 MG/1
200 TABLET, FILM COATED ORAL
Qty: 6 TABLET | Refills: 0 | Status: SHIPPED | OUTPATIENT
Start: 2021-09-10 | End: 2021-09-12

## 2021-09-10 RX ORDER — NITROFURANTOIN 25; 75 MG/1; MG/1
100 CAPSULE ORAL 2 TIMES DAILY
Qty: 14 CAPSULE | Refills: 0 | Status: SHIPPED | OUTPATIENT
Start: 2021-09-10 | End: 2021-09-13 | Stop reason: ALTCHOICE

## 2021-09-13 ENCOUNTER — TELEPHONE (OUTPATIENT)
Dept: ADMINISTRATIVE | Facility: HOSPITAL | Age: 71
End: 2021-09-13

## 2021-09-14 ENCOUNTER — HOSPITAL ENCOUNTER (OUTPATIENT)
Dept: RADIOLOGY | Facility: HOSPITAL | Age: 71
Discharge: HOME | End: 2021-09-14
Attending: FAMILY MEDICINE
Payer: MEDICARE

## 2021-09-14 DIAGNOSIS — Z12.31 ENCOUNTER FOR SCREENING MAMMOGRAM FOR BREAST CANCER: ICD-10-CM

## 2021-09-14 PROCEDURE — 77063 MAMMO DIGITAL SCREENING BILAT WITH TOMO: ICD-10-PCS | Mod: 26,,, | Performed by: RADIOLOGY

## 2021-09-14 PROCEDURE — 77067 SCR MAMMO BI INCL CAD: CPT | Mod: 26,,, | Performed by: RADIOLOGY

## 2021-09-14 PROCEDURE — 77067 SCR MAMMO BI INCL CAD: CPT | Mod: TC

## 2021-09-14 PROCEDURE — 77067 MAMMO DIGITAL SCREENING BILAT WITH TOMO: ICD-10-PCS | Mod: 26,,, | Performed by: RADIOLOGY

## 2021-09-14 PROCEDURE — 77063 BREAST TOMOSYNTHESIS BI: CPT | Mod: 26,,, | Performed by: RADIOLOGY

## 2021-10-01 ENCOUNTER — PATIENT MESSAGE (OUTPATIENT)
Dept: RHEUMATOLOGY | Facility: CLINIC | Age: 71
End: 2021-10-01

## 2021-10-05 ENCOUNTER — PATIENT MESSAGE (OUTPATIENT)
Dept: PRIMARY CARE CLINIC | Facility: CLINIC | Age: 71
End: 2021-10-05

## 2021-10-07 ENCOUNTER — IMMUNIZATION (OUTPATIENT)
Dept: PRIMARY CARE CLINIC | Facility: CLINIC | Age: 71
End: 2021-10-07
Payer: MEDICARE

## 2021-10-07 ENCOUNTER — TELEPHONE (OUTPATIENT)
Dept: RHEUMATOLOGY | Facility: CLINIC | Age: 71
End: 2021-10-07

## 2021-10-07 DIAGNOSIS — I10 ESSENTIAL HYPERTENSION: Chronic | ICD-10-CM

## 2021-10-07 PROCEDURE — G0008 FLU VACCINE - QUADRIVALENT - ADJUVANTED: ICD-10-PCS | Mod: S$GLB,,, | Performed by: FAMILY MEDICINE

## 2021-10-07 PROCEDURE — G0008 ADMIN INFLUENZA VIRUS VAC: HCPCS | Mod: S$GLB,,, | Performed by: FAMILY MEDICINE

## 2021-10-07 PROCEDURE — 90694 VACC AIIV4 NO PRSRV 0.5ML IM: CPT | Mod: S$GLB,,, | Performed by: FAMILY MEDICINE

## 2021-10-07 PROCEDURE — 90694 FLU VACCINE - QUADRIVALENT - ADJUVANTED: ICD-10-PCS | Mod: S$GLB,,, | Performed by: FAMILY MEDICINE

## 2021-10-07 RX ORDER — BUPROPION HYDROCHLORIDE 150 MG/1
TABLET ORAL
Qty: 90 TABLET | Refills: 0 | Status: SHIPPED | OUTPATIENT
Start: 2021-10-07 | End: 2021-12-01 | Stop reason: ALTCHOICE

## 2021-10-07 RX ORDER — NIFEDIPINE 90 MG/1
90 TABLET, FILM COATED, EXTENDED RELEASE ORAL DAILY
Qty: 90 TABLET | Refills: 0 | Status: SHIPPED | OUTPATIENT
Start: 2021-10-07 | End: 2022-03-07

## 2021-10-08 ENCOUNTER — PATIENT MESSAGE (OUTPATIENT)
Dept: PRIMARY CARE CLINIC | Facility: CLINIC | Age: 71
End: 2021-10-08

## 2021-10-13 ENCOUNTER — LAB VISIT (OUTPATIENT)
Dept: LAB | Facility: HOSPITAL | Age: 71
End: 2021-10-13
Attending: FAMILY MEDICINE
Payer: MEDICARE

## 2021-10-13 ENCOUNTER — PATIENT MESSAGE (OUTPATIENT)
Dept: PRIMARY CARE CLINIC | Facility: CLINIC | Age: 71
End: 2021-10-13

## 2021-10-13 DIAGNOSIS — N39.0 URINARY TRACT INFECTION WITHOUT HEMATURIA, SITE UNSPECIFIED: ICD-10-CM

## 2021-10-13 DIAGNOSIS — N39.0 URINARY TRACT INFECTION WITHOUT HEMATURIA, SITE UNSPECIFIED: Primary | ICD-10-CM

## 2021-10-13 LAB
AMORPH CRY URNS QL MICRO: ABNORMAL
BACTERIA #/AREA URNS HPF: ABNORMAL /HPF
BILIRUB UR QL STRIP: ABNORMAL
CLARITY UR: ABNORMAL
COLOR UR: YELLOW
GLUCOSE UR QL STRIP: ABNORMAL
HGB UR QL STRIP: ABNORMAL
KETONES UR QL STRIP: ABNORMAL
LEUKOCYTE ESTERASE UR QL STRIP: ABNORMAL
MICROSCOPIC COMMENT: ABNORMAL
NITRITE UR QL STRIP: ABNORMAL
PH UR STRIP: ABNORMAL [PH] (ref 5–8)
PROT UR QL STRIP: ABNORMAL
SP GR UR STRIP: ABNORMAL (ref 1–1.03)
TRI-PHOS CRY URNS QL MICRO: ABNORMAL
URN SPEC COLLECT METH UR: ABNORMAL
UROBILINOGEN UR STRIP-ACNC: ABNORMAL EU/DL

## 2021-10-13 PROCEDURE — 81000 URINALYSIS NONAUTO W/SCOPE: CPT | Performed by: FAMILY MEDICINE

## 2021-10-14 ENCOUNTER — PATIENT MESSAGE (OUTPATIENT)
Dept: PRIMARY CARE CLINIC | Facility: CLINIC | Age: 71
End: 2021-10-14
Payer: MEDICARE

## 2021-10-14 ENCOUNTER — PATIENT MESSAGE (OUTPATIENT)
Dept: PRIMARY CARE CLINIC | Facility: CLINIC | Age: 71
End: 2021-10-14

## 2021-10-14 RX ORDER — SULFAMETHOXAZOLE AND TRIMETHOPRIM 800; 160 MG/1; MG/1
1 TABLET ORAL 2 TIMES DAILY
Qty: 14 TABLET | Refills: 0 | Status: SHIPPED | OUTPATIENT
Start: 2021-10-14 | End: 2021-10-21

## 2021-10-20 ENCOUNTER — TELEPHONE (OUTPATIENT)
Dept: RHEUMATOLOGY | Facility: CLINIC | Age: 71
End: 2021-10-20

## 2021-10-28 ENCOUNTER — TELEPHONE (OUTPATIENT)
Dept: RHEUMATOLOGY | Facility: CLINIC | Age: 71
End: 2021-10-28
Payer: MEDICARE

## 2021-11-03 ENCOUNTER — INFUSION (OUTPATIENT)
Dept: INFUSION THERAPY | Facility: HOSPITAL | Age: 71
End: 2021-11-03
Payer: MEDICARE

## 2021-11-03 VITALS
SYSTOLIC BLOOD PRESSURE: 149 MMHG | OXYGEN SATURATION: 99 % | RESPIRATION RATE: 18 BRPM | HEART RATE: 73 BPM | DIASTOLIC BLOOD PRESSURE: 82 MMHG | TEMPERATURE: 98 F

## 2021-11-03 DIAGNOSIS — M81.0 AGE-RELATED OSTEOPOROSIS WITHOUT CURRENT PATHOLOGICAL FRACTURE: Primary | ICD-10-CM

## 2021-11-03 PROCEDURE — 63600175 PHARM REV CODE 636 W HCPCS: Mod: JG | Performed by: PHYSICIAN ASSISTANT

## 2021-11-03 PROCEDURE — 96372 THER/PROPH/DIAG INJ SC/IM: CPT

## 2021-11-03 RX ADMIN — DENOSUMAB 60 MG: 60 INJECTION SUBCUTANEOUS at 11:11

## 2021-12-01 ENCOUNTER — OFFICE VISIT (OUTPATIENT)
Dept: PRIMARY CARE CLINIC | Facility: CLINIC | Age: 71
End: 2021-12-01
Payer: MEDICARE

## 2021-12-01 VITALS
SYSTOLIC BLOOD PRESSURE: 132 MMHG | WEIGHT: 186.94 LBS | OXYGEN SATURATION: 98 % | TEMPERATURE: 97 F | DIASTOLIC BLOOD PRESSURE: 82 MMHG | HEART RATE: 82 BPM | BODY MASS INDEX: 33.12 KG/M2

## 2021-12-01 DIAGNOSIS — E03.8 OTHER SPECIFIED HYPOTHYROIDISM: Primary | Chronic | ICD-10-CM

## 2021-12-01 DIAGNOSIS — M81.0 AGE-RELATED OSTEOPOROSIS WITHOUT CURRENT PATHOLOGICAL FRACTURE: ICD-10-CM

## 2021-12-01 DIAGNOSIS — E78.5 HYPERLIPIDEMIA, UNSPECIFIED HYPERLIPIDEMIA TYPE: ICD-10-CM

## 2021-12-01 DIAGNOSIS — F33.41 RECURRENT MAJOR DEPRESSIVE DISORDER, IN PARTIAL REMISSION: ICD-10-CM

## 2021-12-01 DIAGNOSIS — E88.810 DYSMETABOLIC SYNDROME X: ICD-10-CM

## 2021-12-01 DIAGNOSIS — I10 ESSENTIAL HYPERTENSION: Chronic | ICD-10-CM

## 2021-12-01 DIAGNOSIS — F41.9 ANXIETY: ICD-10-CM

## 2021-12-01 DIAGNOSIS — R73.09 ABNORMAL GLUCOSE: ICD-10-CM

## 2021-12-01 DIAGNOSIS — K14.6 BURNING MOUTH SYNDROME: ICD-10-CM

## 2021-12-01 PROCEDURE — 99214 PR OFFICE/OUTPT VISIT, EST, LEVL IV, 30-39 MIN: ICD-10-PCS | Mod: S$GLB,,, | Performed by: FAMILY MEDICINE

## 2021-12-01 PROCEDURE — 4010F ACE/ARB THERAPY RXD/TAKEN: CPT | Mod: CPTII,S$GLB,, | Performed by: FAMILY MEDICINE

## 2021-12-01 PROCEDURE — 4010F PR ACE/ARB THEARPY RXD/TAKEN: ICD-10-PCS | Mod: CPTII,S$GLB,, | Performed by: FAMILY MEDICINE

## 2021-12-01 PROCEDURE — 99214 OFFICE O/P EST MOD 30 MIN: CPT | Mod: S$GLB,,, | Performed by: FAMILY MEDICINE

## 2021-12-01 PROCEDURE — 99999 PR PBB SHADOW E&M-EST. PATIENT-LVL III: ICD-10-PCS | Mod: PBBFAC,,, | Performed by: FAMILY MEDICINE

## 2021-12-01 PROCEDURE — 99999 PR PBB SHADOW E&M-EST. PATIENT-LVL III: CPT | Mod: PBBFAC,,, | Performed by: FAMILY MEDICINE

## 2021-12-02 ENCOUNTER — LAB VISIT (OUTPATIENT)
Dept: LAB | Facility: HOSPITAL | Age: 71
End: 2021-12-02
Payer: MEDICARE

## 2021-12-02 DIAGNOSIS — M85.89 OTHER SPECIFIED DISORDERS OF BONE DENSITY AND STRUCTURE, MULTIPLE SITES: ICD-10-CM

## 2021-12-02 DIAGNOSIS — E55.9 VITAMIN D DEFICIENCY: ICD-10-CM

## 2021-12-02 DIAGNOSIS — E03.8 OTHER SPECIFIED HYPOTHYROIDISM: Chronic | ICD-10-CM

## 2021-12-02 DIAGNOSIS — E78.5 HYPERLIPIDEMIA, UNSPECIFIED HYPERLIPIDEMIA TYPE: ICD-10-CM

## 2021-12-02 DIAGNOSIS — E88.810 DYSMETABOLIC SYNDROME X: ICD-10-CM

## 2021-12-02 LAB
CHOLEST SERPL-MCNC: 195 MG/DL (ref 120–199)
CHOLEST/HDLC SERPL: 2.3 {RATIO} (ref 2–5)
HDLC SERPL-MCNC: 85 MG/DL (ref 40–75)
HDLC SERPL: 43.6 % (ref 20–50)
LDLC SERPL CALC-MCNC: 100.6 MG/DL (ref 63–159)
NONHDLC SERPL-MCNC: 110 MG/DL
TRIGL SERPL-MCNC: 47 MG/DL (ref 30–150)

## 2021-12-02 PROCEDURE — 80053 COMPREHEN METABOLIC PANEL: CPT | Performed by: PHYSICIAN ASSISTANT

## 2021-12-02 PROCEDURE — 36415 COLL VENOUS BLD VENIPUNCTURE: CPT | Mod: PN | Performed by: PHYSICIAN ASSISTANT

## 2021-12-02 PROCEDURE — 80061 LIPID PANEL: CPT | Performed by: FAMILY MEDICINE

## 2021-12-03 LAB
ALBUMIN SERPL BCP-MCNC: 3.9 G/DL (ref 3.5–5.2)
ALP SERPL-CCNC: 72 U/L (ref 55–135)
ALT SERPL W/O P-5'-P-CCNC: 20 U/L (ref 10–44)
ANION GAP SERPL CALC-SCNC: 9 MMOL/L (ref 8–16)
AST SERPL-CCNC: 29 U/L (ref 10–40)
BILIRUB SERPL-MCNC: 0.5 MG/DL (ref 0.1–1)
BUN SERPL-MCNC: 19 MG/DL (ref 8–23)
CALCIUM SERPL-MCNC: 9.6 MG/DL (ref 8.7–10.5)
CHLORIDE SERPL-SCNC: 101 MMOL/L (ref 95–110)
CO2 SERPL-SCNC: 24 MMOL/L (ref 23–29)
CREAT SERPL-MCNC: 0.9 MG/DL (ref 0.5–1.4)
EST. GFR  (AFRICAN AMERICAN): >60 ML/MIN/1.73 M^2
EST. GFR  (NON AFRICAN AMERICAN): >60 ML/MIN/1.73 M^2
GLUCOSE SERPL-MCNC: 96 MG/DL (ref 70–110)
POTASSIUM SERPL-SCNC: 3.9 MMOL/L (ref 3.5–5.1)
PROT SERPL-MCNC: 7 G/DL (ref 6–8.4)
SODIUM SERPL-SCNC: 134 MMOL/L (ref 136–145)

## 2021-12-09 RX ORDER — IRBESARTAN 300 MG/1
TABLET ORAL
Qty: 90 TABLET | Refills: 3 | Status: SHIPPED | OUTPATIENT
Start: 2021-12-09 | End: 2022-12-14

## 2021-12-09 RX ORDER — SERTRALINE HYDROCHLORIDE 50 MG/1
TABLET, FILM COATED ORAL
Qty: 90 TABLET | Refills: 0 | OUTPATIENT
Start: 2021-12-09

## 2021-12-09 RX ORDER — LOVASTATIN 40 MG/1
TABLET ORAL
Qty: 90 TABLET | Refills: 3 | Status: SHIPPED | OUTPATIENT
Start: 2021-12-09 | End: 2023-01-26

## 2022-01-04 ENCOUNTER — PATIENT MESSAGE (OUTPATIENT)
Dept: PRIMARY CARE CLINIC | Facility: CLINIC | Age: 72
End: 2022-01-04
Payer: MEDICARE

## 2022-01-06 ENCOUNTER — OFFICE VISIT (OUTPATIENT)
Dept: PRIMARY CARE CLINIC | Facility: CLINIC | Age: 72
End: 2022-01-06
Payer: MEDICARE

## 2022-01-06 VITALS
TEMPERATURE: 98 F | HEART RATE: 94 BPM | WEIGHT: 182.13 LBS | RESPIRATION RATE: 18 BRPM | HEIGHT: 63 IN | OXYGEN SATURATION: 98 % | SYSTOLIC BLOOD PRESSURE: 130 MMHG | BODY MASS INDEX: 32.27 KG/M2 | DIASTOLIC BLOOD PRESSURE: 60 MMHG

## 2022-01-06 DIAGNOSIS — M25.551 RIGHT HIP PAIN: Primary | ICD-10-CM

## 2022-01-06 DIAGNOSIS — M54.40 ACUTE LEFT-SIDED LOW BACK PAIN WITH SCIATICA, SCIATICA LATERALITY UNSPECIFIED: ICD-10-CM

## 2022-01-06 DIAGNOSIS — I10 ESSENTIAL HYPERTENSION: ICD-10-CM

## 2022-01-06 PROCEDURE — 3288F PR FALLS RISK ASSESSMENT DOCUMENTED: ICD-10-PCS | Mod: CPTII,S$GLB,, | Performed by: PHYSICIAN ASSISTANT

## 2022-01-06 PROCEDURE — 3078F PR MOST RECENT DIASTOLIC BLOOD PRESSURE < 80 MM HG: ICD-10-PCS | Mod: CPTII,S$GLB,, | Performed by: PHYSICIAN ASSISTANT

## 2022-01-06 PROCEDURE — 99214 PR OFFICE/OUTPT VISIT, EST, LEVL IV, 30-39 MIN: ICD-10-PCS | Mod: S$GLB,,, | Performed by: PHYSICIAN ASSISTANT

## 2022-01-06 PROCEDURE — 1160F RVW MEDS BY RX/DR IN RCRD: CPT | Mod: CPTII,S$GLB,, | Performed by: PHYSICIAN ASSISTANT

## 2022-01-06 PROCEDURE — 3008F PR BODY MASS INDEX (BMI) DOCUMENTED: ICD-10-PCS | Mod: CPTII,S$GLB,, | Performed by: PHYSICIAN ASSISTANT

## 2022-01-06 PROCEDURE — 3008F BODY MASS INDEX DOCD: CPT | Mod: CPTII,S$GLB,, | Performed by: PHYSICIAN ASSISTANT

## 2022-01-06 PROCEDURE — 1159F PR MEDICATION LIST DOCUMENTED IN MEDICAL RECORD: ICD-10-PCS | Mod: CPTII,S$GLB,, | Performed by: PHYSICIAN ASSISTANT

## 2022-01-06 PROCEDURE — 3075F SYST BP GE 130 - 139MM HG: CPT | Mod: CPTII,S$GLB,, | Performed by: PHYSICIAN ASSISTANT

## 2022-01-06 PROCEDURE — 1101F PR PT FALLS ASSESS DOC 0-1 FALLS W/OUT INJ PAST YR: ICD-10-PCS | Mod: CPTII,S$GLB,, | Performed by: PHYSICIAN ASSISTANT

## 2022-01-06 PROCEDURE — 1101F PT FALLS ASSESS-DOCD LE1/YR: CPT | Mod: CPTII,S$GLB,, | Performed by: PHYSICIAN ASSISTANT

## 2022-01-06 PROCEDURE — 3288F FALL RISK ASSESSMENT DOCD: CPT | Mod: CPTII,S$GLB,, | Performed by: PHYSICIAN ASSISTANT

## 2022-01-06 PROCEDURE — 99999 PR PBB SHADOW E&M-EST. PATIENT-LVL V: CPT | Mod: PBBFAC,,, | Performed by: PHYSICIAN ASSISTANT

## 2022-01-06 PROCEDURE — 3078F DIAST BP <80 MM HG: CPT | Mod: CPTII,S$GLB,, | Performed by: PHYSICIAN ASSISTANT

## 2022-01-06 PROCEDURE — 99999 PR PBB SHADOW E&M-EST. PATIENT-LVL V: ICD-10-PCS | Mod: PBBFAC,,, | Performed by: PHYSICIAN ASSISTANT

## 2022-01-06 PROCEDURE — 1125F AMNT PAIN NOTED PAIN PRSNT: CPT | Mod: CPTII,S$GLB,, | Performed by: PHYSICIAN ASSISTANT

## 2022-01-06 PROCEDURE — 99214 OFFICE O/P EST MOD 30 MIN: CPT | Mod: S$GLB,,, | Performed by: PHYSICIAN ASSISTANT

## 2022-01-06 PROCEDURE — 3075F PR MOST RECENT SYSTOLIC BLOOD PRESS GE 130-139MM HG: ICD-10-PCS | Mod: CPTII,S$GLB,, | Performed by: PHYSICIAN ASSISTANT

## 2022-01-06 PROCEDURE — 1160F PR REVIEW ALL MEDS BY PRESCRIBER/CLIN PHARMACIST DOCUMENTED: ICD-10-PCS | Mod: CPTII,S$GLB,, | Performed by: PHYSICIAN ASSISTANT

## 2022-01-06 PROCEDURE — 1125F PR PAIN SEVERITY QUANTIFIED, PAIN PRESENT: ICD-10-PCS | Mod: CPTII,S$GLB,, | Performed by: PHYSICIAN ASSISTANT

## 2022-01-06 PROCEDURE — 1159F MED LIST DOCD IN RCRD: CPT | Mod: CPTII,S$GLB,, | Performed by: PHYSICIAN ASSISTANT

## 2022-01-06 NOTE — Clinical Note
Eboni Bone Dr. saw me today for some right sided hip/leg pain that initially started with LBP.  I'm getting x-rays, referring to PT/ortho.  Her blood pressure was initially high at start of visit, but it improved on repeat.  However, I told her I would reach out to you about bp (it looks like she has tried a number of medications in past, so I wanted to discuss with you before making any changes, but not sure that anything needs to be changed since repeat was fine) - I did mention Cardiology follow-up to her before the repeat reading - she has never seen Cardiology and is currently on three medications for HTN so it may be beneficial for her to keep that follow-up

## 2022-01-06 NOTE — PROGRESS NOTES
Subjective:      Patient ID: Cleo Ford is a 71 y.o. female.    Chief Complaint: Leg Pain    Cleo Ford is a 71 y.o. female w/ hx of left hip fracture and osteopenia who presents to clinic for right hip pain that started 3 months ago.  Started at lower back, gradually radiated into hip and pain moves down towards groin area as well as radiate down the leg to just above the knee.  Hip pain has caused to walk with a limp.  No weakness. No numbness/tingling.      BP initially elevated during visit, but improved on repeat.  Currently taking Coreg 6.25 mg BID, irbesartan 300 mg every evening, and nifedipine 90 mg daily       Leg Pain   The incident occurred more than 1 week ago. Incident location: no injury/trauma  There was no injury mechanism. The pain is present in the right hip. The pain is moderate. Pertinent negatives include no loss of sensation or numbness. Inability to bear weight: walks with limp  The symptoms are aggravated by movement and weight bearing.     Review of Systems   Constitutional: Negative for activity change, appetite change, fatigue, fever and unexpected weight change.   HENT: Negative for congestion, ear pain, sore throat and trouble swallowing.    Respiratory: Negative for cough and shortness of breath.    Cardiovascular: Negative for chest pain and palpitations.   Gastrointestinal: Negative for abdominal distention, abdominal pain, constipation, diarrhea, nausea and vomiting.   Genitourinary: Negative for difficulty urinating, frequency and urgency.   Musculoskeletal: Positive for back pain (pain originated w/ right lower back pain ) and gait problem (limp due to hip pain, right ). Negative for arthralgias and myalgias.   Neurological: Negative for dizziness, weakness, light-headedness and numbness.   Psychiatric/Behavioral: Negative for decreased concentration and dysphoric mood. The patient is not nervous/anxious.        Objective:   /60   Pulse 94   Temp 97.7 °F  "(36.5 °C) (Temporal)   Resp 18   Ht 5' 3" (1.6 m)   Wt 82.6 kg (182 lb 1.6 oz)   LMP  (LMP Unknown)   SpO2 98%   BMI 32.26 kg/m²   Physical Exam  Vitals reviewed.   Constitutional:       General: She is not in acute distress.     Appearance: She is well-developed and well-nourished. She is not ill-appearing, toxic-appearing, sickly-appearing or diaphoretic.   HENT:      Head: Normocephalic and atraumatic.      Right Ear: External ear normal.      Left Ear: External ear normal.      Nose: Nose normal.   Cardiovascular:      Rate and Rhythm: Normal rate and regular rhythm.      Pulses:           Radial pulses are 2+ on the right side and 2+ on the left side.      Heart sounds: Normal heart sounds, S1 normal and S2 normal.   Pulmonary:      Effort: Pulmonary effort is normal. No tachypnea, bradypnea, accessory muscle usage, respiratory distress or apnea.      Breath sounds: Normal breath sounds. No decreased breath sounds, wheezing, rhonchi or rales.   Chest:      Chest wall: No tenderness or bony tenderness.   Musculoskeletal:      Right hip: Tenderness present. No deformity or bony tenderness. Normal range of motion. Normal strength.        Legs:    Skin:     General: Skin is warm and dry.      Capillary Refill: Capillary refill takes less than 2 seconds.   Neurological:      Mental Status: She is alert and oriented to person, place, and time. She is not disoriented.      Cranial Nerves: No cranial nerve deficit.      Sensory: No sensory deficit.      Motor: No abnormal muscle tone.   Psychiatric:         Mood and Affect: Mood and affect and mood normal.         Behavior: Behavior normal.         Thought Content: Thought content normal.       Assessment:      1. Right hip pain    2. Acute left-sided low back pain with sciatica, sciatica laterality unspecified    3. Essential hypertension       Plan:   Right hip pain  Comments:  obtain hip x-ray and follow-up with PT for strengthening exercises as well as Ortho " for further evaluation   Orders:  -     X-Ray Hip 2 or 3 views Right (with Pelvis when performed); Future; Expected date: 01/06/2022  -     Ambulatory referral/consult to Physical/Occupational Therapy; Future; Expected date: 01/13/2022  -     Ambulatory referral/consult to Orthopedics; Future; Expected date: 01/13/2022    Acute left-sided low back pain with sciatica, sciatica laterality unspecified  Comments:  known degenerative findings L4-L6, obtain lumbar spine x-rays   Orders:  -     X-Ray Lumbar Spine 2 Or 3 Views; Future; Expected date: 01/06/2022    Essential hypertension  Comments:  chronic, elevated at start of visit but improved on repeat, referral to Cardiology for further eval   Orders:  -     Ambulatory referral/consult to Cardiology; Future; Expected date: 01/13/2022          Elizabeth Garcia PA-C   Physician Assistant   [facility] [place]

## 2022-01-07 ENCOUNTER — HOSPITAL ENCOUNTER (OUTPATIENT)
Dept: RADIOLOGY | Facility: HOSPITAL | Age: 72
Discharge: HOME | End: 2022-01-07
Attending: PHYSICIAN ASSISTANT
Payer: MEDICARE

## 2022-01-07 DIAGNOSIS — M54.40 ACUTE LEFT-SIDED LOW BACK PAIN WITH SCIATICA, SCIATICA LATERALITY UNSPECIFIED: ICD-10-CM

## 2022-01-07 PROCEDURE — 72100 X-RAY EXAM L-S SPINE 2/3 VWS: CPT | Mod: 26,,, | Performed by: RADIOLOGY

## 2022-01-07 PROCEDURE — 72100 X-RAY EXAM L-S SPINE 2/3 VWS: CPT | Mod: TC

## 2022-01-07 PROCEDURE — 72100 XR LUMBAR SPINE 2 OR 3 VIEWS: ICD-10-PCS | Mod: 26,,, | Performed by: RADIOLOGY

## 2022-01-10 DIAGNOSIS — F39 MOOD DISORDER: ICD-10-CM

## 2022-01-10 RX ORDER — ALPRAZOLAM 1 MG/1
TABLET ORAL
Qty: 60 TABLET | Refills: 0 | Status: SHIPPED | OUTPATIENT
Start: 2022-01-10 | End: 2022-03-02

## 2022-01-10 NOTE — TELEPHONE ENCOUNTER
No new care gaps identified.  Powered by [employer]. Reference number: [id number].   1/10/2022 7:28:22 AM CST

## 2022-01-13 ENCOUNTER — TELEPHONE (OUTPATIENT)
Dept: ORTHOPEDICS | Facility: CLINIC | Age: 72
End: 2022-01-13
Payer: MEDICARE

## 2022-01-13 NOTE — TELEPHONE ENCOUNTER
Called regarding patient's orthopedic referral and they stated do not want to schedule at this time.

## 2022-01-18 ENCOUNTER — OFFICE VISIT (OUTPATIENT)
Dept: CARDIOLOGY | Facility: CLINIC | Age: 72
End: 2022-01-18
Payer: MEDICARE

## 2022-01-18 VITALS
OXYGEN SATURATION: 99 % | RESPIRATION RATE: 16 BRPM | SYSTOLIC BLOOD PRESSURE: 150 MMHG | DIASTOLIC BLOOD PRESSURE: 84 MMHG | HEIGHT: 63 IN | WEIGHT: 181.19 LBS | HEART RATE: 85 BPM | BODY MASS INDEX: 32.11 KG/M2

## 2022-01-18 DIAGNOSIS — Z82.49 FAMILY HISTORY OF CARDIOVASCULAR DISEASE: ICD-10-CM

## 2022-01-18 DIAGNOSIS — E66.3 OVERWEIGHT: ICD-10-CM

## 2022-01-18 DIAGNOSIS — I10 ESSENTIAL HYPERTENSION: Primary | ICD-10-CM

## 2022-01-18 DIAGNOSIS — E78.2 MIXED HYPERLIPIDEMIA: ICD-10-CM

## 2022-01-18 DIAGNOSIS — I65.23 ATHEROSCLEROSIS OF BOTH CAROTID ARTERIES: ICD-10-CM

## 2022-01-18 PROCEDURE — 99204 PR OFFICE/OUTPT VISIT, NEW, LEVL IV, 45-59 MIN: ICD-10-PCS | Mod: S$GLB,,, | Performed by: INTERNAL MEDICINE

## 2022-01-18 PROCEDURE — 1126F AMNT PAIN NOTED NONE PRSNT: CPT | Mod: CPTII,S$GLB,, | Performed by: INTERNAL MEDICINE

## 2022-01-18 PROCEDURE — 99999 PR PBB SHADOW E&M-EST. PATIENT-LVL IV: CPT | Mod: PBBFAC,,, | Performed by: INTERNAL MEDICINE

## 2022-01-18 PROCEDURE — 1126F PR PAIN SEVERITY QUANTIFIED, NO PAIN PRESENT: ICD-10-PCS | Mod: CPTII,S$GLB,, | Performed by: INTERNAL MEDICINE

## 2022-01-18 PROCEDURE — 3288F FALL RISK ASSESSMENT DOCD: CPT | Mod: CPTII,S$GLB,, | Performed by: INTERNAL MEDICINE

## 2022-01-18 PROCEDURE — 3079F PR MOST RECENT DIASTOLIC BLOOD PRESSURE 80-89 MM HG: ICD-10-PCS | Mod: CPTII,S$GLB,, | Performed by: INTERNAL MEDICINE

## 2022-01-18 PROCEDURE — 1159F PR MEDICATION LIST DOCUMENTED IN MEDICAL RECORD: ICD-10-PCS | Mod: CPTII,S$GLB,, | Performed by: INTERNAL MEDICINE

## 2022-01-18 PROCEDURE — 3079F DIAST BP 80-89 MM HG: CPT | Mod: CPTII,S$GLB,, | Performed by: INTERNAL MEDICINE

## 2022-01-18 PROCEDURE — 99204 OFFICE O/P NEW MOD 45 MIN: CPT | Mod: S$GLB,,, | Performed by: INTERNAL MEDICINE

## 2022-01-18 PROCEDURE — 3008F PR BODY MASS INDEX (BMI) DOCUMENTED: ICD-10-PCS | Mod: CPTII,S$GLB,, | Performed by: INTERNAL MEDICINE

## 2022-01-18 PROCEDURE — 99999 PR PBB SHADOW E&M-EST. PATIENT-LVL IV: ICD-10-PCS | Mod: PBBFAC,,, | Performed by: INTERNAL MEDICINE

## 2022-01-18 PROCEDURE — 3077F SYST BP >= 140 MM HG: CPT | Mod: CPTII,S$GLB,, | Performed by: INTERNAL MEDICINE

## 2022-01-18 PROCEDURE — 3288F PR FALLS RISK ASSESSMENT DOCUMENTED: ICD-10-PCS | Mod: CPTII,S$GLB,, | Performed by: INTERNAL MEDICINE

## 2022-01-18 PROCEDURE — 3008F BODY MASS INDEX DOCD: CPT | Mod: CPTII,S$GLB,, | Performed by: INTERNAL MEDICINE

## 2022-01-18 PROCEDURE — 1101F PR PT FALLS ASSESS DOC 0-1 FALLS W/OUT INJ PAST YR: ICD-10-PCS | Mod: CPTII,S$GLB,, | Performed by: INTERNAL MEDICINE

## 2022-01-18 PROCEDURE — 1159F MED LIST DOCD IN RCRD: CPT | Mod: CPTII,S$GLB,, | Performed by: INTERNAL MEDICINE

## 2022-01-18 PROCEDURE — 1101F PT FALLS ASSESS-DOCD LE1/YR: CPT | Mod: CPTII,S$GLB,, | Performed by: INTERNAL MEDICINE

## 2022-01-18 PROCEDURE — 3077F PR MOST RECENT SYSTOLIC BLOOD PRESSURE >= 140 MM HG: ICD-10-PCS | Mod: CPTII,S$GLB,, | Performed by: INTERNAL MEDICINE

## 2022-01-18 RX ORDER — CARVEDILOL 12.5 MG/1
12.5 TABLET ORAL 2 TIMES DAILY WITH MEALS
Qty: 180 TABLET | Refills: 3 | Status: SHIPPED | OUTPATIENT
Start: 2022-01-18 | End: 2023-01-30

## 2022-01-18 NOTE — PROGRESS NOTES
Subjective:    Patient ID:  Cleo Ford is a 71 y.o. female who presents for evaluation of Hypertension, Risk Factor Management For Atherosclerosis, and Hyperlipidemia        HPI  Pt presents for eval.   (now ) was pt of mine.  Her current medical conditions include HTN, carotid atherosclerosis, hyperlipidemia.  Family h/o CV disease (father had CHF, CAD/MI; but was smoker/etoh issues per pt).  Nonsmoker.  Has long standing HTN.  Carotid us  mild plaque.  ecgs over years reviewed: NSR, normal appearing ecgs.  No cp or CHF sxs.  No exercise.  Room for improvement in diet.  Lipids controlled on statin tx.  She was f/u by digital HTN program but disenrolled as it was too much.  BP above goal today; varies in control.  Does not check at home.       Past Medical History:   Diagnosis Date    Anxiety     Depression     Dysmetabolic syndrome X     Obesity     Other and unspecified hyperlipidemia     Unspecified essential hypertension     Unspecified hypothyroidism          Current Outpatient Medications:     ALPRAZolam (XANAX) 1 MG tablet, TAKE 1 TABLET BY MOUTH 2 TIMES A DAY, Disp: 60 tablet, Rfl: 0    irbesartan (AVAPRO) 300 MG tablet, Take 1 tablet by mouth every evening.**To replace valsartan due to backorder **, Disp: 90 tablet, Rfl: 3    levothyroxine (SYNTHROID) 112 MCG tablet, TAKE 1 TABLET BY MOUTH ONCE DAILY BEFORE BREAKFAST, Disp: 90 tablet, Rfl: 0    lovastatin (MEVACOR) 40 MG tablet, TAKE 1 TABLET BY MOUTH NIGHTLY, Disp: 90 tablet, Rfl: 3    NIFEdipine (ADALAT CC) 90 MG TbSR, Take 1 tablet (90 mg total) by mouth once daily., Disp: 90 tablet, Rfl: 0    aspirin (ECOTRIN) 81 MG EC tablet, Take 81 mg by mouth once daily., Disp: , Rfl:     carvediloL (COREG) 12.5 MG tablet, Take 1 tablet (12.5 mg total) by mouth 2 (two) times daily with meals., Disp: 180 tablet, Rfl: 3      Review of Systems   Constitutional: Negative.   HENT: Negative.    Eyes: Negative.    Cardiovascular:  "Negative.    Respiratory: Negative.    Endocrine: Negative.    Hematologic/Lymphatic: Negative.    Skin: Negative.    Musculoskeletal: Negative.    Gastrointestinal: Negative.    Genitourinary: Negative.    Neurological: Negative.    Psychiatric/Behavioral: Negative.    Allergic/Immunologic: Negative.        BP (!) 150/84 (BP Location: Left arm, Patient Position: Sitting, BP Method: Large (Manual))   Pulse 85   Resp 16   Ht 5' 3" (1.6 m)   Wt 82.2 kg (181 lb 3.5 oz)   LMP  (LMP Unknown)   SpO2 99%   BMI 32.10 kg/m²       Wt Readings from Last 3 Encounters:   01/18/22 82.2 kg (181 lb 3.5 oz)   01/06/22 82.6 kg (182 lb 1.6 oz)   12/01/21 84.8 kg (186 lb 15.2 oz)     Temp Readings from Last 3 Encounters:   01/06/22 97.7 °F (36.5 °C) (Temporal)   12/01/21 97.2 °F (36.2 °C)   11/03/21 98.2 °F (36.8 °C)     BP Readings from Last 3 Encounters:   01/18/22 (!) 150/84   01/06/22 130/60   12/01/21 132/82     Pulse Readings from Last 3 Encounters:   01/18/22 85   01/06/22 94   12/01/21 82          Objective:    Physical Exam  Vitals and nursing note reviewed.   Constitutional:       General: She is active. She is not in acute distress.Vital signs are normal.      Appearance: Normal appearance. She is well-developed and well-nourished. She is not ill-appearing, sickly-appearing or diaphoretic.   HENT:      Head: Normocephalic.   Neck:      Thyroid: No thyromegaly.      Vascular: Normal carotid pulses. No carotid bruit, hepatojugular reflux or JVD.   Cardiovascular:      Rate and Rhythm: Normal rate and regular rhythm.      Chest Wall: PMI is not displaced.      Pulses: Normal pulses.           Radial pulses are 2+ on the right side and 2+ on the left side.      Heart sounds: Normal heart sounds, S1 normal and S2 normal. No murmur heard.  No friction rub. No gallop.    Pulmonary:      Effort: Pulmonary effort is normal.      Breath sounds: Normal breath sounds. No wheezing or rales.   Abdominal:      General: Bowel sounds " are normal. There is no ascites or abdominal bruit. Aorta is normal.      Palpations: Abdomen is soft. There is no hepatomegaly, splenomegaly, mass or pulsatile liver.      Tenderness: There is no abdominal tenderness.   Musculoskeletal:         General: No edema.      Cervical back: Neck supple.   Lymphadenopathy:      Cervical: No cervical adenopathy.   Skin:     General: Skin is warm.   Neurological:      Mental Status: She is alert and oriented to person, place, and time.   Psychiatric:         Mood and Affect: Mood and affect normal.         Behavior: Behavior normal. Behavior is cooperative.       I have reviewed all pertinent labs and cardiac studies.      Chemistry        Component Value Date/Time     (L) 12/02/2021 0920    K 3.9 12/02/2021 0920     12/02/2021 0920    CO2 24 12/02/2021 0920    BUN 19 12/02/2021 0920    CREATININE 0.9 12/02/2021 0920    GLU 96 12/02/2021 0920        Component Value Date/Time    CALCIUM 9.6 12/02/2021 0920    ALKPHOS 72 12/02/2021 0920    AST 29 12/02/2021 0920    ALT 20 12/02/2021 0920    BILITOT 0.5 12/02/2021 0920    ESTGFRAFRICA >60.0 12/02/2021 0920    EGFRNONAA >60.0 12/02/2021 0920          Lab Results   Component Value Date    WBC 7.22 01/21/2021    HGB 12.0 01/21/2021    HCT 37.6 01/21/2021    MCV 96 01/21/2021     01/21/2021       Lab Results   Component Value Date    HGBA1C 5.2 10/14/2020       Lab Results   Component Value Date    CHOL 195 12/02/2021    CHOL 177 10/12/2020    CHOL 198 01/31/2019     Lab Results   Component Value Date    HDL 85 (H) 12/02/2021    HDL 63 10/12/2020    HDL 77 (H) 01/31/2019     Lab Results   Component Value Date    LDLCALC 100.6 12/02/2021    LDLCALC 100.8 10/12/2020    LDLCALC 109.2 01/31/2019     Lab Results   Component Value Date    TRIG 47 12/02/2021    TRIG 66 10/12/2020    TRIG 59 01/31/2019     Lab Results   Component Value Date    CHOLHDL 43.6 12/02/2021    CHOLHDL 35.6 10/12/2020    CHOLHDL 38.9 01/31/2019            Assessment:       1. Essential hypertension    2. Atherosclerosis of both carotid arteries    3. Mixed hyperlipidemia    4. Family history of cardiovascular disease    5. Overweight         Plan:               Goal BP < 130/80.  Increase Coreg to 12.5 mg bid.  Home BP monitoring encouraged.  Continue other HTN meds.  Exercise more, daily.  Weight loss/control.  Low salt diet.  Carotid u/s.  Statin tx.  F/u in 6 months.      I have reviewed all pertinent labs and cardiac studies independently. Plans and recommendations have been formulated under my direct supervision. All questions answered and patient voiced understanding.

## 2022-01-25 ENCOUNTER — HOSPITAL ENCOUNTER (OUTPATIENT)
Dept: CARDIOLOGY | Facility: HOSPITAL | Age: 72
Discharge: HOME | End: 2022-01-25
Attending: INTERNAL MEDICINE
Payer: MEDICARE

## 2022-01-25 ENCOUNTER — TELEPHONE (OUTPATIENT)
Dept: CARDIOLOGY | Facility: HOSPITAL | Age: 72
End: 2022-01-25
Payer: MEDICARE

## 2022-01-25 VITALS — HEIGHT: 63 IN | BODY MASS INDEX: 32.07 KG/M2 | WEIGHT: 181 LBS

## 2022-01-25 DIAGNOSIS — I65.23 ATHEROSCLEROSIS OF BOTH CAROTID ARTERIES: ICD-10-CM

## 2022-01-25 LAB
LEFT ARM DIASTOLIC BLOOD PRESSURE: 78 MMHG
LEFT ARM SYSTOLIC BLOOD PRESSURE: 140 MMHG
LEFT CBA DIAS: 11 CM/S
LEFT CBA SYS: 47 CM/S
LEFT CCA DIST DIAS: 12 CM/S
LEFT CCA DIST SYS: 52 CM/S
LEFT CCA MID DIAS: 11 CM/S
LEFT CCA MID SYS: 61 CM/S
LEFT CCA PROX DIAS: 8 CM/S
LEFT CCA PROX SYS: 85 CM/S
LEFT ECA DIAS: 6 CM/S
LEFT ECA SYS: 58 CM/S
LEFT ICA DIST DIAS: 21 CM/S
LEFT ICA DIST SYS: 97 CM/S
LEFT ICA MID DIAS: 24 CM/S
LEFT ICA MID SYS: 76 CM/S
LEFT ICA PROX DIAS: 13 CM/S
LEFT ICA PROX SYS: 45 CM/S
LEFT VERTEBRAL DIAS: 11 CM/S
LEFT VERTEBRAL SYS: 46 CM/S
OHS CV CAROTID RIGHT ICA EDV HIGHEST: 22
OHS CV CAROTID ULTRASOUND LEFT ICA/CCA RATIO: 1.87
OHS CV CAROTID ULTRASOUND RIGHT ICA/CCA RATIO: 1.4
OHS CV PV CAROTID LEFT HIGHEST CCA: 85
OHS CV PV CAROTID LEFT HIGHEST ICA: 97
OHS CV PV CAROTID RIGHT HIGHEST CCA: 104
OHS CV PV CAROTID RIGHT HIGHEST ICA: 67
OHS CV US CAROTID LEFT HIGHEST EDV: 24
RIGHT ARM DIASTOLIC BLOOD PRESSURE: 82 MMHG
RIGHT ARM SYSTOLIC BLOOD PRESSURE: 140 MMHG
RIGHT CBA DIAS: 10 CM/S
RIGHT CBA SYS: 52 CM/S
RIGHT CCA DIST DIAS: 9 CM/S
RIGHT CCA DIST SYS: 48 CM/S
RIGHT CCA MID DIAS: 7 CM/S
RIGHT CCA MID SYS: 104 CM/S
RIGHT CCA PROX DIAS: 8 CM/S
RIGHT CCA PROX SYS: 77 CM/S
RIGHT ECA DIAS: 11 CM/S
RIGHT ECA SYS: 83 CM/S
RIGHT ICA DIST DIAS: 22 CM/S
RIGHT ICA DIST SYS: 67 CM/S
RIGHT ICA MID DIAS: 16 CM/S
RIGHT ICA MID SYS: 55 CM/S
RIGHT ICA PROX DIAS: 14 CM/S
RIGHT ICA PROX SYS: 47 CM/S
RIGHT VERTEBRAL DIAS: 10 CM/S
RIGHT VERTEBRAL SYS: 34 CM/S

## 2022-01-25 PROCEDURE — 93880 CV US DOPPLER CAROTID (CUPID ONLY): ICD-10-PCS | Mod: 26,,, | Performed by: INTERNAL MEDICINE

## 2022-01-25 PROCEDURE — 93880 EXTRACRANIAL BILAT STUDY: CPT | Mod: 26,,, | Performed by: INTERNAL MEDICINE

## 2022-01-25 PROCEDURE — 93880 EXTRACRANIAL BILAT STUDY: CPT

## 2022-01-26 NOTE — TELEPHONE ENCOUNTER
Please call pt.  Carotid u/s results are good.  No blockages.  F/u next scheduled appt.    Dr Ho

## 2022-02-10 ENCOUNTER — HOSPITAL ENCOUNTER (OUTPATIENT)
Dept: RADIOLOGY | Facility: HOSPITAL | Age: 72
Discharge: HOME | End: 2022-02-10
Attending: PHYSICIAN ASSISTANT
Payer: MEDICARE

## 2022-02-10 DIAGNOSIS — M25.551 RIGHT HIP PAIN: ICD-10-CM

## 2022-02-10 PROCEDURE — 73502 X-RAY EXAM HIP UNI 2-3 VIEWS: CPT | Mod: TC,RT

## 2022-02-10 PROCEDURE — 73502 XR HIP WITH PELVIS WHEN PERFORMED, 2 OR 3  VIEWS RIGHT: ICD-10-PCS | Mod: 26,RT,, | Performed by: RADIOLOGY

## 2022-02-10 PROCEDURE — 73502 X-RAY EXAM HIP UNI 2-3 VIEWS: CPT | Mod: 26,RT,, | Performed by: RADIOLOGY

## 2022-03-02 ENCOUNTER — PATIENT MESSAGE (OUTPATIENT)
Dept: PRIMARY CARE CLINIC | Facility: CLINIC | Age: 72
End: 2022-03-02
Payer: MEDICARE

## 2022-03-03 ENCOUNTER — PATIENT MESSAGE (OUTPATIENT)
Dept: PRIMARY CARE CLINIC | Facility: CLINIC | Age: 72
End: 2022-03-03
Payer: MEDICARE

## 2022-03-04 ENCOUNTER — OFFICE VISIT (OUTPATIENT)
Dept: PRIMARY CARE CLINIC | Facility: CLINIC | Age: 72
End: 2022-03-04
Payer: MEDICARE

## 2022-03-04 ENCOUNTER — HOSPITAL ENCOUNTER (OUTPATIENT)
Dept: RADIOLOGY | Facility: HOSPITAL | Age: 72
Discharge: HOME | End: 2022-03-04
Attending: PHYSICIAN ASSISTANT
Payer: MEDICARE

## 2022-03-04 VITALS
TEMPERATURE: 98 F | HEART RATE: 85 BPM | RESPIRATION RATE: 18 BRPM | HEIGHT: 63 IN | OXYGEN SATURATION: 98 % | WEIGHT: 180.31 LBS | BODY MASS INDEX: 31.95 KG/M2 | DIASTOLIC BLOOD PRESSURE: 80 MMHG | SYSTOLIC BLOOD PRESSURE: 160 MMHG

## 2022-03-04 DIAGNOSIS — I10 ESSENTIAL HYPERTENSION: ICD-10-CM

## 2022-03-04 DIAGNOSIS — R26.9 ABNORMAL GAIT: ICD-10-CM

## 2022-03-04 DIAGNOSIS — M79.604 PAIN OF RIGHT LOWER EXTREMITY: Primary | ICD-10-CM

## 2022-03-04 DIAGNOSIS — M79.604 PAIN OF RIGHT LOWER EXTREMITY: ICD-10-CM

## 2022-03-04 PROCEDURE — 3008F PR BODY MASS INDEX (BMI) DOCUMENTED: ICD-10-PCS | Mod: CPTII,S$GLB,, | Performed by: PHYSICIAN ASSISTANT

## 2022-03-04 PROCEDURE — 99214 OFFICE O/P EST MOD 30 MIN: CPT | Mod: S$GLB,,, | Performed by: PHYSICIAN ASSISTANT

## 2022-03-04 PROCEDURE — 3008F BODY MASS INDEX DOCD: CPT | Mod: CPTII,S$GLB,, | Performed by: PHYSICIAN ASSISTANT

## 2022-03-04 PROCEDURE — 93970 EXTREMITY STUDY: CPT | Mod: 26,,, | Performed by: RADIOLOGY

## 2022-03-04 PROCEDURE — 3288F PR FALLS RISK ASSESSMENT DOCUMENTED: ICD-10-PCS | Mod: CPTII,S$GLB,, | Performed by: PHYSICIAN ASSISTANT

## 2022-03-04 PROCEDURE — 99999 PR PBB SHADOW E&M-EST. PATIENT-LVL V: CPT | Mod: PBBFAC,,, | Performed by: PHYSICIAN ASSISTANT

## 2022-03-04 PROCEDURE — 1101F PR PT FALLS ASSESS DOC 0-1 FALLS W/OUT INJ PAST YR: ICD-10-PCS | Mod: CPTII,S$GLB,, | Performed by: PHYSICIAN ASSISTANT

## 2022-03-04 PROCEDURE — 1125F PR PAIN SEVERITY QUANTIFIED, PAIN PRESENT: ICD-10-PCS | Mod: CPTII,S$GLB,, | Performed by: PHYSICIAN ASSISTANT

## 2022-03-04 PROCEDURE — 1160F PR REVIEW ALL MEDS BY PRESCRIBER/CLIN PHARMACIST DOCUMENTED: ICD-10-PCS | Mod: CPTII,S$GLB,, | Performed by: PHYSICIAN ASSISTANT

## 2022-03-04 PROCEDURE — 1125F AMNT PAIN NOTED PAIN PRSNT: CPT | Mod: CPTII,S$GLB,, | Performed by: PHYSICIAN ASSISTANT

## 2022-03-04 PROCEDURE — 99214 PR OFFICE/OUTPT VISIT, EST, LEVL IV, 30-39 MIN: ICD-10-PCS | Mod: S$GLB,,, | Performed by: PHYSICIAN ASSISTANT

## 2022-03-04 PROCEDURE — 1160F RVW MEDS BY RX/DR IN RCRD: CPT | Mod: CPTII,S$GLB,, | Performed by: PHYSICIAN ASSISTANT

## 2022-03-04 PROCEDURE — 3077F PR MOST RECENT SYSTOLIC BLOOD PRESSURE >= 140 MM HG: ICD-10-PCS | Mod: CPTII,S$GLB,, | Performed by: PHYSICIAN ASSISTANT

## 2022-03-04 PROCEDURE — 3288F FALL RISK ASSESSMENT DOCD: CPT | Mod: CPTII,S$GLB,, | Performed by: PHYSICIAN ASSISTANT

## 2022-03-04 PROCEDURE — 3079F DIAST BP 80-89 MM HG: CPT | Mod: CPTII,S$GLB,, | Performed by: PHYSICIAN ASSISTANT

## 2022-03-04 PROCEDURE — 93970 US LOWER EXTREMITY VEINS BILATERAL: ICD-10-PCS | Mod: 26,,, | Performed by: RADIOLOGY

## 2022-03-04 PROCEDURE — 1101F PT FALLS ASSESS-DOCD LE1/YR: CPT | Mod: CPTII,S$GLB,, | Performed by: PHYSICIAN ASSISTANT

## 2022-03-04 PROCEDURE — 1159F MED LIST DOCD IN RCRD: CPT | Mod: CPTII,S$GLB,, | Performed by: PHYSICIAN ASSISTANT

## 2022-03-04 PROCEDURE — 99999 PR PBB SHADOW E&M-EST. PATIENT-LVL V: ICD-10-PCS | Mod: PBBFAC,,, | Performed by: PHYSICIAN ASSISTANT

## 2022-03-04 PROCEDURE — 3077F SYST BP >= 140 MM HG: CPT | Mod: CPTII,S$GLB,, | Performed by: PHYSICIAN ASSISTANT

## 2022-03-04 PROCEDURE — 1159F PR MEDICATION LIST DOCUMENTED IN MEDICAL RECORD: ICD-10-PCS | Mod: CPTII,S$GLB,, | Performed by: PHYSICIAN ASSISTANT

## 2022-03-04 PROCEDURE — 93970 EXTREMITY STUDY: CPT | Mod: TC

## 2022-03-04 PROCEDURE — 3079F PR MOST RECENT DIASTOLIC BLOOD PRESSURE 80-89 MM HG: ICD-10-PCS | Mod: CPTII,S$GLB,, | Performed by: PHYSICIAN ASSISTANT

## 2022-03-04 RX ORDER — TRIPROLIDINE/PSEUDOEPHEDRINE 2.5MG-60MG
TABLET ORAL EVERY 6 HOURS PRN
COMMUNITY
End: 2022-04-27 | Stop reason: ALTCHOICE

## 2022-03-04 RX ORDER — HYDROCHLOROTHIAZIDE 12.5 MG/1
12.5 TABLET ORAL DAILY
Qty: 30 TABLET | Refills: 11 | Status: SHIPPED | OUTPATIENT
Start: 2022-03-04 | End: 2022-04-27 | Stop reason: ALTCHOICE

## 2022-03-04 NOTE — PROGRESS NOTES
Subjective:      Patient ID: Cleo Ford is a 71 y.o. female.    Chief Complaint: Leg Pain    Cleo Ford is a 71 y.o. female who presents to clinic for right upper leg pain.  Leg pain right leg starting above right front of knee and going up to top of leg, constant and dull - improves with aleve but returns.  Get a sharp pain when get up fast.  Daughter noticed limping/waddle.  No swelling. No hx of blood clot     Right hip pain from last visit has improved   Willing to see/follow-up with PT   Blood pressure elevated - recently saw Dr. Ho who increased Coreg.  Has been on HCTZ in past, but was taken off after an episode of hyponatremia - but Eboni thinks hyponatremia was mainly because she was doing a cleanse at the time.      Prior visit notes:    Cleo Ford is a 71 y.o. female w/ hx of left hip fracture and osteopenia who presents to clinic for right hip pain that started 3 months ago.  Started at lower back, gradually radiated into hip and pain moves down towards groin area as well as radiate down the leg to just above the knee.  Hip pain has caused to walk with a limp.  No weakness. No numbness/tingling.       BP initially elevated during visit, but improved on repeat.  Currently taking Coreg 6.25 mg BID, irbesartan 300 mg every evening, and nifedipine 90 mg daily       My right leg is hurting.  It hurts between my pelvis and my knee.  What can we do about that?  It's been going on for quite a while.      Review of Systems   Constitutional: Negative for activity change, appetite change, fatigue, fever and unexpected weight change.   HENT: Negative for congestion, ear pain, sore throat and trouble swallowing.    Respiratory: Negative for cough and shortness of breath.    Cardiovascular: Negative for chest pain, palpitations and leg swelling.   Gastrointestinal: Negative for abdominal distention, abdominal pain, constipation, diarrhea, nausea and vomiting.   Genitourinary: Negative for  "difficulty urinating, frequency and urgency.   Musculoskeletal: Positive for gait problem. Negative for arthralgias and myalgias.        Right upper front leg pain    Neurological: Negative for dizziness, weakness and light-headedness.   Psychiatric/Behavioral: Negative for decreased concentration and dysphoric mood. The patient is not nervous/anxious.        Objective:   BP (!) 160/80   Pulse 85   Temp 98 °F (36.7 °C) (Temporal)   Resp 18   Ht 5' 3" (1.6 m)   Wt 81.8 kg (180 lb 5.4 oz)   LMP  (LMP Unknown)   SpO2 98%   BMI 31.95 kg/m²   Physical Exam  Vitals reviewed.   Constitutional:       General: She is not in acute distress.     Appearance: She is well-developed. She is not diaphoretic.   HENT:      Head: Normocephalic and atraumatic.      Right Ear: External ear normal.      Left Ear: External ear normal.      Nose: Nose normal.   Cardiovascular:      Rate and Rhythm: Normal rate.   Pulmonary:      Effort: Pulmonary effort is normal. No respiratory distress.   Musculoskeletal:      Right upper leg: Tenderness present. No swelling, edema, deformity, lacerations or bony tenderness.        Legs:    Skin:     General: Skin is warm and dry.      Capillary Refill: Capillary refill takes less than 2 seconds.   Neurological:      Mental Status: She is alert and oriented to person, place, and time.      Motor: No abnormal muscle tone.   Psychiatric:         Behavior: Behavior normal.       Assessment:      1. Pain of right lower extremity    2. Abnormal gait    3. Essential hypertension       Plan:   Pain of right lower extremity  Comments:  US to rule out blood clot, if no blood clot, discussed likely msk, and recommended PT evaluation   Orders:  -     US Lower Extremity Veins Bilateral; Future; Expected date: 03/04/2022    Abnormal gait  Comments:  referral to PT for evaluation - suspect right upper anterior leg pain msk and likely quadriceps compensating for recent hip pain   Orders:  -     Ambulatory " referral/consult to Physical/Occupational Therapy; Future; Expected date: 03/11/2022    Essential hypertension  Comments:  restart HCTZ at low dose and f/u w/ Dr. Julian in 4-6 weeks with CMP just prior to ensure sodium levels stable on HCTZ   Orders:  -     hydroCHLOROthiazide (HYDRODIURIL) 12.5 MG Tab; Take 1 tablet (12.5 mg total) by mouth once daily.  Dispense: 30 tablet; Refill: 11  -     Comprehensive Metabolic Panel; Future; Expected date: 03/04/2022          Elizabeth Garcia PA-C   Physician Assistant   [facility]

## 2022-03-09 ENCOUNTER — CLINICAL SUPPORT (OUTPATIENT)
Dept: REHABILITATION | Facility: HOSPITAL | Age: 72
End: 2022-03-09
Payer: MEDICARE

## 2022-03-09 DIAGNOSIS — R26.89 DECREASED FUNCTIONAL MOBILITY: ICD-10-CM

## 2022-03-09 DIAGNOSIS — M25.651 DECREASED RANGE OF RIGHT HIP MOVEMENT: ICD-10-CM

## 2022-03-09 DIAGNOSIS — R26.9 GAIT ABNORMALITY: Primary | ICD-10-CM

## 2022-03-09 DIAGNOSIS — R26.9 ABNORMAL GAIT: ICD-10-CM

## 2022-03-09 PROCEDURE — 97110 THERAPEUTIC EXERCISES: CPT

## 2022-03-09 PROCEDURE — 97162 PT EVAL MOD COMPLEX 30 MIN: CPT

## 2022-03-09 NOTE — PLAN OF CARE
[facility]   Physical Therapy Initial Evaluation   Date: 3/9/2022   Name: Cleo Ford  Clinic Number: [id number]    Therapy Diagnosis:    Encounter Diagnoses   Name Primary?    Abnormal gait     Gait abnormality Yes    Decreased functional mobility     Decreased range of right hip movement       Physician: Elizabeth Garcia PA-C     Physician Orders: PT Eval and Treat  Medical Diagnosis from Referral: Abnormal gait   Evaluation Date: 3/9/2022  Authorization Period Expiration: 12/31/2022  Plan of Care Expiration: 5/9/2022  Progress Note Due: 4/9/2022  Visit # / Visits authorized: 1/ 1   FOTO: 1/ 3     Precautions: Standard    Time In: 1438  Time Out: 1515  Total Billable Time (timed & untimed codes): 35 minutes    SUBJECTIVE   Date of onset: February 2022    History of current condition - Eboni reports her  passed away in November. States that prior to his passing, he was having a lot of falls, so she was having to help lift him frequently. During this time, she began to have low back pain. Pain slowly moved to the posterolateral hip and then to the anterior hip and thigh. The low back and posterolateral hip pain have subsided but pt states that she continues to have anterior hip and thigh for the last 3 weeks. Pain is mostly present when sitting for a prolonged period and then trying to get up, or when lifting and abducting or adducting the leg from a seated position.      Imaging: x-ray hips 2/10/2022    Pain:  Current 0/10, worst 5/10, best 0/10   Location: deep in the R hip, R anterior thigh   Description: locked up in the joint, aching tight   Aggravating Factors: lifting and abducting or adducting the leg (such as when picking up her leg to put it in her car), getting up after sitting for a prolonged period   Easing Factors: ibuprofen, stretches     Prior Therapy: N/A (however was treated for prior pelvic fracture   Social History: Pt lives alone (son and grandkids  live across from her)   Occupation: Pt is a realtor   Prior Level of Function: Independent and pain free with all ADL, IADL, community mobility and functional activities.   Current Level of Function: pain when lifting and abducting or adducting the leg in a seated position. Pain when trying to get up after sitting for a prolonged period.     Dominant Extremity: right    Pts goals: Pt reported goals are to decrease overall pain levels in order to return to prior functional level.       Medical History:   Past Medical History:   Diagnosis Date    Anxiety     Depression     Dysmetabolic syndrome X     Obesity     Other and unspecified hyperlipidemia     Unspecified essential hypertension     Unspecified hypothyroidism        Surgical History:   Cleo Ford  has a past surgical history that includes gastric sleeve; Hysterectomy; Appendectomy; Cholecystectomy; Colonoscopy (N/A, 9/15/2016); Oophorectomy; Injection of anesthetic agent into sacroiliac joint (Right, 1/16/2020); tummy tuck; Reduction of both breasts; and Breast reconstruction (Bilateral, 02/2021).    Medications:   Cleo has a current medication list which includes the following prescription(s): alprazolam, aspirin, carvedilol, hydrochlorothiazide, ibuprofen, irbesartan, levothyroxine, lovastatin, and nifedipine.    Allergies:   Review of patient's allergies indicates:   Allergen Reactions    Tobramycin-dexamethasone      Eye Drops          OBJECTIVE     RANGE OF MOTION:    Lumbar Right  (spine) Left   Pain/Dysfunction with Movement Goal   Lumbar Flexion  75% --- Hamstring stretch     Lumbar Extension  50% --- Pain free     Lumbar Side Bending  100% 100% Pain free       Hip AROM/PROM Right Left Pain/Dysfunction with Movement Goal   Hip Flexion (120) 110 120  120   Hip Extension (30) 0 10  10   Hip Abduction (45) 40 40     Hip IR (45) 15 15     Hip ER (45) 25 25       STRENGTH:    L/E MMT Right  (spine) Left Pain/Dysfunction with Movement  Goal   Hip Flexion  4-/5 4-/5  4+/5 B   Hip Extension  4-/5 4-/5  4+/5 B   Hip Abduction  4-/5 4-/5  4+/5 B   Knee Extension 4+/5 4+/5  5/5 B   Knee Flexion 4/5 4/5  5/5 B   Hip IR 4-/5 4-/5 Pain in R hip  4+/5 B   Hip ER 4-/5 4-/5 Pain in R hip  4+/5 B       MUSCLE LENGTH:     Muscle Tested  Right Left  Goal   Hip Flexors  decreased decreased Normal B   Quadriceps decreased decreased Normal B   Hamstrings  decreased decreased Normal B   Piriformis  decreased decreased Normal B   Gastrocnemius  decreased decreased Normal B   Soleus  decreased decreased Normal B       Sensation:  Sensation is intact to light touch in B lower extremities. In supine, R ASIS appears inferior to L.     Palpation: Increased tone and tenderness noted with palpation of right glutes, piriformis, hip flexors and quadriceps. Increased tenderness noted with palpation of right PSIS.     Posture:  Pt presents with postural abnormalities which include: forward head and rounded shoulders     Gait Analysis: The patient ambulated with the following assistive device: none; the pt presents with the following gait abnormalities: decreased stance time on right, decreased hip extension on right and decreased pelvic/trunk rotation      Movement Analysis Observations noted   Sit to stand B upper extremity support, significant weight shift to L, bradykinetic        Balance  Right (spine) Left Pain/dysfunction noted Goal   Narrow Base of Support 60 ---  60 seconds   Tandem Stance 0 0 Requires upper extremity support  60 seconds   Single Leg Stance NT NT R:   L:  60 seconds       FUNCTION:     CMS Impairment/Limitation/Restriction for FOTO Upper Leg Survey    Therapist reviewed FOTO scores for Eboni on 3/9/2022.   FOTO documents entered into [employer] - see Media section.    Limitation Score: 43%         TREATMENT     Eboni received the treatments listed below:       MANUAL THERAPY TECHNIQUES were applied for (5) minutes, including:    Manual Intervention Performed  Today    Soft Tissue Mobilization     Joint Mobilizations x R hip distraction    Mobilization with movement     Muscle energy technique  x For posterior rotation of R innominate    Functional Dry Needling        Plan for Next Visit: shotgun technique, soft tissue mobilization R hip flexors and glutes          THERAPEUTIC EXERCISES to develop strength, endurance, ROM, flexibility, posture and core stabilization for (8) minutes including:    Intervention Performed Today    Adduction ball squeeze  x 2 x 10    Side lying clamshells  x 2 x 10                                    Plan for Next Visit: bike, abdominal bracing, posterior pelvic tilt, standing cone step overs, straight leg raise flexion and abduction, bridges         PATIENT EDUCATION AND HOME EXERCISES       Education provided: (time included with therex)    Patient educated on the impairments noted above and the effects of physical therapy intervention to improve overall condition and QOL.    Patient was educated on all the above exercise prior/during/after for proper posture, positioning, and execution for safe performance with home exercise program.    Patient educated on the importance of improved core and lower extremity strength in order to improve alignment of the spine and lower extremities with static positions and dynamic movement.    Patient educated on the importance of strong core and lower extremity musculature in order to improve both static and dynamic balance, improve gait mechanics, reduce fall risk and improve household and community mobility.       Written Home Exercises Provided: yes.  Exercises were reviewed and Eboni was able to demonstrate them prior to the end of the session.  Eboni demonstrated good  understanding of the education provided. See EMR under Patient Instructions for exercises provided during therapy sessions.    ASSESSMENT   Cleo is a 71 y.o. female referred to outpatient Physical Therapy with a medical diagnosis  "of Abnormal gait. Pt presents with impairments including: decreased ROM, decreased strength, decreased muscle length, impaired balance, postural abnormalities, gait abnormalities and decreased overall function.    Pt prognosis is Excellent.   Pt will benefit from skilled outpatient Physical Therapy to address the deficits stated above and in the chart below, provide pt/family education, and to maximize pt's level of independence.     Plan of care discussed with patient: Yes  Pt's spiritual, cultural and educational needs considered and patient is agreeable to the plan of care and goals as stated below:     Anticipated Barriers for therapy: co-morbidities and chronicity of condition    Medical Necessity is demonstrated by the following  History  Co-morbidities and personal factors that may impact the plan of care Co-morbidities:   anxiety, depression, high BMI, HTN and hx of traumatic pelvic fracture    Personal Factors:   coping style  lifestyle     high   Examination  Body Structures and Functions, activity limitations and participation restrictions that may impact the plan of care Body Regions:   back  lower extremities    Body Systems:    ROM  strength  balance  gait  transitions  motor control    Participation Restrictions:   See above in "Current Level of Function"     Activity limitations:   Learning and applying knowledge  no deficits    General Tasks and Commands  no deficits    Communication  no deficits    Mobility  sit to stand, lifting L leg in a seated positin    Self care  no deficits    Domestic Life  no deficits    Interactions/Relationships  no deficits    Life Areas  no deficits    Community and Social Life  community life  recreation and leisure         moderate   Clinical Presentation evolving clinical presentation with changing clinical characteristics moderate   Decision Making/ Complexity Score: moderate       GOALS:    Short Term Goals:  6 weeks Progress   3/9/2022   1. Pain: Pt will " demonstrate improved pain by reports of less than or equal to 3/10 worst pain on the verbal rating scale in order to progress toward maximal functional ability and improve QOL. PC   2. Function: Patient will demonstrate improved function as indicated by a functional limitation score of less than or equal to 39 out of 100 on FOTO. PC   3. Mobility: Patient will improve AROM to 50% of stated goals, listed in objective measures above, in order to progress towards independence with functional activities.  PC   4. Strength: Patient will improve strength to 50% of stated goals, listed in objective measures above, in order to progress towards independence with functional activities.  PC   5. Gait: Patient will demonstrate improved gait mechanics including symmetrical stance time B in order to improve functional mobility, improve quality of life, and decrease risk of further injury or fall.  PC   6. HEP: Patient will demonstrate independence with HEP in order to progress toward functional independence. PC     Long Term Goals:  12 weeks Progress  3/9/2022   1. Pain: Pt will demonstrate improved pain by reports of less than or equal to 0/10 worst pain on the verbal rating scale in order to progress toward maximal functional ability and improve QOL.   PC   2. Function: Patient will demonstrate improved function as indicated by a functional limitation score of less than or equal to 34 out of 100 on FOTO. PC   3. Mobility: Patient will improve AROM to stated goals, listed in objective measures above, in order to return to maximal functional potential and improve quality of life. PC   4. Strength: Patient will improve strength to stated goals, listed in objective measures above, in order to improve functional independence and quality of life. PC   5. Gait: Patient will demonstrate normalized gait mechanics with minimal compensation in order to return to PLOF. PC   6. Patient will return to normal ADL's, IADL's, community  involvement, recreational activities, and work-related activities with less than or equal to 0/10 pain and maximal function.  PC    PC= progressing/continue; PM= partially met;        DC= discontinue    PLAN   Plan of care Certification: 3/9/2022 to 5/9/2022.    Outpatient Physical Therapy 2 times weekly for 12 weeks to include any combination of the following interventions: virtual visits, dry needling, modalities, electrical stimulation (IFC, Pre-Mod, Attended with Functional Dry Needling), Cervical/Lumbar Traction, Gait Training, Manual Therapy, Neuromuscular Re-ed, Patient Education, Self Care, Therapeutic Activites, and Therapeutic Exercise     Anabela Ho, PT, DPT    I CERTIFY THE NEED FOR THESE SERVICES FURNISHED UNDER THIS PLAN OF TREATMENT AND WHILE UNDER MY CARE   Physician's comments:     Physician's Signature: ___________________________________________________

## 2022-03-11 PROBLEM — M25.651 DECREASED RANGE OF RIGHT HIP MOVEMENT: Status: ACTIVE | Noted: 2022-03-11

## 2022-03-11 PROBLEM — R26.89 DECREASED FUNCTIONAL MOBILITY: Status: ACTIVE | Noted: 2022-03-11

## 2022-03-14 ENCOUNTER — CLINICAL SUPPORT (OUTPATIENT)
Dept: REHABILITATION | Facility: HOSPITAL | Age: 72
End: 2022-03-14
Payer: MEDICARE

## 2022-03-14 DIAGNOSIS — M25.651 DECREASED RANGE OF RIGHT HIP MOVEMENT: ICD-10-CM

## 2022-03-14 DIAGNOSIS — R26.89 DECREASED FUNCTIONAL MOBILITY: Primary | ICD-10-CM

## 2022-03-14 DIAGNOSIS — R26.9 GAIT ABNORMALITY: ICD-10-CM

## 2022-03-14 PROCEDURE — 97110 THERAPEUTIC EXERCISES: CPT

## 2022-03-14 PROCEDURE — 97140 MANUAL THERAPY 1/> REGIONS: CPT

## 2022-03-14 NOTE — PROGRESS NOTES
[facility]   Physical Therapy Treatment Note     Name: Cleo Ford  [facility] Number: [id number]    Therapy Diagnosis:   Encounter Diagnoses   Name Primary?    Decreased functional mobility Yes    Gait abnormality     Decreased range of right hip movement      Physician: Elizabeth Garcia PA-C    Visit Date: 3/14/2022    Physician Orders: PT Eval and Treat  Medical Diagnosis from Referral: Abnormal gait   Evaluation Date: 3/9/2022  Authorization Period Expiration: 12/31/2022  Plan of Care Expiration: 5/9/2022  Progress Note Due: 4/9/2022  Visit # / Visits authorized: 1/20 (+1 for initial evaluation)   FOTO: 1/ 3      Precautions: Standard    Time In: 1600  Time Out: 1645  Total Billable Time: 40 minutes     SUBJECTIVE     Patient reports: no significant changes since initial evaluation.     He/She was compliant with home exercise program.  Response to previous treatment: states she felt fine   Functional change: none noted     Pain: 0/10 at rest   Location: R hip     OBJECTIVE     Objective Measures updated at progress report unless specified.       TREATMENT     Eboni received the treatments listed below:       MANUAL THERAPY TECHNIQUES were applied for (10) minutes, including:    Manual Intervention Performed Today    Soft Tissue Mobilization x right glutes, iliacus, iliospoas and quadriceps   Joint Mobilizations x Hip distraction    Mobilization with movement          Functional Dry Needling        Plan for Next Visit: PRN          THERAPEUTIC EXERCISES to develop strength, endurance, ROM, flexibility, posture and core stabilization for (30) minutes including:    Intervention Performed Today    Recumbent bike (for lower extremity strength and endurance)  x Level 1, 5 minutes    Stretches  x Prone quadriceps (strap): 3 x 30s   Supine hip flexor stretch: 3 x 30s   Prone hip flexor stretch: 2 minutes by PT    Fall outs  x Green band, 2 x 10 B    Posterior pelvic tilt  x 30x     Bridges  x 2 x 10    Marches  x 2 x 10 B    Straight leg raise flexion  x 2 x 10 on R with green band           Plan for Next Visit: salomón, standing marches, single leg stance        PATIENT EDUCATION AND HOME EXERCISES     Home Exercises Provided and Patient Education Provided     Education provided: (time included with therex)    Patient educated on biomechanical justification for therapeutic exercise and importance of compliance with HEP in order to improve overall impairments and QOL    Patient was educated on all the above exercise prior/during/after for proper posture, positioning, and execution for safe performance with home exercise program.     Written Home Exercises Provided: yes.  Exercises were reviewed and Eboni was able to demonstrate them prior to the end of the session.  Eboni demonstrated good  understanding of the education provided. See EMR under Patient Instructions for exercises provided during therapy sessions.      ASSESSMENT     Pt tolerated session well today. Significant decrease in muscle tension noted following soft tissue mobilization performed. Able to incorporate marches and assisted straight leg raise with minimal discomfort.     Eboni is progressing well towards her goals.   Pt prognosis is Excellent.     Pt will continue to benefit from skilled outpatient physical therapy to address the deficits listed in the problem list box on initial evaluation, provide pt/family education and to maximize pt's level of independence in the home and community environment.     Pt's spiritual, cultural and educational needs considered and pt agreeable to plan of care and goals.     Anticipated Barriers for therapy: co-morbidities and chronicity of condition    GOALS:     Short Term Goals:  6 weeks Progress   3/9/2022   1. Pain: Pt will demonstrate improved pain by reports of less than or equal to 3/10 worst pain on the verbal rating scale in order to progress toward maximal functional  ability and improve QOL. PC   2. Function: Patient will demonstrate improved function as indicated by a functional limitation score of less than or equal to 39 out of 100 on FOTO. PC   3. Mobility: Patient will improve AROM to 50% of stated goals, listed in objective measures above, in order to progress towards independence with functional activities.  PC   4. Strength: Patient will improve strength to 50% of stated goals, listed in objective measures above, in order to progress towards independence with functional activities.  PC   5. Gait: Patient will demonstrate improved gait mechanics including symmetrical stance time B in order to improve functional mobility, improve quality of life, and decrease risk of further injury or fall.  PC   6. HEP: Patient will demonstrate independence with HEP in order to progress toward functional independence. PC      Long Term Goals:  12 weeks Progress  3/9/2022   1. Pain: Pt will demonstrate improved pain by reports of less than or equal to 0/10 worst pain on the verbal rating scale in order to progress toward maximal functional ability and improve QOL.   PC   2. Function: Patient will demonstrate improved function as indicated by a functional limitation score of less than or equal to 34 out of 100 on FOTO. PC   3. Mobility: Patient will improve AROM to stated goals, listed in objective measures above, in order to return to maximal functional potential and improve quality of life. PC   4. Strength: Patient will improve strength to stated goals, listed in objective measures above, in order to improve functional independence and quality of life. PC   5. Gait: Patient will demonstrate normalized gait mechanics with minimal compensation in order to return to PLOF. PC   6. Patient will return to normal ADL's, IADL's, community involvement, recreational activities, and work-related activities with less than or equal to 0/10 pain and maximal function.  PC        Goals Key:  PC=  progressing/continue; PM= partially met;        DC= discontinue    PLAN     Continue Plan of Care (POC) and progress per patient tolerance. See treatment section for details on planned progressions next session.      Anabela Ho, PT

## 2022-03-17 LAB — HEMOCCULT STL QL IA: NEGATIVE

## 2022-03-18 ENCOUNTER — CLINICAL SUPPORT (OUTPATIENT)
Dept: REHABILITATION | Facility: HOSPITAL | Age: 72
End: 2022-03-18
Payer: MEDICARE

## 2022-03-18 DIAGNOSIS — R26.89 DECREASED FUNCTIONAL MOBILITY: Primary | ICD-10-CM

## 2022-03-18 DIAGNOSIS — R26.9 GAIT ABNORMALITY: ICD-10-CM

## 2022-03-18 DIAGNOSIS — M25.651 DECREASED RANGE OF RIGHT HIP MOVEMENT: ICD-10-CM

## 2022-03-18 PROCEDURE — 97110 THERAPEUTIC EXERCISES: CPT

## 2022-03-18 PROCEDURE — 97140 MANUAL THERAPY 1/> REGIONS: CPT

## 2022-03-18 NOTE — PROGRESS NOTES
[facility]   Physical Therapy Treatment Note     Name: Cleo Ford  [facility] Number: [id number]    Therapy Diagnosis:   Encounter Diagnoses   Name Primary?    Decreased functional mobility Yes    Gait abnormality     Decreased range of right hip movement      Physician: Elizabeth Garcia PA-C    Visit Date: 3/18/2022    Physician Orders: PT Eval and Treat  Medical Diagnosis from Referral: Abnormal gait   Evaluation Date: 3/9/2022  Authorization Period Expiration: 12/31/2022  Plan of Care Expiration: 5/9/2022  Progress Note Due: 4/9/2022  Visit # / Visits authorized: 1/20 (+1 for initial evaluation)   FOTO: 1/ 3      Precautions: Standard    Time In: 0745  Time Out: 0830  Total Billable Time: 43 minutes     SUBJECTIVE     Patient reports: she is feeling much better and has only had pain one time since previous visit     He/She was compliant with home exercise program.  Response to previous treatment: states she felt fine   Functional change: none noted     Pain: 0/10 at rest   Location: R hip     OBJECTIVE     Objective Measures updated at progress report unless specified.       TREATMENT     Eboni received the treatments listed below:       MANUAL THERAPY TECHNIQUES were applied for (10) minutes, including:    Manual Intervention Performed Today    Soft Tissue Mobilization x right glutes, iliacus, iliospoas and quadriceps   Joint Mobilizations x Hip distraction    Mobilization with movement          Functional Dry Needling        Plan for Next Visit: PRN          THERAPEUTIC EXERCISES to develop strength, endurance, ROM, flexibility, posture and core stabilization for (33) minutes including:    Intervention Performed Today    Recumbent bike (for lower extremity strength and endurance)   Level 1, 5 minutes    Stretches  X  x Prone quadriceps (strap): 3 x 30s   Supine hip flexor stretch: 3 x 30s   Prone hip flexor stretch: 2 minutes by PT    Nickolas  x 2 x 30s on R    Posterior  pelvic tilt   30x    Bridges  x 2 x 1 minute     Marches  X  x 0#, 1 minute   2#, 1 minute    Straight leg raise flexion  x 2 x 1 minute on R with red band    Shuttle leg press  X  x Double: level 5, 5 minutes   Single: level 3, 2 x 10 B      Plan for Next Visit: clamshells, standing marches, single leg stance        PATIENT EDUCATION AND HOME EXERCISES     Home Exercises Provided and Patient Education Provided     Education provided: (time included with therex)    Patient educated on biomechanical justification for therapeutic exercise and importance of compliance with HEP in order to improve overall impairments and QOL    Patient was educated on all the above exercise prior/during/after for proper posture, positioning, and execution for safe performance with home exercise program.     Written Home Exercises Provided: yes.  Exercises were reviewed and Eboni was able to demonstrate them prior to the end of the session.  Eboni demonstrated good  understanding of the education provided. See EMR under Patient Instructions for exercises provided during therapy sessions.      ASSESSMENT     Pt tolerated session well today. Able to progress straight leg raise and marches today due to decreased pain. Decreased muscle tension and tenderness to palpation noted with soft tissue mobilization performed    Eboni is progressing well towards her goals.   Pt prognosis is Excellent.     Pt will continue to benefit from skilled outpatient physical therapy to address the deficits listed in the problem list box on initial evaluation, provide pt/family education and to maximize pt's level of independence in the home and community environment.     Pt's spiritual, cultural and educational needs considered and pt agreeable to plan of care and goals.     Anticipated Barriers for therapy: co-morbidities and chronicity of condition    GOALS:     Short Term Goals:  6 weeks Progress   3/9/2022   1. Pain: Pt will demonstrate improved pain by  reports of less than or equal to 3/10 worst pain on the verbal rating scale in order to progress toward maximal functional ability and improve QOL. PC   2. Function: Patient will demonstrate improved function as indicated by a functional limitation score of less than or equal to 39 out of 100 on FOTO. PC   3. Mobility: Patient will improve AROM to 50% of stated goals, listed in objective measures above, in order to progress towards independence with functional activities.  PC   4. Strength: Patient will improve strength to 50% of stated goals, listed in objective measures above, in order to progress towards independence with functional activities.  PC   5. Gait: Patient will demonstrate improved gait mechanics including symmetrical stance time B in order to improve functional mobility, improve quality of life, and decrease risk of further injury or fall.  PC   6. HEP: Patient will demonstrate independence with HEP in order to progress toward functional independence. PC      Long Term Goals:  12 weeks Progress  3/9/2022   1. Pain: Pt will demonstrate improved pain by reports of less than or equal to 0/10 worst pain on the verbal rating scale in order to progress toward maximal functional ability and improve QOL.   PC   2. Function: Patient will demonstrate improved function as indicated by a functional limitation score of less than or equal to 34 out of 100 on FOTO. PC   3. Mobility: Patient will improve AROM to stated goals, listed in objective measures above, in order to return to maximal functional potential and improve quality of life. PC   4. Strength: Patient will improve strength to stated goals, listed in objective measures above, in order to improve functional independence and quality of life. PC   5. Gait: Patient will demonstrate normalized gait mechanics with minimal compensation in order to return to PLOF. PC   6. Patient will return to normal ADL's, IADL's, community involvement, recreational  activities, and work-related activities with less than or equal to 0/10 pain and maximal function.  PC        Goals Key:  PC= progressing/continue; PM= partially met;        DC= discontinue    PLAN     Continue Plan of Care (POC) and progress per patient tolerance. See treatment section for details on planned progressions next session.      nAabela Ho, PT

## 2022-03-23 ENCOUNTER — DOCUMENTATION ONLY (OUTPATIENT)
Dept: REHABILITATION | Facility: HOSPITAL | Age: 72
End: 2022-03-23

## 2022-03-23 ENCOUNTER — CLINICAL SUPPORT (OUTPATIENT)
Dept: REHABILITATION | Facility: HOSPITAL | Age: 72
End: 2022-03-23
Payer: MEDICARE

## 2022-03-23 DIAGNOSIS — M25.652 DECREASED RANGE OF LEFT HIP MOVEMENT: ICD-10-CM

## 2022-03-23 DIAGNOSIS — M62.81 PROXIMAL MUSCLE WEAKNESS: Primary | ICD-10-CM

## 2022-03-23 PROCEDURE — 97140 MANUAL THERAPY 1/> REGIONS: CPT | Mod: CQ

## 2022-03-23 PROCEDURE — 97110 THERAPEUTIC EXERCISES: CPT | Mod: CQ

## 2022-03-23 NOTE — PROGRESS NOTES
PT/PTA met face to face to discuss patient's treatment plan and progress towards established goals. Patient will be seen by physical therapist every sixth visit and minimally once per month.     Barbie Bell PTA

## 2022-03-23 NOTE — PROGRESS NOTES
[facility]   Physical Therapy Treatment Note     Name: Cleo Ford  [facility] Number: [id number]    Therapy Diagnosis:   Encounter Diagnoses   Name Primary?    Proximal muscle weakness Yes    Decreased range of left hip movement      Physician: Elizabeth Garcia PA-C    Visit Date: 3/23/2022    Physician Orders: PT Eval and Treat  Medical Diagnosis from Referral: Abnormal gait   Evaluation Date: 3/9/2022  Authorization Period Expiration: 12/31/2022  Plan of Care Expiration: 5/9/2022  Progress Note Due: 4/9/2022  Visit # / Visits authorized: 2/20 (+1 for initial evaluation)   FOTO: 1/ 3      Precautions: Standard    Time In: 1515  Time Out: 1605  Total Billable Time: 43 minutes     SUBJECTIVE     Patient reports: she has been feeling pretty good and not having any pain. She only had one day where her pain was bad. Her pain kicked in at the end of the day.     He/She was compliant with home exercise program.  Response to previous treatment: states she felt fine   Functional change: none noted     Pain: 0/10 at rest   Location: R hip     OBJECTIVE     Objective Measures updated at progress report unless specified.       TREATMENT     Eboni received the treatments listed below:       MANUAL THERAPY TECHNIQUES were applied for (8) minutes, including:    Manual Intervention Performed Today    Soft Tissue Mobilization  right glutes, iliacus, iliospoas and quadriceps   Joint Mobilizations x Hip distraction    Mobilization with movement          Functional Dry Needling        Plan for Next Visit: PRN          THERAPEUTIC EXERCISES to develop strength, endurance, ROM, flexibility, posture and core stabilization for (35) minutes including:    Intervention Performed Today    Recumbent bike (for lower extremity strength and endurance)  x Level 1, 5 minutes    Stretches  X  x Prone quadriceps (strap): 3 x 30s   Supine hip flexor stretch: 3 x 30s   Prone hip flexor stretch: 2 minutes by PT     Clamshells  x 2 x 1 minute each bilateral    Posterior pelvic tilt   30x    Bridges  x 2 x 1 minute     Marches  x 2#, 2 x 1 minute    Straight leg raise flexion  x 2 x 1 minute on R with red band    Shuttle leg press  X  x Double: level 5, 3 minutes   Single: level 3, 2 minutes each     Plan for Next Visit: clamshells, standing marches, single leg stance        PATIENT EDUCATION AND HOME EXERCISES     Home Exercises Provided and Patient Education Provided     Education provided: (time included with therex)    Patient educated on biomechanical justification for therapeutic exercise and importance of compliance with HEP in order to improve overall impairments and QOL    Patient was educated on all the above exercise prior/during/after for proper posture, positioning, and execution for safe performance with home exercise program.     Written Home Exercises Provided: continue prior home exercise program  Exercises were reviewed and Eboni was able to demonstrate them prior to the end of the session.  Eboni demonstrated good  understanding of the education provided. See EMR under Patient Instructions for exercises provided during therapy sessions.      ASSESSMENT     Patient tolerated session well today. Decreased endurance noted with most activities today, rest breaks taken as needed. Patient challenged with hip activities. Good response to long axis distraction, leaving session with no reports of increased pain.     Eboni is progressing well towards her goals.   Pt prognosis is Excellent.     Pt will continue to benefit from skilled outpatient physical therapy to address the deficits listed in the problem list box on initial evaluation, provide pt/family education and to maximize pt's level of independence in the home and community environment.     Pt's spiritual, cultural and educational needs considered and pt agreeable to plan of care and goals.     Anticipated Barriers for therapy: co-morbidities and chronicity  of condition    GOALS:     Short Term Goals:  6 weeks Progress   3/9/2022   1. Pain: Pt will demonstrate improved pain by reports of less than or equal to 3/10 worst pain on the verbal rating scale in order to progress toward maximal functional ability and improve QOL. PC   2. Function: Patient will demonstrate improved function as indicated by a functional limitation score of less than or equal to 39 out of 100 on FOTO. PC   3. Mobility: Patient will improve AROM to 50% of stated goals, listed in objective measures above, in order to progress towards independence with functional activities.  PC   4. Strength: Patient will improve strength to 50% of stated goals, listed in objective measures above, in order to progress towards independence with functional activities.  PC   5. Gait: Patient will demonstrate improved gait mechanics including symmetrical stance time B in order to improve functional mobility, improve quality of life, and decrease risk of further injury or fall.  PC   6. HEP: Patient will demonstrate independence with HEP in order to progress toward functional independence. PC      Long Term Goals:  12 weeks Progress  3/9/2022   1. Pain: Pt will demonstrate improved pain by reports of less than or equal to 0/10 worst pain on the verbal rating scale in order to progress toward maximal functional ability and improve QOL.   PC   2. Function: Patient will demonstrate improved function as indicated by a functional limitation score of less than or equal to 34 out of 100 on FOTO. PC   3. Mobility: Patient will improve AROM to stated goals, listed in objective measures above, in order to return to maximal functional potential and improve quality of life. PC   4. Strength: Patient will improve strength to stated goals, listed in objective measures above, in order to improve functional independence and quality of life. PC   5. Gait: Patient will demonstrate normalized gait mechanics with minimal compensation in  order to return to PLOF. PC   6. Patient will return to normal ADL's, IADL's, community involvement, recreational activities, and work-related activities with less than or equal to 0/10 pain and maximal function.  PC        Goals Key:  PC= progressing/continue; PM= partially met;        DC= discontinue    PLAN     Continue Plan of Care (POC) and progress per patient tolerance. See treatment section for details on planned progressions next session.      Barbie Bell, PTA

## 2022-03-29 ENCOUNTER — CLINICAL SUPPORT (OUTPATIENT)
Dept: REHABILITATION | Facility: HOSPITAL | Age: 72
End: 2022-03-29
Payer: MEDICARE

## 2022-03-29 DIAGNOSIS — R26.9 GAIT ABNORMALITY: ICD-10-CM

## 2022-03-29 DIAGNOSIS — M25.651 DECREASED RANGE OF RIGHT HIP MOVEMENT: ICD-10-CM

## 2022-03-29 DIAGNOSIS — R26.89 DECREASED FUNCTIONAL MOBILITY: Primary | ICD-10-CM

## 2022-03-29 PROCEDURE — 97110 THERAPEUTIC EXERCISES: CPT

## 2022-03-29 PROCEDURE — 97140 MANUAL THERAPY 1/> REGIONS: CPT

## 2022-03-29 NOTE — PROGRESS NOTES
[facility]   Physical Therapy Treatment Note     Name: Cleo Ford  [facility] Number: [id number]    Therapy Diagnosis:   Encounter Diagnoses   Name Primary?    Decreased functional mobility Yes    Gait abnormality     Decreased range of right hip movement      Physician: Elizabeth Garcia PA-C    Visit Date: 3/29/2022    Physician Orders: PT Eval and Treat  Medical Diagnosis from Referral: Abnormal gait   Evaluation Date: 3/9/2022  Authorization Period Expiration: 12/31/2022  Plan of Care Expiration: 5/9/2022  Progress Note Due: 4/9/2022  Visit # / Visits authorized: 4/20 (+1 for initial evaluation)   FOTO: 1/ 3      Precautions: Standard    Time In: 1045  Time Out: 1130  Total Billable Time: 43 minutes     SUBJECTIVE     Patient reports: she has been feeling good and only has discomfort when driving in her car for prolonged periods of time.     He/She was compliant with home exercise program.  Response to previous treatment: states she felt fine   Functional change: none noted     Pain: 0/10 at rest   Location: R hip     OBJECTIVE     Objective Measures updated at progress report unless specified.       TREATMENT     Eboni received the treatments listed below:       MANUAL THERAPY TECHNIQUES were applied for (8) minutes, including:    Manual Intervention Performed Today    Soft Tissue Mobilization  right glutes, iliacus, iliospoas and quadriceps   Joint Mobilizations x Hip distraction    Mobilization with movement          Functional Dry Needling        Plan for Next Visit: PRN          THERAPEUTIC EXERCISES to develop strength, endurance, ROM, flexibility, posture and core stabilization for (33) minutes including:    Intervention Performed Today    Recumbent bike (for lower extremity strength and endurance)  x Level 1, 5 minutes    Stretches  X  x Prone quadriceps (strap): 3 x 30s   Supine hip flexor stretch: 3 x 30s   Prone hip flexor stretch: 2 minutes by ZANA Olmos   2 x 1  minute each bilateral    Posterior pelvic tilt   30x    Bridges  x 2 x 1 minute  With 5s holds    Marches- standing  x 2 x 1 minute with 3s holds    Straight leg raise flexion   2 x 1 minute on R with red band    Shuttle leg press   Double: level 5, 3 minutes   Single: level 3, 2 minutes each   Sit to stand x 3 x 10    Side stepping  x Red band, 4 laps along mat                Plan for Next Visit: clamshells, standing marches, single leg stance          NEUROMUSCULAR RE-EDUCATION ACTIVITIES to improve Balance, Coordination, Kinesthetic, Sense, Proprioception and Posture for (2) minutes.  The following were included:    Intervention Performed Today    Tandem stance  X 2 x 1 minute B                                         Plan for Next Visit: [place]          PATIENT EDUCATION AND HOME EXERCISES     Home Exercises Provided and Patient Education Provided     Education provided: (time included with therex)    Patient educated on biomechanical justification for therapeutic exercise and importance of compliance with HEP in order to improve overall impairments and QOL    Patient was educated on all the above exercise prior/during/after for proper posture, positioning, and execution for safe performance with home exercise program.     Written Home Exercises Provided: continue prior home exercise program  Exercises were reviewed and Eboni was able to demonstrate them prior to the end of the session.  Eboni demonstrated good  understanding of the education provided. See EMR under Patient Instructions for exercises provided during therapy sessions.      ASSESSMENT     Patient tolerated session well today. Able to progress lower extremity strengthening with incorporation of sit to stand, side stepping and standing marches with no increased symptoms reported throughout session. Pt reports significant decrease in tenderness to palpation through anterior hip musculature and decreased pain and pulling with hip flexor stretch      Eboni is progressing well towards her goals.   Pt prognosis is Excellent.     Pt will continue to benefit from skilled outpatient physical therapy to address the deficits listed in the problem list box on initial evaluation, provide pt/family education and to maximize pt's level of independence in the home and community environment.     Pt's spiritual, cultural and educational needs considered and pt agreeable to plan of care and goals.     Anticipated Barriers for therapy: co-morbidities and chronicity of condition    GOALS:     Short Term Goals:  6 weeks Progress   3/9/2022   1. Pain: Pt will demonstrate improved pain by reports of less than or equal to 3/10 worst pain on the verbal rating scale in order to progress toward maximal functional ability and improve QOL. PC   2. Function: Patient will demonstrate improved function as indicated by a functional limitation score of less than or equal to 39 out of 100 on FOTO. PC   3. Mobility: Patient will improve AROM to 50% of stated goals, listed in objective measures above, in order to progress towards independence with functional activities.  PC   4. Strength: Patient will improve strength to 50% of stated goals, listed in objective measures above, in order to progress towards independence with functional activities.  PC   5. Gait: Patient will demonstrate improved gait mechanics including symmetrical stance time B in order to improve functional mobility, improve quality of life, and decrease risk of further injury or fall.  PC   6. HEP: Patient will demonstrate independence with HEP in order to progress toward functional independence. PC      Long Term Goals:  12 weeks Progress  3/9/2022   1. Pain: Pt will demonstrate improved pain by reports of less than or equal to 0/10 worst pain on the verbal rating scale in order to progress toward maximal functional ability and improve QOL.   PC   2. Function: Patient will demonstrate improved function as indicated by a  functional limitation score of less than or equal to 34 out of 100 on FOTO. PC   3. Mobility: Patient will improve AROM to stated goals, listed in objective measures above, in order to return to maximal functional potential and improve quality of life. PC   4. Strength: Patient will improve strength to stated goals, listed in objective measures above, in order to improve functional independence and quality of life. PC   5. Gait: Patient will demonstrate normalized gait mechanics with minimal compensation in order to return to PLOF. PC   6. Patient will return to normal ADL's, IADL's, community involvement, recreational activities, and work-related activities with less than or equal to 0/10 pain and maximal function.  PC        Goals Key:  PC= progressing/continue; PM= partially met;        DC= discontinue    PLAN     Continue Plan of Care (POC) and progress per patient tolerance. See treatment section for details on planned progressions next session.      Anabela Ho, PT

## 2022-03-31 ENCOUNTER — CLINICAL SUPPORT (OUTPATIENT)
Dept: REHABILITATION | Facility: HOSPITAL | Age: 72
End: 2022-03-31
Payer: MEDICARE

## 2022-03-31 DIAGNOSIS — M62.81 PROXIMAL MUSCLE WEAKNESS: Primary | ICD-10-CM

## 2022-03-31 DIAGNOSIS — M25.652 DECREASED RANGE OF LEFT HIP MOVEMENT: ICD-10-CM

## 2022-03-31 PROCEDURE — 97140 MANUAL THERAPY 1/> REGIONS: CPT | Mod: CQ

## 2022-03-31 PROCEDURE — 97110 THERAPEUTIC EXERCISES: CPT | Mod: CQ

## 2022-03-31 NOTE — PROGRESS NOTES
[facility]   Physical Therapy Treatment Note     Name: Cleo Ford  [facility] Number: [id number]    Therapy Diagnosis:   Encounter Diagnoses   Name Primary?    Proximal muscle weakness Yes    Decreased range of left hip movement      Physician: Elizabeth Garcia PA-C    Visit Date: 3/31/2022    Physician Orders: PT Eval and Treat  Medical Diagnosis from Referral: Abnormal gait   Evaluation Date: 3/9/2022  Authorization Period Expiration: 12/31/2022  Plan of Care Expiration: 5/9/2022  Progress Note Due: 4/9/2022  Visit # / Visits authorized: 5/20 (+1 for initial evaluation)   FOTO: 1/ 3      Precautions: Standard    Time In: 1045  Time Out: 1130  Total Billable Time: 43 minutes     SUBJECTIVE     Patient reports: she is feeling great today. After she left therapy on Tuesday, she had increased pain after being in the car for a long time. It got better and she is feeling fine today.   He/She was compliant with home exercise program.  Response to previous treatment: states she felt fine   Functional change: none noted     Pain: 0/10 at rest   Location: R hip     OBJECTIVE     Objective Measures updated at progress report unless specified.       TREATMENT     Eboni received the treatments listed below:       MANUAL THERAPY TECHNIQUES were applied for (8) minutes, including:    Manual Intervention Performed Today    Soft Tissue Mobilization  right glutes, iliacus, iliospoas and quadriceps   Joint Mobilizations x Hip distraction    Mobilization with movement          Functional Dry Needling        Plan for Next Visit: PRN          THERAPEUTIC EXERCISES to develop strength, endurance, ROM, flexibility, posture and core stabilization for (35) minutes including:    Intervention Performed Today    Recumbent bike (for lower extremity strength and endurance)  x Level 1, 7 minutes    Stretches     Prone quadriceps (strap): 3 x 30s   Supine hip flexor stretch: 3 x 30s   Prone hip flexor stretch: 2  minutes by PT    [place]   2 x 1 minute each bilateral    Posterior pelvic tilt   30x    Bridges  x 2 x 1 minute  With 5s holds    Marches- standing  x 2 x 1 minute with 3s holds    Straight leg raise flexion   2 x 1 minute on R with red band    Shuttle leg press   Double: level 5, 3 minutes   Single: level 3, 2 minutes each   Sit to stand x 3 x 10    Side stepping  x Red band, 4 laps along mat    Leg Press x Double Leg 4 plates 3 minutes  Single Leg 2 plates 2 minutes each bilateral          Plan for Next Visit: salomón, standing marches, single leg stance          NEUROMUSCULAR RE-EDUCATION ACTIVITIES to improve Balance, Coordination, Kinesthetic, Sense, Proprioception and Posture for (0) minutes.  The following were included:    Intervention Performed Today    Tandem stance   2 x 1 minute B                                         Plan for Next Visit: amarilysamboadavid          PATIENT EDUCATION AND HOME EXERCISES     Home Exercises Provided and Patient Education Provided     Education provided: (time included with therex)    Patient educated on biomechanical justification for therapeutic exercise and importance of compliance with HEP in order to improve overall impairments and QOL    Patient was educated on all the above exercise prior/during/after for proper posture, positioning, and execution for safe performance with home exercise program.     Written Home Exercises Provided: continue prior home exercise program  Exercises were reviewed and Eboni was able to demonstrate them prior to the end of the session.  Eboni demonstrated good  understanding of the education provided. See EMR under Patient Instructions for exercises provided during therapy sessions.      ASSESSMENT     Patient did well with session today with no complains of discomfort. Added leg press activities today, patient with good fatigue noted. Verbal cues for core activation during standing activities. Patient also needed cues for hip abductor  activation during sit to stands. Patient left session with no reports of increased pain.     Eboni is progressing well towards her goals.   Pt prognosis is Excellent.     Pt will continue to benefit from skilled outpatient physical therapy to address the deficits listed in the problem list box on initial evaluation, provide pt/family education and to maximize pt's level of independence in the home and community environment.     Pt's spiritual, cultural and educational needs considered and pt agreeable to plan of care and goals.     Anticipated Barriers for therapy: co-morbidities and chronicity of condition    GOALS:     Short Term Goals:  6 weeks Progress   3/9/2022   1. Pain: Pt will demonstrate improved pain by reports of less than or equal to 3/10 worst pain on the verbal rating scale in order to progress toward maximal functional ability and improve QOL. PC   2. Function: Patient will demonstrate improved function as indicated by a functional limitation score of less than or equal to 39 out of 100 on FOTO. PC   3. Mobility: Patient will improve AROM to 50% of stated goals, listed in objective measures above, in order to progress towards independence with functional activities.  PC   4. Strength: Patient will improve strength to 50% of stated goals, listed in objective measures above, in order to progress towards independence with functional activities.  PC   5. Gait: Patient will demonstrate improved gait mechanics including symmetrical stance time B in order to improve functional mobility, improve quality of life, and decrease risk of further injury or fall.  PC   6. HEP: Patient will demonstrate independence with HEP in order to progress toward functional independence. PC      Long Term Goals:  12 weeks Progress  3/9/2022   1. Pain: Pt will demonstrate improved pain by reports of less than or equal to 0/10 worst pain on the verbal rating scale in order to progress toward maximal functional ability and improve  QOL.   PC   2. Function: Patient will demonstrate improved function as indicated by a functional limitation score of less than or equal to 34 out of 100 on FOTO. PC   3. Mobility: Patient will improve AROM to stated goals, listed in objective measures above, in order to return to maximal functional potential and improve quality of life. PC   4. Strength: Patient will improve strength to stated goals, listed in objective measures above, in order to improve functional independence and quality of life. PC   5. Gait: Patient will demonstrate normalized gait mechanics with minimal compensation in order to return to PLOF. PC   6. Patient will return to normal ADL's, IADL's, community involvement, recreational activities, and work-related activities with less than or equal to 0/10 pain and maximal function.  PC        Goals Key:  PC= progressing/continue; PM= partially met;        DC= discontinue    PLAN     Continue Plan of Care (POC) and progress per patient tolerance. See treatment section for details on planned progressions next session.      Barbie Bell, ABIOLA

## 2022-04-04 ENCOUNTER — CLINICAL SUPPORT (OUTPATIENT)
Dept: REHABILITATION | Facility: HOSPITAL | Age: 72
End: 2022-04-04
Payer: MEDICARE

## 2022-04-04 DIAGNOSIS — R26.89 DECREASED FUNCTIONAL MOBILITY: Primary | ICD-10-CM

## 2022-04-04 DIAGNOSIS — R26.9 GAIT ABNORMALITY: ICD-10-CM

## 2022-04-04 DIAGNOSIS — M25.651 DECREASED RANGE OF RIGHT HIP MOVEMENT: ICD-10-CM

## 2022-04-04 PROCEDURE — 97140 MANUAL THERAPY 1/> REGIONS: CPT

## 2022-04-04 PROCEDURE — 97110 THERAPEUTIC EXERCISES: CPT

## 2022-04-04 NOTE — PROGRESS NOTES
[facility]   Physical Therapy Treatment Note     Name: Cleo Ford  [facility] Number: [id number]    Therapy Diagnosis:   Encounter Diagnoses   Name Primary?    Decreased functional mobility Yes    Gait abnormality     Decreased range of right hip movement      Physician: Elizabeth Garcia PA-C    Visit Date: 4/4/2022    Physician Orders: PT Eval and Treat  Medical Diagnosis from Referral: Abnormal gait   Evaluation Date: 3/9/2022  Authorization Period Expiration: 12/31/2022  Plan of Care Expiration: 5/9/2022  Progress Note Due: 4/9/2022  Visit # / Visits authorized: 5/20 (+1 for initial evaluation)   FOTO: 1/ 3      Precautions: Standard    Time In: 1300  Time Out: 1345  Total Billable Time: 42 minutes     SUBJECTIVE     Patient reports: she is feeling good today. States she had not had any pain in the last week until this morning due to a 2 hour car ride. States 2/10 pain max when moving her leg to get out of the car     He/She was compliant with home exercise program.  Response to previous treatment: states she felt fine   Functional change: none noted     Pain: 0/10 at rest   Location: R hip     OBJECTIVE     Objective Measures updated at progress report unless specified.       TREATMENT     Eboni received the treatments listed below:       MANUAL THERAPY TECHNIQUES were applied for (12) minutes, including:    Manual Intervention Performed Today    Soft Tissue Mobilization x right glutes and piriformis   Joint Mobilizations x Hip distraction    Mobilization with movement          Functional Dry Needling        Plan for Next Visit: PRN          THERAPEUTIC EXERCISES to develop strength, endurance, ROM, flexibility, posture and core stabilization for (27) minutes including:    Intervention Performed Today    Recumbent bike (for lower extremity strength and endurance)  x Level 1, 7 minutes    Stretches  x   Prone quadriceps (strap): 2 x 1 minute   Supine hip flexor stretch: 3 x 30s    Prone hip flexor stretch: 2 minutes by PT   Piriformis (2 ways in figure 4): 10s holds each way for 3 minutes    Clamshells   2 x 1 minute each bilateral    Posterior pelvic tilt   30x    Bridges  x 2 x 1 minute  With 5s holds    Marches- standing  x 2 x 1 minute with 3s holds    Straight leg raise flexion   2 x 1 minute on R with red band    Shuttle leg press   Double: level 5, 3 minutes   Single: level 3, 2 minutes each   Sit to stand x Red band, 2 x 12    Side stepping  x Red band, 4 laps along mat    Leg Press  Double Leg 4 plates 3 minutes  Single Leg 2 plates 2 minutes each bilateral          Plan for Next Visit: clamshells, standing marches, single leg stance          NEUROMUSCULAR RE-EDUCATION ACTIVITIES to improve Balance, Coordination, Kinesthetic, Sense, Proprioception and Posture for (4) minutes.  The following were included:    Intervention Performed Today    Tandem stance  x 2 x 1 minute B                                         Plan for Next Visit: [place]          PATIENT EDUCATION AND HOME EXERCISES     Home Exercises Provided and Patient Education Provided     Education provided: (time included with therex)    Patient educated on biomechanical justification for therapeutic exercise and importance of compliance with HEP in order to improve overall impairments and QOL    Patient was educated on all the above exercise prior/during/after for proper posture, positioning, and execution for safe performance with home exercise program.     Written Home Exercises Provided: continue prior home exercise program  Exercises were reviewed and Eboni was able to demonstrate them prior to the end of the session.  Eboni demonstrated good  understanding of the education provided. See EMR under Patient Instructions for exercises provided during therapy sessions.      ASSESSMENT     Patient tolerated session well today. Performed hip distraction and soft tissue mobilization to the posterior hip musculature with  significant relief reported following intervention. All other interventions tolerated well with significant fatigue reported throughout session.     Eboni is progressing well towards her goals.   Pt prognosis is Excellent.     Pt will continue to benefit from skilled outpatient physical therapy to address the deficits listed in the problem list box on initial evaluation, provide pt/family education and to maximize pt's level of independence in the home and community environment.     Pt's spiritual, cultural and educational needs considered and pt agreeable to plan of care and goals.     Anticipated Barriers for therapy: co-morbidities and chronicity of condition    GOALS:     Short Term Goals:  6 weeks Progress   3/9/2022   1. Pain: Pt will demonstrate improved pain by reports of less than or equal to 3/10 worst pain on the verbal rating scale in order to progress toward maximal functional ability and improve QOL. PC   2. Function: Patient will demonstrate improved function as indicated by a functional limitation score of less than or equal to 39 out of 100 on FOTO. PC   3. Mobility: Patient will improve AROM to 50% of stated goals, listed in objective measures above, in order to progress towards independence with functional activities.  PC   4. Strength: Patient will improve strength to 50% of stated goals, listed in objective measures above, in order to progress towards independence with functional activities.  PC   5. Gait: Patient will demonstrate improved gait mechanics including symmetrical stance time B in order to improve functional mobility, improve quality of life, and decrease risk of further injury or fall.  PC   6. HEP: Patient will demonstrate independence with HEP in order to progress toward functional independence. PC      Long Term Goals:  12 weeks Progress  3/9/2022   1. Pain: Pt will demonstrate improved pain by reports of less than or equal to 0/10 worst pain on the verbal rating scale in order  to progress toward maximal functional ability and improve QOL.   PC   2. Function: Patient will demonstrate improved function as indicated by a functional limitation score of less than or equal to 34 out of 100 on FOTO. PC   3. Mobility: Patient will improve AROM to stated goals, listed in objective measures above, in order to return to maximal functional potential and improve quality of life. PC   4. Strength: Patient will improve strength to stated goals, listed in objective measures above, in order to improve functional independence and quality of life. PC   5. Gait: Patient will demonstrate normalized gait mechanics with minimal compensation in order to return to PLOF. PC   6. Patient will return to normal ADL's, IADL's, community involvement, recreational activities, and work-related activities with less than or equal to 0/10 pain and maximal function.  PC        Goals Key:  PC= progressing/continue; PM= partially met;        DC= discontinue    PLAN     Continue Plan of Care (POC) and progress per patient tolerance. See treatment section for details on planned progressions next session.      Anabela Ho, PT

## 2022-04-18 ENCOUNTER — LAB VISIT (OUTPATIENT)
Dept: LAB | Facility: HOSPITAL | Age: 72
End: 2022-04-18
Payer: MEDICARE

## 2022-04-18 DIAGNOSIS — I10 ESSENTIAL HYPERTENSION: ICD-10-CM

## 2022-04-18 LAB
ALBUMIN SERPL BCP-MCNC: 3.9 G/DL (ref 3.5–5.2)
ALP SERPL-CCNC: 54 U/L (ref 55–135)
ALT SERPL W/O P-5'-P-CCNC: 13 U/L (ref 10–44)
ANION GAP SERPL CALC-SCNC: 11 MMOL/L (ref 8–16)
AST SERPL-CCNC: 20 U/L (ref 10–40)
BILIRUB SERPL-MCNC: 0.5 MG/DL (ref 0.1–1)
BUN SERPL-MCNC: 14 MG/DL (ref 8–23)
CALCIUM SERPL-MCNC: 10.1 MG/DL (ref 8.7–10.5)
CHLORIDE SERPL-SCNC: 102 MMOL/L (ref 95–110)
CO2 SERPL-SCNC: 23 MMOL/L (ref 23–29)
CREAT SERPL-MCNC: 0.8 MG/DL (ref 0.5–1.4)
EST. GFR  (AFRICAN AMERICAN): >60 ML/MIN/1.73 M^2
EST. GFR  (NON AFRICAN AMERICAN): >60 ML/MIN/1.73 M^2
GLUCOSE SERPL-MCNC: 99 MG/DL (ref 70–110)
POTASSIUM SERPL-SCNC: 3.8 MMOL/L (ref 3.5–5.1)
PROT SERPL-MCNC: 7.3 G/DL (ref 6–8.4)
SODIUM SERPL-SCNC: 136 MMOL/L (ref 136–145)

## 2022-04-18 PROCEDURE — 36415 COLL VENOUS BLD VENIPUNCTURE: CPT | Performed by: PHYSICIAN ASSISTANT

## 2022-04-18 PROCEDURE — 80053 COMPREHEN METABOLIC PANEL: CPT | Performed by: PHYSICIAN ASSISTANT

## 2022-04-27 ENCOUNTER — OFFICE VISIT (OUTPATIENT)
Dept: PRIMARY CARE CLINIC | Facility: CLINIC | Age: 72
End: 2022-04-27
Payer: MEDICARE

## 2022-04-27 VITALS
BODY MASS INDEX: 32.09 KG/M2 | SYSTOLIC BLOOD PRESSURE: 130 MMHG | HEART RATE: 74 BPM | DIASTOLIC BLOOD PRESSURE: 80 MMHG | WEIGHT: 181.13 LBS | HEIGHT: 63 IN

## 2022-04-27 DIAGNOSIS — Z00.00 ROUTINE GENERAL MEDICAL EXAMINATION AT A HEALTH CARE FACILITY: ICD-10-CM

## 2022-04-27 DIAGNOSIS — D53.9 MACROCYTIC ANEMIA: ICD-10-CM

## 2022-04-27 DIAGNOSIS — I65.23 ATHEROSCLEROSIS OF BOTH CAROTID ARTERIES: ICD-10-CM

## 2022-04-27 DIAGNOSIS — I10 ESSENTIAL HYPERTENSION: Primary | Chronic | ICD-10-CM

## 2022-04-27 DIAGNOSIS — F41.9 ANXIETY: ICD-10-CM

## 2022-04-27 DIAGNOSIS — F39 MOOD DISORDER: ICD-10-CM

## 2022-04-27 DIAGNOSIS — E78.2 MIXED HYPERLIPIDEMIA: ICD-10-CM

## 2022-04-27 DIAGNOSIS — E66.3 OVERWEIGHT: ICD-10-CM

## 2022-04-27 DIAGNOSIS — E03.8 OTHER SPECIFIED HYPOTHYROIDISM: Chronic | ICD-10-CM

## 2022-04-27 DIAGNOSIS — F33.41 RECURRENT MAJOR DEPRESSIVE DISORDER, IN PARTIAL REMISSION: ICD-10-CM

## 2022-04-27 DIAGNOSIS — M81.0 AGE-RELATED OSTEOPOROSIS WITHOUT CURRENT PATHOLOGICAL FRACTURE: ICD-10-CM

## 2022-04-27 DIAGNOSIS — E88.810 DYSMETABOLIC SYNDROME X: ICD-10-CM

## 2022-04-27 DIAGNOSIS — E87.1 HYPONATREMIA: ICD-10-CM

## 2022-04-27 PROCEDURE — 4010F ACE/ARB THERAPY RXD/TAKEN: CPT | Mod: CPTII,S$GLB,, | Performed by: FAMILY MEDICINE

## 2022-04-27 PROCEDURE — 1160F PR REVIEW ALL MEDS BY PRESCRIBER/CLIN PHARMACIST DOCUMENTED: ICD-10-PCS | Mod: CPTII,S$GLB,, | Performed by: FAMILY MEDICINE

## 2022-04-27 PROCEDURE — 99999 PR PBB SHADOW E&M-EST. PATIENT-LVL III: ICD-10-PCS | Mod: PBBFAC,,, | Performed by: FAMILY MEDICINE

## 2022-04-27 PROCEDURE — 3288F PR FALLS RISK ASSESSMENT DOCUMENTED: ICD-10-PCS | Mod: CPTII,S$GLB,, | Performed by: FAMILY MEDICINE

## 2022-04-27 PROCEDURE — 1159F MED LIST DOCD IN RCRD: CPT | Mod: CPTII,S$GLB,, | Performed by: FAMILY MEDICINE

## 2022-04-27 PROCEDURE — 1126F PR PAIN SEVERITY QUANTIFIED, NO PAIN PRESENT: ICD-10-PCS | Mod: CPTII,S$GLB,, | Performed by: FAMILY MEDICINE

## 2022-04-27 PROCEDURE — 3075F PR MOST RECENT SYSTOLIC BLOOD PRESS GE 130-139MM HG: ICD-10-PCS | Mod: CPTII,S$GLB,, | Performed by: FAMILY MEDICINE

## 2022-04-27 PROCEDURE — 3079F PR MOST RECENT DIASTOLIC BLOOD PRESSURE 80-89 MM HG: ICD-10-PCS | Mod: CPTII,S$GLB,, | Performed by: FAMILY MEDICINE

## 2022-04-27 PROCEDURE — 99214 OFFICE O/P EST MOD 30 MIN: CPT | Mod: S$GLB,,, | Performed by: FAMILY MEDICINE

## 2022-04-27 PROCEDURE — 1159F PR MEDICATION LIST DOCUMENTED IN MEDICAL RECORD: ICD-10-PCS | Mod: CPTII,S$GLB,, | Performed by: FAMILY MEDICINE

## 2022-04-27 PROCEDURE — 3008F BODY MASS INDEX DOCD: CPT | Mod: CPTII,S$GLB,, | Performed by: FAMILY MEDICINE

## 2022-04-27 PROCEDURE — 3008F PR BODY MASS INDEX (BMI) DOCUMENTED: ICD-10-PCS | Mod: CPTII,S$GLB,, | Performed by: FAMILY MEDICINE

## 2022-04-27 PROCEDURE — 99214 PR OFFICE/OUTPT VISIT, EST, LEVL IV, 30-39 MIN: ICD-10-PCS | Mod: S$GLB,,, | Performed by: FAMILY MEDICINE

## 2022-04-27 PROCEDURE — 1101F PR PT FALLS ASSESS DOC 0-1 FALLS W/OUT INJ PAST YR: ICD-10-PCS | Mod: CPTII,S$GLB,, | Performed by: FAMILY MEDICINE

## 2022-04-27 PROCEDURE — 99999 PR PBB SHADOW E&M-EST. PATIENT-LVL III: CPT | Mod: PBBFAC,,, | Performed by: FAMILY MEDICINE

## 2022-04-27 PROCEDURE — 3288F FALL RISK ASSESSMENT DOCD: CPT | Mod: CPTII,S$GLB,, | Performed by: FAMILY MEDICINE

## 2022-04-27 PROCEDURE — 4010F PR ACE/ARB THEARPY RXD/TAKEN: ICD-10-PCS | Mod: CPTII,S$GLB,, | Performed by: FAMILY MEDICINE

## 2022-04-27 PROCEDURE — 3079F DIAST BP 80-89 MM HG: CPT | Mod: CPTII,S$GLB,, | Performed by: FAMILY MEDICINE

## 2022-04-27 PROCEDURE — 3075F SYST BP GE 130 - 139MM HG: CPT | Mod: CPTII,S$GLB,, | Performed by: FAMILY MEDICINE

## 2022-04-27 PROCEDURE — 1160F RVW MEDS BY RX/DR IN RCRD: CPT | Mod: CPTII,S$GLB,, | Performed by: FAMILY MEDICINE

## 2022-04-27 PROCEDURE — 1101F PT FALLS ASSESS-DOCD LE1/YR: CPT | Mod: CPTII,S$GLB,, | Performed by: FAMILY MEDICINE

## 2022-04-27 PROCEDURE — 1126F AMNT PAIN NOTED NONE PRSNT: CPT | Mod: CPTII,S$GLB,, | Performed by: FAMILY MEDICINE

## 2022-04-27 RX ORDER — LEVOTHYROXINE SODIUM 112 UG/1
112 TABLET ORAL
Qty: 90 TABLET | Refills: 3 | Status: SHIPPED | OUTPATIENT
Start: 2022-04-27 | End: 2023-04-24

## 2022-04-27 RX ORDER — ALPRAZOLAM 1 MG/1
1 TABLET ORAL 2 TIMES DAILY
Qty: 180 TABLET | Refills: 1 | Status: SHIPPED | OUTPATIENT
Start: 2022-05-06 | End: 2022-10-20 | Stop reason: SDUPTHER

## 2022-04-27 NOTE — PROGRESS NOTES
Subjective:      Patient ID: Cleo Ford is a 71 y.o. female.    Chief Complaint: Follow-up    Disclaimer:  This note is prepared using voice recognition software and as such is likely to have errors and has not been proof read. Please contact me for questions.     Cleo Ford is a 71 y.o. female who presents to clinic for  F/u bp, leg pain, and restarting hctz.   Stopped hctz.   In PT.   Driving in her car too much. Better.   Doing more with real estate lately.   Needing meds. Wants xanax if possible for 90 day rx. Using up to bid dosing.   Leg pain is better.   bp controlled. Off hctz.   On statin.     Patient Active Problem List:     Essential hypertension     Hyperlipidemia     Hypothyroidism     Anxiety     Dysmetabolic syndrome X     Screen for colon cancer     Hyponatremia     Macrocytic anemia     Recurrent major depressive disorder, in partial remission     Chronic pain of right knee     Meniscal injury, right, initial encounter     Leg pain, posterior, right     Gait abnormality     Proximal muscle weakness     Decreased range of left hip movement     Age-related osteoporosis without current pathological fracture     Atherosclerosis of both carotid arteries     Family history of cardiovascular disease     Overweight     Decreased functional mobility     Decreased range of right hip movement    Lab Results       Component                Value               Date                       HGBA1C                   5.2                 10/14/2020                 HGBA1C                   5.1                 01/31/2019                 HGBA1C                   5.1                 08/16/2018             Lab Results       Component                Value               Date                       CHOL                     195                 12/02/2021                 CHOL                     177                 10/12/2020                 CHOL                     198                 01/31/2019            Lab  Results       Component                Value               Date                       LDLCALC                  100.6               12/02/2021                 LDLCALC                  100.8               10/12/2020                 LDLCALC                  109.2               01/31/2019              Wt Readings from Last 10 Encounters:  04/27/22 : 82.2 kg (181 lb 1.7 oz)  03/04/22 : 81.8 kg (180 lb 5.4 oz)  01/25/22 : 82.1 kg (181 lb)  01/18/22 : 82.2 kg (181 lb 3.5 oz)  01/06/22 : 82.6 kg (182 lb 1.6 oz)  12/01/21 : 84.8 kg (186 lb 15.2 oz)  06/28/21 : 81.6 kg (180 lb)  01/25/21 : 79.5 kg (175 lb 4.3 oz)  11/30/20 : 83.9 kg (185 lb)  10/12/20 : 79.1 kg (174 lb 6.1 oz)      The 10-year ASCVD risk score (Renuka WORTHY Jr., et al., 2013) is: 10.5%    Values used to calculate the score:      Age: 71 years      Sex: Female      Is Non- : No      Diabetic: No      Tobacco smoker: No      Systolic Blood Pressure: 130 mmHg      Is BP treated: Yes      HDL Cholesterol: 85 mg/dL      Total Cholesterol: 195 mg/dL            Lab Results   Component Value Date    WBC 7.22 01/21/2021    HGB 12.0 01/21/2021    HCT 37.6 01/21/2021     01/21/2021    CHOL 195 12/02/2021    TRIG 47 12/02/2021    HDL 85 (H) 12/02/2021    ALT 13 04/18/2022    AST 20 04/18/2022     04/18/2022    K 3.8 04/18/2022     04/18/2022    CREATININE 0.8 04/18/2022    BUN 14 04/18/2022    CO2 23 04/18/2022    TSH 2.694 06/28/2021    HGBA1C 5.2 10/14/2020       US Lower Extremity Veins Bilateral  Narrative: EXAMINATION:  US LOWER EXTREMITY VEINS BILATERAL    CLINICAL HISTORY:  Pain in right leg    TECHNIQUE:  Duplex and color flow Doppler and dynamic compression was performed of the bilateral lower extremity veins was performed.    COMPARISON:  None    FINDINGS:  Right thigh veins: The common femoral, femoral, popliteal, upper greater saphenous, and deep femoral veins are patent and free of thrombus. The veins are normally  "compressible and have normal phasic flow and augmentation response.    Right calf veins: The visualized calf veins are patent.    Left thigh veins: The common femoral, femoral, popliteal, upper greater saphenous, and deep femoral veins are patent and free of thrombus. The veins are normally compressible and have normal phasic flow and augmentation response.    Left calf veins: The visualized calf veins are patent.    Miscellaneous: None  Impression: No evidence of deep venous thrombosis in either lower extremity.    Electronically signed by: ANTONIO Ma MD  Date:    03/07/2022  Time:    10:33        Review of Systems   Constitutional: Negative for chills, fatigue and fever.   HENT: Negative for ear pain and trouble swallowing.    Eyes: Negative for pain and visual disturbance.   Respiratory: Negative for cough and shortness of breath.    Cardiovascular: Negative for chest pain and leg swelling.   Gastrointestinal: Negative for abdominal pain, blood in stool, nausea and vomiting.   Endocrine: Negative for cold intolerance and heat intolerance.   Genitourinary: Negative for dysuria and frequency.   Musculoskeletal: Negative for joint swelling, myalgias and neck pain.   Skin: Negative for color change and rash.   Neurological: Negative for dizziness and headaches.   Psychiatric/Behavioral: Negative for behavioral problems and sleep disturbance.     Objective:     Vitals:    04/27/22 1345   BP: 130/80   Pulse: 74   Weight: 82.2 kg (181 lb 1.7 oz)   Height: 5' 3" (1.6 m)     Physical Exam  Vitals reviewed.   Constitutional:       Appearance: Normal appearance. She is well-developed. She is obese.   HENT:      Head: Normocephalic and atraumatic.      Right Ear: Tympanic membrane and external ear normal.      Left Ear: Tympanic membrane and external ear normal.      Nose: Nose normal.      Mouth/Throat:      Mouth: Mucous membranes are moist.      Pharynx: Oropharynx is clear.   Eyes:      Conjunctiva/sclera: " Conjunctivae normal.      Pupils: Pupils are equal, round, and reactive to light.   Neck:      Thyroid: No thyromegaly.   Cardiovascular:      Rate and Rhythm: Normal rate and regular rhythm.      Heart sounds: No murmur heard.    No friction rub. No gallop.   Pulmonary:      Effort: Pulmonary effort is normal. No respiratory distress.      Breath sounds: No wheezing or rales.   Abdominal:      General: Bowel sounds are normal. There is no distension.      Palpations: Abdomen is soft.      Tenderness: There is no abdominal tenderness. There is no rebound.   Musculoskeletal:         General: Normal range of motion.      Cervical back: Normal range of motion and neck supple.   Lymphadenopathy:      Cervical: No cervical adenopathy.   Skin:     General: Skin is warm and dry.      Findings: No rash.   Neurological:      Mental Status: She is alert and oriented to person, place, and time.   Psychiatric:         Attention and Perception: Attention and perception normal.         Mood and Affect: Mood and affect normal.         Speech: Speech normal.         Behavior: Behavior normal.         Thought Content: Thought content normal.         Cognition and Memory: Cognition and memory normal.         Judgment: Judgment normal.       Assessment:     1. Essential hypertension    2. Mood disorder    3. Anxiety    4. Recurrent major depressive disorder, in partial remission    5. Hyponatremia    6. Age-related osteoporosis without current pathological fracture    7. Other specified hypothyroidism    8. Dysmetabolic syndrome X    9. Macrocytic anemia    10. Overweight    11. Atherosclerosis of both carotid arteries    12. Mixed hyperlipidemia    13. Routine general medical examination at a health care facility      Plan:   Cleo was seen today for follow-up.    Diagnoses and all orders for this visit:    Essential hypertension - stable, Continue with current medications and interventions. Labs reviewed.       Mood disorder -  stable, Continue with current medications and interventions. Labs reviewed.     Comments:  Benefitting greatly from Wellbutrin and Zoloft combo p.r.n. use of Xanax  Orders:  -     ALPRAZolam (XANAX) 1 MG tablet; Take 1 tablet (1 mg total) by mouth 2 (two) times daily.    Anxiety - stable, Continue with current medications and interventions. Labs reviewed.       Recurrent major depressive disorder, in partial remission - stable, Continue with current medications and interventions. Labs reviewed.       Hyponatremia- resolved off hctz.     Age-related osteoporosis without current pathological fracture  - stable, Continue with current medications and interventions. Labs reviewed.       Other specified hypothyroidism - stable, Continue with current medications and interventions. Labs reviewed.       Dysmetabolic syndrome X - stable, Continue with current medications and interventions. Labs reviewed.       Macrocytic anemia - stable, Continue with current medications and interventions. Labs reviewed.       Overweight - stable, Continue with current medications and interventions. Labs reviewed.       Atherosclerosis of both carotid arteries - stable, Continue with current medications and interventions. Labs reviewed.       Mixed hyperlipidemia - stable, Continue with current medications and interventions. Labs reviewed.       Routine general medical examination at a [facility] facility- - labs reviewed. Discussed Health Maintenance issues.       Other orders  -     levothyroxine (SYNTHROID) 112 MCG tablet; Take 1 tablet (112 mcg total) by mouth before breakfast.            Follow up in about 6 months (around 10/27/2022) for f/u office visit Dr. Julian.    There are no Patient Instructions on file for this visit.

## 2022-04-29 ENCOUNTER — PATIENT OUTREACH (OUTPATIENT)
Dept: ADMINISTRATIVE | Facility: HOSPITAL | Age: 72
End: 2022-04-29
Payer: MEDICARE

## 2022-05-04 ENCOUNTER — TELEPHONE (OUTPATIENT)
Dept: RHEUMATOLOGY | Facility: CLINIC | Age: 72
End: 2022-05-04
Payer: MEDICARE

## 2022-05-05 ENCOUNTER — OFFICE VISIT (OUTPATIENT)
Dept: RHEUMATOLOGY | Facility: CLINIC | Age: 72
End: 2022-05-05
Payer: MEDICARE

## 2022-05-05 ENCOUNTER — INFUSION (OUTPATIENT)
Dept: INFUSION THERAPY | Facility: HOSPITAL | Age: 72
End: 2022-05-05
Attending: INTERNAL MEDICINE
Payer: MEDICARE

## 2022-05-05 VITALS
SYSTOLIC BLOOD PRESSURE: 138 MMHG | HEART RATE: 70 BPM | BODY MASS INDEX: 32.15 KG/M2 | HEIGHT: 63 IN | WEIGHT: 181.44 LBS | DIASTOLIC BLOOD PRESSURE: 83 MMHG

## 2022-05-05 DIAGNOSIS — M81.0 AGE-RELATED OSTEOPOROSIS WITHOUT CURRENT PATHOLOGICAL FRACTURE: Primary | ICD-10-CM

## 2022-05-05 PROCEDURE — 1159F MED LIST DOCD IN RCRD: CPT | Mod: CPTII,S$GLB,, | Performed by: INTERNAL MEDICINE

## 2022-05-05 PROCEDURE — 1160F PR REVIEW ALL MEDS BY PRESCRIBER/CLIN PHARMACIST DOCUMENTED: ICD-10-PCS | Mod: CPTII,S$GLB,, | Performed by: INTERNAL MEDICINE

## 2022-05-05 PROCEDURE — 3288F PR FALLS RISK ASSESSMENT DOCUMENTED: ICD-10-PCS | Mod: CPTII,S$GLB,, | Performed by: INTERNAL MEDICINE

## 2022-05-05 PROCEDURE — 99214 PR OFFICE/OUTPT VISIT, EST, LEVL IV, 30-39 MIN: ICD-10-PCS | Mod: S$GLB,,, | Performed by: INTERNAL MEDICINE

## 2022-05-05 PROCEDURE — 4010F PR ACE/ARB THEARPY RXD/TAKEN: ICD-10-PCS | Mod: CPTII,S$GLB,, | Performed by: INTERNAL MEDICINE

## 2022-05-05 PROCEDURE — 3075F PR MOST RECENT SYSTOLIC BLOOD PRESS GE 130-139MM HG: ICD-10-PCS | Mod: CPTII,S$GLB,, | Performed by: INTERNAL MEDICINE

## 2022-05-05 PROCEDURE — 3008F PR BODY MASS INDEX (BMI) DOCUMENTED: ICD-10-PCS | Mod: CPTII,S$GLB,, | Performed by: INTERNAL MEDICINE

## 2022-05-05 PROCEDURE — 3079F PR MOST RECENT DIASTOLIC BLOOD PRESSURE 80-89 MM HG: ICD-10-PCS | Mod: CPTII,S$GLB,, | Performed by: INTERNAL MEDICINE

## 2022-05-05 PROCEDURE — 96372 THER/PROPH/DIAG INJ SC/IM: CPT

## 2022-05-05 PROCEDURE — 3288F FALL RISK ASSESSMENT DOCD: CPT | Mod: CPTII,S$GLB,, | Performed by: INTERNAL MEDICINE

## 2022-05-05 PROCEDURE — 1101F PR PT FALLS ASSESS DOC 0-1 FALLS W/OUT INJ PAST YR: ICD-10-PCS | Mod: CPTII,S$GLB,, | Performed by: INTERNAL MEDICINE

## 2022-05-05 PROCEDURE — 1101F PT FALLS ASSESS-DOCD LE1/YR: CPT | Mod: CPTII,S$GLB,, | Performed by: INTERNAL MEDICINE

## 2022-05-05 PROCEDURE — 1160F RVW MEDS BY RX/DR IN RCRD: CPT | Mod: CPTII,S$GLB,, | Performed by: INTERNAL MEDICINE

## 2022-05-05 PROCEDURE — 3079F DIAST BP 80-89 MM HG: CPT | Mod: CPTII,S$GLB,, | Performed by: INTERNAL MEDICINE

## 2022-05-05 PROCEDURE — 4010F ACE/ARB THERAPY RXD/TAKEN: CPT | Mod: CPTII,S$GLB,, | Performed by: INTERNAL MEDICINE

## 2022-05-05 PROCEDURE — 1126F AMNT PAIN NOTED NONE PRSNT: CPT | Mod: CPTII,S$GLB,, | Performed by: INTERNAL MEDICINE

## 2022-05-05 PROCEDURE — 3075F SYST BP GE 130 - 139MM HG: CPT | Mod: CPTII,S$GLB,, | Performed by: INTERNAL MEDICINE

## 2022-05-05 PROCEDURE — 3008F BODY MASS INDEX DOCD: CPT | Mod: CPTII,S$GLB,, | Performed by: INTERNAL MEDICINE

## 2022-05-05 PROCEDURE — 99999 PR PBB SHADOW E&M-EST. PATIENT-LVL III: ICD-10-PCS | Mod: PBBFAC,,, | Performed by: INTERNAL MEDICINE

## 2022-05-05 PROCEDURE — 1159F PR MEDICATION LIST DOCUMENTED IN MEDICAL RECORD: ICD-10-PCS | Mod: CPTII,S$GLB,, | Performed by: INTERNAL MEDICINE

## 2022-05-05 PROCEDURE — 63600175 PHARM REV CODE 636 W HCPCS: Mod: JG | Performed by: INTERNAL MEDICINE

## 2022-05-05 PROCEDURE — 1126F PR PAIN SEVERITY QUANTIFIED, NO PAIN PRESENT: ICD-10-PCS | Mod: CPTII,S$GLB,, | Performed by: INTERNAL MEDICINE

## 2022-05-05 PROCEDURE — 99999 PR PBB SHADOW E&M-EST. PATIENT-LVL III: CPT | Mod: PBBFAC,,, | Performed by: INTERNAL MEDICINE

## 2022-05-05 PROCEDURE — 99214 OFFICE O/P EST MOD 30 MIN: CPT | Mod: S$GLB,,, | Performed by: INTERNAL MEDICINE

## 2022-05-05 RX ADMIN — DENOSUMAB 60 MG: 60 INJECTION SUBCUTANEOUS at 02:05

## 2022-05-05 NOTE — PROGRESS NOTES
RHEUMATOLOGY CLINIC FOLLOW UP VISIT  Chief complaints, HPI, ROS, EXAM, Assessment & Plans:-  Cleo Arnold a 71 y.o. pleasant female comes in for follow up visit. She follows in the Rheumatology Clinic for osteoporosis.  She was started on Prolia.  She reports doing well.  No fragility fractures since last visit.  Rheumatological review of system negative otherwise.    1. Age-related osteoporosis without current pathological fracture      Problem List Items Addressed This Visit     Age-related osteoporosis without current pathological fracture - Primary             Continue Prolia every 6 months.  CMP normal from today.  DEXA in 2021 showed significant improvement.  Repeat DEXA 2023.  # Follow up in about 6 months (around 11/5/2022).      Disclaimer: This note was prepared using voice recognition system and is likely to have sound alike errors and is not proof read.  Please call me with any questions.

## 2022-06-24 ENCOUNTER — PATIENT MESSAGE (OUTPATIENT)
Dept: PRIMARY CARE CLINIC | Facility: CLINIC | Age: 72
End: 2022-06-24
Payer: MEDICARE

## 2022-07-25 ENCOUNTER — DOCUMENTATION ONLY (OUTPATIENT)
Dept: REHABILITATION | Facility: HOSPITAL | Age: 72
End: 2022-07-25
Payer: MEDICARE

## 2022-07-25 PROBLEM — R26.89 DECREASED FUNCTIONAL MOBILITY: Status: RESOLVED | Noted: 2022-03-11 | Resolved: 2022-07-25

## 2022-07-25 PROBLEM — R26.9 GAIT ABNORMALITY: Status: RESOLVED | Noted: 2019-12-16 | Resolved: 2022-07-25

## 2022-07-25 PROBLEM — M25.651 DECREASED RANGE OF RIGHT HIP MOVEMENT: Status: RESOLVED | Noted: 2022-03-11 | Resolved: 2022-07-25

## 2022-07-25 NOTE — PROGRESS NOTES
[facility]  Physical Therapy Discharge Note    Name: Cleo Ford  [facility] Number: [id number]    Therapy Diagnosis:        Encounter Diagnoses   Name Primary?    Decreased functional mobility Yes    Gait abnormality      Decreased range of right hip movement        Physician: Elizabeth Garcia PA-C     Physician Orders: PT Eval and Treat  Medical Diagnosis from Referral: Abnormal gait   Evaluation Date: 3/9/2022      Date of Last visit: 4/4/2022  Total Visits Received: 6    ASSESSMENT        Discharge reason: Patient has not attended therapy since 4/4/2022    Discharge FOTO Score: NA    Goals: Not met due to pt not returning to PT     PLAN   This patient is discharged from PT.    Anabela Ho, PT, DPT

## 2022-09-07 ENCOUNTER — TELEPHONE (OUTPATIENT)
Dept: PRIMARY CARE CLINIC | Facility: CLINIC | Age: 72
End: 2022-09-07
Payer: MEDICARE

## 2022-09-07 DIAGNOSIS — Z12.31 ENCOUNTER FOR SCREENING MAMMOGRAM FOR BREAST CANCER: Primary | ICD-10-CM

## 2022-09-07 NOTE — TELEPHONE ENCOUNTER
----- Message from Nadia Lee sent at 9/6/2022  2:14 PM CDT -----  Contact: Patient  Type:  Mammogram    Caller is requesting to schedule their annual mammogram appointment.  Order is not listed in EPIC.  Please enter order and contact patient to schedule.  Name of Caller:Cleo Ford   Where would they like the mammogram performed? [facility]   Would the patient rather a call back or a response via [facility]? [facility]  Best Call Back Number:[phone]   Additional Information: pt would also like to get a flu shot scheduled.

## 2022-09-08 NOTE — TELEPHONE ENCOUNTER
I have signed for the following orders AND/OR meds.  Please call the patient and ask the patient to schedule the testing AND/OR inform about any medications that were sent.      Orders Placed This Encounter   Procedures    Mammo Digital Screening Bilat w/ Rasta     Standing Status:   Future     Standing Expiration Date:   9/7/2024     Order Specific Question:   May the Radiologist modify the order per protocol to meet the clinical needs of the patient?     Answer:   Yes     Order Specific Question:   Release to patient     Answer:   Immediate

## 2022-09-21 ENCOUNTER — HOSPITAL ENCOUNTER (OUTPATIENT)
Dept: RADIOLOGY | Facility: HOSPITAL | Age: 72
Discharge: HOME | End: 2022-09-21
Attending: FAMILY MEDICINE
Payer: MEDICARE

## 2022-09-21 VITALS — BODY MASS INDEX: 32.07 KG/M2 | WEIGHT: 181 LBS | HEIGHT: 63 IN

## 2022-09-21 DIAGNOSIS — Z12.31 ENCOUNTER FOR SCREENING MAMMOGRAM FOR BREAST CANCER: ICD-10-CM

## 2022-09-21 PROCEDURE — 77067 SCR MAMMO BI INCL CAD: CPT | Mod: TC

## 2022-09-21 PROCEDURE — 77063 BREAST TOMOSYNTHESIS BI: CPT | Mod: TC

## 2022-09-21 PROCEDURE — 77063 MAMMO DIGITAL SCREENING BILAT WITH TOMO: ICD-10-PCS | Mod: 26,,, | Performed by: RADIOLOGY

## 2022-09-21 PROCEDURE — 77063 BREAST TOMOSYNTHESIS BI: CPT | Mod: 26,,, | Performed by: RADIOLOGY

## 2022-09-21 PROCEDURE — 77067 MAMMO DIGITAL SCREENING BILAT WITH TOMO: ICD-10-PCS | Mod: 26,,, | Performed by: RADIOLOGY

## 2022-09-21 PROCEDURE — 77067 SCR MAMMO BI INCL CAD: CPT | Mod: 26,,, | Performed by: RADIOLOGY

## 2022-09-24 ENCOUNTER — IMMUNIZATION (OUTPATIENT)
Dept: INTERNAL MEDICINE | Facility: CLINIC | Age: 72
End: 2022-09-24
Payer: MEDICARE

## 2022-09-24 PROCEDURE — G0008 FLU VACCINE - QUADRIVALENT - ADJUVANTED: ICD-10-PCS | Mod: S$GLB,,, | Performed by: FAMILY MEDICINE

## 2022-09-24 PROCEDURE — 90694 VACC AIIV4 NO PRSRV 0.5ML IM: CPT | Mod: S$GLB,,, | Performed by: FAMILY MEDICINE

## 2022-09-24 PROCEDURE — 90694 FLU VACCINE - QUADRIVALENT - ADJUVANTED: ICD-10-PCS | Mod: S$GLB,,, | Performed by: FAMILY MEDICINE

## 2022-09-24 PROCEDURE — G0008 ADMIN INFLUENZA VIRUS VAC: HCPCS | Mod: S$GLB,,, | Performed by: FAMILY MEDICINE

## 2022-09-26 NOTE — PROGRESS NOTES
Your mammogram is normal. You will need to repeat the exam again in 1-2 years. If you have further questions please let me know. Elizabeth Julian MD

## 2022-10-20 ENCOUNTER — OFFICE VISIT (OUTPATIENT)
Dept: PRIMARY CARE CLINIC | Facility: CLINIC | Age: 72
End: 2022-10-20
Payer: MEDICARE

## 2022-10-20 ENCOUNTER — LAB VISIT (OUTPATIENT)
Dept: LAB | Facility: HOSPITAL | Age: 72
End: 2022-10-20
Attending: FAMILY MEDICINE
Payer: MEDICARE

## 2022-10-20 VITALS
DIASTOLIC BLOOD PRESSURE: 72 MMHG | HEART RATE: 76 BPM | SYSTOLIC BLOOD PRESSURE: 136 MMHG | HEIGHT: 63 IN | WEIGHT: 166.13 LBS | BODY MASS INDEX: 29.44 KG/M2 | TEMPERATURE: 98 F

## 2022-10-20 DIAGNOSIS — D53.9 MACROCYTIC ANEMIA: ICD-10-CM

## 2022-10-20 DIAGNOSIS — R73.09 ABNORMAL GLUCOSE: ICD-10-CM

## 2022-10-20 DIAGNOSIS — E78.2 MIXED HYPERLIPIDEMIA: ICD-10-CM

## 2022-10-20 DIAGNOSIS — F39 MOOD DISORDER: ICD-10-CM

## 2022-10-20 DIAGNOSIS — I10 ESSENTIAL HYPERTENSION: ICD-10-CM

## 2022-10-20 DIAGNOSIS — E88.810 DYSMETABOLIC SYNDROME X: ICD-10-CM

## 2022-10-20 DIAGNOSIS — Z00.00 ROUTINE GENERAL MEDICAL EXAMINATION AT A HEALTH CARE FACILITY: Primary | ICD-10-CM

## 2022-10-20 DIAGNOSIS — Z00.00 ROUTINE GENERAL MEDICAL EXAMINATION AT A HEALTH CARE FACILITY: ICD-10-CM

## 2022-10-20 DIAGNOSIS — M81.0 AGE-RELATED OSTEOPOROSIS WITHOUT CURRENT PATHOLOGICAL FRACTURE: ICD-10-CM

## 2022-10-20 DIAGNOSIS — F41.9 ANXIETY: ICD-10-CM

## 2022-10-20 DIAGNOSIS — E87.1 HYPONATREMIA: ICD-10-CM

## 2022-10-20 DIAGNOSIS — E03.8 OTHER SPECIFIED HYPOTHYROIDISM: ICD-10-CM

## 2022-10-20 DIAGNOSIS — F33.41 RECURRENT MAJOR DEPRESSIVE DISORDER, IN PARTIAL REMISSION: ICD-10-CM

## 2022-10-20 DIAGNOSIS — I65.23 ATHEROSCLEROSIS OF BOTH CAROTID ARTERIES: ICD-10-CM

## 2022-10-20 PROCEDURE — 1126F PR PAIN SEVERITY QUANTIFIED, NO PAIN PRESENT: ICD-10-PCS | Mod: CPTII,S$GLB,, | Performed by: FAMILY MEDICINE

## 2022-10-20 PROCEDURE — 1160F RVW MEDS BY RX/DR IN RCRD: CPT | Mod: CPTII,S$GLB,, | Performed by: FAMILY MEDICINE

## 2022-10-20 PROCEDURE — 99397 PR PREVENTIVE VISIT,EST,65 & OVER: ICD-10-PCS | Mod: S$GLB,,, | Performed by: FAMILY MEDICINE

## 2022-10-20 PROCEDURE — 1126F AMNT PAIN NOTED NONE PRSNT: CPT | Mod: CPTII,S$GLB,, | Performed by: FAMILY MEDICINE

## 2022-10-20 PROCEDURE — 1101F PR PT FALLS ASSESS DOC 0-1 FALLS W/OUT INJ PAST YR: ICD-10-PCS | Mod: CPTII,S$GLB,, | Performed by: FAMILY MEDICINE

## 2022-10-20 PROCEDURE — 82728 ASSAY OF FERRITIN: CPT | Performed by: FAMILY MEDICINE

## 2022-10-20 PROCEDURE — 1160F PR REVIEW ALL MEDS BY PRESCRIBER/CLIN PHARMACIST DOCUMENTED: ICD-10-PCS | Mod: CPTII,S$GLB,, | Performed by: FAMILY MEDICINE

## 2022-10-20 PROCEDURE — 1159F PR MEDICATION LIST DOCUMENTED IN MEDICAL RECORD: ICD-10-PCS | Mod: CPTII,S$GLB,, | Performed by: FAMILY MEDICINE

## 2022-10-20 PROCEDURE — 3075F PR MOST RECENT SYSTOLIC BLOOD PRESS GE 130-139MM HG: ICD-10-PCS | Mod: CPTII,S$GLB,, | Performed by: FAMILY MEDICINE

## 2022-10-20 PROCEDURE — 3075F SYST BP GE 130 - 139MM HG: CPT | Mod: CPTII,S$GLB,, | Performed by: FAMILY MEDICINE

## 2022-10-20 PROCEDURE — 84466 ASSAY OF TRANSFERRIN: CPT | Performed by: FAMILY MEDICINE

## 2022-10-20 PROCEDURE — 80061 LIPID PANEL: CPT | Performed by: FAMILY MEDICINE

## 2022-10-20 PROCEDURE — 99999 PR PBB SHADOW E&M-EST. PATIENT-LVL III: CPT | Mod: PBBFAC,,, | Performed by: FAMILY MEDICINE

## 2022-10-20 PROCEDURE — 84439 ASSAY OF FREE THYROXINE: CPT | Performed by: FAMILY MEDICINE

## 2022-10-20 PROCEDURE — 36415 COLL VENOUS BLD VENIPUNCTURE: CPT | Mod: PN | Performed by: FAMILY MEDICINE

## 2022-10-20 PROCEDURE — 3078F DIAST BP <80 MM HG: CPT | Mod: CPTII,S$GLB,, | Performed by: FAMILY MEDICINE

## 2022-10-20 PROCEDURE — 80053 COMPREHEN METABOLIC PANEL: CPT | Performed by: FAMILY MEDICINE

## 2022-10-20 PROCEDURE — 99397 PER PM REEVAL EST PAT 65+ YR: CPT | Mod: S$GLB,,, | Performed by: FAMILY MEDICINE

## 2022-10-20 PROCEDURE — 4010F ACE/ARB THERAPY RXD/TAKEN: CPT | Mod: CPTII,S$GLB,, | Performed by: FAMILY MEDICINE

## 2022-10-20 PROCEDURE — 82607 VITAMIN B-12: CPT | Performed by: FAMILY MEDICINE

## 2022-10-20 PROCEDURE — 83036 HEMOGLOBIN GLYCOSYLATED A1C: CPT | Performed by: FAMILY MEDICINE

## 2022-10-20 PROCEDURE — 3288F PR FALLS RISK ASSESSMENT DOCUMENTED: ICD-10-PCS | Mod: CPTII,S$GLB,, | Performed by: FAMILY MEDICINE

## 2022-10-20 PROCEDURE — 1101F PT FALLS ASSESS-DOCD LE1/YR: CPT | Mod: CPTII,S$GLB,, | Performed by: FAMILY MEDICINE

## 2022-10-20 PROCEDURE — 82306 VITAMIN D 25 HYDROXY: CPT | Performed by: FAMILY MEDICINE

## 2022-10-20 PROCEDURE — 84443 ASSAY THYROID STIM HORMONE: CPT | Performed by: FAMILY MEDICINE

## 2022-10-20 PROCEDURE — 4010F PR ACE/ARB THEARPY RXD/TAKEN: ICD-10-PCS | Mod: CPTII,S$GLB,, | Performed by: FAMILY MEDICINE

## 2022-10-20 PROCEDURE — 99999 PR PBB SHADOW E&M-EST. PATIENT-LVL III: ICD-10-PCS | Mod: PBBFAC,,, | Performed by: FAMILY MEDICINE

## 2022-10-20 PROCEDURE — 3288F FALL RISK ASSESSMENT DOCD: CPT | Mod: CPTII,S$GLB,, | Performed by: FAMILY MEDICINE

## 2022-10-20 PROCEDURE — 1159F MED LIST DOCD IN RCRD: CPT | Mod: CPTII,S$GLB,, | Performed by: FAMILY MEDICINE

## 2022-10-20 PROCEDURE — 3078F PR MOST RECENT DIASTOLIC BLOOD PRESSURE < 80 MM HG: ICD-10-PCS | Mod: CPTII,S$GLB,, | Performed by: FAMILY MEDICINE

## 2022-10-20 PROCEDURE — 85025 COMPLETE CBC W/AUTO DIFF WBC: CPT | Performed by: FAMILY MEDICINE

## 2022-10-20 RX ORDER — ALPRAZOLAM 1 MG/1
1 TABLET ORAL 2 TIMES DAILY
Qty: 180 TABLET | Refills: 1 | Status: SHIPPED | OUTPATIENT
Start: 2022-10-20 | End: 2023-07-10

## 2022-10-20 RX ORDER — AZITHROMYCIN 250 MG/1
TABLET, FILM COATED ORAL
Qty: 6 TABLET | Refills: 0 | Status: SHIPPED | OUTPATIENT
Start: 2022-10-20 | End: 2022-10-25

## 2022-10-20 NOTE — PROGRESS NOTES
Subjective:      Patient ID: Cleo Ford is a 72 y.o. female.    Chief Complaint: Follow-up    Disclaimer:  This note is prepared using voice recognition software and as such is likely to have errors and has not been proof read. Please contact me for questions.     [facility] Reason For Visit    10/17/2022 11:43 AM CDT - Filed by Patient   What is your primary reason for visit? Other/Annual   Have you experienced any of the following:   Change in activity? No   Unexpected weight change? No   Neck pain? No   Hearing loss? No   Runny nose? No   Trouble swallowing? No   Eye discharge? No   Changes in vision? No   Chest tightness? No   Wheezing? No   Chest pain? No   Heart beating fast or racing? No   Blood in stool? No   Constipation? No   Vomiting? No   Diarrhea? No   Drinking much more than usual? No   Urinating much more than usual? No   Difficulty urinating? No   Blood in the urine? No   Menstrual problem? No   Painful urination? No   Joint swelling? No   Joint pain? No   Headaches? No   Weakness? No   Confusion? No   Feeling depressed? No     [facility] Documents    10/17/2022 11:45 AM CDT - Filed by Patient   Would you like a copy of [facility]'s Financial Assistance Policy Summary? No, I would not like a copy.   Is this visit work-related or due to a work-related accident/injury? No     [facility]    10/17/2022 11:48 AM CDT - Filed by Patient   On average, how many days per week do you engage in moderate to strenuous exercise (like a brisk walk)? 0 days   On average, how many minutes do you engage in exercise at this level? 0 min   Do you feel stress - tense, restless, nervous, or anxious, or unable to sleep at night because your mind is troubled all the time - these days? Very much   Do you belong to any clubs or organizations such as Mu-ism groups, unions, fraternal or athletic groups, or school groups? Yes   How often do you attend meetings of the clubs or organizations you belong to? More than 4 times per  year   In a typical week, how many times do you talk on the phone with family, friends, or neighbors? More than three times a week   How often do you get together with friends or relatives? More than three times a week   Are you , , , , never , or living with a partner?    How hard is it for you to pay for the very basics like food, housing, medical care, and heating? Not hard at all   Within the past 12 months, you worried that your food would run out before you got the money to buy more. Never true   Within the past 12 months, the food you bought just didnt last and you didnt have money to get more. Never true   In the past 12 months, has lack of transportation kept you from medical appointments or from getting medications? No   In the past 12 months, has lack of transportation kept you from meetings, work, or from getting things needed for daily living? No   How often do you have a drink containing alcohol? Never   How many drinks containing alcohol do you have on a typical day when you are drinking? Patient does not drink   How often do you have six or more drinks on one occasion? Never   In the last 12 months, was there a time when you were not able to pay the mortgage or rent on time? No   In the last 12 months, how many places have you lived? (range: at least 0) 1   In the last 12 months, was there a time when you did not have a steady place to sleep or slept in a shelter (including now)? [place]       Cleo Ford is a 72 y.o. female who presents to clinic for  annual exam. Doing well.   Lost 15lb.   Doing intermittent fasting.   Doing more with real estate lately.   Needing meds. Wants xanax if possible for 90 day rx. Using up to bid dosing.   On statin.     Patient Active Problem List:     Essential hypertension     Hyperlipidemia     Hypothyroidism     Anxiety     Dysmetabolic syndrome X     Screen for colon cancer     Hyponatremia     Macrocytic anemia      Recurrent major depressive disorder, in partial remission     Chronic pain of right knee     Meniscal injury, right, initial encounter     Leg pain, posterior, right     Gait abnormality     Proximal muscle weakness     Decreased range of left hip movement     Age-related osteoporosis without current pathological fracture     Atherosclerosis of both carotid arteries     Family history of cardiovascular disease     Overweight     Decreased functional mobility     Decreased range of right hip movement        Lab Results   Component Value Date    HGBA1C 5.2 10/14/2020    HGBA1C 5.1 01/31/2019    HGBA1C 5.1 08/16/2018      Lab Results   Component Value Date    CHOL 195 12/02/2021    CHOL 177 10/12/2020    CHOL 198 01/31/2019     Lab Results   Component Value Date    LDLCALC 100.6 12/02/2021    LDLCALC 100.8 10/12/2020    LDLCALC 109.2 01/31/2019       Wt Readings from Last 10 Encounters:   10/20/22 75.4 kg (166 lb 1.9 oz)   09/21/22 82.1 kg (181 lb)   05/05/22 82.3 kg (181 lb 7 oz)   04/27/22 82.2 kg (181 lb 1.7 oz)   03/04/22 81.8 kg (180 lb 5.4 oz)   01/25/22 82.1 kg (181 lb)   01/18/22 82.2 kg (181 lb 3.5 oz)   01/06/22 82.6 kg (182 lb 1.6 oz)   12/01/21 84.8 kg (186 lb 15.2 oz)   06/28/21 81.6 kg (180 lb)       The 10-year ASCVD risk score (Timothy CARLIN, et al., 2019) is: 12.9%    Values used to calculate the score:      Age: 72 years      Sex: Female      Is Non- : No      Diabetic: No      Tobacco smoker: No      Systolic Blood Pressure: 136 mmHg      Is BP treated: Yes      HDL Cholesterol: 85 mg/dL      Total Cholesterol: 195 mg/dL  Lab Results   Component Value Date    WBC 7.22 01/21/2021    HGB 12.0 01/21/2021    HCT 37.6 01/21/2021     01/21/2021    CHOL 195 12/02/2021    TRIG 47 12/02/2021    HDL 85 (H) 12/02/2021    ALT 14 05/05/2022    AST 18 05/05/2022     05/05/2022    K 4.1 05/05/2022     05/05/2022    CREATININE 0.9 05/05/2022    BUN 21 05/05/2022    CO2 23  05/05/2022    TSH 2.694 06/28/2021    HGBA1C 5.2 10/14/2020       Mammo Digital Screening Bilat w/ Rasta  Narrative: Result:  Mammo Digital Screening Bilat w/ Rasta    History:  Patient is 72 y.o. and is seen for a screening mammogram.    Films Compared:  Compared to: 09/14/2021 Mammo Digital Screening Bilat w/ Rasta and   09/01/2020 Mammo Digital Screening Bilat w/ Rasta     Findings:   This procedure was performed using tomosynthesis.   Computer-aided detection was utilized in the interpretation of this   examination.    The breasts have scattered areas of fibroglandular density. There is no   evidence of suspicious masses, microcalcifications or architectural   distortion.  Impression:    No mammographic evidence of malignancy.    BI-RADS Category 1: Negative    Recommendation:  Routine screening mammogram in 1 year is recommended.    Your estimated lifetime risk of breast cancer (to age 85) based on   Tyrer-Cuzick risk assessment model is 6.22 %.  According to the American   Cancer Society, patients with a lifetime breast cancer risk of 20% or   higher might benefit from supplemental screening tests. ??         Review of Systems   Constitutional:  Negative for activity change and unexpected weight change.   HENT:  Negative for hearing loss, rhinorrhea and trouble swallowing.    Eyes:  Negative for discharge and visual disturbance.   Respiratory:  Negative for chest tightness and wheezing.    Cardiovascular:  Negative for chest pain and palpitations.   Gastrointestinal:  Negative for blood in stool, constipation, diarrhea and vomiting.   Endocrine: Negative for polydipsia and polyuria.   Genitourinary:  Negative for difficulty urinating, dysuria, hematuria and menstrual problem.   Musculoskeletal:  Negative for arthralgias, joint swelling and neck pain.   Neurological:  Negative for weakness and headaches.   Psychiatric/Behavioral:  Negative for confusion and dysphoric mood.    Objective:     Vitals:    10/20/22 0913  "  BP: 136/72   Pulse: 76   Temp: 97.5 °F (36.4 °C)   Weight: 75.4 kg (166 lb 1.9 oz)   Height: 5' 3" (1.6 m)     Physical Exam  Vitals reviewed.   Constitutional:       Appearance: Normal appearance. She is well-developed, well-groomed and overweight.   HENT:      Head: Normocephalic and atraumatic.      Right Ear: Tympanic membrane and external ear normal.      Left Ear: Tympanic membrane and external ear normal.      Nose: Nose normal.      Mouth/Throat:      Mouth: Mucous membranes are moist.      Pharynx: Oropharynx is clear.   Eyes:      Conjunctiva/sclera: Conjunctivae normal.      Pupils: Pupils are equal, round, and reactive to light.   Neck:      Thyroid: No thyromegaly.   Cardiovascular:      Rate and Rhythm: Normal rate and regular rhythm.      Heart sounds: No murmur heard.    No friction rub. No gallop.   Pulmonary:      Effort: Pulmonary effort is normal. No respiratory distress.      Breath sounds: No wheezing or rales.   Abdominal:      General: Bowel sounds are normal. There is no distension.      Palpations: Abdomen is soft.      Tenderness: There is no abdominal tenderness. There is no rebound.   Musculoskeletal:         General: Normal range of motion.      Cervical back: Normal range of motion and neck supple.   Lymphadenopathy:      Cervical: No cervical adenopathy.   Skin:     General: Skin is warm and dry.      Findings: No rash.   Neurological:      Mental Status: She is alert and oriented to person, place, and time.   Psychiatric:         Attention and Perception: Attention and perception normal.         Mood and Affect: Mood and affect normal.         Speech: Speech normal.         Behavior: Behavior normal. Behavior is cooperative.         Thought Content: Thought content normal.         Cognition and Memory: Cognition and memory normal.         Judgment: Judgment normal.     Assessment:     1. Routine general medical examination at a health care facility    2. Essential hypertension  "   3. Mood disorder    4. Anxiety    5. Recurrent major depressive disorder, in partial remission    6. Age-related osteoporosis without current pathological fracture    7. Other specified hypothyroidism    8. Dysmetabolic syndrome X    9. Atherosclerosis of both carotid arteries    10. Macrocytic anemia    11. Mixed hyperlipidemia    12. Hyponatremia    13. Abnormal glucose    14. Mood disorder      Plan:   Cleo was seen today for follow-up.    Diagnoses and all orders for this visit:    Routine general medical examination at a health care facility - labs ordered. Discussed Health Maintenance issues.   Pt with [employer] and provides Preventative visit yearly.   -     TSH; Future  -     T4, Free; Future  -     Lipid Panel; Future  -     Hemoglobin A1C; Future  -     Comprehensive Metabolic Panel; Future  -     CBC Auto Differential; Future  -     Microalbumin/Creatinine Ratio, Urine; Future  -     Vitamin D; Future  -     Ferritin; Future  -     Iron and TIBC; Future  -     Vitamin B12; Future    Essential hypertension - stable, Continue with current medications and interventions. Labs ordered    -     TSH; Future  -     T4, Free; Future  -     Lipid Panel; Future  -     Hemoglobin A1C; Future  -     Comprehensive Metabolic Panel; Future  -     CBC Auto Differential; Future  -     Microalbumin/Creatinine Ratio, Urine; Future  -     Vitamin D; Future  -     Ferritin; Future  -     Iron and TIBC; Future  -     Vitamin B12; Future    Mood disorder- stable, Continue with current medications and interventions. Labs ordered, rf'ed xanax.   -     TSH; Future  -     T4, Free; Future  -     Lipid Panel; Future  -     Hemoglobin A1C; Future  -     Comprehensive Metabolic Panel; Future  -     CBC Auto Differential; Future  -     Microalbumin/Creatinine Ratio, Urine; Future  -     Vitamin D; Future  -     Ferritin; Future  -     Iron and TIBC; Future  -     Vitamin B12; Future  -     ALPRAZolam (XANAX) 1  MG tablet; Take 1 tablet (1 mg total) by mouth 2 (two) times daily.    Anxiety - stable, Continue with current medications and interventions. Labs ordered, 'ed xanax.   -     TSH; Future  -     T4, Free; Future  -     Lipid Panel; Future  -     Hemoglobin A1C; Future  -     Comprehensive Metabolic Panel; Future  -     CBC Auto Differential; Future  -     Microalbumin/Creatinine Ratio, Urine; Future  -     Vitamin D; Future  -     Ferritin; Future  -     Iron and TIBC; Future  -     Vitamin B12; Future    Recurrent major depressive disorder, in partial remission - stable, Continue with current medications and interventions. Labs ordered  -     TSH; Future  -     T4, Free; Future  -     Lipid Panel; Future  -     Hemoglobin A1C; Future  -     Comprehensive Metabolic Panel; Future  -     CBC Auto Differential; Future  -     Microalbumin/Creatinine Ratio, Urine; Future  -     Vitamin D; Future  -     Ferritin; Future  -     Iron and TIBC; Future  -     Vitamin B12; Future    Age-related osteoporosis without current pathological fracture- stable, Continue with current medications and interventions. Labs ordered  -     TSH; Future  -     T4, Free; Future  -     Lipid Panel; Future  -     Hemoglobin A1C; Future  -     Comprehensive Metabolic Panel; Future  -     CBC Auto Differential; Future  -     Microalbumin/Creatinine Ratio, Urine; Future  -     Vitamin D; Future  -     Ferritin; Future  -     Iron and TIBC; Future  -     Vitamin B12; Future    Other specified hypothyroidism- stable, Continue with current medications and interventions. Labs ordered  -     TSH; Future  -     T4, Free; Future  -     Lipid Panel; Future  -     Hemoglobin A1C; Future  -     Comprehensive Metabolic Panel; Future  -     CBC Auto Differential; Future  -     Microalbumin/Creatinine Ratio, Urine; Future  -     Vitamin D; Future  -     Ferritin; Future  -     Iron and TIBC; Future  -     Vitamin B12; Future    Dysmetabolic syndrome X- stable,  Continue with current medications and interventions. Labs ordered  -     TSH; Future  -     T4, Free; Future  -     Lipid Panel; Future  -     Hemoglobin A1C; Future  -     Comprehensive Metabolic Panel; Future  -     CBC Auto Differential; Future  -     Microalbumin/Creatinine Ratio, Urine; Future  -     Vitamin D; Future  -     Ferritin; Future  -     Iron and TIBC; Future  -     Vitamin B12; Future    Atherosclerosis of both carotid arteries- stable, Continue with current medications and interventions. Labs ordered  -     TSH; Future  -     T4, Free; Future  -     Lipid Panel; Future  -     Hemoglobin A1C; Future  -     Comprehensive Metabolic Panel; Future  -     CBC Auto Differential; Future  -     Microalbumin/Creatinine Ratio, Urine; Future  -     Vitamin D; Future  -     Ferritin; Future  -     Iron and TIBC; Future  -     Vitamin B12; Future    Macrocytic anemia- stable, Continue with current medications and interventions. Labs ordered  -     TSH; Future  -     T4, Free; Future  -     Lipid Panel; Future  -     Hemoglobin A1C; Future  -     Comprehensive Metabolic Panel; Future  -     CBC Auto Differential; Future  -     Microalbumin/Creatinine Ratio, Urine; Future  -     Vitamin D; Future  -     Ferritin; Future  -     Iron and TIBC; Future  -     Vitamin B12; Future    Mixed hyperlipidemia- stable, Continue with current medications and interventions. Labs ordered  -     TSH; Future  -     T4, Free; Future  -     Lipid Panel; Future  -     Hemoglobin A1C; Future  -     Comprehensive Metabolic Panel; Future  -     CBC Auto Differential; Future  -     Microalbumin/Creatinine Ratio, Urine; Future  -     Vitamin D; Future  -     Ferritin; Future  -     Iron and TIBC; Future  -     Vitamin B12; Future    Hyponatremia- stable, Continue with current medications and interventions. Labs ordered  -     TSH; Future  -     T4, Free; Future  -     Lipid Panel; Future  -     Hemoglobin A1C; Future  -     Comprehensive  Metabolic Panel; Future  -     CBC Auto Differential; Future  -     Microalbumin/Creatinine Ratio, Urine; Future  -     Vitamin D; Future  -     Ferritin; Future  -     Iron and TIBC; Future  -     Vitamin B12; Future    Abnormal glucose- stable, Continue with current medications and interventions. Labs ordered  -     Hemoglobin A1C; Future    Mood disorder  Comments:  Benefitting greatly from Wellbutrin and Zoloft combo p.r.n. use of Xanax  Orders:  -     TSH; Future  -     T4, Free; Future  -     Lipid Panel; Future  -     Hemoglobin A1C; Future  -     Comprehensive Metabolic Panel; Future  -     CBC Auto Differential; Future  -     Microalbumin/Creatinine Ratio, Urine; Future  -     Vitamin D; Future  -     Ferritin; Future  -     Iron and TIBC; Future  -     Vitamin B12; Future  -     ALPRAZolam (XANAX) 1 MG tablet; Take 1 tablet (1 mg total) by mouth 2 (two) times daily.    Other orders  -     azithromycin (Z-ALLEY) 250 MG tablet; Take 2 tablets by mouth on day 1; Take 1 tablet by mouth on days 2-5        Health Maintenance Due   Topic Date Due    COVID-19 Vaccine (5 - Booster) 03/29/2022       No follow-ups on file.    There are no Patient Instructions on file for this visit.

## 2022-10-21 LAB
25(OH)D3+25(OH)D2 SERPL-MCNC: 55 NG/ML (ref 30–96)
ALBUMIN SERPL BCP-MCNC: 4.1 G/DL (ref 3.5–5.2)
ALP SERPL-CCNC: 61 U/L (ref 55–135)
ALT SERPL W/O P-5'-P-CCNC: 11 U/L (ref 10–44)
ANION GAP SERPL CALC-SCNC: 15 MMOL/L (ref 8–16)
AST SERPL-CCNC: 20 U/L (ref 10–40)
BASOPHILS # BLD AUTO: 0.05 K/UL (ref 0–0.2)
BASOPHILS NFR BLD: 0.6 % (ref 0–1.9)
BILIRUB SERPL-MCNC: 0.6 MG/DL (ref 0.1–1)
BUN SERPL-MCNC: 13 MG/DL (ref 8–23)
CALCIUM SERPL-MCNC: 10.6 MG/DL (ref 8.7–10.5)
CHLORIDE SERPL-SCNC: 102 MMOL/L (ref 95–110)
CHOLEST SERPL-MCNC: 202 MG/DL (ref 120–199)
CHOLEST/HDLC SERPL: 3.1 {RATIO} (ref 2–5)
CO2 SERPL-SCNC: 27 MMOL/L (ref 23–29)
CREAT SERPL-MCNC: 0.8 MG/DL (ref 0.5–1.4)
DIFFERENTIAL METHOD: ABNORMAL
EOSINOPHIL # BLD AUTO: 0.2 K/UL (ref 0–0.5)
EOSINOPHIL NFR BLD: 2.2 % (ref 0–8)
ERYTHROCYTE [DISTWIDTH] IN BLOOD BY AUTOMATED COUNT: 13.7 % (ref 11.5–14.5)
EST. GFR  (NO RACE VARIABLE): >60 ML/MIN/1.73 M^2
ESTIMATED AVG GLUCOSE: 108 MG/DL (ref 68–131)
FERRITIN SERPL-MCNC: 27 NG/ML (ref 20–300)
GLUCOSE SERPL-MCNC: 91 MG/DL (ref 70–110)
HBA1C MFR BLD: 5.4 % (ref 4–5.6)
HCT VFR BLD AUTO: 40.8 % (ref 37–48.5)
HDLC SERPL-MCNC: 66 MG/DL (ref 40–75)
HDLC SERPL: 32.7 % (ref 20–50)
HGB BLD-MCNC: 13 G/DL (ref 12–16)
IMM GRANULOCYTES # BLD AUTO: 0.02 K/UL (ref 0–0.04)
IMM GRANULOCYTES NFR BLD AUTO: 0.2 % (ref 0–0.5)
IRON SERPL-MCNC: 86 UG/DL (ref 30–160)
LDLC SERPL CALC-MCNC: 125.2 MG/DL (ref 63–159)
LYMPHOCYTES # BLD AUTO: 2.3 K/UL (ref 1–4.8)
LYMPHOCYTES NFR BLD: 25.8 % (ref 18–48)
MCH RBC QN AUTO: 30.5 PG (ref 27–31)
MCHC RBC AUTO-ENTMCNC: 31.9 G/DL (ref 32–36)
MCV RBC AUTO: 96 FL (ref 82–98)
MONOCYTES # BLD AUTO: 0.7 K/UL (ref 0.3–1)
MONOCYTES NFR BLD: 7.2 % (ref 4–15)
NEUTROPHILS # BLD AUTO: 5.8 K/UL (ref 1.8–7.7)
NEUTROPHILS NFR BLD: 64 % (ref 38–73)
NONHDLC SERPL-MCNC: 136 MG/DL
NRBC BLD-RTO: 0 /100 WBC
PLATELET # BLD AUTO: 271 K/UL (ref 150–450)
PMV BLD AUTO: 10.7 FL (ref 9.2–12.9)
POTASSIUM SERPL-SCNC: 3.9 MMOL/L (ref 3.5–5.1)
PROT SERPL-MCNC: 7.3 G/DL (ref 6–8.4)
RBC # BLD AUTO: 4.26 M/UL (ref 4–5.4)
SATURATED IRON: 20 % (ref 20–50)
SODIUM SERPL-SCNC: 144 MMOL/L (ref 136–145)
T4 FREE SERPL-MCNC: 1.3 NG/DL (ref 0.71–1.51)
TOTAL IRON BINDING CAPACITY: 429 UG/DL (ref 250–450)
TRANSFERRIN SERPL-MCNC: 290 MG/DL (ref 200–375)
TRIGL SERPL-MCNC: 54 MG/DL (ref 30–150)
TSH SERPL DL<=0.005 MIU/L-ACNC: 1.1 UIU/ML (ref 0.4–4)
VIT B12 SERPL-MCNC: 1104 PG/ML (ref 210–950)
WBC # BLD AUTO: 8.99 K/UL (ref 3.9–12.7)

## 2022-11-09 ENCOUNTER — INFUSION (OUTPATIENT)
Dept: INFUSION THERAPY | Facility: HOSPITAL | Age: 72
End: 2022-11-09
Attending: INTERNAL MEDICINE
Payer: MEDICARE

## 2022-11-09 ENCOUNTER — OFFICE VISIT (OUTPATIENT)
Dept: RHEUMATOLOGY | Facility: CLINIC | Age: 72
End: 2022-11-09
Payer: MEDICARE

## 2022-11-09 VITALS
DIASTOLIC BLOOD PRESSURE: 67 MMHG | HEART RATE: 82 BPM | SYSTOLIC BLOOD PRESSURE: 120 MMHG | HEIGHT: 63 IN | WEIGHT: 168 LBS | BODY MASS INDEX: 29.77 KG/M2

## 2022-11-09 VITALS — WEIGHT: 168 LBS | BODY MASS INDEX: 29.77 KG/M2 | HEIGHT: 63 IN

## 2022-11-09 DIAGNOSIS — M81.0 AGE-RELATED OSTEOPOROSIS WITHOUT CURRENT PATHOLOGICAL FRACTURE: Primary | ICD-10-CM

## 2022-11-09 DIAGNOSIS — M70.71 ISCHIAL BURSITIS OF RIGHT SIDE: ICD-10-CM

## 2022-11-09 PROCEDURE — 99999 PR PBB SHADOW E&M-EST. PATIENT-LVL III: ICD-10-PCS | Mod: PBBFAC,,, | Performed by: INTERNAL MEDICINE

## 2022-11-09 PROCEDURE — 1160F PR REVIEW ALL MEDS BY PRESCRIBER/CLIN PHARMACIST DOCUMENTED: ICD-10-PCS | Mod: CPTII,S$GLB,, | Performed by: INTERNAL MEDICINE

## 2022-11-09 PROCEDURE — 1159F PR MEDICATION LIST DOCUMENTED IN MEDICAL RECORD: ICD-10-PCS | Mod: CPTII,S$GLB,, | Performed by: INTERNAL MEDICINE

## 2022-11-09 PROCEDURE — 3074F SYST BP LT 130 MM HG: CPT | Mod: CPTII,S$GLB,, | Performed by: INTERNAL MEDICINE

## 2022-11-09 PROCEDURE — 99214 OFFICE O/P EST MOD 30 MIN: CPT | Mod: S$GLB,,, | Performed by: INTERNAL MEDICINE

## 2022-11-09 PROCEDURE — 4010F ACE/ARB THERAPY RXD/TAKEN: CPT | Mod: CPTII,S$GLB,, | Performed by: INTERNAL MEDICINE

## 2022-11-09 PROCEDURE — 1126F AMNT PAIN NOTED NONE PRSNT: CPT | Mod: CPTII,S$GLB,, | Performed by: INTERNAL MEDICINE

## 2022-11-09 PROCEDURE — 3288F PR FALLS RISK ASSESSMENT DOCUMENTED: ICD-10-PCS | Mod: CPTII,S$GLB,, | Performed by: INTERNAL MEDICINE

## 2022-11-09 PROCEDURE — 3008F PR BODY MASS INDEX (BMI) DOCUMENTED: ICD-10-PCS | Mod: CPTII,S$GLB,, | Performed by: INTERNAL MEDICINE

## 2022-11-09 PROCEDURE — 3008F BODY MASS INDEX DOCD: CPT | Mod: CPTII,S$GLB,, | Performed by: INTERNAL MEDICINE

## 2022-11-09 PROCEDURE — 63600175 PHARM REV CODE 636 W HCPCS: Mod: JG | Performed by: INTERNAL MEDICINE

## 2022-11-09 PROCEDURE — 3074F PR MOST RECENT SYSTOLIC BLOOD PRESSURE < 130 MM HG: ICD-10-PCS | Mod: CPTII,S$GLB,, | Performed by: INTERNAL MEDICINE

## 2022-11-09 PROCEDURE — 99999 PR PBB SHADOW E&M-EST. PATIENT-LVL III: CPT | Mod: PBBFAC,,, | Performed by: INTERNAL MEDICINE

## 2022-11-09 PROCEDURE — 99214 PR OFFICE/OUTPT VISIT, EST, LEVL IV, 30-39 MIN: ICD-10-PCS | Mod: S$GLB,,, | Performed by: INTERNAL MEDICINE

## 2022-11-09 PROCEDURE — 3066F NEPHROPATHY DOC TX: CPT | Mod: CPTII,S$GLB,, | Performed by: INTERNAL MEDICINE

## 2022-11-09 PROCEDURE — 4010F PR ACE/ARB THEARPY RXD/TAKEN: ICD-10-PCS | Mod: CPTII,S$GLB,, | Performed by: INTERNAL MEDICINE

## 2022-11-09 PROCEDURE — 3060F PR POS MICROALBUMINURIA RESULT DOCUMENTED/REVIEW: ICD-10-PCS | Mod: CPTII,S$GLB,, | Performed by: INTERNAL MEDICINE

## 2022-11-09 PROCEDURE — 3078F DIAST BP <80 MM HG: CPT | Mod: CPTII,S$GLB,, | Performed by: INTERNAL MEDICINE

## 2022-11-09 PROCEDURE — 96372 THER/PROPH/DIAG INJ SC/IM: CPT

## 2022-11-09 PROCEDURE — 3078F PR MOST RECENT DIASTOLIC BLOOD PRESSURE < 80 MM HG: ICD-10-PCS | Mod: CPTII,S$GLB,, | Performed by: INTERNAL MEDICINE

## 2022-11-09 PROCEDURE — 1101F PT FALLS ASSESS-DOCD LE1/YR: CPT | Mod: CPTII,S$GLB,, | Performed by: INTERNAL MEDICINE

## 2022-11-09 PROCEDURE — 3044F PR MOST RECENT HEMOGLOBIN A1C LEVEL <7.0%: ICD-10-PCS | Mod: CPTII,S$GLB,, | Performed by: INTERNAL MEDICINE

## 2022-11-09 PROCEDURE — 1159F MED LIST DOCD IN RCRD: CPT | Mod: CPTII,S$GLB,, | Performed by: INTERNAL MEDICINE

## 2022-11-09 PROCEDURE — 1126F PR PAIN SEVERITY QUANTIFIED, NO PAIN PRESENT: ICD-10-PCS | Mod: CPTII,S$GLB,, | Performed by: INTERNAL MEDICINE

## 2022-11-09 PROCEDURE — 3066F PR DOCUMENTATION OF TREATMENT FOR NEPHROPATHY: ICD-10-PCS | Mod: CPTII,S$GLB,, | Performed by: INTERNAL MEDICINE

## 2022-11-09 PROCEDURE — 3060F POS MICROALBUMINURIA REV: CPT | Mod: CPTII,S$GLB,, | Performed by: INTERNAL MEDICINE

## 2022-11-09 PROCEDURE — 1101F PR PT FALLS ASSESS DOC 0-1 FALLS W/OUT INJ PAST YR: ICD-10-PCS | Mod: CPTII,S$GLB,, | Performed by: INTERNAL MEDICINE

## 2022-11-09 PROCEDURE — 1160F RVW MEDS BY RX/DR IN RCRD: CPT | Mod: CPTII,S$GLB,, | Performed by: INTERNAL MEDICINE

## 2022-11-09 PROCEDURE — 3044F HG A1C LEVEL LT 7.0%: CPT | Mod: CPTII,S$GLB,, | Performed by: INTERNAL MEDICINE

## 2022-11-09 PROCEDURE — 3288F FALL RISK ASSESSMENT DOCD: CPT | Mod: CPTII,S$GLB,, | Performed by: INTERNAL MEDICINE

## 2022-11-09 RX ADMIN — DENOSUMAB 60 MG: 60 INJECTION SUBCUTANEOUS at 02:11

## 2022-11-09 NOTE — NURSING
1430: Prolia Injection given without difficulties.Bandaid applied. Patient instructed to stay in the clinic for 15 minutes. Patient verbalized understanding and will notify nurse with any complaints.

## 2022-11-09 NOTE — PROGRESS NOTES
RHEUMATOLOGY CLINIC FOLLOW UP VISIT  Chief complaints, HPI, ROS, EXAM, Assessment & Plans:-  Cleo Arnold a 72 y.o. pleasant female comes in for follow up visit. She follows in the Rheumatology Clinic for osteoporosis.  She was started on Prolia.  She reports doing well.  No fragility fractures since last visit. Mild right gluteal pain after prolonged activities.  Rheumatological review of system negative otherwise.  No tenderness.   1. Age-related osteoporosis without current pathological fracture    2. Ischial bursitis of right side      Problem List Items Addressed This Visit       Age-related osteoporosis without current pathological fracture - Primary    Ischial bursitis of right side         Continue Prolia every 6 months.  CMP normal from today.  DEXA in 2021 showed significant improvement.  Repeat DEXA 2023.  Conservative treatment for ischial bursitis. Home PT advised.   # Follow up in about 6 months (around 5/9/2023).      Disclaimer: This note was prepared using voice recognition system and is likely to have sound alike errors and is not proof read.  Please call me with any questions.

## 2022-11-09 NOTE — DISCHARGE INSTRUCTIONS
Thank you for allowing me to care for you today,  MICHAEL BarnettN, RN    [facility]  [address] [address]  [phone] phone     [phone] fax  Hours of Operation: Monday- Friday 8:00am- 5:00pm  After hours phone  [phone]  Hematology / Oncology Physicians on call      Dr. Chris Martin, BRUCE Lovett, JULES Sood NP Phaon Dunbar, JULES Bardales, JULES    Please call with any concerns regarding your appointment today.

## 2022-12-15 ENCOUNTER — PATIENT MESSAGE (OUTPATIENT)
Dept: PRIMARY CARE CLINIC | Facility: CLINIC | Age: 72
End: 2022-12-15
Payer: MEDICARE

## 2022-12-20 ENCOUNTER — OFFICE VISIT (OUTPATIENT)
Dept: PRIMARY CARE CLINIC | Facility: CLINIC | Age: 72
End: 2022-12-20
Payer: MEDICARE

## 2022-12-20 DIAGNOSIS — K14.6 BURNING MOUTH SYNDROME: Primary | ICD-10-CM

## 2022-12-20 DIAGNOSIS — F13.20 BENZODIAZEPINE DEPENDENCE: ICD-10-CM

## 2022-12-20 DIAGNOSIS — I10 ESSENTIAL HYPERTENSION: Chronic | ICD-10-CM

## 2022-12-20 PROCEDURE — 1160F PR REVIEW ALL MEDS BY PRESCRIBER/CLIN PHARMACIST DOCUMENTED: ICD-10-PCS | Mod: CPTII,95,, | Performed by: FAMILY MEDICINE

## 2022-12-20 PROCEDURE — 4010F ACE/ARB THERAPY RXD/TAKEN: CPT | Mod: CPTII,95,, | Performed by: FAMILY MEDICINE

## 2022-12-20 PROCEDURE — 4010F PR ACE/ARB THEARPY RXD/TAKEN: ICD-10-PCS | Mod: CPTII,95,, | Performed by: FAMILY MEDICINE

## 2022-12-20 PROCEDURE — 1160F RVW MEDS BY RX/DR IN RCRD: CPT | Mod: CPTII,95,, | Performed by: FAMILY MEDICINE

## 2022-12-20 PROCEDURE — 3066F NEPHROPATHY DOC TX: CPT | Mod: CPTII,95,, | Performed by: FAMILY MEDICINE

## 2022-12-20 PROCEDURE — 99214 OFFICE O/P EST MOD 30 MIN: CPT | Mod: 95,,, | Performed by: FAMILY MEDICINE

## 2022-12-20 PROCEDURE — 3060F PR POS MICROALBUMINURIA RESULT DOCUMENTED/REVIEW: ICD-10-PCS | Mod: CPTII,95,, | Performed by: FAMILY MEDICINE

## 2022-12-20 PROCEDURE — 1159F PR MEDICATION LIST DOCUMENTED IN MEDICAL RECORD: ICD-10-PCS | Mod: CPTII,95,, | Performed by: FAMILY MEDICINE

## 2022-12-20 PROCEDURE — 99214 PR OFFICE/OUTPT VISIT, EST, LEVL IV, 30-39 MIN: ICD-10-PCS | Mod: 95,,, | Performed by: FAMILY MEDICINE

## 2022-12-20 PROCEDURE — 3060F POS MICROALBUMINURIA REV: CPT | Mod: CPTII,95,, | Performed by: FAMILY MEDICINE

## 2022-12-20 PROCEDURE — 3066F PR DOCUMENTATION OF TREATMENT FOR NEPHROPATHY: ICD-10-PCS | Mod: CPTII,95,, | Performed by: FAMILY MEDICINE

## 2022-12-20 PROCEDURE — 3044F PR MOST RECENT HEMOGLOBIN A1C LEVEL <7.0%: ICD-10-PCS | Mod: CPTII,95,, | Performed by: FAMILY MEDICINE

## 2022-12-20 PROCEDURE — 1159F MED LIST DOCD IN RCRD: CPT | Mod: CPTII,95,, | Performed by: FAMILY MEDICINE

## 2022-12-20 PROCEDURE — 3044F HG A1C LEVEL LT 7.0%: CPT | Mod: CPTII,95,, | Performed by: FAMILY MEDICINE

## 2022-12-20 RX ORDER — VALSARTAN 320 MG/1
320 TABLET ORAL DAILY
Qty: 90 TABLET | Refills: 3 | Status: SHIPPED | OUTPATIENT
Start: 2022-12-20 | End: 2023-12-11

## 2022-12-20 NOTE — PROGRESS NOTES
Subjective:      Patient ID: Cleo Ford is a 72 y.o. female.    Chief Complaint: Follow-up, Hypertension, and burning mouth syndrome    The patient location is: home  Visit type: video and audio simultaneous    Cleo Ford is a 72 y.o. female presenting via telemedicine for BP f/u.    Hx of HTN, HLD, hypothyroidism, anxiety and depression. Currently on Irbesartan, Gabapentin, Xanax, Levothyroxine, Nifedipine, Carvedilol, Losartan, and ASA. Pt today reports she has been having burning mouth syndrome since 2020 . She has tried for tx  taking Gabapentin that was prescribed last visit. Pt states she has had this syndrome for 3 years but has worsened since starting Gabapentin. She first discussed condition in December of 2020. She reports she changed from Valsartan to Irbesartan during that time due to Valsartan shortage. Pt has not been monitoring BP lately. Thinks it is controlled. Tried mirapax and valtrex also. On xanax nightly for sleep. Makes her very sleepy.   No other complaint at this time.     [facility] Reason For Visit    12/20/2022  7:12 AM CST - Filed by Patient   What is your primary reason for visit? Other/Annual   Have you experienced any of the following:   Change in activity?     Unexpected weight change?     Neck pain?     Hearing loss?     Runny nose?     Trouble swallowing?     Eye discharge?     Changes in vision?     Chest tightness?     Wheezing?     Chest pain?     Heart beating fast or racing?     Blood in stool?     Constipation?     Vomiting?     Diarrhea?     Drinking much more than usual?     Urinating much more than usual?     Difficulty urinating?     Blood in the urine?     Menstrual problem?     Painful urination?     Joint swelling?     Joint pain?     Headaches?     Weakness?     Confusion?     Feeling depressed?       [facility]eq Documents    12/20/2022  7:13 AM CST - Filed by Patient   Would you like a copy of [facility]'s Financial Assistance Policy Summary? No, I would not  like a copy.     [facility] Peq [facility]    12/20/2022  7:15 AM CST - Filed by Patient   On average, how many days per week do you engage in moderate to strenuous exercise (like a brisk walk)? 0 days   On average, how many minutes do you engage in exercise at this level? 0 min   Do you feel stress - tense, restless, nervous, or anxious, or unable to sleep at night because your mind is troubled all the time - these days? Only a little   Do you belong to any clubs or organizations such as Jew groups, unions, [employer] or athletic groups, or school groups? Patient refused   How often do you attend meetings of the clubs or organizations you belong to? Patient refused   In a typical week, how many times do you talk on the phone with family, friends, or neighbors? More than three times a week   How often do you get together with friends or relatives? Twice a week   Are you , , , , never , or living with a partner?    How hard is it for you to pay for the very basics like food, housing, medical care, and heating? Not hard at all   Within the past 12 months, you worried that your food would run out before you got the money to buy more. Never true   Within the past 12 months, the food you bought just didnt last and you didnt have money to get more. Never true   In the past 12 months, has lack of transportation kept you from medical appointments or from getting medications? No   In the past 12 months, has lack of transportation kept you from meetings, work, or from getting things needed for daily living? No   How often do you have a drink containing alcohol? Never   How many drinks containing alcohol do you have on a typical day when you are drinking? Patient does not drink   How often do you have six or more drinks on one occasion? Never   In the last 12 months, was there a time when you were not able to pay the mortgage or rent on time? No   In the last 12 months, how many places have  you lived? (range: at least 0) 1   In the last 12 months, was there a time when you did not have a steady place to sleep or slept in a shelter (including now)? No         Pmh, Psh, Family Hx, Social Hx updated in Epic Tabs today.     LABS:   Lab Results   Component Value Date    WBC 8.99 10/20/2022    HGB 13.0 10/20/2022    HCT 40.8 10/20/2022     10/20/2022    CHOL 202 (H) 10/20/2022    TRIG 54 10/20/2022    HDL 66 10/20/2022    ALT 12 11/09/2022    AST 19 11/09/2022     11/09/2022    K 3.4 (L) 11/09/2022     11/09/2022    CREATININE 1.2 11/09/2022    BUN 30 (H) 11/09/2022    CO2 23 11/09/2022    TSH 1.103 10/20/2022    HGBA1C 5.4 10/20/2022       Mammo Digital Screening Bilat w/ Rasta  Narrative: Result:  Mammo Digital Screening Bilat w/ Rasta    History:  Patient is 72 y.o. and is seen for a screening mammogram.    Films Compared:  Compared to: 09/14/2021 Mammo Digital Screening Bilat w/ Rasta and   09/01/2020 Mammo Digital Screening Bilat w/ Rasta     Findings:   This procedure was performed using tomosynthesis.   Computer-aided detection was utilized in the interpretation of this   examination.    The breasts have scattered areas of fibroglandular density. There is no   evidence of suspicious masses, microcalcifications or architectural   distortion.  Impression:    No mammographic evidence of malignancy.    BI-RADS Category 1: Negative    Recommendation:  Routine screening mammogram in 1 year is recommended.    Your estimated lifetime risk of breast cancer (to age 85) based on   Tyrer-Cuzick risk assessment model is 6.22 %.  According to the American   Cancer Society, patients with a lifetime breast cancer risk of 20% or   higher might benefit from supplemental screening tests. ??         Review of Systems   Constitutional:  Negative for activity change and unexpected weight change.   HENT:  Negative for hearing loss, rhinorrhea and trouble swallowing.         Burning mouth symptoms    Eyes:   Negative for discharge and visual disturbance.   Respiratory:  Negative for chest tightness and wheezing.    Cardiovascular:  Negative for chest pain and palpitations.   Gastrointestinal:  Negative for blood in stool, constipation, diarrhea and vomiting.   Endocrine: Negative for polydipsia and polyuria.   Genitourinary:  Negative for difficulty urinating, dysuria, hematuria and menstrual problem.   Musculoskeletal:  Negative for arthralgias, joint swelling and neck pain.   Neurological:  Negative for weakness and headaches.   Psychiatric/Behavioral:  Negative for confusion and dysphoric mood.    Objective:   There were no vitals filed for this visit.  Wt Readings from Last 10 Encounters:   11/09/22 76.2 kg (167 lb 15.9 oz)   11/09/22 76.2 kg (167 lb 15.9 oz)   10/20/22 75.4 kg (166 lb 1.9 oz)   09/21/22 82.1 kg (181 lb)   05/05/22 82.3 kg (181 lb 7 oz)   04/27/22 82.2 kg (181 lb 1.7 oz)   03/04/22 81.8 kg (180 lb 5.4 oz)   01/25/22 82.1 kg (181 lb)   01/18/22 82.2 kg (181 lb 3.5 oz)   01/06/22 82.6 kg (182 lb 1.6 oz)     Physical Exam  Vitals reviewed.   Constitutional:       General: She is awake. She is not in acute distress.     Appearance: Normal appearance. She is well-developed, well-groomed and normal weight. She is not ill-appearing.   HENT:      Head: Normocephalic and atraumatic.      Right Ear: External ear normal.      Left Ear: External ear normal.      Nose: Nose normal.      Mouth/Throat:      Lips: Pink.   Eyes:      Conjunctiva/sclera: Conjunctivae normal.   Pulmonary:      Effort: Pulmonary effort is normal.   Neurological:      Mental Status: She is alert.   Psychiatric:         Attention and Perception: Attention and perception normal. She is attentive.         Mood and Affect: Mood and affect normal. Mood is not anxious or depressed. Affect is not labile, blunt, angry or inappropriate.         Speech: Speech normal. She is communicative. Speech is not rapid and pressured, delayed, slurred or  tangential.         Behavior: Behavior normal. Behavior is not agitated, slowed, aggressive, withdrawn, hyperactive or combative. Behavior is cooperative.         Thought Content: Thought content normal. Thought content is not paranoid or delusional. Thought content does not include homicidal or suicidal ideation. Thought content does not include homicidal or suicidal plan.         Cognition and Memory: Cognition and memory normal. Memory is not impaired. She does not exhibit impaired recent memory or impaired remote memory.         Judgment: Judgment normal. Judgment is not impulsive or inappropriate.     Assessment:     1. Burning mouth syndrome    2. Essential hypertension    3. Benzodiazepine dependence      Plan:   Cleo was seen today for follow-up, hypertension and burning mouth syndrome.    Diagnoses and all orders for this visit:    Burning mouth syndrome  Comments:  going on since 12/2020, thinks related to bp meds. changed at that time from valsartan to ibersartan. failed mirapex, gabapentin, valtrex. change bp meds.     Essential hypertension  Comments:  change back to valsartan due to concerns for burning mouth syndrome with ibersartan. monitor bp.     Benzodiazepine dependence  Comments:  using xanax at night. working on weaning down dose over next 6 months.     Other orders  -     valsartan (DIOVAN) 320 MG tablet; Take 1 tablet (320 mg total) by mouth once daily. For blood pressure, to replace ibersartan      Instructed to stop taking Irbesartan; will Rx Valsartan for BP treatment.   Instructed to lower Xanax dose to half for the next 6 months.   Instructed to f/u if sx persist or worsen.   Vitamins have been checked in past no deficiencies noted.     Face to Face time with patient: 7:19 AM-7:33 AM       20    minutes of total time spent on the encounter, which includes face to face time and non-face to face time preparing to see the patient (eg, review of tests), Obtaining and/or reviewing  separately obtained history, Documenting clinical information in the electronic or other health record, Independently interpreting results (not separately reported) and communicating results to the patient/family/caregiver, or Care coordination (not separately reported).     Each patient to whom he or she provides medical services by telemedicine is:  (1) informed of the relationship between the physician and patient and the respective role of any other health care provider with respect to management of the patient; and (2) notified that he or she may decline to receive medical services by telemedicine and may withdraw from such care at any time.      No follow-ups on file.    There are no Patient Instructions on file for this visit.    Scribe Attestation:   I, Richard Flower, am scribing for, and in the presence of, Dr. Elizabeth Julian MD. I performed the above scribed service and the documentation accurately describes the services I performed. I attest to the accuracy of the note.    I, Dr. Elizabeth Julian MD, reviewed documentation as scribed above. I personally performed the services described in this documentation.  I agree that the record reflects my personal performance and is accurate and complete. Elizabeth Julian MD.    12/20/2022

## 2023-03-08 ENCOUNTER — OFFICE VISIT (OUTPATIENT)
Dept: OPHTHALMOLOGY | Facility: CLINIC | Age: 73
End: 2023-03-08
Payer: MEDICARE

## 2023-03-08 ENCOUNTER — PATIENT MESSAGE (OUTPATIENT)
Dept: OPHTHALMOLOGY | Facility: CLINIC | Age: 73
End: 2023-03-08

## 2023-03-08 DIAGNOSIS — H52.7 REFRACTIVE ERRORS: ICD-10-CM

## 2023-03-08 DIAGNOSIS — H25.13 CATARACT, NUCLEAR SCLEROTIC SENILE, BILATERAL: Primary | ICD-10-CM

## 2023-03-08 PROCEDURE — 99999 PR PBB SHADOW E&M-EST. PATIENT-LVL II: CPT | Mod: PBBFAC,,, | Performed by: OPTOMETRIST

## 2023-03-08 PROCEDURE — 92004 COMPRE OPH EXAM NEW PT 1/>: CPT | Mod: S$GLB,,, | Performed by: OPTOMETRIST

## 2023-03-08 PROCEDURE — 99999 PR PBB SHADOW E&M-EST. PATIENT-LVL II: ICD-10-PCS | Mod: PBBFAC,,, | Performed by: OPTOMETRIST

## 2023-03-08 PROCEDURE — 92015 DETERMINE REFRACTIVE STATE: CPT | Mod: S$GLB,,, | Performed by: OPTOMETRIST

## 2023-03-08 PROCEDURE — 92004 PR EYE EXAM, NEW PATIENT,COMPREHESV: ICD-10-PCS | Mod: S$GLB,,, | Performed by: OPTOMETRIST

## 2023-03-08 PROCEDURE — 1159F MED LIST DOCD IN RCRD: CPT | Mod: CPTII,S$GLB,, | Performed by: OPTOMETRIST

## 2023-03-08 PROCEDURE — 1159F PR MEDICATION LIST DOCUMENTED IN MEDICAL RECORD: ICD-10-PCS | Mod: CPTII,S$GLB,, | Performed by: OPTOMETRIST

## 2023-03-08 PROCEDURE — 92015 PR REFRACTION: ICD-10-PCS | Mod: S$GLB,,, | Performed by: OPTOMETRIST

## 2023-03-08 NOTE — PROGRESS NOTES
HPI    Blurred vision at near and distance with glasses.  Last eye exam 02/03/2020.  Update glasses RX.    Last edited by Karol Ford MA on 3/8/2023  1:33 PM.            Assessment /Plan     For exam results, see Encounter Report.    Cataract, nuclear sclerotic senile, bilateral    Refractive errors      Moderate cataracts OU, not surgical  Follow annually.    Dispense Final Rx for glasses.  RTC 1 year  Discussed above and answered questions.

## 2023-03-21 ENCOUNTER — PATIENT MESSAGE (OUTPATIENT)
Dept: PRIMARY CARE CLINIC | Facility: CLINIC | Age: 73
End: 2023-03-21
Payer: MEDICARE

## 2023-03-22 ENCOUNTER — OFFICE VISIT (OUTPATIENT)
Dept: PRIMARY CARE CLINIC | Facility: CLINIC | Age: 73
End: 2023-03-22
Payer: MEDICARE

## 2023-03-22 DIAGNOSIS — Z13.31 POSITIVE DEPRESSION SCREENING: Primary | ICD-10-CM

## 2023-03-22 DIAGNOSIS — F43.21 GRIEF: ICD-10-CM

## 2023-03-22 PROCEDURE — 4010F ACE/ARB THERAPY RXD/TAKEN: CPT | Mod: CPTII,95,, | Performed by: PHYSICIAN ASSISTANT

## 2023-03-22 PROCEDURE — 1101F PR PT FALLS ASSESS DOC 0-1 FALLS W/OUT INJ PAST YR: ICD-10-PCS | Mod: CPTII,95,, | Performed by: PHYSICIAN ASSISTANT

## 2023-03-22 PROCEDURE — 4010F PR ACE/ARB THEARPY RXD/TAKEN: ICD-10-PCS | Mod: CPTII,95,, | Performed by: PHYSICIAN ASSISTANT

## 2023-03-22 PROCEDURE — 1101F PT FALLS ASSESS-DOCD LE1/YR: CPT | Mod: CPTII,95,, | Performed by: PHYSICIAN ASSISTANT

## 2023-03-22 PROCEDURE — 1159F PR MEDICATION LIST DOCUMENTED IN MEDICAL RECORD: ICD-10-PCS | Mod: CPTII,95,, | Performed by: PHYSICIAN ASSISTANT

## 2023-03-22 PROCEDURE — 3288F PR FALLS RISK ASSESSMENT DOCUMENTED: ICD-10-PCS | Mod: CPTII,95,, | Performed by: PHYSICIAN ASSISTANT

## 2023-03-22 PROCEDURE — 1159F MED LIST DOCD IN RCRD: CPT | Mod: CPTII,95,, | Performed by: PHYSICIAN ASSISTANT

## 2023-03-22 PROCEDURE — 3288F FALL RISK ASSESSMENT DOCD: CPT | Mod: CPTII,95,, | Performed by: PHYSICIAN ASSISTANT

## 2023-03-22 PROCEDURE — 99213 OFFICE O/P EST LOW 20 MIN: CPT | Mod: 95,,, | Performed by: PHYSICIAN ASSISTANT

## 2023-03-22 PROCEDURE — 99213 PR OFFICE/OUTPT VISIT, EST, LEVL III, 20-29 MIN: ICD-10-PCS | Mod: 95,,, | Performed by: PHYSICIAN ASSISTANT

## 2023-03-22 RX ORDER — IRBESARTAN 300 MG/1
300 TABLET ORAL
COMMUNITY
Start: 2023-03-11 | End: 2023-04-17 | Stop reason: ALTCHOICE

## 2023-03-22 NOTE — PATIENT INSTRUCTIONS
You have a referral for [facility]     If you do not hear from them- we discussed some other local options \    If you need a referral let me know     For [facility] therapy- we discussed [facility] [place]

## 2023-03-27 ENCOUNTER — PATIENT MESSAGE (OUTPATIENT)
Dept: PRIMARY CARE CLINIC | Facility: CLINIC | Age: 73
End: 2023-03-27
Payer: MEDICARE

## 2023-03-27 NOTE — TELEPHONE ENCOUNTER
Spoke with pt in regards to psychology referral. Referral re-faxed and informed pt that someone will reach out in regards of getting pt scheduled.

## 2023-03-29 ENCOUNTER — TELEPHONE (OUTPATIENT)
Dept: PSYCHIATRY | Facility: CLINIC | Age: 73
End: 2023-03-29
Payer: MEDICARE

## 2023-03-29 NOTE — TELEPHONE ENCOUNTER
----- Message from Robert Fried MA sent at 3/28/2023  4:53 PM CDT -----  Contact: Cleo    ----- Message -----  From: Sheridan Barrios  Sent: 3/28/2023   4:07 PM CDT  To: [facility] Psych Clinical Staff    Type:  Sooner Apoointment Request    Caller is requesting a sooner appointment. Caller will not accept being placed on the waitlist and is requesting a message be sent to doctor.  Name of Caller:Cleo  When is the first available appointment?unknown  Symptoms:Z13.31 (ICD-10-CM) - Positive depression screening  Would the patient rather a call back or a response via [facility]? [facility]  Best Call Back Number:N/a  Additional Information: Patient reports a referral was sent and request to be informed status. Patient request to schedule visit with psychiatry.   Thank you,  GH

## 2023-04-04 ENCOUNTER — OFFICE VISIT (OUTPATIENT)
Dept: PSYCHIATRY | Facility: CLINIC | Age: 73
End: 2023-04-04
Payer: MEDICARE

## 2023-04-04 DIAGNOSIS — F43.21 GRIEF: ICD-10-CM

## 2023-04-04 DIAGNOSIS — F33.1 MODERATE EPISODE OF RECURRENT MAJOR DEPRESSIVE DISORDER: Primary | ICD-10-CM

## 2023-04-04 DIAGNOSIS — F41.9 ANXIETY: ICD-10-CM

## 2023-04-04 PROCEDURE — 99999 PR PBB SHADOW E&M-EST. PATIENT-LVL II: CPT | Mod: PBBFAC,,, | Performed by: SOCIAL WORKER

## 2023-04-04 PROCEDURE — 90791 PSYCH DIAGNOSTIC EVALUATION: CPT | Mod: S$GLB,,, | Performed by: SOCIAL WORKER

## 2023-04-04 PROCEDURE — 4010F ACE/ARB THERAPY RXD/TAKEN: CPT | Mod: CPTII,S$GLB,, | Performed by: SOCIAL WORKER

## 2023-04-04 PROCEDURE — 90791 PR PSYCHIATRIC DIAGNOSTIC EVALUATION: ICD-10-PCS | Mod: S$GLB,,, | Performed by: SOCIAL WORKER

## 2023-04-04 PROCEDURE — 4010F PR ACE/ARB THEARPY RXD/TAKEN: ICD-10-PCS | Mod: CPTII,S$GLB,, | Performed by: SOCIAL WORKER

## 2023-04-04 PROCEDURE — 1159F PR MEDICATION LIST DOCUMENTED IN MEDICAL RECORD: ICD-10-PCS | Mod: CPTII,S$GLB,, | Performed by: SOCIAL WORKER

## 2023-04-04 PROCEDURE — 1159F MED LIST DOCD IN RCRD: CPT | Mod: CPTII,S$GLB,, | Performed by: SOCIAL WORKER

## 2023-04-04 PROCEDURE — 99999 PR PBB SHADOW E&M-EST. PATIENT-LVL II: ICD-10-PCS | Mod: PBBFAC,,, | Performed by: SOCIAL WORKER
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
## 2023-04-11 ENCOUNTER — OFFICE VISIT (OUTPATIENT)
Dept: PSYCHIATRY | Facility: CLINIC | Age: 73
End: 2023-04-11
Payer: MEDICARE

## 2023-04-11 DIAGNOSIS — F33.1 MODERATE EPISODE OF RECURRENT MAJOR DEPRESSIVE DISORDER: Primary | ICD-10-CM

## 2023-04-11 DIAGNOSIS — F41.9 ANXIETY: ICD-10-CM

## 2023-04-11 DIAGNOSIS — F43.21 GRIEF: ICD-10-CM

## 2023-04-11 PROCEDURE — 90837 PSYTX W PT 60 MINUTES: CPT | Mod: S$GLB,,, | Performed by: SOCIAL WORKER

## 2023-04-11 PROCEDURE — 99999 PR PBB SHADOW E&M-EST. PATIENT-LVL I: CPT | Mod: PBBFAC,,, | Performed by: SOCIAL WORKER

## 2023-04-11 PROCEDURE — 99999 PR PBB SHADOW E&M-EST. PATIENT-LVL I: ICD-10-PCS | Mod: PBBFAC,,, | Performed by: SOCIAL WORKER

## 2023-04-11 PROCEDURE — 90837 PR PSYCHOTHERAPY W/PATIENT, 60 MIN: ICD-10-PCS | Mod: S$GLB,,, | Performed by: SOCIAL WORKER

## 2023-04-11 PROCEDURE — 4010F PR ACE/ARB THEARPY RXD/TAKEN: ICD-10-PCS | Mod: CPTII,S$GLB,, | Performed by: SOCIAL WORKER

## 2023-04-11 PROCEDURE — 4010F ACE/ARB THERAPY RXD/TAKEN: CPT | Mod: CPTII,S$GLB,, | Performed by: SOCIAL WORKER

## 2023-04-11 NOTE — PROGRESS NOTES
Individual Psychotherapy Follow-up Visit Progress Note (PhD/LCSW)     Outpatient Psychotherapy - 60 minutes with patient (53 minutes or more) - [id number]    Date: 04/11/2023    Visit Type: In Person Visit at [facility]      4/11/2023  MRN: [id number]  Primary Care Provider: MD Lexis Pickettneptali Ford is a 72 y.o. female who presents today for follow-up of depression, anxiety, and grief . Met with patient.      Preferred Name: Cleo     Subjective:     Last encounter (with this provider): 4/4/2023     Content of Current Session: Pt seen for first f/u visit. Continued to obtain psychosocial history and establish rapport. States she has had a rough week and is feeling depressed. Discussed current symptoms and the level of impairment in her life. She does have a good support system and plans to travel a few times in the next several months. Explored pt's grief and how her 's death has impacted family relationships. Discussed the grief process and strategies to help pt process the loss.    Therapeutic Interventions Utilized During Current Session: Cognitive Behavioral Therapy, Supportive Therapy      Objective:       Mental Status Evaluation  Appearance: unremarkable, age appropriate  Behavior: normal, cooperative  Speech: normal tone, normal rate, normal pitch, normal volume  Mood: depressed  Affect: congruent and appropriate, blunted, sad  Thought Process: normal and logical  Thought Content: normal, no suicidality, no homicidality, delusions, or paranoia  Sensorium: grossly intact  Cognition: grossly intact  Insight: good  Judgment: adequate to circumstances    Risk parameters:  Patient reports no suicidal ideation  Patient reports no homicidal ideation  Patient reports no self-injurious behavior  Patient reports no violent behavior      Assessment & Plan:     The patient's response to the interventions is accepting    The patient's progress toward treatment goals is  good    Homework assigned: practice relaxation skills daily and write down 3 goals each morning     Treatment plan:   A. Target symptoms: Depression, Anxiety, and Grief   B. Therapeutic modalities: insight oriented psychotherapy, supportive psychotherapy  C. Why chosen therapy is appropriate versus another modality: relevant to diagnosis, evidence based practice   D. Outcome monitoring methods: self report, observation, rating scales, feedback from clinical staff      Visit Diagnosis:   1. Moderate episode of recurrent major depressive disorder    2. Grief    3. Anxiety      Justification for CPT 44937: Time needed to address and contain intense emotions    Follow-up: individual psychotherapy and medication management by physician    Return to Clinic: as scheduled  Pt Reported to Schedule Self via Epic EMR MyChart Application and/or Department Support Staff    CRISTELA ScottW-BACS

## 2023-04-17 ENCOUNTER — OFFICE VISIT (OUTPATIENT)
Dept: PRIMARY CARE CLINIC | Facility: CLINIC | Age: 73
End: 2023-04-17
Payer: MEDICARE

## 2023-04-17 DIAGNOSIS — E03.8 OTHER SPECIFIED HYPOTHYROIDISM: Chronic | ICD-10-CM

## 2023-04-17 DIAGNOSIS — K14.6 BURNING MOUTH SYNDROME: ICD-10-CM

## 2023-04-17 DIAGNOSIS — F33.1 MODERATE EPISODE OF RECURRENT MAJOR DEPRESSIVE DISORDER: Primary | ICD-10-CM

## 2023-04-17 DIAGNOSIS — E83.52 HYPERCALCEMIA: ICD-10-CM

## 2023-04-17 PROCEDURE — 1159F PR MEDICATION LIST DOCUMENTED IN MEDICAL RECORD: ICD-10-PCS | Mod: CPTII,95,, | Performed by: FAMILY MEDICINE

## 2023-04-17 PROCEDURE — 4010F PR ACE/ARB THEARPY RXD/TAKEN: ICD-10-PCS | Mod: CPTII,95,, | Performed by: FAMILY MEDICINE

## 2023-04-17 PROCEDURE — 1160F PR REVIEW ALL MEDS BY PRESCRIBER/CLIN PHARMACIST DOCUMENTED: ICD-10-PCS | Mod: CPTII,95,, | Performed by: FAMILY MEDICINE

## 2023-04-17 PROCEDURE — 99214 PR OFFICE/OUTPT VISIT, EST, LEVL IV, 30-39 MIN: ICD-10-PCS | Mod: 95,,, | Performed by: FAMILY MEDICINE

## 2023-04-17 PROCEDURE — 99214 OFFICE O/P EST MOD 30 MIN: CPT | Mod: 95,,, | Performed by: FAMILY MEDICINE

## 2023-04-17 PROCEDURE — 1159F MED LIST DOCD IN RCRD: CPT | Mod: CPTII,95,, | Performed by: FAMILY MEDICINE

## 2023-04-17 PROCEDURE — 1160F RVW MEDS BY RX/DR IN RCRD: CPT | Mod: CPTII,95,, | Performed by: FAMILY MEDICINE

## 2023-04-17 PROCEDURE — 4010F ACE/ARB THERAPY RXD/TAKEN: CPT | Mod: CPTII,95,, | Performed by: FAMILY MEDICINE

## 2023-04-17 RX ORDER — SERTRALINE HYDROCHLORIDE 50 MG/1
50 TABLET, FILM COATED ORAL DAILY
Qty: 90 TABLET | Refills: 3 | Status: SHIPPED | OUTPATIENT
Start: 2023-04-17 | End: 2023-05-15

## 2023-04-17 NOTE — PROGRESS NOTES
Subjective:      Patient ID: Cleo Ford is a 72 y.o. female.    Chief Complaint: Depression    The patient location is: home  Visit type: video and audio simultaneous    Patient is a 72 y.o. female coming in today  has a past medical history of Anxiety, Depression, Dysmetabolic syndrome X, Obesity, Other and unspecified hyperlipidemia, Unspecified essential hypertension, and Unspecified hypothyroidism.    Pt presents via telemedicine c/o depression.   Currently on Nifedipine, Carvedilol, Lovastatin, Valsartan, and Xanax. Reports she continues to grieve the passing of her  a year and a half ago. Reports depression increase in the past few days. Pt used to be on depression medication in the past; currently not taking it. Reports depression exacerbates in the afternoons. Reports recent change in Levothyroxine pill appearance. Reports mild burning sensation in the past few days. Currently taking calcium supplements at this time.     [facility] Reason For Visit    4/17/2023 11:43 AM CDT - Filed by Patient   What is your primary reason for visit? Other/Annual   Have you experienced any of the following:   Change in activity? No   Unexpected weight change? No   Neck pain? No   Hearing loss? No   Runny nose? No   Trouble swallowing? No   Eye discharge? No   Changes in vision? No   Chest tightness? No   Wheezing? No   Chest pain? No   Heart beating fast or racing? No   Blood in stool? No   Constipation? No   Vomiting? No   Diarrhea? No   Drinking much more than usual? No   Urinating much more than usual? No   Difficulty urinating? No   Blood in the urine? No   Menstrual problem? No   Painful urination? No   Joint swelling? No   Joint pain? No   Headaches? No   Weakness? No   Confusion? No   Feeling depressed? Yes     [facility] Documents    4/17/2023 11:43 AM CDT - Filed by Patient   Would you like a copy of [facility]'s Financial Assistance Policy Summary? No, I would not like a copy.   Is this visit work-related or  due to a work-related accident/injury? No     [facility] Sdoh    4/17/2023 11:45 AM CDT - Filed by Patient   On average, how many days per week do you engage in moderate to strenuous exercise (like a brisk walk)? 0 days   On average, how many minutes do you engage in exercise at this level?    Do you feel stress - tense, restless, nervous, or anxious, or unable to sleep at night because your mind is troubled all the time - these days? Very much   Do you belong to any clubs or organizations such as Gnosticism groups, unions, [employer] or athletic groups, or school groups? No   How often do you attend meetings of the clubs or organizations you belong to? Patient refused   In a typical week, how many times do you talk on the phone with family, friends, or neighbors? More than three times a week   How often do you get together with friends or relatives? Twice a week   Are you , , , , never , or living with a partner?    How hard is it for you to pay for the very basics like food, housing, medical care, and heating? Not hard at all   Within the past 12 months, you worried that your food would run out before you got the money to buy more. Never true   Within the past 12 months, the food you bought just didnt last and you didnt have money to get more. Never true   In the past 12 months, has lack of transportation kept you from medical appointments or from getting medications? No   In the past 12 months, has lack of transportation kept you from meetings, work, or from getting things needed for daily living? No   How often do you have a drink containing alcohol? Monthly or less   How many drinks containing alcohol do you have on a typical day when you are drinking? 1 or 2   How often do you have six or more drinks on one occasion? Never   In the last 12 months, was there a time when you were not able to pay the mortgage or rent on time? No   In the last 12 months, how many places have  you lived? (range: at least 0) 1   In the last 12 months, was there a time when you did not have a steady place to sleep or slept in a shelter (including now)? No         Pmh, Psh, Family Hx, Social Hx updated in Epic Tabs today.     LABS:   Lab Results   Component Value Date    WBC 8.99 10/20/2022    HGB 13.0 10/20/2022    HCT 40.8 10/20/2022     10/20/2022    CHOL 202 (H) 10/20/2022    TRIG 54 10/20/2022    HDL 66 10/20/2022    ALT 12 11/09/2022    AST 19 11/09/2022     11/09/2022    K 3.4 (L) 11/09/2022     11/09/2022    CREATININE 1.2 11/09/2022    BUN 30 (H) 11/09/2022    CO2 23 11/09/2022    TSH 1.103 10/20/2022    HGBA1C 5.4 10/20/2022       Mammo Digital Screening Bilat w/ Rasta  Narrative: Result:  Mammo Digital Screening Bilat w/ Rasta    History:  Patient is 72 y.o. and is seen for a screening mammogram.    Films Compared:  Compared to: 09/14/2021 Mammo Digital Screening Bilat w/ Rasta and   09/01/2020 Mammo Digital Screening Bilat w/ Rasta     Findings:   This procedure was performed using tomosynthesis.   Computer-aided detection was utilized in the interpretation of this   examination.    The breasts have scattered areas of fibroglandular density. There is no   evidence of suspicious masses, microcalcifications or architectural   distortion.  Impression:    No mammographic evidence of malignancy.    BI-RADS Category 1: Negative    Recommendation:  Routine screening mammogram in 1 year is recommended.    Your estimated lifetime risk of breast cancer (to age 85) based on   Tyrer-Cuzick risk assessment model is 6.22 %.  According to the American   Cancer Society, patients with a lifetime breast cancer risk of 20% or   higher might benefit from supplemental screening tests. ??         Review of Systems   Constitutional:  Negative for activity change and unexpected weight change.   HENT:  Negative for hearing loss, rhinorrhea and trouble swallowing.    Eyes:  Negative for discharge and visual  disturbance.   Respiratory:  Negative for chest tightness and wheezing.    Cardiovascular:  Negative for chest pain and palpitations.   Gastrointestinal:  Negative for blood in stool, constipation, diarrhea and vomiting.   Endocrine: Negative for polydipsia and polyuria.   Genitourinary:  Negative for difficulty urinating, dysuria, hematuria and menstrual problem.   Musculoskeletal:  Negative for arthralgias, joint swelling and neck pain.   Neurological:  Negative for weakness and headaches.   Psychiatric/Behavioral:  Positive for dysphoric mood. Negative for confusion.    Objective:   There were no vitals filed for this visit.  Wt Readings from Last 10 Encounters:   11/09/22 76.2 kg (167 lb 15.9 oz)   11/09/22 76.2 kg (167 lb 15.9 oz)   10/20/22 75.4 kg (166 lb 1.9 oz)   09/21/22 82.1 kg (181 lb)   05/05/22 82.3 kg (181 lb 7 oz)   04/27/22 82.2 kg (181 lb 1.7 oz)   03/04/22 81.8 kg (180 lb 5.4 oz)   01/25/22 82.1 kg (181 lb)   01/18/22 82.2 kg (181 lb 3.5 oz)   01/06/22 82.6 kg (182 lb 1.6 oz)     Physical Exam  Vitals reviewed.   Constitutional:       General: She is awake. She is not in acute distress.     Appearance: Normal appearance. She is well-developed, well-groomed and normal weight. She is not ill-appearing.   HENT:      Head: Normocephalic and atraumatic.      Right Ear: External ear normal.      Left Ear: External ear normal.      Nose: Nose normal.      Mouth/Throat:      Lips: Pink.   Eyes:      Conjunctiva/sclera: Conjunctivae normal.   Pulmonary:      Effort: Pulmonary effort is normal.   Neurological:      Mental Status: She is alert.   Psychiatric:         Attention and Perception: Attention and perception normal. She is attentive.         Mood and Affect: Mood is depressed. Mood is not anxious. Affect is tearful. Affect is not labile, blunt, angry or inappropriate.         Speech: Speech normal. She is communicative. Speech is not rapid and pressured, delayed, slurred or tangential.          Behavior: Behavior normal. Behavior is not agitated, slowed, aggressive, withdrawn, hyperactive or combative. Behavior is cooperative.         Thought Content: Thought content normal. Thought content is not paranoid or delusional. Thought content does not include homicidal or suicidal ideation. Thought content does not include homicidal or suicidal plan.         Cognition and Memory: Cognition and memory normal. Memory is not impaired. She does not exhibit impaired recent memory or impaired remote memory.         Judgment: Judgment normal. Judgment is not impulsive or inappropriate.     Assessment:     1. Moderate episode of recurrent major depressive disorder    2. Burning mouth syndrome    3. Other specified hypothyroidism    4. Hypercalcemia      Plan:   Cleo was seen today for depression.    Diagnoses and all orders for this visit:    Moderate episode of recurrent major depressive disorder  Comments:  in counseling helping, but desires to restart meds. will send in sertraline 50mg daily, f/u 6 weeks. consider adding in wellbutrin next visit   Orders:  -     PTH, Intact; Future  -     Calcium, Ionized; Future  -     Vitamin D; Future  -     TSH; Future  -     T4, Free; Future    Burning mouth syndrome  Comments:  chronic, no improvement with stopping sertraline in the past. will have pt stop calcium supplements. recheck calcium, pth, vit d levels today. only occuring QHS  Orders:  -     PTH, Intact; Future  -     Calcium, Ionized; Future  -     Vitamin D; Future  -     TSH; Future  -     T4, Free; Future    Other specified hypothyroidism  Comments:  stable with meds, but with more depression. labs ordered.   Orders:  -     PTH, Intact; Future  -     Calcium, Ionized; Future  -     Vitamin D; Future  -     TSH; Future  -     T4, Free; Future    Hypercalcemia  Comments:  np,  will have pt stop calcium supplements. recheck calcium, pth, vit d levels today. only occuring QHS  Orders:  -     PTH, Intact; Future  -      Calcium, Ionized; Future  -     Vitamin D; Future  -     TSH; Future  -     T4, Free; Future    Other orders  -     sertraline (ZOLOFT) 50 MG tablet; Take 1 tablet (50 mg total) by mouth once daily.      Depression is exacerbated at this time.   Pt to restart Zoloft 50 mg/day for depression treatment.   Advised to start counseling to help with grief.   Burning mouth syndrome is exacerbated at this time.   Instructed to cut out calcium supplements for burning mouth treatment.   Advised on dietary changes for burning mouth prevention.   Lab work ordered to be completed this week.   Instructed to f/u in 6 weeks.     Face to Face time with patient: 12:16 PM-12:38 PM          30 minutes of total time spent on the encounter, which includes face to face time and non-face to face time preparing to see the patient (eg, review of tests), Obtaining and/or reviewing separately obtained history, Documenting clinical information in the electronic or other health record, Independently interpreting results (not separately reported) and communicating results to the patient/family/caregiver, or Care coordination (not separately reported).     Each patient to whom he or she provides medical services by telemedicine is:  (1) informed of the relationship between the physician and patient and the respective role of any other health care provider with respect to management of the patient; and (2) notified that he or she may decline to receive medical services by telemedicine and may withdraw from such care at any time.      Follow up in about 6 weeks (around 5/29/2023) for f/u Telemed NATHALY Diaz .    There are no Patient Instructions on file for this visit.    Scribe Attestation:   I, Richard Flower, am scribing for, and in the presence of, Dr. Elizabeth Julian MD. I performed the above scribed service and the documentation accurately describes the services I performed. I attest to the accuracy of the note.    I, Dr. Elizabeth Julian,  MD, reviewed documentation as scribed above. I personally performed the services described in this documentation.  I agree that the record reflects my personal performance and is accurate and complete. Elizabeth Julian MD.    04/17/2023

## 2023-04-18 ENCOUNTER — LAB VISIT (OUTPATIENT)
Dept: LAB | Facility: HOSPITAL | Age: 73
End: 2023-04-18
Attending: FAMILY MEDICINE
Payer: MEDICARE

## 2023-04-18 DIAGNOSIS — E03.8 OTHER SPECIFIED HYPOTHYROIDISM: Chronic | ICD-10-CM

## 2023-04-18 DIAGNOSIS — E83.52 HYPERCALCEMIA: ICD-10-CM

## 2023-04-18 DIAGNOSIS — F33.1 MODERATE EPISODE OF RECURRENT MAJOR DEPRESSIVE DISORDER: ICD-10-CM

## 2023-04-18 DIAGNOSIS — K14.6 BURNING MOUTH SYNDROME: ICD-10-CM

## 2023-04-18 LAB
25(OH)D3+25(OH)D2 SERPL-MCNC: 62 NG/ML (ref 30–96)
CA-I BLDV-SCNC: 1.41 MMOL/L (ref 1.06–1.42)
PTH-INTACT SERPL-MCNC: 67.2 PG/ML (ref 9–77)
T4 FREE SERPL-MCNC: 1.23 NG/DL (ref 0.71–1.51)
TSH SERPL DL<=0.005 MIU/L-ACNC: 1.49 UIU/ML (ref 0.4–4)

## 2023-04-18 PROCEDURE — 36415 COLL VENOUS BLD VENIPUNCTURE: CPT | Mod: PN | Performed by: FAMILY MEDICINE

## 2023-04-18 PROCEDURE — 83970 ASSAY OF PARATHORMONE: CPT | Performed by: FAMILY MEDICINE

## 2023-04-18 PROCEDURE — 84439 ASSAY OF FREE THYROXINE: CPT | Performed by: FAMILY MEDICINE

## 2023-04-18 PROCEDURE — 84443 ASSAY THYROID STIM HORMONE: CPT | Performed by: FAMILY MEDICINE

## 2023-04-18 PROCEDURE — 82306 VITAMIN D 25 HYDROXY: CPT | Performed by: FAMILY MEDICINE

## 2023-04-18 PROCEDURE — 82330 ASSAY OF CALCIUM: CPT | Performed by: FAMILY MEDICINE

## 2023-04-24 ENCOUNTER — OFFICE VISIT (OUTPATIENT)
Dept: PSYCHIATRY | Facility: CLINIC | Age: 73
End: 2023-04-24
Payer: MEDICARE

## 2023-04-24 DIAGNOSIS — F43.21 GRIEF: ICD-10-CM

## 2023-04-24 DIAGNOSIS — F33.1 MODERATE EPISODE OF RECURRENT MAJOR DEPRESSIVE DISORDER: Primary | ICD-10-CM

## 2023-04-24 DIAGNOSIS — F41.9 ANXIETY: ICD-10-CM

## 2023-04-24 PROCEDURE — 90834 PSYTX W PT 45 MINUTES: CPT | Mod: S$GLB,,, | Performed by: SOCIAL WORKER

## 2023-04-24 PROCEDURE — 90834 PR PSYCHOTHERAPY W/PATIENT, 45 MIN: ICD-10-PCS | Mod: S$GLB,,, | Performed by: SOCIAL WORKER

## 2023-04-24 PROCEDURE — 1159F MED LIST DOCD IN RCRD: CPT | Mod: CPTII,S$GLB,, | Performed by: SOCIAL WORKER

## 2023-04-24 PROCEDURE — 4010F PR ACE/ARB THEARPY RXD/TAKEN: ICD-10-PCS | Mod: CPTII,S$GLB,, | Performed by: SOCIAL WORKER

## 2023-04-24 PROCEDURE — 1159F PR MEDICATION LIST DOCUMENTED IN MEDICAL RECORD: ICD-10-PCS | Mod: CPTII,S$GLB,, | Performed by: SOCIAL WORKER

## 2023-04-24 PROCEDURE — 4010F ACE/ARB THERAPY RXD/TAKEN: CPT | Mod: CPTII,S$GLB,, | Performed by: SOCIAL WORKER

## 2023-04-24 NOTE — PROGRESS NOTES
Cleo,     Your lab results are within recommended goals for your age and conditions. No change in therapy is needed. Please let me know if you have further questions.    Sincerely,   Elizabeth Julian MD

## 2023-04-24 NOTE — PROGRESS NOTES
"Individual Psychotherapy Follow-up Visit Progress Note (PhD/LCSW)     Outpatient Psychotherapy - 45 minutes with patient (38-52 minutes) - [id number]    Date: 04/24/2023    Visit Type: In Person Visit at [facility]      4/24/2023  MRN: [id number]  Primary Care Provider: MD Lexis Pickettneptali Ford is a 72 y.o. female who presents today for follow-up of depression, anxiety, and grief . Met with patient.      Preferred Name: Cleo     Subjective:     Last encounter (with this provider): 4/11/2023     Content of Current Session: Pt reports she is now taking Zoloft 50 mg and "starting to feel a little hope." Her 52nd wedding anniversary was last week. She stayed home by herself and states the day was very difficult for her. States she is "grieving the loss of my family dynamic more than anything." Rates her depression 4/10. Provided grief support and helped pt process her feelings. Discussed communication strategies to try with her son, from whom she is somewhat estranged since her 's death.    Therapeutic Interventions Utilized During Current Session: Cognitive Behavioral Therapy, Supportive Therapy      Objective:       Mental Status Evaluation  Appearance: unremarkable, age appropriate  Behavior: normal, cooperative  Speech: normal tone, normal rate, normal pitch, normal volume  Mood: depressed  Affect: congruent and appropriate, blunted, sad  Thought Process: normal and logical  Thought Content: normal, no suicidality, no homicidality, delusions, or paranoia  Sensorium: grossly intact  Cognition: grossly intact  Insight: good  Judgment: adequate to circumstances    Risk parameters:  Patient reports no suicidal ideation  Patient reports no homicidal ideation  Patient reports no self-injurious behavior  Patient reports no violent behavior      Assessment & Plan:     The patient's response to the interventions is accepting    The patient's progress toward treatment goals is " good    Homework assigned: practice relaxation skills daily and write down 3 goals each morning     Treatment plan:   A. Target symptoms: Depression, Anxiety, and Grief   B. Therapeutic modalities: insight oriented psychotherapy, supportive psychotherapy  C. Why chosen therapy is appropriate versus another modality: relevant to diagnosis, evidence based practice   D. Outcome monitoring methods: self report, observation, rating scales, feedback from clinical staff      Visit Diagnosis:   1. Moderate episode of recurrent major depressive disorder    2. Grief    3. Anxiety      Justification for CPT 32153: Time needed to address and contain intense emotions    Follow-up: individual psychotherapy and medication management by physician    Return to Clinic: as scheduled  Pt Reported to Schedule Self via Epic EMR MyChart Application and/or Department Support Staff    CRISTELA ScottW-BACS

## 2023-05-10 ENCOUNTER — PATIENT MESSAGE (OUTPATIENT)
Dept: PRIMARY CARE CLINIC | Facility: CLINIC | Age: 73
End: 2023-05-10
Payer: MEDICARE

## 2023-05-11 ENCOUNTER — LAB VISIT (OUTPATIENT)
Dept: LAB | Facility: HOSPITAL | Age: 73
End: 2023-05-11
Attending: PHYSICIAN ASSISTANT
Payer: MEDICARE

## 2023-05-11 ENCOUNTER — INFUSION (OUTPATIENT)
Dept: INFUSION THERAPY | Facility: HOSPITAL | Age: 73
End: 2023-05-11
Attending: PHYSICIAN ASSISTANT
Payer: MEDICARE

## 2023-05-11 ENCOUNTER — OFFICE VISIT (OUTPATIENT)
Dept: RHEUMATOLOGY | Facility: CLINIC | Age: 73
End: 2023-05-11
Payer: MEDICARE

## 2023-05-11 VITALS
SYSTOLIC BLOOD PRESSURE: 125 MMHG | HEIGHT: 63 IN | WEIGHT: 157.63 LBS | BODY MASS INDEX: 27.93 KG/M2 | DIASTOLIC BLOOD PRESSURE: 73 MMHG | HEART RATE: 67 BPM

## 2023-05-11 DIAGNOSIS — M81.0 AGE-RELATED OSTEOPOROSIS WITHOUT CURRENT PATHOLOGICAL FRACTURE: Primary | ICD-10-CM

## 2023-05-11 DIAGNOSIS — M81.0 AGE-RELATED OSTEOPOROSIS WITHOUT CURRENT PATHOLOGICAL FRACTURE: ICD-10-CM

## 2023-05-11 LAB
ALBUMIN SERPL BCP-MCNC: 4.4 G/DL (ref 3.5–5.2)
ALP SERPL-CCNC: 49 U/L (ref 55–135)
ALT SERPL W/O P-5'-P-CCNC: 19 U/L (ref 10–44)
ANION GAP SERPL CALC-SCNC: 11 MMOL/L (ref 8–16)
AST SERPL-CCNC: 25 U/L (ref 10–40)
BILIRUB SERPL-MCNC: 0.5 MG/DL (ref 0.1–1)
BUN SERPL-MCNC: 27 MG/DL (ref 8–23)
CALCIUM SERPL-MCNC: 10.3 MG/DL (ref 8.7–10.5)
CHLORIDE SERPL-SCNC: 105 MMOL/L (ref 95–110)
CO2 SERPL-SCNC: 23 MMOL/L (ref 23–29)
CREAT SERPL-MCNC: 1.3 MG/DL (ref 0.5–1.4)
EST. GFR  (NO RACE VARIABLE): 44 ML/MIN/1.73 M^2
GLUCOSE SERPL-MCNC: 110 MG/DL (ref 70–110)
POTASSIUM SERPL-SCNC: 4.2 MMOL/L (ref 3.5–5.1)
PROT SERPL-MCNC: 7.9 G/DL (ref 6–8.4)
SODIUM SERPL-SCNC: 139 MMOL/L (ref 136–145)

## 2023-05-11 PROCEDURE — 36415 COLL VENOUS BLD VENIPUNCTURE: CPT | Performed by: INTERNAL MEDICINE

## 2023-05-11 PROCEDURE — 3288F FALL RISK ASSESSMENT DOCD: CPT | Mod: CPTII,S$GLB,, | Performed by: INTERNAL MEDICINE

## 2023-05-11 PROCEDURE — 99214 OFFICE O/P EST MOD 30 MIN: CPT | Mod: S$GLB,,, | Performed by: INTERNAL MEDICINE

## 2023-05-11 PROCEDURE — 1101F PR PT FALLS ASSESS DOC 0-1 FALLS W/OUT INJ PAST YR: ICD-10-PCS | Mod: CPTII,S$GLB,, | Performed by: INTERNAL MEDICINE

## 2023-05-11 PROCEDURE — 3078F DIAST BP <80 MM HG: CPT | Mod: CPTII,S$GLB,, | Performed by: INTERNAL MEDICINE

## 2023-05-11 PROCEDURE — 3008F PR BODY MASS INDEX (BMI) DOCUMENTED: ICD-10-PCS | Mod: CPTII,S$GLB,, | Performed by: INTERNAL MEDICINE

## 2023-05-11 PROCEDURE — 3074F PR MOST RECENT SYSTOLIC BLOOD PRESSURE < 130 MM HG: ICD-10-PCS | Mod: CPTII,S$GLB,, | Performed by: INTERNAL MEDICINE

## 2023-05-11 PROCEDURE — 99999 PR PBB SHADOW E&M-EST. PATIENT-LVL III: ICD-10-PCS | Mod: PBBFAC,,, | Performed by: INTERNAL MEDICINE

## 2023-05-11 PROCEDURE — 99999 PR PBB SHADOW E&M-EST. PATIENT-LVL III: CPT | Mod: PBBFAC,,, | Performed by: INTERNAL MEDICINE

## 2023-05-11 PROCEDURE — 1101F PT FALLS ASSESS-DOCD LE1/YR: CPT | Mod: CPTII,S$GLB,, | Performed by: INTERNAL MEDICINE

## 2023-05-11 PROCEDURE — 3288F PR FALLS RISK ASSESSMENT DOCUMENTED: ICD-10-PCS | Mod: CPTII,S$GLB,, | Performed by: INTERNAL MEDICINE

## 2023-05-11 PROCEDURE — 4010F ACE/ARB THERAPY RXD/TAKEN: CPT | Mod: CPTII,S$GLB,, | Performed by: INTERNAL MEDICINE

## 2023-05-11 PROCEDURE — 80053 COMPREHEN METABOLIC PANEL: CPT | Performed by: INTERNAL MEDICINE

## 2023-05-11 PROCEDURE — 3074F SYST BP LT 130 MM HG: CPT | Mod: CPTII,S$GLB,, | Performed by: INTERNAL MEDICINE

## 2023-05-11 PROCEDURE — 1160F PR REVIEW ALL MEDS BY PRESCRIBER/CLIN PHARMACIST DOCUMENTED: ICD-10-PCS | Mod: CPTII,S$GLB,, | Performed by: INTERNAL MEDICINE

## 2023-05-11 PROCEDURE — 1160F RVW MEDS BY RX/DR IN RCRD: CPT | Mod: CPTII,S$GLB,, | Performed by: INTERNAL MEDICINE

## 2023-05-11 PROCEDURE — 1159F MED LIST DOCD IN RCRD: CPT | Mod: CPTII,S$GLB,, | Performed by: INTERNAL MEDICINE

## 2023-05-11 PROCEDURE — 3078F PR MOST RECENT DIASTOLIC BLOOD PRESSURE < 80 MM HG: ICD-10-PCS | Mod: CPTII,S$GLB,, | Performed by: INTERNAL MEDICINE

## 2023-05-11 PROCEDURE — 1126F AMNT PAIN NOTED NONE PRSNT: CPT | Mod: CPTII,S$GLB,, | Performed by: INTERNAL MEDICINE

## 2023-05-11 PROCEDURE — 1126F PR PAIN SEVERITY QUANTIFIED, NO PAIN PRESENT: ICD-10-PCS | Mod: CPTII,S$GLB,, | Performed by: INTERNAL MEDICINE

## 2023-05-11 PROCEDURE — 4010F PR ACE/ARB THEARPY RXD/TAKEN: ICD-10-PCS | Mod: CPTII,S$GLB,, | Performed by: INTERNAL MEDICINE

## 2023-05-11 PROCEDURE — 96372 THER/PROPH/DIAG INJ SC/IM: CPT

## 2023-05-11 PROCEDURE — 99214 PR OFFICE/OUTPT VISIT, EST, LEVL IV, 30-39 MIN: ICD-10-PCS | Mod: S$GLB,,, | Performed by: INTERNAL MEDICINE

## 2023-05-11 PROCEDURE — 3008F BODY MASS INDEX DOCD: CPT | Mod: CPTII,S$GLB,, | Performed by: INTERNAL MEDICINE

## 2023-05-11 PROCEDURE — 63600175 PHARM REV CODE 636 W HCPCS: Mod: JZ,JG | Performed by: INTERNAL MEDICINE

## 2023-05-11 PROCEDURE — 1159F PR MEDICATION LIST DOCUMENTED IN MEDICAL RECORD: ICD-10-PCS | Mod: CPTII,S$GLB,, | Performed by: INTERNAL MEDICINE

## 2023-05-11 RX ADMIN — DENOSUMAB 60 MG: 60 INJECTION SUBCUTANEOUS at 01:05

## 2023-05-11 NOTE — PROGRESS NOTES
RHEUMATOLOGY CLINIC FOLLOW UP VISIT  Chief complaints, HPI, ROS, EXAM, Assessment & Plans:-  Cleo Arnold a 72 y.o. pleasant female comes in for follow up visit. She follows in the Rheumatology Clinic for osteoporosis.  She was started on Prolia.  She reports doing well.  No fragility fractures since last visit. No significant joint pain today.   Rheumatological review of system negative otherwise.  No tenderness. No synovitis. No effusion.   1. Age-related osteoporosis without current pathological fracture      Problem List Items Addressed This Visit       Age-related osteoporosis without current pathological fracture - Primary    Relevant Orders    DXA Bone Density Axial Skeleton 1 or more sites      Latest Reference Range & Units 05/11/23 10:39   Sodium 136 - 145 mmol/L 139   Potassium 3.5 - 5.1 mmol/L 4.2   Chloride 95 - 110 mmol/L 105   CO2 23 - 29 mmol/L 23   Anion Gap 8 - 16 mmol/L 11   BUN 8 - 23 mg/dL 27 (H)   Creatinine 0.5 - 1.4 mg/dL 1.3   eGFR >60 mL/min/1.73 m^2 44 !   Glucose 70 - 110 mg/dL 110   Calcium 8.7 - 10.5 mg/dL 10.3   Alkaline Phosphatase 55 - 135 U/L 49 (L)   PROTEIN TOTAL 6.0 - 8.4 g/dL 7.9   Albumin 3.5 - 5.2 g/dL 4.4   BILIRUBIN TOTAL 0.1 - 1.0 mg/dL 0.5   AST 10 - 40 U/L 25   ALT 10 - 44 U/L 19   (H): Data is abnormally high  !: Data is abnormal  (L): Data is abnormally low      Continue Prolia every 6 months.  CMP normal from today.  DEXA in 2021 showed significant improvement.  Repeat DEXA next visit .    # Follow up in about 6 months (around 11/11/2023).      Disclaimer: This note was prepared using voice recognition system and is likely to have sound alike errors and is not proof read.  Please call me with any questions.

## 2023-05-11 NOTE — NURSING
Last dose? 11/9/22     Pt taking calcium/vitamin D supplements? yes     Any invasive dental procedures in past or upcoming 3 months? no     Last Rheumatology provider visit? Today. Kalyan Weinberg MD     Recent labs? calcium 10.8    Prolia administered SQ in L arm.  Injection given w/o difficulties. Bandaid applied.   Patient instructed to stay in the clinic for 15 minutes.   Patient educated on s&s of reaction. Verbalized understanding.  NAD upon discharge.    Follow up visit? 11/21/23

## 2023-05-15 ENCOUNTER — OFFICE VISIT (OUTPATIENT)
Dept: PRIMARY CARE CLINIC | Facility: CLINIC | Age: 73
End: 2023-05-15
Payer: MEDICARE

## 2023-05-15 DIAGNOSIS — R25.1 TREMOR: ICD-10-CM

## 2023-05-15 DIAGNOSIS — N18.32 STAGE 3B CHRONIC KIDNEY DISEASE: ICD-10-CM

## 2023-05-15 DIAGNOSIS — E21.3 HYPERPARATHYROIDISM: ICD-10-CM

## 2023-05-15 DIAGNOSIS — F13.20 BENZODIAZEPINE DEPENDENCE: ICD-10-CM

## 2023-05-15 DIAGNOSIS — F33.1 MODERATE EPISODE OF RECURRENT MAJOR DEPRESSIVE DISORDER: Primary | ICD-10-CM

## 2023-05-15 PROCEDURE — 4010F PR ACE/ARB THEARPY RXD/TAKEN: ICD-10-PCS | Mod: CPTII,95,, | Performed by: FAMILY MEDICINE

## 2023-05-15 PROCEDURE — 4010F ACE/ARB THERAPY RXD/TAKEN: CPT | Mod: CPTII,95,, | Performed by: FAMILY MEDICINE

## 2023-05-15 PROCEDURE — 99214 OFFICE O/P EST MOD 30 MIN: CPT | Mod: 95,,, | Performed by: FAMILY MEDICINE

## 2023-05-15 PROCEDURE — 99214 PR OFFICE/OUTPT VISIT, EST, LEVL IV, 30-39 MIN: ICD-10-PCS | Mod: 95,,, | Performed by: FAMILY MEDICINE

## 2023-05-15 RX ORDER — ESCITALOPRAM OXALATE 5 MG/1
5 TABLET ORAL DAILY
Qty: 30 TABLET | Refills: 11 | Status: SHIPPED | OUTPATIENT
Start: 2023-05-15 | End: 2023-06-07

## 2023-05-15 NOTE — PROGRESS NOTES
Subjective:      Patient ID: Cleo Ford is a 72 y.o. female.    Chief Complaint: No chief complaint on file.    The patient location is: home  Visit type: video and audio simultaneous    Patient is a 72 y.o. female coming in today  has a past medical history of Anxiety, Depression, Dysmetabolic syndrome X, Obesity, Other and unspecified hyperlipidemia, Unspecified essential hypertension, and Unspecified hypothyroidism.    Pt presents via telemedicine for depression medication f/u.  Pt reports she started having hand tremors shortly after she started taking Zoloft for depression. Pt stopped taking medication with tremor improvement; however, her depression exacerbated due to lack of medication. Pt has tried Wellbutrin in the past.     No questionnaires on file.    Pmh, Psh, Family Hx, Social Hx updated in Epic Tabs today.     LABS:   Lab Results   Component Value Date    WBC 8.99 10/20/2022    HGB 13.0 10/20/2022    HCT 40.8 10/20/2022     10/20/2022    CHOL 202 (H) 10/20/2022    TRIG 54 10/20/2022    HDL 66 10/20/2022    ALT 19 05/11/2023    AST 25 05/11/2023     05/11/2023    K 4.2 05/11/2023     05/11/2023    CREATININE 1.3 05/11/2023    BUN 27 (H) 05/11/2023    CO2 23 05/11/2023    TSH 1.490 04/18/2023    HGBA1C 5.4 10/20/2022       Mammo Digital Screening Bilat w/ Rasta  Narrative: Result:  Mammo Digital Screening Bilat w/ Rasta    History:  Patient is 72 y.o. and is seen for a screening mammogram.    Films Compared:  Compared to: 09/14/2021 Mammo Digital Screening Bilat w/ Rasta and   09/01/2020 Mammo Digital Screening Bilat w/ Rasta     Findings:   This procedure was performed using tomosynthesis.   Computer-aided detection was utilized in the interpretation of this   examination.    The breasts have scattered areas of fibroglandular density. There is no   evidence of suspicious masses, microcalcifications or architectural   distortion.  Impression:    No mammographic evidence of  malignancy.    BI-RADS Category 1: Negative    Recommendation:  Routine screening mammogram in 1 year is recommended.    Your estimated lifetime risk of breast cancer (to age 85) based on   Tyrer-Cuzick risk assessment model is 6.22 %.  According to the American   Cancer Society, patients with a lifetime breast cancer risk of 20% or   higher might benefit from supplemental screening tests. ??         Review of Systems   Constitutional:  Negative for activity change, appetite change and fatigue.   Respiratory:  Negative for cough and shortness of breath.    Cardiovascular:  Negative for chest pain and palpitations.   Gastrointestinal:  Negative for abdominal distention and abdominal pain.   Neurological:  Negative for tremors and weakness.   Psychiatric/Behavioral:  Positive for dysphoric mood. Negative for sleep disturbance. The patient is not nervous/anxious.    Objective:   There were no vitals filed for this visit.  Wt Readings from Last 10 Encounters:   05/11/23 71.5 kg (157 lb 10.1 oz)   11/09/22 76.2 kg (167 lb 15.9 oz)   11/09/22 76.2 kg (167 lb 15.9 oz)   10/20/22 75.4 kg (166 lb 1.9 oz)   09/21/22 82.1 kg (181 lb)   05/05/22 82.3 kg (181 lb 7 oz)   04/27/22 82.2 kg (181 lb 1.7 oz)   03/04/22 81.8 kg (180 lb 5.4 oz)   01/25/22 82.1 kg (181 lb)   01/18/22 82.2 kg (181 lb 3.5 oz)     Physical Exam  Psychiatric:         Attention and Perception: Attention normal.         Mood and Affect: Mood normal.         Speech: Speech normal.     Assessment:     1. Moderate episode of recurrent major depressive disorder    2. Tremor    3. Hyperparathyroidism    4. Stage 3b chronic kidney disease    5. Benzodiazepine dependence      Plan:   Diagnoses and all orders for this visit:    Moderate episode of recurrent major depressive disorder  Comments:  symptoms initially improved with zoloft, however had tremors, so d/c'ed. trial of lexapro 5mg low dose. if SE reoccur, will d/c lexapro and try pristiq.      Tremor  Comments:  with sertraline. since d/c'ed resolved.     Hyperparathyroidism    Stage 3b chronic kidney disease    Benzodiazepine dependence    Other orders  -     EScitalopram oxalate (LEXAPRO) 5 MG Tab; Take 1 tablet (5 mg total) by mouth once daily.      Depression not controlled at this time.   New Rx Lexapro 5 mg/day for depression treatment. If pt fails Lexapro or has side effects, will try Pristiq.   Instructed to f/u if sx persist or worsen.   - above diagnoses were discussed and reviewed today during visit. The conditions are stable.  Continue with current medications and interventions. Labs reviewed.       Face to Face time with patient: 12:13 PM-12:18 PM       23 minutes of total time spent on the encounter, which includes face to face time and non-face to face time preparing to see the patient (eg, review of tests), Obtaining and/or reviewing separately obtained history, Documenting clinical information in the electronic or other health record, Independently interpreting results (not separately reported) and communicating results to the patient/family/caregiver, or Care coordination (not separately reported).     Each patient to whom he or she provides medical services by telemedicine is:  (1) informed of the relationship between the physician and patient and the respective role of any other health care provider with respect to management of the patient; and (2) notified that he or she may decline to receive medical services by telemedicine and may withdraw from such care at any time.      No follow-ups on file.    There are no Patient Instructions on file for this visit.    Scribe Attestation:   IRichard, am scribing for, and in the presence of, Dr. Elizabeth Julian MD. I performed the above scribed service and the documentation accurately describes the services I performed. I attest to the accuracy of the note.    I, Dr. Elizabeth Julian MD, reviewed documentation as scribed above. I  personally performed the services described in this documentation.  I agree that the record reflects my personal performance and is accurate and complete. Elizabeth Julian MD.    05/15/2023

## 2023-06-05 ENCOUNTER — PATIENT MESSAGE (OUTPATIENT)
Dept: PRIMARY CARE CLINIC | Facility: CLINIC | Age: 73
End: 2023-06-05

## 2023-06-07 ENCOUNTER — OFFICE VISIT (OUTPATIENT)
Dept: PRIMARY CARE CLINIC | Facility: CLINIC | Age: 73
End: 2023-06-07
Payer: MEDICARE

## 2023-06-07 VITALS — WEIGHT: 157 LBS | HEIGHT: 63 IN | BODY MASS INDEX: 27.82 KG/M2

## 2023-06-07 DIAGNOSIS — F33.1 MODERATE EPISODE OF RECURRENT MAJOR DEPRESSIVE DISORDER: Primary | ICD-10-CM

## 2023-06-07 DIAGNOSIS — E21.3 HYPERPARATHYROIDISM: ICD-10-CM

## 2023-06-07 DIAGNOSIS — E03.8 OTHER SPECIFIED HYPOTHYROIDISM: ICD-10-CM

## 2023-06-07 DIAGNOSIS — E83.52 HYPERCALCEMIA: ICD-10-CM

## 2023-06-07 DIAGNOSIS — I10 ESSENTIAL HYPERTENSION: Chronic | ICD-10-CM

## 2023-06-07 DIAGNOSIS — R73.09 ABNORMAL GLUCOSE: ICD-10-CM

## 2023-06-07 DIAGNOSIS — R25.1 TREMOR: ICD-10-CM

## 2023-06-07 DIAGNOSIS — N18.32 STAGE 3B CHRONIC KIDNEY DISEASE: ICD-10-CM

## 2023-06-07 PROCEDURE — 1159F MED LIST DOCD IN RCRD: CPT | Mod: CPTII,95,, | Performed by: FAMILY MEDICINE

## 2023-06-07 PROCEDURE — 4010F PR ACE/ARB THEARPY RXD/TAKEN: ICD-10-PCS | Mod: CPTII,95,, | Performed by: FAMILY MEDICINE

## 2023-06-07 PROCEDURE — 1126F AMNT PAIN NOTED NONE PRSNT: CPT | Mod: CPTII,95,, | Performed by: FAMILY MEDICINE

## 2023-06-07 PROCEDURE — 3288F PR FALLS RISK ASSESSMENT DOCUMENTED: ICD-10-PCS | Mod: CPTII,95,, | Performed by: FAMILY MEDICINE

## 2023-06-07 PROCEDURE — 1126F PR PAIN SEVERITY QUANTIFIED, NO PAIN PRESENT: ICD-10-PCS | Mod: CPTII,95,, | Performed by: FAMILY MEDICINE

## 2023-06-07 PROCEDURE — 1101F PT FALLS ASSESS-DOCD LE1/YR: CPT | Mod: CPTII,95,, | Performed by: FAMILY MEDICINE

## 2023-06-07 PROCEDURE — 4010F ACE/ARB THERAPY RXD/TAKEN: CPT | Mod: CPTII,95,, | Performed by: FAMILY MEDICINE

## 2023-06-07 PROCEDURE — 3008F BODY MASS INDEX DOCD: CPT | Mod: CPTII,95,, | Performed by: FAMILY MEDICINE

## 2023-06-07 PROCEDURE — 3288F FALL RISK ASSESSMENT DOCD: CPT | Mod: CPTII,95,, | Performed by: FAMILY MEDICINE

## 2023-06-07 PROCEDURE — 99214 PR OFFICE/OUTPT VISIT, EST, LEVL IV, 30-39 MIN: ICD-10-PCS | Mod: 95,,, | Performed by: FAMILY MEDICINE

## 2023-06-07 PROCEDURE — 1160F RVW MEDS BY RX/DR IN RCRD: CPT | Mod: CPTII,95,, | Performed by: FAMILY MEDICINE

## 2023-06-07 PROCEDURE — 1159F PR MEDICATION LIST DOCUMENTED IN MEDICAL RECORD: ICD-10-PCS | Mod: CPTII,95,, | Performed by: FAMILY MEDICINE

## 2023-06-07 PROCEDURE — 1101F PR PT FALLS ASSESS DOC 0-1 FALLS W/OUT INJ PAST YR: ICD-10-PCS | Mod: CPTII,95,, | Performed by: FAMILY MEDICINE

## 2023-06-07 PROCEDURE — 99214 OFFICE O/P EST MOD 30 MIN: CPT | Mod: 95,,, | Performed by: FAMILY MEDICINE

## 2023-06-07 PROCEDURE — 3008F PR BODY MASS INDEX (BMI) DOCUMENTED: ICD-10-PCS | Mod: CPTII,95,, | Performed by: FAMILY MEDICINE

## 2023-06-07 PROCEDURE — 1160F PR REVIEW ALL MEDS BY PRESCRIBER/CLIN PHARMACIST DOCUMENTED: ICD-10-PCS | Mod: CPTII,95,, | Performed by: FAMILY MEDICINE

## 2023-06-07 RX ORDER — ESCITALOPRAM OXALATE 5 MG/1
5 TABLET ORAL DAILY
Qty: 90 TABLET | Refills: 3 | Status: SHIPPED | OUTPATIENT
Start: 2023-06-07 | End: 2024-03-11 | Stop reason: ALTCHOICE

## 2023-06-07 NOTE — PROGRESS NOTES
Subjective:      Patient ID: Cleo Ford is a 72 y.o. female.    Chief Complaint: Follow-up    The patient location is: home  Visit type: video and audio simultaneous    Patient is a 72 y.o. female coming in today for 6 week f/u.   Reports significant improvement in depression with Lexapro that was prescribed last visit. Reports BP has been stable since last visit. Pt also reports weight loss as well. Pt recently came back from trip to [place] last month. She is due for updated lab work.     [facility] Reason For Visit    6/7/2023  9:26 AM CDT - Filed by Patient   What is your primary reason for visit? Other/Annual   Have you experienced any of the following:   Change in activity? No   Unexpected weight change? No   Neck pain? No   Hearing loss? No   Runny nose? No   Trouble swallowing? No   Eye discharge? No   Changes in vision? No   Chest tightness? No   Wheezing? No   Chest pain? No   Heart beating fast or racing? No   Blood in stool? No   Constipation? No   Vomiting? No   Diarrhea? No   Drinking much more than usual? No   Urinating much more than usual? No   Difficulty urinating? No   Blood in the urine? No   Menstrual problem? No   Painful urination? No   Joint swelling? No   Joint pain? No   Headaches? No   Weakness? No   Confusion? No   Feeling depressed? No     [facility] Documents    6/7/2023  9:27 AM CDT - Filed by Patient   Would you like a copy of [facility]'s Financial Assistance Policy Summary? No, I would not like a copy.     [facility]    6/7/2023  9:28 AM CDT - Filed by Patient   On average, how many days per week do you engage in moderate to strenuous exercise (like a brisk walk)? Patient refused   On average, how many minutes do you engage in exercise at this level?    Do you feel stress - tense, restless, nervous, or anxious, or unable to sleep at night because your mind is troubled all the time - these days?    Do you belong to any clubs or organizations such as [employer] groups, unions, fraternal  or athletic groups, or school groups? Patient refused   How often do you attend meetings of the clubs or organizations you belong to?    In a typical week, how many times do you talk on the phone with family, friends, or neighbors?    How often do you get together with friends or relatives? Three times a week   Are you , , , , never , or living with a partner?    How hard is it for you to pay for the very basics like food, housing, medical care, and heating? Not hard at all   Within the past 12 months, you worried that your food would run out before you got the money to buy more. Never true   Within the past 12 months, the food you bought just didnt last and you didnt have money to get more. Never true   In the past 12 months, has lack of transportation kept you from medical appointments or from getting medications? No   In the past 12 months, has lack of transportation kept you from meetings, work, or from getting things needed for daily living? No   How often do you have a drink containing alcohol? Monthly or less   How many drinks containing alcohol do you have on a typical day when you are drinking? Patient does not drink   How often do you have six or more drinks on one occasion? Never   In the last 12 months, was there a time when you were not able to pay the mortgage or rent on time? No   In the last 12 months, how many places have you lived? (range: at least 0) 1   In the last 12 months, was there a time when you did not have a steady place to sleep or slept in a shelter (including now)? No         Pmh, Psh, Family Hx, Social Hx updated in Epic Tabs today.     LABS:   Lab Results   Component Value Date    WBC 8.99 10/20/2022    HGB 13.0 10/20/2022    HCT 40.8 10/20/2022     10/20/2022    CHOL 202 (H) 10/20/2022    TRIG 54 10/20/2022    HDL 66 10/20/2022    ALT 19 05/11/2023    AST 25 05/11/2023     05/11/2023    K 4.2 05/11/2023     05/11/2023     CREATININE 1.3 05/11/2023    BUN 27 (H) 05/11/2023    CO2 23 05/11/2023    TSH 1.490 04/18/2023    HGBA1C 5.4 10/20/2022       Mammo Digital Screening Bilat w/ Rasta  Narrative: Result:  Mammo Digital Screening Bilat w/ Rasta    History:  Patient is 72 y.o. and is seen for a screening mammogram.    Films Compared:  Compared to: 09/14/2021 Mammo Digital Screening Bilat w/ Rasta and   09/01/2020 Mammo Digital Screening Bilat w/ Rasta     Findings:   This procedure was performed using tomosynthesis.   Computer-aided detection was utilized in the interpretation of this   examination.    The breasts have scattered areas of fibroglandular density. There is no   evidence of suspicious masses, microcalcifications or architectural   distortion.  Impression:    No mammographic evidence of malignancy.    BI-RADS Category 1: Negative    Recommendation:  Routine screening mammogram in 1 year is recommended.    Your estimated lifetime risk of breast cancer (to age 85) based on   Tyrer-Cuzick risk assessment model is 6.22 %.  According to the American   Cancer Society, patients with a lifetime breast cancer risk of 20% or   higher might benefit from supplemental screening tests. ??         Review of Systems   Constitutional:  Negative for activity change and unexpected weight change.   HENT:  Negative for hearing loss, rhinorrhea and trouble swallowing.    Eyes:  Negative for discharge and visual disturbance.   Respiratory:  Negative for chest tightness and wheezing.    Cardiovascular:  Negative for chest pain and palpitations.   Gastrointestinal:  Negative for blood in stool, constipation, diarrhea and vomiting.   Endocrine: Negative for polydipsia and polyuria.   Genitourinary:  Negative for difficulty urinating, dysuria, hematuria and menstrual problem.   Musculoskeletal:  Negative for arthralgias, joint swelling and neck pain.   Neurological:  Negative for weakness and headaches.   Psychiatric/Behavioral:  Negative for  "confusion and dysphoric mood.    Objective:     Vitals:    06/07/23 1203   Weight: 71.2 kg (157 lb)   Height: 5' 3" (1.6 m)     Wt Readings from Last 10 Encounters:   06/07/23 71.2 kg (157 lb)   05/11/23 71.5 kg (157 lb 10.1 oz)   11/09/22 76.2 kg (167 lb 15.9 oz)   11/09/22 76.2 kg (167 lb 15.9 oz)   10/20/22 75.4 kg (166 lb 1.9 oz)   09/21/22 82.1 kg (181 lb)   05/05/22 82.3 kg (181 lb 7 oz)   04/27/22 82.2 kg (181 lb 1.7 oz)   03/04/22 81.8 kg (180 lb 5.4 oz)   01/25/22 82.1 kg (181 lb)     Physical Exam  Vitals reviewed.   Constitutional:       General: She is awake. She is not in acute distress.     Appearance: Normal appearance. She is well-developed, well-groomed and normal weight. She is not ill-appearing.   HENT:      Head: Normocephalic and atraumatic.      Right Ear: External ear normal.      Left Ear: External ear normal.      Nose: Nose normal.      Mouth/Throat:      Lips: Pink.   Eyes:      Conjunctiva/sclera: Conjunctivae normal.   Pulmonary:      Effort: Pulmonary effort is normal.   Neurological:      Mental Status: She is alert.   Psychiatric:         Attention and Perception: Attention and perception normal. She is attentive.         Mood and Affect: Mood and affect normal. Mood is not anxious or depressed. Affect is not labile, blunt, angry or inappropriate.         Speech: Speech normal. She is communicative. Speech is not rapid and pressured, delayed, slurred or tangential.         Behavior: Behavior normal. Behavior is not agitated, slowed, aggressive, withdrawn, hyperactive or combative. Behavior is cooperative.         Thought Content: Thought content normal. Thought content is not paranoid or delusional. Thought content does not include homicidal or suicidal ideation. Thought content does not include homicidal or suicidal plan.         Cognition and Memory: Cognition and memory normal. Memory is not impaired. She does not exhibit impaired recent memory or impaired remote memory.         " Judgment: Judgment normal. Judgment is not impulsive or inappropriate.     Assessment:     1. Moderate episode of recurrent major depressive disorder    2. Tremor    3. Essential hypertension    4. Stage 3b chronic kidney disease    5. Hypercalcemia    6. Other specified hypothyroidism    7. Hyperparathyroidism    8. Abnormal glucose      Plan:   Cleo was seen today for follow-up.    Diagnoses and all orders for this visit:    Moderate episode of recurrent major depressive disorder  Comments:  improved with lexapro 5mg daily. cont, will send in 90 day rf.   Orders:  -     TSH; Future  -     T4, Free; Future  -     Lipid Panel; Future  -     Hemoglobin A1C; Future  -     Comprehensive Metabolic Panel; Future  -     CBC Auto Differential; Future  -     Insulin, Random; Future  -     Vitamin D; Future  -     EScitalopram oxalate (LEXAPRO) 5 MG Tab; Take 1 tablet (5 mg total) by mouth once daily.    Tremor  Comments:  resolved with stopping zoloft.   Orders:  -     TSH; Future  -     T4, Free; Future  -     Lipid Panel; Future  -     Hemoglobin A1C; Future  -     Comprehensive Metabolic Panel; Future  -     CBC Auto Differential; Future  -     Insulin, Random; Future  -     Vitamin D; Future    Essential hypertension  Comments:  stable, push hydration, repeat labs in 3 months.   Orders:  -     TSH; Future  -     T4, Free; Future  -     Lipid Panel; Future  -     Hemoglobin A1C; Future  -     Comprehensive Metabolic Panel; Future  -     CBC Auto Differential; Future  -     Insulin, Random; Future  -     Vitamin D; Future    Stage 3b chronic kidney disease  Comments:  push hydration, repeat labs in 3 months.   Orders:  -     TSH; Future  -     T4, Free; Future  -     Lipid Panel; Future  -     Hemoglobin A1C; Future  -     Comprehensive Metabolic Panel; Future  -     CBC Auto Differential; Future  -     Insulin, Random; Future  -     Vitamin D; Future    Hypercalcemia  Comments:  push hydration, repeat labs in 3  months. off calcium supplements.   Orders:  -     TSH; Future  -     T4, Free; Future  -     Lipid Panel; Future  -     Hemoglobin A1C; Future  -     Comprehensive Metabolic Panel; Future  -     CBC Auto Differential; Future  -     Insulin, Random; Future  -     Vitamin D; Future    Other specified hypothyroidism  -     TSH; Future  -     T4, Free; Future  -     Lipid Panel; Future  -     Hemoglobin A1C; Future  -     Comprehensive Metabolic Panel; Future  -     CBC Auto Differential; Future  -     Insulin, Random; Future  -     Vitamin D; Future    Hyperparathyroidism  Comments:  push hydration, repeat labs in 3 months. off calcium supplements.   Orders:  -     TSH; Future  -     T4, Free; Future  -     Lipid Panel; Future  -     Hemoglobin A1C; Future  -     Comprehensive Metabolic Panel; Future  -     CBC Auto Differential; Future  -     Insulin, Random; Future  -     Vitamin D; Future  -     CALCIUM, IONIZED; Future  -     PTH, intact; Future    Abnormal glucose  -     Hemoglobin A1C; Future        - above diagnoses were discussed and reviewed today during visit. The conditions are stable. Depression and BP have significantly improved since last visit. Tremor resolved since stopping Zoloft. Continue with current medications and interventions.   Refilled Rx Lexapro 5 mg/day for depression treatment.   Lab work ordered to be completed prior to next visit.   Advised to continue to increase hydration.   Instructed to f/u in 3 months.     Face to Face time with patient: 12:05 PM-12:13 PM         20  minutes of total time spent on the encounter, which includes face to face time and non-face to face time preparing to see the patient (eg, review of tests), Obtaining and/or reviewing separately obtained history, Documenting clinical information in the electronic or other health record, Independently interpreting results (not separately reported) and communicating results to the patient/family/caregiver, or Care  coordination (not separately reported).     Each patient to whom he or she provides medical services by telemedicine is:  (1) informed of the relationship between the physician and patient and the respective role of any other health care provider with respect to management of the patient; and (2) notified that he or she may decline to receive medical services by telemedicine and may withdraw from such care at any time.      Follow up in about 3 months (around 9/7/2023) for physical with Dr MARLEY.    There are no Patient Instructions on file for this visit.    Scribe Attestation:   I, Richard Flower, am scribing for, and in the presence of, Dr. Elizabeth Marley MD. I performed the above scribed service and the documentation accurately describes the services I performed. I attest to the accuracy of the note.    I, Dr. Elizabeth Marley MD, reviewed documentation as scribed above. I personally performed the services described in this documentation.  I agree that the record reflects my personal performance and is accurate and complete. Elizabeth Marley MD.    06/07/2023

## 2023-06-19 ENCOUNTER — PATIENT MESSAGE (OUTPATIENT)
Dept: PRIMARY CARE CLINIC | Facility: CLINIC | Age: 73
End: 2023-06-19

## 2023-06-19 ENCOUNTER — PATIENT MESSAGE (OUTPATIENT)
Dept: PRIMARY CARE CLINIC | Facility: CLINIC | Age: 73
End: 2023-06-19
Payer: MEDICARE

## 2023-06-20 ENCOUNTER — OFFICE VISIT (OUTPATIENT)
Dept: PRIMARY CARE CLINIC | Facility: CLINIC | Age: 73
End: 2023-06-20
Payer: MEDICARE

## 2023-06-20 ENCOUNTER — PATIENT MESSAGE (OUTPATIENT)
Dept: PRIMARY CARE CLINIC | Facility: CLINIC | Age: 73
End: 2023-06-20

## 2023-06-20 VITALS — WEIGHT: 157 LBS | BODY MASS INDEX: 27.82 KG/M2 | HEIGHT: 63 IN

## 2023-06-20 DIAGNOSIS — J01.90 ACUTE SINUSITIS, RECURRENCE NOT SPECIFIED, UNSPECIFIED LOCATION: Primary | ICD-10-CM

## 2023-06-20 DIAGNOSIS — I10 ESSENTIAL HYPERTENSION: ICD-10-CM

## 2023-06-20 DIAGNOSIS — R09.82 POSTNASAL DRIP: ICD-10-CM

## 2023-06-20 PROCEDURE — 3008F PR BODY MASS INDEX (BMI) DOCUMENTED: ICD-10-PCS | Mod: CPTII,95,, | Performed by: FAMILY MEDICINE

## 2023-06-20 PROCEDURE — 99214 PR OFFICE/OUTPT VISIT, EST, LEVL IV, 30-39 MIN: ICD-10-PCS | Mod: 95,,, | Performed by: FAMILY MEDICINE

## 2023-06-20 PROCEDURE — 1126F PR PAIN SEVERITY QUANTIFIED, NO PAIN PRESENT: ICD-10-PCS | Mod: CPTII,95,, | Performed by: FAMILY MEDICINE

## 2023-06-20 PROCEDURE — 1126F AMNT PAIN NOTED NONE PRSNT: CPT | Mod: CPTII,95,, | Performed by: FAMILY MEDICINE

## 2023-06-20 PROCEDURE — 4010F PR ACE/ARB THEARPY RXD/TAKEN: ICD-10-PCS | Mod: CPTII,95,, | Performed by: FAMILY MEDICINE

## 2023-06-20 PROCEDURE — 4010F ACE/ARB THERAPY RXD/TAKEN: CPT | Mod: CPTII,95,, | Performed by: FAMILY MEDICINE

## 2023-06-20 PROCEDURE — 1160F PR REVIEW ALL MEDS BY PRESCRIBER/CLIN PHARMACIST DOCUMENTED: ICD-10-PCS | Mod: CPTII,95,, | Performed by: FAMILY MEDICINE

## 2023-06-20 PROCEDURE — 1159F PR MEDICATION LIST DOCUMENTED IN MEDICAL RECORD: ICD-10-PCS | Mod: CPTII,95,, | Performed by: FAMILY MEDICINE

## 2023-06-20 PROCEDURE — 1159F MED LIST DOCD IN RCRD: CPT | Mod: CPTII,95,, | Performed by: FAMILY MEDICINE

## 2023-06-20 PROCEDURE — 1160F RVW MEDS BY RX/DR IN RCRD: CPT | Mod: CPTII,95,, | Performed by: FAMILY MEDICINE

## 2023-06-20 PROCEDURE — 99214 OFFICE O/P EST MOD 30 MIN: CPT | Mod: 95,,, | Performed by: FAMILY MEDICINE

## 2023-06-20 PROCEDURE — 3008F BODY MASS INDEX DOCD: CPT | Mod: CPTII,95,, | Performed by: FAMILY MEDICINE

## 2023-06-20 RX ORDER — PROMETHAZINE HYDROCHLORIDE AND DEXTROMETHORPHAN HYDROBROMIDE 6.25; 15 MG/5ML; MG/5ML
5 SYRUP ORAL EVERY 4 HOURS PRN
Qty: 180 ML | Refills: 0 | Status: SHIPPED | OUTPATIENT
Start: 2023-06-20 | End: 2023-06-30

## 2023-06-20 RX ORDER — AZITHROMYCIN 250 MG/1
TABLET, FILM COATED ORAL
Qty: 6 TABLET | Refills: 0 | Status: SHIPPED | OUTPATIENT
Start: 2023-06-20 | End: 2023-06-25

## 2023-06-20 RX ORDER — AZELASTINE HYDROCHLORIDE, FLUTICASONE PROPIONATE 137; 50 UG/1; UG/1
1 SPRAY, METERED NASAL 2 TIMES DAILY
Qty: 23 G | Refills: 11 | Status: SHIPPED | OUTPATIENT
Start: 2023-06-20

## 2023-06-20 RX ORDER — GUAIFENESIN 1200 MG/1
TABLET, EXTENDED RELEASE ORAL
Qty: 20 TABLET | Refills: 1
Start: 2023-06-20

## 2023-06-20 RX ORDER — PREDNISONE 20 MG/1
40 TABLET ORAL DAILY
Qty: 10 TABLET | Refills: 0 | Status: SHIPPED | OUTPATIENT
Start: 2023-06-20 | End: 2023-06-25

## 2023-06-20 NOTE — PROGRESS NOTES
Subjective:      Patient ID: Cleo Ford is a 72 y.o. female.    Chief Complaint: Sinusitis and Nasal Congestion    The patient location is: home  Visit type: video and audio simultaneous    Patient is a 72 y.o. female coming in today for congestion and sinusitis.  States sx started 3 days ago after a 5 hrs flight. Reports she started to have rhinorrhea yesterday. Has been on Mucinex with minimal relief. States sx have prevented her from having good night sleep. No other health concern at this time.     [facility] Reason For Visit    6/19/2023  5:18 PM CDT - Filed by Patient   What is your primary reason for visit? Other/Annual   Have you experienced any of the following:   Change in activity? No   Unexpected weight change? No   Neck pain? No   Hearing loss? No   Runny nose? Yes   Trouble swallowing? No   Eye discharge? No   Changes in vision? No   Chest tightness? No   Wheezing? No   Chest pain? No   Heart beating fast or racing? No   Blood in stool? No   Constipation? No   Vomiting? No   Diarrhea? No   Drinking much more than usual? No   Urinating much more than usual? No   Difficulty urinating? No   Blood in the urine? No   Menstrual problem? No   Painful urination? No   Joint swelling? No   Joint pain? No   Headaches? No   Weakness? No   Confusion? No   Feeling depressed? No     [facility] Peq Documents    6/19/2023  5:18 PM CDT - Filed by Patient   Would you like a copy of [facility]'s Financial Assistance Policy Summary? No, I would not like a copy.     [facility] Peq [facility]    6/19/2023  5:20 PM CDT - Filed by Patient   On average, how many days per week do you engage in moderate to strenuous exercise (like a brisk walk)? Patient refused   On average, how many minutes do you engage in exercise at this level? 0 min   Do you feel stress - tense, restless, nervous, or anxious, or unable to sleep at night because your mind is troubled all the time - these days? Patient refused   Do you belong to any clubs or organizations such  as Shinto groups, unions, fraternal or athletic groups, or school groups? Patient refused   How often do you attend meetings of the clubs or organizations you belong to? Patient refused   In a typical week, how many times do you talk on the phone with family, friends, or neighbors? Patient refused   How often do you get together with friends or relatives? Patient refused   Are you , , , , never , or living with a partner?    How hard is it for you to pay for the very basics like food, housing, medical care, and heating? Not hard at all   Within the past 12 months, you worried that your food would run out before you got the money to buy more. Never true   Within the past 12 months, the food you bought just didnt last and you didnt have money to get more. Never true   In the past 12 months, has lack of transportation kept you from medical appointments or from getting medications? No   In the past 12 months, has lack of transportation kept you from meetings, work, or from getting things needed for daily living? No   How often do you have a drink containing alcohol? Patient refused   How many drinks containing alcohol do you have on a typical day when you are drinking? Patient refused   How often do you have six or more drinks on one occasion? Patient refused   In the last 12 months, was there a time when you were not able to pay the mortgage or rent on time? No   In the last 12 months, how many places have you lived? (range: at least 0) 1   In the last 12 months, was there a time when you did not have a steady place to sleep or slept in a shelter (including now)? No         Pmh, Psh, Family Hx, Social Hx updated in Epic Tabs today.     LABS:   Lab Results   Component Value Date    WBC 8.99 10/20/2022    HGB 13.0 10/20/2022    HCT 40.8 10/20/2022     10/20/2022    CHOL 202 (H) 10/20/2022    TRIG 54 10/20/2022    HDL 66 10/20/2022    ALT 19 05/11/2023    AST 25  05/11/2023     05/11/2023    K 4.2 05/11/2023     05/11/2023    CREATININE 1.3 05/11/2023    BUN 27 (H) 05/11/2023    CO2 23 05/11/2023    TSH 1.490 04/18/2023    HGBA1C 5.4 10/20/2022       Mammo Digital Screening Bilat w/ Rasta  Narrative: Result:  Mammo Digital Screening Bilat w/ Rasta    History:  Patient is 72 y.o. and is seen for a screening mammogram.    Films Compared:  Compared to: 09/14/2021 Mammo Digital Screening Bilat w/ Rasta and   09/01/2020 Mammo Digital Screening Bilat w/ Rasta     Findings:   This procedure was performed using tomosynthesis.   Computer-aided detection was utilized in the interpretation of this   examination.    The breasts have scattered areas of fibroglandular density. There is no   evidence of suspicious masses, microcalcifications or architectural   distortion.  Impression:    No mammographic evidence of malignancy.    BI-RADS Category 1: Negative    Recommendation:  Routine screening mammogram in 1 year is recommended.    Your estimated lifetime risk of breast cancer (to age 85) based on   Tyrer-Cuzick risk assessment model is 6.22 %.  According to the American   Cancer Society, patients with a lifetime breast cancer risk of 20% or   higher might benefit from supplemental screening tests. ??         Review of Systems   Constitutional:  Negative for activity change and unexpected weight change.   HENT:  Positive for congestion and rhinorrhea. Negative for hearing loss and trouble swallowing.    Eyes:  Negative for discharge and visual disturbance.   Respiratory:  Negative for chest tightness and wheezing.    Cardiovascular:  Negative for chest pain and palpitations.   Gastrointestinal:  Negative for blood in stool, constipation, diarrhea and vomiting.   Endocrine: Negative for polydipsia and polyuria.   Genitourinary:  Negative for difficulty urinating, dysuria, hematuria and menstrual problem.   Musculoskeletal:  Negative for arthralgias, joint swelling and neck pain.  "  Neurological:  Negative for weakness and headaches.   Psychiatric/Behavioral:  Negative for confusion and dysphoric mood.    Objective:     Vitals:    06/20/23 1607   Weight: 71.2 kg (157 lb)   Height: 5' 3" (1.6 m)     Wt Readings from Last 10 Encounters:   06/20/23 71.2 kg (157 lb)   06/07/23 71.2 kg (157 lb)   05/11/23 71.5 kg (157 lb 10.1 oz)   11/09/22 76.2 kg (167 lb 15.9 oz)   11/09/22 76.2 kg (167 lb 15.9 oz)   10/20/22 75.4 kg (166 lb 1.9 oz)   09/21/22 82.1 kg (181 lb)   05/05/22 82.3 kg (181 lb 7 oz)   04/27/22 82.2 kg (181 lb 1.7 oz)   03/04/22 81.8 kg (180 lb 5.4 oz)     Physical Exam  Constitutional:       General: She is not in acute distress.     Appearance: Normal appearance. She is well-developed. She is not ill-appearing.   HENT:      Head: Normocephalic and atraumatic.      Right Ear: External ear normal.      Left Ear: External ear normal.      Nose:      Right Sinus: Maxillary sinus tenderness and frontal sinus tenderness present.      Left Sinus: Maxillary sinus tenderness and frontal sinus tenderness present.   Eyes:      Conjunctiva/sclera: Conjunctivae normal.   Pulmonary:      Effort: Pulmonary effort is normal. No respiratory distress.   Skin:     Findings: No rash.   Neurological:      Mental Status: She is alert.   Psychiatric:         Attention and Perception: Attention normal. She is attentive.         Mood and Affect: Mood and affect normal. Mood is not anxious, depressed or elated. Affect is not labile, blunt, angry or inappropriate.         Speech: Speech normal. She is communicative. Speech is not rapid and pressured, delayed, slurred or tangential.         Behavior: Behavior normal. Behavior is not agitated, slowed, aggressive, withdrawn, hyperactive or combative.         Thought Content: Thought content normal.         Cognition and Memory: Cognition normal. Memory is not impaired. She does not exhibit impaired recent memory or impaired remote memory.         Judgment: " Judgment normal. Judgment is not impulsive or inappropriate.     Assessment:     1. Acute sinusitis, recurrence not specified, unspecified location    2. Postnasal drip    3. Essential hypertension      Plan:   Cleo was seen today for sinusitis and nasal congestion.    Diagnoses and all orders for this visit:    Acute sinusitis, recurrence not specified, unspecified location  Comments:  NP, starting meds for symptoms and steroids. if not improving in 3-5 days, can begin zpak antibiotics.   Orders:  -     predniSONE (DELTASONE) 20 MG tablet; Take 2 tablets (40 mg total) by mouth once daily. for 5 days  -     promethazine-dextromethorphan (PROMETHAZINE-DM) 6.25-15 mg/5 mL Syrp; Take 5 mLs by mouth every 4 (four) hours as needed (may make you drowsy).  -     guaiFENesin (MUCINEX) 1,200 mg Ta12; 1 tab po daily  -     azelastine-fluticasone (DYMISTA) 137-50 mcg/spray Spry nassal spray; 1 spray by Each Nostril route 2 (two) times daily.  -     azithromycin (Z-ALLEY) 250 MG tablet; Take 2 tablets by mouth on day 1; Take 1 tablet by mouth on days 2-5; take if sinus infection gets worse    Postnasal drip  Comments:  NP, starting meds for symptoms and steroids. if not improving in 3-5 days, can begin zpak antibiotics.   Orders:  -     predniSONE (DELTASONE) 20 MG tablet; Take 2 tablets (40 mg total) by mouth once daily. for 5 days  -     promethazine-dextromethorphan (PROMETHAZINE-DM) 6.25-15 mg/5 mL Syrp; Take 5 mLs by mouth every 4 (four) hours as needed (may make you drowsy).  -     guaiFENesin (MUCINEX) 1,200 mg Ta12; 1 tab po daily  -     azelastine-fluticasone (DYMISTA) 137-50 mcg/spray Spry nassal spray; 1 spray by Each Nostril route 2 (two) times daily.  -     azithromycin (Z-ALLEY) 250 MG tablet; Take 2 tablets by mouth on day 1; Take 1 tablet by mouth on days 2-5; take if sinus infection gets worse    Essential hypertension  Comments:  unable to do OTC meds due to bp issues. monitor at home.       Sinusitis and  postnasal drip are - New Problem, discussed symptoms, work up, suspected diagnosis.   New Rx Zpak 250 mg/day for sx treatment if sx persist after 1 week.   New RxDymista nasal spray PRN fr congestion treatment.   New Promethazine syrup PRN for congestion treatment.   New Rx Deltasone 20 mg BID for 5 days for sx treatment.   Instructed to f/u if sx persist or worsen    Face to Face time with patient: 4:10 PM-4:18 PM          25 minutes of total time spent on the encounter, which includes face to face time and non-face to face time preparing to see the patient (eg, review of tests), Obtaining and/or reviewing separately obtained history, Documenting clinical information in the electronic or other health record, Independently interpreting results (not separately reported) and communicating results to the patient/family/caregiver, or Care coordination (not separately reported).     Each patient to whom he or she provides medical services by telemedicine is:  (1) informed of the relationship between the physician and patient and the respective role of any other health care provider with respect to management of the patient; and (2) notified that he or she may decline to receive medical services by telemedicine and may withdraw from such care at any time.      No follow-ups on file.    There are no Patient Instructions on file for this visit.    Scribe Attestation:   I, Richard Flower, am scribing for, and in the presence of, Dr. Elizabeth Julian MD. I performed the above scribed service and the documentation accurately describes the services I performed. I attest to the accuracy of the note.    I, Dr. Elizabeth Julian MD, reviewed documentation as scribed above. I personally performed the services described in this documentation.  I agree that the record reflects my personal performance and is accurate and complete. Eliazbeth Julian MD.    06/20/2023

## 2023-09-05 ENCOUNTER — LAB VISIT (OUTPATIENT)
Dept: LAB | Facility: HOSPITAL | Age: 73
End: 2023-09-05
Attending: FAMILY MEDICINE
Payer: MEDICARE

## 2023-09-05 DIAGNOSIS — E21.3 HYPERPARATHYROIDISM: ICD-10-CM

## 2023-09-05 DIAGNOSIS — R73.09 ABNORMAL GLUCOSE: ICD-10-CM

## 2023-09-05 DIAGNOSIS — I10 ESSENTIAL HYPERTENSION: Chronic | ICD-10-CM

## 2023-09-05 DIAGNOSIS — E03.8 OTHER SPECIFIED HYPOTHYROIDISM: ICD-10-CM

## 2023-09-05 DIAGNOSIS — E83.52 HYPERCALCEMIA: ICD-10-CM

## 2023-09-05 DIAGNOSIS — N18.32 STAGE 3B CHRONIC KIDNEY DISEASE: ICD-10-CM

## 2023-09-05 DIAGNOSIS — R25.1 TREMOR: ICD-10-CM

## 2023-09-05 DIAGNOSIS — F33.1 MODERATE EPISODE OF RECURRENT MAJOR DEPRESSIVE DISORDER: ICD-10-CM

## 2023-09-05 LAB
BASOPHILS # BLD AUTO: 0.06 K/UL (ref 0–0.2)
BASOPHILS NFR BLD: 0.8 % (ref 0–1.9)
CA-I BLDV-SCNC: 1.13 MMOL/L (ref 1.06–1.42)
DIFFERENTIAL METHOD: ABNORMAL
EOSINOPHIL # BLD AUTO: 0.2 K/UL (ref 0–0.5)
EOSINOPHIL NFR BLD: 2.5 % (ref 0–8)
ERYTHROCYTE [DISTWIDTH] IN BLOOD BY AUTOMATED COUNT: 13 % (ref 11.5–14.5)
ESTIMATED AVG GLUCOSE: 103 MG/DL (ref 68–131)
HBA1C MFR BLD: 5.2 % (ref 4–5.6)
HCT VFR BLD AUTO: 38.8 % (ref 37–48.5)
HGB BLD-MCNC: 12.2 G/DL (ref 12–16)
IMM GRANULOCYTES # BLD AUTO: 0.02 K/UL (ref 0–0.04)
IMM GRANULOCYTES NFR BLD AUTO: 0.3 % (ref 0–0.5)
INSULIN COLLECTION INTERVAL: NORMAL
INSULIN SERPL-ACNC: 22.3 UU/ML
LYMPHOCYTES # BLD AUTO: 2.3 K/UL (ref 1–4.8)
LYMPHOCYTES NFR BLD: 30.9 % (ref 18–48)
MCH RBC QN AUTO: 30.4 PG (ref 27–31)
MCHC RBC AUTO-ENTMCNC: 31.4 G/DL (ref 32–36)
MCV RBC AUTO: 97 FL (ref 82–98)
MONOCYTES # BLD AUTO: 0.5 K/UL (ref 0.3–1)
MONOCYTES NFR BLD: 6.5 % (ref 4–15)
NEUTROPHILS # BLD AUTO: 4.3 K/UL (ref 1.8–7.7)
NEUTROPHILS NFR BLD: 59 % (ref 38–73)
NRBC BLD-RTO: 0 /100 WBC
PLATELET # BLD AUTO: 287 K/UL (ref 150–450)
PMV BLD AUTO: 10.6 FL (ref 9.2–12.9)
RBC # BLD AUTO: 4.01 M/UL (ref 4–5.4)
WBC # BLD AUTO: 7.28 K/UL (ref 3.9–12.7)

## 2023-09-05 PROCEDURE — 80061 LIPID PANEL: CPT | Performed by: FAMILY MEDICINE

## 2023-09-05 PROCEDURE — 84443 ASSAY THYROID STIM HORMONE: CPT | Performed by: FAMILY MEDICINE

## 2023-09-05 PROCEDURE — 84439 ASSAY OF FREE THYROXINE: CPT | Performed by: FAMILY MEDICINE

## 2023-09-05 PROCEDURE — 82330 ASSAY OF CALCIUM: CPT | Performed by: FAMILY MEDICINE

## 2023-09-05 PROCEDURE — 82306 VITAMIN D 25 HYDROXY: CPT | Performed by: FAMILY MEDICINE

## 2023-09-05 PROCEDURE — 83525 ASSAY OF INSULIN: CPT | Performed by: FAMILY MEDICINE

## 2023-09-05 PROCEDURE — 83036 HEMOGLOBIN GLYCOSYLATED A1C: CPT | Performed by: FAMILY MEDICINE

## 2023-09-05 PROCEDURE — 83970 ASSAY OF PARATHORMONE: CPT | Performed by: FAMILY MEDICINE

## 2023-09-05 PROCEDURE — 85025 COMPLETE CBC W/AUTO DIFF WBC: CPT | Performed by: FAMILY MEDICINE

## 2023-09-05 PROCEDURE — 36415 COLL VENOUS BLD VENIPUNCTURE: CPT | Mod: PN | Performed by: FAMILY MEDICINE

## 2023-09-05 PROCEDURE — 80053 COMPREHEN METABOLIC PANEL: CPT | Performed by: FAMILY MEDICINE

## 2023-09-06 LAB
25(OH)D3+25(OH)D2 SERPL-MCNC: 48 NG/ML (ref 30–96)
ALBUMIN SERPL BCP-MCNC: 3.7 G/DL (ref 3.5–5.2)
ALP SERPL-CCNC: 47 U/L (ref 55–135)
ALT SERPL W/O P-5'-P-CCNC: 17 U/L (ref 10–44)
ANION GAP SERPL CALC-SCNC: 11 MMOL/L (ref 8–16)
AST SERPL-CCNC: 26 U/L (ref 10–40)
BILIRUB SERPL-MCNC: 0.4 MG/DL (ref 0.1–1)
BUN SERPL-MCNC: 15 MG/DL (ref 8–23)
CALCIUM SERPL-MCNC: 9.7 MG/DL (ref 8.7–10.5)
CHLORIDE SERPL-SCNC: 107 MMOL/L (ref 95–110)
CHOLEST SERPL-MCNC: 163 MG/DL (ref 120–199)
CHOLEST/HDLC SERPL: 2.5 {RATIO} (ref 2–5)
CO2 SERPL-SCNC: 21 MMOL/L (ref 23–29)
CREAT SERPL-MCNC: 1 MG/DL (ref 0.5–1.4)
EST. GFR  (NO RACE VARIABLE): >60 ML/MIN/1.73 M^2
GLUCOSE SERPL-MCNC: 116 MG/DL (ref 70–110)
HDLC SERPL-MCNC: 66 MG/DL (ref 40–75)
HDLC SERPL: 40.5 % (ref 20–50)
LDLC SERPL CALC-MCNC: 88 MG/DL (ref 63–159)
NONHDLC SERPL-MCNC: 97 MG/DL
POTASSIUM SERPL-SCNC: 3.9 MMOL/L (ref 3.5–5.1)
PROT SERPL-MCNC: 7.1 G/DL (ref 6–8.4)
PTH-INTACT SERPL-MCNC: 99.6 PG/ML (ref 9–77)
SODIUM SERPL-SCNC: 139 MMOL/L (ref 136–145)
T4 FREE SERPL-MCNC: 1.35 NG/DL (ref 0.71–1.51)
TRIGL SERPL-MCNC: 45 MG/DL (ref 30–150)
TSH SERPL DL<=0.005 MIU/L-ACNC: 0.23 UIU/ML (ref 0.4–4)

## 2023-09-11 ENCOUNTER — TELEPHONE (OUTPATIENT)
Dept: PRIMARY CARE CLINIC | Facility: CLINIC | Age: 73
End: 2023-09-11
Payer: MEDICARE

## 2023-09-11 ENCOUNTER — OFFICE VISIT (OUTPATIENT)
Dept: PRIMARY CARE CLINIC | Facility: CLINIC | Age: 73
End: 2023-09-11
Payer: MEDICARE

## 2023-09-11 VITALS
TEMPERATURE: 96 F | WEIGHT: 159.5 LBS | HEART RATE: 66 BPM | OXYGEN SATURATION: 97 % | SYSTOLIC BLOOD PRESSURE: 120 MMHG | DIASTOLIC BLOOD PRESSURE: 72 MMHG | BODY MASS INDEX: 28.25 KG/M2

## 2023-09-11 DIAGNOSIS — N95.2 ATROPHIC VAGINITIS: ICD-10-CM

## 2023-09-11 DIAGNOSIS — Z12.31 ENCOUNTER FOR SCREENING MAMMOGRAM FOR BREAST CANCER: ICD-10-CM

## 2023-09-11 DIAGNOSIS — E03.8 OTHER SPECIFIED HYPOTHYROIDISM: Chronic | ICD-10-CM

## 2023-09-11 DIAGNOSIS — I10 ESSENTIAL HYPERTENSION: Chronic | ICD-10-CM

## 2023-09-11 DIAGNOSIS — E83.52 HYPERCALCEMIA: ICD-10-CM

## 2023-09-11 DIAGNOSIS — R73.09 ABNORMAL GLUCOSE: ICD-10-CM

## 2023-09-11 DIAGNOSIS — R13.10 DYSPHAGIA, UNSPECIFIED TYPE: ICD-10-CM

## 2023-09-11 DIAGNOSIS — R10.13 DYSPEPSIA: ICD-10-CM

## 2023-09-11 DIAGNOSIS — Z00.00 ROUTINE GENERAL MEDICAL EXAMINATION AT A HEALTH CARE FACILITY: Primary | ICD-10-CM

## 2023-09-11 PROCEDURE — 1159F PR MEDICATION LIST DOCUMENTED IN MEDICAL RECORD: ICD-10-PCS | Mod: CPTII,S$GLB,, | Performed by: FAMILY MEDICINE

## 2023-09-11 PROCEDURE — 3044F PR MOST RECENT HEMOGLOBIN A1C LEVEL <7.0%: ICD-10-PCS | Mod: CPTII,S$GLB,, | Performed by: FAMILY MEDICINE

## 2023-09-11 PROCEDURE — 99397 PR PREVENTIVE VISIT,EST,65 & OVER: ICD-10-PCS | Mod: S$GLB,,, | Performed by: FAMILY MEDICINE

## 2023-09-11 PROCEDURE — 3044F HG A1C LEVEL LT 7.0%: CPT | Mod: CPTII,S$GLB,, | Performed by: FAMILY MEDICINE

## 2023-09-11 PROCEDURE — 4010F PR ACE/ARB THEARPY RXD/TAKEN: ICD-10-PCS | Mod: CPTII,S$GLB,, | Performed by: FAMILY MEDICINE

## 2023-09-11 PROCEDURE — 3008F PR BODY MASS INDEX (BMI) DOCUMENTED: ICD-10-PCS | Mod: CPTII,S$GLB,, | Performed by: FAMILY MEDICINE

## 2023-09-11 PROCEDURE — 1160F RVW MEDS BY RX/DR IN RCRD: CPT | Mod: CPTII,S$GLB,, | Performed by: FAMILY MEDICINE

## 2023-09-11 PROCEDURE — 4010F ACE/ARB THERAPY RXD/TAKEN: CPT | Mod: CPTII,S$GLB,, | Performed by: FAMILY MEDICINE

## 2023-09-11 PROCEDURE — 3288F PR FALLS RISK ASSESSMENT DOCUMENTED: ICD-10-PCS | Mod: CPTII,S$GLB,, | Performed by: FAMILY MEDICINE

## 2023-09-11 PROCEDURE — 99999 PR PBB SHADOW E&M-EST. PATIENT-LVL III: ICD-10-PCS | Mod: PBBFAC,,, | Performed by: FAMILY MEDICINE

## 2023-09-11 PROCEDURE — 3008F BODY MASS INDEX DOCD: CPT | Mod: CPTII,S$GLB,, | Performed by: FAMILY MEDICINE

## 2023-09-11 PROCEDURE — 3078F PR MOST RECENT DIASTOLIC BLOOD PRESSURE < 80 MM HG: ICD-10-PCS | Mod: CPTII,S$GLB,, | Performed by: FAMILY MEDICINE

## 2023-09-11 PROCEDURE — 99999 PR PBB SHADOW E&M-EST. PATIENT-LVL III: CPT | Mod: PBBFAC,,, | Performed by: FAMILY MEDICINE

## 2023-09-11 PROCEDURE — 1126F PR PAIN SEVERITY QUANTIFIED, NO PAIN PRESENT: ICD-10-PCS | Mod: CPTII,S$GLB,, | Performed by: FAMILY MEDICINE

## 2023-09-11 PROCEDURE — 3074F SYST BP LT 130 MM HG: CPT | Mod: CPTII,S$GLB,, | Performed by: FAMILY MEDICINE

## 2023-09-11 PROCEDURE — 3078F DIAST BP <80 MM HG: CPT | Mod: CPTII,S$GLB,, | Performed by: FAMILY MEDICINE

## 2023-09-11 PROCEDURE — 1101F PT FALLS ASSESS-DOCD LE1/YR: CPT | Mod: CPTII,S$GLB,, | Performed by: FAMILY MEDICINE

## 2023-09-11 PROCEDURE — 3074F PR MOST RECENT SYSTOLIC BLOOD PRESSURE < 130 MM HG: ICD-10-PCS | Mod: CPTII,S$GLB,, | Performed by: FAMILY MEDICINE

## 2023-09-11 PROCEDURE — 1101F PR PT FALLS ASSESS DOC 0-1 FALLS W/OUT INJ PAST YR: ICD-10-PCS | Mod: CPTII,S$GLB,, | Performed by: FAMILY MEDICINE

## 2023-09-11 PROCEDURE — 3288F FALL RISK ASSESSMENT DOCD: CPT | Mod: CPTII,S$GLB,, | Performed by: FAMILY MEDICINE

## 2023-09-11 PROCEDURE — 1160F PR REVIEW ALL MEDS BY PRESCRIBER/CLIN PHARMACIST DOCUMENTED: ICD-10-PCS | Mod: CPTII,S$GLB,, | Performed by: FAMILY MEDICINE

## 2023-09-11 PROCEDURE — 1159F MED LIST DOCD IN RCRD: CPT | Mod: CPTII,S$GLB,, | Performed by: FAMILY MEDICINE

## 2023-09-11 PROCEDURE — 1126F AMNT PAIN NOTED NONE PRSNT: CPT | Mod: CPTII,S$GLB,, | Performed by: FAMILY MEDICINE

## 2023-09-11 PROCEDURE — 99397 PER PM REEVAL EST PAT 65+ YR: CPT | Mod: S$GLB,,, | Performed by: FAMILY MEDICINE

## 2023-09-11 RX ORDER — LEVOTHYROXINE SODIUM 100 UG/1
100 TABLET ORAL
Qty: 90 TABLET | Refills: 3 | Status: SHIPPED | OUTPATIENT
Start: 2023-09-11 | End: 2023-12-11 | Stop reason: SDUPTHER

## 2023-09-11 NOTE — PROGRESS NOTES
Subjective:      Patient ID: Cleo Ford is a 73 y.o. female.    Chief Complaint: Annual Exam      Patient is a 73 y.o. female coming in today for annual exam.   Recent lab work results reviewed with pt. No signs of anemia. Blood glucose and parathyroid levels were slightly elevated. Other markers were within normal limits. Pt is inquiring about hormone supplement she can take due to recent decrease in libido. Reports vaginal dryness. Discussed habit changes and tips to increase libido. She is requiring medication refill today. No other health concern at this time.       1. Routine general medical examination at a health care facility    2. Essential hypertension    3. Other specified hypothyroidism    4. Dysphagia, unspecified type    5. Dyspepsia    6. Hypercalcemia    7. Abnormal glucose    8. Atrophic vaginitis    9. Encounter for screening mammogram for breast cancer       [facility]    9/11/2023  8:11 AM CDT - Filed by Patient   On average, how many days per week do you engage in moderate to strenuous exercise (like a brisk walk)? 0 days   On average, how many minutes do you engage in exercise at this level? 0 min   Do you feel stress - tense, restless, nervous, or anxious, or unable to sleep at night because your mind is troubled all the time - these days? To some extent   Do you belong to any clubs or organizations such as Oriental orthodox groups, unions, fraternal or athletic groups, or school groups? Patient refused   How often do you attend meetings of the clubs or organizations you belong to? Patient refused   In a typical week, how many times do you talk on the phone with family, friends, or neighbors? More than three times a week   How often do you get together with friends or relatives? Three times a week   Are you , , , , never , or living with a partner?    How hard is it for you to pay for the very basics like food, housing, medical care, and heating?  Not hard at all   Within the past 12 months, you worried that your food would run out before you got the money to buy more. Never true   Within the past 12 months, the food you bought just didnt last and you didnt have money to get more. Never true   In the past 12 months, has lack of transportation kept you from medical appointments or from getting medications? No   In the past 12 months, has lack of transportation kept you from meetings, work, or from getting things needed for daily living? No   How often do you have a drink containing alcohol? 2-4 times a month   How many drinks containing alcohol do you have on a typical day when you are drinking? 1 or 2   How often do you have six or more drinks on one occasion? Never   In the last 12 months, was there a time when you were not able to pay the mortgage or rent on time? No   In the last 12 months, how many places have you lived? (range: at least 0) 1   In the last 12 months, was there a time when you did not have a steady place to sleep or slept in a shelter (including now)? No         Pmh, Psh, Family Hx, Social Hx, HM updated in Epic Tabs today.   Review of Systems   Constitutional:  Negative for activity change, appetite change, chills, fatigue and fever.   HENT:  Negative for ear pain and trouble swallowing.    Eyes:  Negative for pain and visual disturbance.   Respiratory:  Negative for cough and shortness of breath.    Cardiovascular:  Negative for chest pain and leg swelling.   Gastrointestinal:  Negative for abdominal pain, blood in stool, nausea and vomiting.   Endocrine: Negative for cold intolerance and heat intolerance.   Genitourinary:  Negative for dysuria and frequency.        + Vaginal dryness   Musculoskeletal:  Negative for joint swelling, myalgias and neck pain.   Skin:  Negative for color change and rash.   Neurological:  Negative for dizziness and headaches.   Psychiatric/Behavioral:  Negative for behavioral problems and sleep disturbance.       Objective:     Vitals:    09/11/23 0824   BP: 120/72   Pulse: 66   Temp: 96.3 °F (35.7 °C)   SpO2: 97%   Weight: 72.3 kg (159 lb 8 oz)     Wt Readings from Last 10 Encounters:   09/11/23 72.3 kg (159 lb 8 oz)   06/20/23 71.2 kg (157 lb)   06/07/23 71.2 kg (157 lb)   05/11/23 71.5 kg (157 lb 10.1 oz)   11/09/22 76.2 kg (167 lb 15.9 oz)   11/09/22 76.2 kg (167 lb 15.9 oz)   10/20/22 75.4 kg (166 lb 1.9 oz)   09/21/22 82.1 kg (181 lb)   05/05/22 82.3 kg (181 lb 7 oz)   04/27/22 82.2 kg (181 lb 1.7 oz)     Physical Exam  Vitals reviewed.   Constitutional:       Appearance: Normal appearance. She is well-developed and overweight.   HENT:      Head: Normocephalic and atraumatic.      Right Ear: Tympanic membrane and external ear normal.      Left Ear: Tympanic membrane and external ear normal.      Nose: Nose normal.      Mouth/Throat:      Mouth: Mucous membranes are moist.      Pharynx: Oropharynx is clear.   Eyes:      Conjunctiva/sclera: Conjunctivae normal.      Pupils: Pupils are equal, round, and reactive to light.   Neck:      Thyroid: No thyromegaly.   Cardiovascular:      Rate and Rhythm: Normal rate and regular rhythm.      Heart sounds: Normal heart sounds. No murmur heard.     No friction rub. No gallop.   Pulmonary:      Effort: Pulmonary effort is normal. No respiratory distress.      Breath sounds: Normal breath sounds. No wheezing or rales.   Abdominal:      General: Bowel sounds are normal. There is no distension.      Palpations: Abdomen is soft.      Tenderness: There is no abdominal tenderness. There is no rebound.   Musculoskeletal:         General: Normal range of motion.      Cervical back: Normal range of motion and neck supple.   Lymphadenopathy:      Cervical: No cervical adenopathy.   Skin:     General: Skin is warm and dry.      Findings: No rash.   Neurological:      Mental Status: She is alert and oriented to person, place, and time.   Psychiatric:         Attention and Perception:  Attention and perception normal.         Mood and Affect: Mood and affect normal.         Speech: Speech normal.         Behavior: Behavior normal.         Thought Content: Thought content normal.         Cognition and Memory: Cognition and memory normal.         Judgment: Judgment normal.         Assessment:     1. Routine general medical examination at a health care facility    2. Essential hypertension    3. Other specified hypothyroidism    4. Dysphagia, unspecified type    5. Dyspepsia    6. Hypercalcemia    7. Abnormal glucose    8. Atrophic vaginitis    9. Encounter for screening mammogram for breast cancer        Plan:   Cleo was seen today for annual exam.    Diagnoses and all orders for this visit:    Routine general medical examination at a health care facility    Essential hypertension  Comments:  controlled.   Orders:  -     Hemoglobin A1C; Future  -     Comprehensive Metabolic Panel; Future  -     Lipid Panel; Future  -     CBC Auto Differential; Future  -     TSH; Future  -     T4, Free; Future    Other specified hypothyroidism  Comments:  decrease levothyroxine from 112mcg to 100mcg daily. sent in new rx, repeat labs in 6months.   Orders:  -     Hemoglobin A1C; Future  -     Comprehensive Metabolic Panel; Future  -     Lipid Panel; Future  -     CBC Auto Differential; Future  -     TSH; Future  -     T4, Free; Future    Dysphagia, unspecified type  Comments:  refer to GI for consideration of egd.   Orders:  -     Ambulatory referral/consult to Gastroenterology; Future    Dyspepsia  Comments:  refer to GI for consideration of egd.   Orders:  -     Ambulatory referral/consult to Gastroenterology; Future    Hypercalcemia  Comments:  improved.   Orders:  -     Hemoglobin A1C; Future  -     Comprehensive Metabolic Panel; Future  -     Lipid Panel; Future  -     CBC Auto Differential; Future  -     TSH; Future  -     T4, Free; Future    Abnormal glucose  -     Hemoglobin A1C; Future  -      Comprehensive Metabolic Panel; Future  -     Lipid Panel; Future  -     CBC Auto Differential; Future  -     TSH; Future  -     T4, Free; Future    Atrophic vaginitis  Comments:  discussed lubricants, can see GYN NP if desires in future to consider vaginal estrogen.     Encounter for screening mammogram for breast cancer  -     Mammo Digital Screening Bilat w/ Rasta; Future    Other orders  -     levothyroxine (SYNTHROID) 100 MCG tablet; Take 1 tablet (100 mcg total) by mouth before breakfast.      - above diagnoses were discussed and reviewed today during visit. The conditions are stable.  Decreased Rx Levothyroxine to 100 mcg/day for thyroid disorder treatment.  Vaginal dryness is - New Problem, discussed symptoms, work up, suspected diagnosis.   Discussed lubricants for vaginal dryness treatment. Pt can f/u with OBGYN SHAUNA for further workup and possible vaginal estrogen.   Referral given to GI for possible upper scope for Dysphagia and Dyspepsia treatment.  Lab work ordered to be completed prior to next treatment.    Instructed to f/u in 6 months.     There are no Patient Instructions on file for this visit.    Follow up in about 6 months (around 3/11/2024) for f/u office visit Dr. Julian.      LABS:   Lab Results   Component Value Date    HGBA1C 5.2 09/05/2023    HGBA1C 5.4 10/20/2022    HGBA1C 5.2 10/14/2020      Lab Results   Component Value Date    CHOL 163 09/05/2023    CHOL 202 (H) 10/20/2022    CHOL 195 12/02/2021     Lab Results   Component Value Date    LDLCALC 88.0 09/05/2023    LDLCALC 125.2 10/20/2022    LDLCALC 100.6 12/02/2021     Lab Results   Component Value Date    WBC 7.28 09/05/2023    HGB 12.2 09/05/2023    HCT 38.8 09/05/2023     09/05/2023    CHOL 163 09/05/2023    TRIG 45 09/05/2023    HDL 66 09/05/2023    ALT 17 09/05/2023    AST 26 09/05/2023     09/05/2023    K 3.9 09/05/2023     09/05/2023    CREATININE 1.0 09/05/2023    BUN 15 09/05/2023    CO2 21 (L) 09/05/2023    TSH  0.227 (L) 09/05/2023    HGBA1C 5.2 09/05/2023       The 10-year ASCVD risk score (Timothy CARLIN, et al., 2019) is: 11.5%    Values used to calculate the score:      Age: 73 years      Sex: Female      Is Non- : No      Diabetic: No      Tobacco smoker: No      Systolic Blood Pressure: 120 mmHg      Is BP treated: Yes      HDL Cholesterol: 66 mg/dL      Total Cholesterol: 163 mg/dL  Mammo Digital Screening Bilat w/ Rasta  Narrative: Result:  Mammo Digital Screening Bilat w/ Rasta    History:  Patient is 72 y.o. and is seen for a screening mammogram.    Films Compared:  Compared to: 09/14/2021 Mammo Digital Screening Bilat w/ Rasta and   09/01/2020 Mammo Digital Screening Bilat w/ Rasta     Findings:   This procedure was performed using tomosynthesis.   Computer-aided detection was utilized in the interpretation of this   examination.    The breasts have scattered areas of fibroglandular density. There is no   evidence of suspicious masses, microcalcifications or architectural   distortion.  Impression:    No mammographic evidence of malignancy.    BI-RADS Category 1: Negative    Recommendation:  Routine screening mammogram in 1 year is recommended.    Your estimated lifetime risk of breast cancer (to age 85) based on   Tyrer-Cuzick risk assessment model is 6.22 %.  According to the American   Cancer Society, patients with a lifetime breast cancer risk of 20% or   higher might benefit from supplemental screening tests. ??     Scribe Attestation:   I, Richard Flower, am scribing for, and in the presence of, Dr. Elizabeth Julian MD. I performed the above scribed service and the documentation accurately describes the services I performed. I attest to the accuracy of the note.    I, Dr. Elizabeth Julian MD, reviewed documentation as scribed above. I personally performed the services described in this documentation.  I agree that the record reflects my personal performance and is accurate and complete.  Elizabeth Julian MD.    09/11/2023

## 2023-10-17 ENCOUNTER — HOSPITAL ENCOUNTER (OUTPATIENT)
Dept: RADIOLOGY | Facility: HOSPITAL | Age: 73
Discharge: HOME | End: 2023-10-17
Attending: FAMILY MEDICINE
Payer: MEDICARE

## 2023-10-17 DIAGNOSIS — Z12.31 ENCOUNTER FOR SCREENING MAMMOGRAM FOR BREAST CANCER: ICD-10-CM

## 2023-10-17 PROCEDURE — 77067 MAMMO DIGITAL SCREENING BILAT WITH TOMO: ICD-10-PCS | Mod: 26,,, | Performed by: RADIOLOGY

## 2023-10-17 PROCEDURE — 77063 MAMMO DIGITAL SCREENING BILAT WITH TOMO: ICD-10-PCS | Mod: 26,,, | Performed by: RADIOLOGY

## 2023-10-17 PROCEDURE — 77067 SCR MAMMO BI INCL CAD: CPT | Mod: TC

## 2023-10-17 PROCEDURE — 77063 BREAST TOMOSYNTHESIS BI: CPT | Mod: 26,,, | Performed by: RADIOLOGY

## 2023-10-17 PROCEDURE — 77067 SCR MAMMO BI INCL CAD: CPT | Mod: 26,,, | Performed by: RADIOLOGY

## 2023-10-20 DIAGNOSIS — F39 MOOD DISORDER: ICD-10-CM

## 2023-10-20 RX ORDER — LOVASTATIN 40 MG/1
TABLET ORAL
Qty: 90 TABLET | Refills: 3 | Status: SHIPPED | OUTPATIENT
Start: 2023-10-20

## 2023-10-20 NOTE — TELEPHONE ENCOUNTER
No care due was identified.  Health [facility] Embedded Care Due Messages. Reference number: [id number].   10/20/2023 6:01:51 AM CDT

## 2023-10-20 NOTE — TELEPHONE ENCOUNTER
Refill Decision Note   Cleo Ford  is requesting a refill authorization.  Brief Assessment and Rationale for Refill:  Approve     Medication Therapy Plan:         Comments:     Note composed:9:03 AM 10/20/2023

## 2023-10-20 NOTE — TELEPHONE ENCOUNTER
No care due was identified.  [facility] Embedded Care Due Messages. Reference number: [id number].   10/20/2023 1:31:14 AM CDT

## 2023-10-23 RX ORDER — ALPRAZOLAM 1 MG/1
TABLET ORAL
Qty: 180 TABLET | Refills: 1 | Status: SHIPPED | OUTPATIENT
Start: 2023-10-23 | End: 2024-03-11

## 2023-10-30 ENCOUNTER — PATIENT MESSAGE (OUTPATIENT)
Dept: RHEUMATOLOGY | Facility: CLINIC | Age: 73
End: 2023-10-30
Payer: MEDICARE

## 2023-11-13 ENCOUNTER — APPOINTMENT (OUTPATIENT)
Dept: RADIOLOGY | Facility: HOSPITAL | Age: 73
End: 2023-11-13
Attending: INTERNAL MEDICINE
Payer: MEDICARE

## 2023-11-13 DIAGNOSIS — M81.0 AGE-RELATED OSTEOPOROSIS WITHOUT CURRENT PATHOLOGICAL FRACTURE: ICD-10-CM

## 2023-11-13 PROCEDURE — 77080 DXA BONE DENSITY AXIAL SKELETON 1 OR MORE SITES: ICD-10-PCS | Mod: 26,,, | Performed by: RADIOLOGY

## 2023-11-13 PROCEDURE — 77080 DXA BONE DENSITY AXIAL: CPT | Mod: 26,,, | Performed by: RADIOLOGY

## 2023-11-13 PROCEDURE — 77080 DXA BONE DENSITY AXIAL: CPT | Mod: TC

## 2023-11-16 ENCOUNTER — OFFICE VISIT (OUTPATIENT)
Dept: RHEUMATOLOGY | Facility: CLINIC | Age: 73
End: 2023-11-16
Payer: MEDICARE

## 2023-11-16 ENCOUNTER — INFUSION (OUTPATIENT)
Dept: INFUSION THERAPY | Facility: HOSPITAL | Age: 73
End: 2023-11-16
Attending: INTERNAL MEDICINE
Payer: MEDICARE

## 2023-11-16 VITALS
WEIGHT: 162.94 LBS | BODY MASS INDEX: 28.86 KG/M2 | SYSTOLIC BLOOD PRESSURE: 121 MMHG | DIASTOLIC BLOOD PRESSURE: 78 MMHG | HEART RATE: 63 BPM

## 2023-11-16 VITALS
TEMPERATURE: 98 F | SYSTOLIC BLOOD PRESSURE: 121 MMHG | DIASTOLIC BLOOD PRESSURE: 78 MMHG | WEIGHT: 162.94 LBS | RESPIRATION RATE: 18 BRPM | BODY MASS INDEX: 28.86 KG/M2 | OXYGEN SATURATION: 97 % | HEART RATE: 63 BPM

## 2023-11-16 DIAGNOSIS — M25.551 LATERAL PAIN OF RIGHT HIP: ICD-10-CM

## 2023-11-16 DIAGNOSIS — M81.0 AGE-RELATED OSTEOPOROSIS WITHOUT CURRENT PATHOLOGICAL FRACTURE: Primary | ICD-10-CM

## 2023-11-16 PROCEDURE — 1160F RVW MEDS BY RX/DR IN RCRD: CPT | Mod: CPTII,S$GLB,, | Performed by: INTERNAL MEDICINE

## 2023-11-16 PROCEDURE — 3044F PR MOST RECENT HEMOGLOBIN A1C LEVEL <7.0%: ICD-10-PCS | Mod: CPTII,S$GLB,, | Performed by: INTERNAL MEDICINE

## 2023-11-16 PROCEDURE — 1101F PT FALLS ASSESS-DOCD LE1/YR: CPT | Mod: CPTII,S$GLB,, | Performed by: INTERNAL MEDICINE

## 2023-11-16 PROCEDURE — 4010F PR ACE/ARB THEARPY RXD/TAKEN: ICD-10-PCS | Mod: CPTII,S$GLB,, | Performed by: INTERNAL MEDICINE

## 2023-11-16 PROCEDURE — 99999 PR PBB SHADOW E&M-EST. PATIENT-LVL III: CPT | Mod: PBBFAC,,, | Performed by: INTERNAL MEDICINE

## 2023-11-16 PROCEDURE — 3288F PR FALLS RISK ASSESSMENT DOCUMENTED: ICD-10-PCS | Mod: CPTII,S$GLB,, | Performed by: INTERNAL MEDICINE

## 2023-11-16 PROCEDURE — 3074F PR MOST RECENT SYSTOLIC BLOOD PRESSURE < 130 MM HG: ICD-10-PCS | Mod: CPTII,S$GLB,, | Performed by: INTERNAL MEDICINE

## 2023-11-16 PROCEDURE — 4010F ACE/ARB THERAPY RXD/TAKEN: CPT | Mod: CPTII,S$GLB,, | Performed by: INTERNAL MEDICINE

## 2023-11-16 PROCEDURE — 3008F BODY MASS INDEX DOCD: CPT | Mod: CPTII,S$GLB,, | Performed by: INTERNAL MEDICINE

## 2023-11-16 PROCEDURE — 96372 THER/PROPH/DIAG INJ SC/IM: CPT

## 2023-11-16 PROCEDURE — 1101F PR PT FALLS ASSESS DOC 0-1 FALLS W/OUT INJ PAST YR: ICD-10-PCS | Mod: CPTII,S$GLB,, | Performed by: INTERNAL MEDICINE

## 2023-11-16 PROCEDURE — 3044F HG A1C LEVEL LT 7.0%: CPT | Mod: CPTII,S$GLB,, | Performed by: INTERNAL MEDICINE

## 2023-11-16 PROCEDURE — 99214 PR OFFICE/OUTPT VISIT, EST, LEVL IV, 30-39 MIN: ICD-10-PCS | Mod: S$GLB,,, | Performed by: INTERNAL MEDICINE

## 2023-11-16 PROCEDURE — 3074F SYST BP LT 130 MM HG: CPT | Mod: CPTII,S$GLB,, | Performed by: INTERNAL MEDICINE

## 2023-11-16 PROCEDURE — 1159F MED LIST DOCD IN RCRD: CPT | Mod: CPTII,S$GLB,, | Performed by: INTERNAL MEDICINE

## 2023-11-16 PROCEDURE — 3008F PR BODY MASS INDEX (BMI) DOCUMENTED: ICD-10-PCS | Mod: CPTII,S$GLB,, | Performed by: INTERNAL MEDICINE

## 2023-11-16 PROCEDURE — 3288F FALL RISK ASSESSMENT DOCD: CPT | Mod: CPTII,S$GLB,, | Performed by: INTERNAL MEDICINE

## 2023-11-16 PROCEDURE — 99999 PR PBB SHADOW E&M-EST. PATIENT-LVL III: ICD-10-PCS | Mod: PBBFAC,,, | Performed by: INTERNAL MEDICINE

## 2023-11-16 PROCEDURE — 3078F DIAST BP <80 MM HG: CPT | Mod: CPTII,S$GLB,, | Performed by: INTERNAL MEDICINE

## 2023-11-16 PROCEDURE — 3078F PR MOST RECENT DIASTOLIC BLOOD PRESSURE < 80 MM HG: ICD-10-PCS | Mod: CPTII,S$GLB,, | Performed by: INTERNAL MEDICINE

## 2023-11-16 PROCEDURE — 1160F PR REVIEW ALL MEDS BY PRESCRIBER/CLIN PHARMACIST DOCUMENTED: ICD-10-PCS | Mod: CPTII,S$GLB,, | Performed by: INTERNAL MEDICINE

## 2023-11-16 PROCEDURE — 99214 OFFICE O/P EST MOD 30 MIN: CPT | Mod: S$GLB,,, | Performed by: INTERNAL MEDICINE

## 2023-11-16 PROCEDURE — 63600175 PHARM REV CODE 636 W HCPCS: Mod: JZ,JG | Performed by: INTERNAL MEDICINE

## 2023-11-16 PROCEDURE — 1159F PR MEDICATION LIST DOCUMENTED IN MEDICAL RECORD: ICD-10-PCS | Mod: CPTII,S$GLB,, | Performed by: INTERNAL MEDICINE

## 2023-11-16 RX ADMIN — DENOSUMAB 60 MG: 60 INJECTION SUBCUTANEOUS at 12:11

## 2023-11-16 NOTE — DISCHARGE INSTRUCTIONS
.[facility]  [address] [address]  [address]  [phone] phone     [phone] fax  Hours of Operation: Monday- Friday 8:00am- 5:00pm  After hours phone  [phone]  Hematology / Oncology Physicians on call    Dr. Chris Escobedo      Nurse Practitioners:    Viridiana Sanders, JULES Oro, JULES Maxwell, JULES Ferrera, JULES Martin, PA      Please don't hesitate to call if you have any concerns.     .Remember to take your calcium with vitamin D supplement as directed.   It is not advisable to undergo invasive dental procedure, within 3 months of your injection, unless approved by your treating provider.

## 2023-11-16 NOTE — PROGRESS NOTES
RHEUMATOLOGY CLINIC FOLLOW UP VISIT  Chief complaints, HPI, ROS, EXAM, Assessment & Plans:-  Cleo Arnold a 73 y.o. pleasant female comes in for follow up visit. She follows in the Rheumatology Clinic for osteoporosis.  She was started on Prolia.  She reports doing well.  No fragility fractures since last visit. No significant joint pain today.  Mild intermittent right hip pain not severe enough to take any medications.  Rheumatological review of system negative otherwise.  No tenderness. No synovitis. No effusion.   1. Age-related osteoporosis without current pathological fracture    2. Lateral pain of right hip        Problem List Items Addressed This Visit       Age-related osteoporosis without current pathological fracture - Primary    Lateral pain of right hip     Personally reviewed DEXA scan images today.  Shows significant improvement at lumbar spine and stable results at femoral neck.  Lab results shows stable results with stable renal functions and elevated calcium.   Latest Reference Range & Units 11/13/23 11:24   Sodium 136 - 145 mmol/L 137   Potassium 3.5 - 5.1 mmol/L 4.1   Chloride 95 - 110 mmol/L 102   CO2 23 - 29 mmol/L 26   Anion Gap 8 - 16 mmol/L 9   BUN 8 - 23 mg/dL 19   Creatinine 0.5 - 1.4 mg/dL 1.3   eGFR >60 mL/min/1.73 m^2 44 !   Glucose 70 - 110 mg/dL 97   Calcium 8.7 - 10.5 mg/dL 10.6 (H)   ALP 55 - 135 U/L 56   PROTEIN TOTAL 6.0 - 8.4 g/dL 7.8   Albumin 3.5 - 5.2 g/dL 4.1   BILIRUBIN TOTAL 0.1 - 1.0 mg/dL 0.5   AST 10 - 40 U/L 21   ALT 10 - 44 U/L 16   !: Data is abnormal  (H): Data is abnormally high  Continue Prolia every 6 months.   Next bone density due in 2025 November.  Dental precautions with Prolia.  Arthritis strength Tylenol for intermittent for hip pain along with glucosamine chondroitin sulfate-Osteo Bi-Flex triple strength with turmeric.  # Follow up in about 1 year (around 11/16/2024).      Disclaimer: This note  was prepared using voice recognition system and is likely to have sound alike errors and is not proof read.  Please call me with any questions.

## 2023-11-16 NOTE — LETTER
October 31, 2019     Cleo Ford  [address] [place] [id number]       Dear Cleo,    Thank you for enrolling in [facility]s Digital [facility] Program. To participate, we ask that you submit information at least once weekly through your [facility] account and maintain regular contact with your Care Team. We have not received any data or heard from you in some time.     The Digital Medicine Care Team has attempted to reach you on multiple occasions to determine if you would like to continue participating in the program. While we encourage you to continue participating fully, we understand that circumstances may change.     To continue participating in the program, please contact me at . If we do not hear back, you will be un-enrolled, and your physician will be notified of your decision.    If you have submitted data and believe you are receiving this letter in error, please call the Digital Medicine Patient Support Line at [phone] for troubleshooting.      We look forward to hearing from you soon.    Sincerely,     Keshia Damon  Your Personal Health       ----- Message from CRISTÓBAL Ceja sent at 11/15/2023 12:11 PM CST -----  Please call Tierra and inform her that her DEXA shows ongoing osteoporosis. Remind me, when did she stop her fosamax?

## 2023-12-06 DIAGNOSIS — I10 ESSENTIAL HYPERTENSION: Chronic | ICD-10-CM

## 2023-12-06 RX ORDER — NIFEDIPINE 90 MG/1
TABLET, FILM COATED, EXTENDED RELEASE ORAL
Qty: 90 TABLET | Refills: 3 | Status: SHIPPED | OUTPATIENT
Start: 2023-12-06

## 2023-12-06 NOTE — TELEPHONE ENCOUNTER
No care due was identified.  [facility] Embedded Care Due Messages. Reference number: [id number].   12/06/2023 1:32:29 AM CST

## 2023-12-10 NOTE — TELEPHONE ENCOUNTER
No care due was identified.  [facility] Embedded Care Due Messages. Reference number: [id number].   12/10/2023 9:31:40 AM CST

## 2023-12-11 RX ORDER — VALSARTAN 320 MG/1
320 TABLET ORAL DAILY
Qty: 90 TABLET | Refills: 0 | Status: SHIPPED | OUTPATIENT
Start: 2023-12-11 | End: 2024-03-11

## 2023-12-11 RX ORDER — LEVOTHYROXINE SODIUM 100 UG/1
100 TABLET ORAL
Qty: 90 TABLET | Refills: 3 | Status: SHIPPED | OUTPATIENT
Start: 2023-12-11

## 2023-12-11 NOTE — TELEPHONE ENCOUNTER
Refill Routing Note   Medication(s) are not appropriate for processing by [facility] for the following reason(s):      Drug-disease interaction    [facility] action(s):  Defer Care Due:  None identified     Medication Therapy Plan: Drug-Disease: valsartan and Hyponatremia      Appointments  past 12m or future 3m with PCP    Date Provider   Last Visit   9/11/2023 Elizabeth Julian MD   Next Visit   3/11/2024 Elizabeth Julian MD   ED visits in past 90 days: 0        Note composed:11:11 AM 12/11/2023

## 2023-12-11 NOTE — TELEPHONE ENCOUNTER
No care due was identified.  [facility] Embedded Care Due Messages. Reference number: [id number].   12/11/2023 12:41:25 PM CST

## 2023-12-11 NOTE — TELEPHONE ENCOUNTER
Refill Decision Note   Cleo Ford  is requesting a refill authorization.  Brief Assessment and Rationale for Refill:  Approve     Medication Therapy Plan:  hyponatremia- now wnl    Medication Reconciliation Completed: No   Comments:     No Care Gaps recommended.     Note composed:12:21 PM 12/11/2023

## 2024-02-25 ENCOUNTER — PATIENT MESSAGE (OUTPATIENT)
Dept: RHEUMATOLOGY | Facility: CLINIC | Age: 74
End: 2024-02-25
Payer: MEDICARE

## 2024-03-02 DIAGNOSIS — I10 ESSENTIAL HYPERTENSION: ICD-10-CM

## 2024-03-04 ENCOUNTER — LAB VISIT (OUTPATIENT)
Dept: LAB | Facility: HOSPITAL | Age: 74
End: 2024-03-04
Attending: FAMILY MEDICINE
Payer: MEDICARE

## 2024-03-04 DIAGNOSIS — E83.52 HYPERCALCEMIA: ICD-10-CM

## 2024-03-04 DIAGNOSIS — R73.09 ABNORMAL GLUCOSE: ICD-10-CM

## 2024-03-04 DIAGNOSIS — E03.8 OTHER SPECIFIED HYPOTHYROIDISM: Chronic | ICD-10-CM

## 2024-03-04 DIAGNOSIS — I10 ESSENTIAL HYPERTENSION: Chronic | ICD-10-CM

## 2024-03-04 LAB
ALBUMIN SERPL BCP-MCNC: 4.2 G/DL (ref 3.5–5.2)
ALP SERPL-CCNC: 52 U/L (ref 55–135)
ALT SERPL W/O P-5'-P-CCNC: 15 U/L (ref 10–44)
ANION GAP SERPL CALC-SCNC: 8 MMOL/L (ref 8–16)
AST SERPL-CCNC: 25 U/L (ref 10–40)
BASOPHILS # BLD AUTO: 0.07 K/UL (ref 0–0.2)
BASOPHILS NFR BLD: 0.9 % (ref 0–1.9)
BILIRUB SERPL-MCNC: 0.5 MG/DL (ref 0.1–1)
BUN SERPL-MCNC: 20 MG/DL (ref 8–23)
CALCIUM SERPL-MCNC: 10.3 MG/DL (ref 8.7–10.5)
CHLORIDE SERPL-SCNC: 105 MMOL/L (ref 95–110)
CHOLEST SERPL-MCNC: 184 MG/DL (ref 120–199)
CHOLEST/HDLC SERPL: 2.6 {RATIO} (ref 2–5)
CO2 SERPL-SCNC: 25 MMOL/L (ref 23–29)
CREAT SERPL-MCNC: 1 MG/DL (ref 0.5–1.4)
DIFFERENTIAL METHOD BLD: NORMAL
EOSINOPHIL # BLD AUTO: 0.2 K/UL (ref 0–0.5)
EOSINOPHIL NFR BLD: 2.4 % (ref 0–8)
ERYTHROCYTE [DISTWIDTH] IN BLOOD BY AUTOMATED COUNT: 12.9 % (ref 11.5–14.5)
EST. GFR  (NO RACE VARIABLE): >60 ML/MIN/1.73 M^2
ESTIMATED AVG GLUCOSE: 111 MG/DL (ref 68–131)
GLUCOSE SERPL-MCNC: 97 MG/DL (ref 70–110)
HBA1C MFR BLD: 5.5 % (ref 4–5.6)
HCT VFR BLD AUTO: 42.4 % (ref 37–48.5)
HDLC SERPL-MCNC: 72 MG/DL (ref 40–75)
HDLC SERPL: 39.1 % (ref 20–50)
HGB BLD-MCNC: 13.7 G/DL (ref 12–16)
IMM GRANULOCYTES # BLD AUTO: 0.01 K/UL (ref 0–0.04)
IMM GRANULOCYTES NFR BLD AUTO: 0.1 % (ref 0–0.5)
LDLC SERPL CALC-MCNC: 100 MG/DL (ref 63–159)
LYMPHOCYTES # BLD AUTO: 2.6 K/UL (ref 1–4.8)
LYMPHOCYTES NFR BLD: 31.8 % (ref 18–48)
MCH RBC QN AUTO: 30.9 PG (ref 27–31)
MCHC RBC AUTO-ENTMCNC: 32.3 G/DL (ref 32–36)
MCV RBC AUTO: 96 FL (ref 82–98)
MONOCYTES # BLD AUTO: 0.5 K/UL (ref 0.3–1)
MONOCYTES NFR BLD: 6.2 % (ref 4–15)
NEUTROPHILS # BLD AUTO: 4.7 K/UL (ref 1.8–7.7)
NEUTROPHILS NFR BLD: 58.6 % (ref 38–73)
NONHDLC SERPL-MCNC: 112 MG/DL
NRBC BLD-RTO: 0 /100 WBC
PLATELET # BLD AUTO: 322 K/UL (ref 150–450)
PMV BLD AUTO: 10.4 FL (ref 9.2–12.9)
POTASSIUM SERPL-SCNC: 4.2 MMOL/L (ref 3.5–5.1)
PROT SERPL-MCNC: 8 G/DL (ref 6–8.4)
RBC # BLD AUTO: 4.43 M/UL (ref 4–5.4)
SODIUM SERPL-SCNC: 138 MMOL/L (ref 136–145)
T4 FREE SERPL-MCNC: 1.46 NG/DL (ref 0.71–1.51)
TRIGL SERPL-MCNC: 60 MG/DL (ref 30–150)
TSH SERPL DL<=0.005 MIU/L-ACNC: 1.28 UIU/ML (ref 0.4–4)
WBC # BLD AUTO: 8.07 K/UL (ref 3.9–12.7)

## 2024-03-04 PROCEDURE — 80053 COMPREHEN METABOLIC PANEL: CPT | Performed by: FAMILY MEDICINE

## 2024-03-04 PROCEDURE — 85025 COMPLETE CBC W/AUTO DIFF WBC: CPT | Performed by: FAMILY MEDICINE

## 2024-03-04 PROCEDURE — 84443 ASSAY THYROID STIM HORMONE: CPT | Performed by: FAMILY MEDICINE

## 2024-03-04 PROCEDURE — 83036 HEMOGLOBIN GLYCOSYLATED A1C: CPT | Performed by: FAMILY MEDICINE

## 2024-03-04 PROCEDURE — 80061 LIPID PANEL: CPT | Performed by: FAMILY MEDICINE

## 2024-03-04 PROCEDURE — 84439 ASSAY OF FREE THYROXINE: CPT | Performed by: FAMILY MEDICINE

## 2024-03-04 PROCEDURE — 36415 COLL VENOUS BLD VENIPUNCTURE: CPT | Mod: PN | Performed by: FAMILY MEDICINE

## 2024-03-04 RX ORDER — CARVEDILOL 12.5 MG/1
TABLET ORAL
Qty: 180 TABLET | Refills: 0 | Status: SHIPPED | OUTPATIENT
Start: 2024-03-04 | End: 2024-05-31

## 2024-03-11 ENCOUNTER — OFFICE VISIT (OUTPATIENT)
Dept: PRIMARY CARE CLINIC | Facility: CLINIC | Age: 74
End: 2024-03-11
Payer: MEDICARE

## 2024-03-11 VITALS
SYSTOLIC BLOOD PRESSURE: 130 MMHG | HEIGHT: 62 IN | BODY MASS INDEX: 31.03 KG/M2 | WEIGHT: 168.63 LBS | RESPIRATION RATE: 18 BRPM | HEART RATE: 73 BPM | TEMPERATURE: 98 F | OXYGEN SATURATION: 98 % | DIASTOLIC BLOOD PRESSURE: 80 MMHG

## 2024-03-11 DIAGNOSIS — E21.3 HYPERPARATHYROIDISM: ICD-10-CM

## 2024-03-11 DIAGNOSIS — E78.2 MIXED HYPERLIPIDEMIA: ICD-10-CM

## 2024-03-11 DIAGNOSIS — M25.551 LATERAL PAIN OF RIGHT HIP: ICD-10-CM

## 2024-03-11 DIAGNOSIS — I10 ESSENTIAL HYPERTENSION: Chronic | ICD-10-CM

## 2024-03-11 DIAGNOSIS — M71.9 BURSITIS, UNSPECIFIED SITE: Primary | ICD-10-CM

## 2024-03-11 DIAGNOSIS — M81.0 AGE-RELATED OSTEOPOROSIS WITHOUT CURRENT PATHOLOGICAL FRACTURE: ICD-10-CM

## 2024-03-11 DIAGNOSIS — F41.9 ANXIETY: ICD-10-CM

## 2024-03-11 DIAGNOSIS — I65.23 ATHEROSCLEROSIS OF BOTH CAROTID ARTERIES: ICD-10-CM

## 2024-03-11 PROBLEM — E83.52 HYPERCALCEMIA: Status: RESOLVED | Noted: 2023-04-17 | Resolved: 2024-03-11

## 2024-03-11 PROBLEM — E87.1 HYPONATREMIA: Status: RESOLVED | Noted: 2018-03-16 | Resolved: 2024-03-11

## 2024-03-11 PROBLEM — N18.32 STAGE 3B CHRONIC KIDNEY DISEASE: Status: RESOLVED | Noted: 2023-05-15 | Resolved: 2024-03-11

## 2024-03-11 PROBLEM — F33.41 RECURRENT MAJOR DEPRESSIVE DISORDER, IN PARTIAL REMISSION: Status: RESOLVED | Noted: 2018-10-30 | Resolved: 2024-03-11

## 2024-03-11 PROBLEM — F13.20 BENZODIAZEPINE DEPENDENCE: Status: RESOLVED | Noted: 2022-12-20 | Resolved: 2024-03-11

## 2024-03-11 PROCEDURE — 99999 PR PBB SHADOW E&M-EST. PATIENT-LVL IV: CPT | Mod: PBBFAC,,, | Performed by: FAMILY MEDICINE

## 2024-03-11 PROCEDURE — 3044F HG A1C LEVEL LT 7.0%: CPT | Mod: CPTII,S$GLB,, | Performed by: FAMILY MEDICINE

## 2024-03-11 PROCEDURE — 1126F AMNT PAIN NOTED NONE PRSNT: CPT | Mod: CPTII,S$GLB,, | Performed by: FAMILY MEDICINE

## 2024-03-11 PROCEDURE — 3075F SYST BP GE 130 - 139MM HG: CPT | Mod: CPTII,S$GLB,, | Performed by: FAMILY MEDICINE

## 2024-03-11 PROCEDURE — 1159F MED LIST DOCD IN RCRD: CPT | Mod: CPTII,S$GLB,, | Performed by: FAMILY MEDICINE

## 2024-03-11 PROCEDURE — 99214 OFFICE O/P EST MOD 30 MIN: CPT | Mod: S$GLB,,, | Performed by: FAMILY MEDICINE

## 2024-03-11 PROCEDURE — 3288F FALL RISK ASSESSMENT DOCD: CPT | Mod: CPTII,S$GLB,, | Performed by: FAMILY MEDICINE

## 2024-03-11 PROCEDURE — G2211 COMPLEX E/M VISIT ADD ON: HCPCS | Mod: S$GLB,,, | Performed by: FAMILY MEDICINE

## 2024-03-11 PROCEDURE — 3008F BODY MASS INDEX DOCD: CPT | Mod: CPTII,S$GLB,, | Performed by: FAMILY MEDICINE

## 2024-03-11 PROCEDURE — 3079F DIAST BP 80-89 MM HG: CPT | Mod: CPTII,S$GLB,, | Performed by: FAMILY MEDICINE

## 2024-03-11 PROCEDURE — 1101F PT FALLS ASSESS-DOCD LE1/YR: CPT | Mod: CPTII,S$GLB,, | Performed by: FAMILY MEDICINE

## 2024-03-11 RX ORDER — VALSARTAN 320 MG/1
320 TABLET ORAL DAILY
Qty: 90 TABLET | Refills: 3 | Status: SHIPPED | OUTPATIENT
Start: 2024-03-11

## 2024-03-11 NOTE — PROGRESS NOTES
Subjective:      Patient ID: Cleo Ford is a 73 y.o. female.    Chief Complaint: Follow-up (6 month )      Patient is a 73 y.o. female coming in today for 6 month f/u.   Reports she continues to deal with right hip bursitis. Currently not f/u with ortho or PT; she is only doing stretching exercises for sx treatment. Discussed providing referral in case pt wants to start f/u for sx treatment. She is off Xanax at this time; she used to be on medication for the past 20 years. Currently on low dose aspirin. States mouth burning sensation resolved since last visit; sx resolved shortly after she stopped Xanax. Recent lab work results reviewed with pt. No signs of infection or anemia. Electrolytes, kidney function, fasting glucose, and liver enzymes are all within normal limits. Cholesterol is improving; pt is currently on cholesterol medication. She is also following an overall healthy diet. Last colonoscopy was in 2016 which was unremarkable. She is up to date with bone density test and RSV vaccine. No other health concern at this time.       1. Bursitis, unspecified site    2. Lateral pain of right hip    3. Essential hypertension    4. Atherosclerosis of both carotid arteries    5. Mixed hyperlipidemia    6. Anxiety    7. Age-related osteoporosis without current pathological fracture    8. Hyperparathyroidism       [facility]    9/11/2023  8:11 AM CDT - Filed by Patient   On average, how many days per week do you engage in moderate to strenuous exercise (like a brisk walk)? 0 days   On average, how many minutes do you engage in exercise at this level? 0 min   Do you feel stress - tense, restless, nervous, or anxious, or unable to sleep at night because your mind is troubled all the time - these days? To some extent   Do you belong to any clubs or organizations such as Confucianist groups, unions, fraternal or athletic groups, or school groups? Patient declined   How often do you attend meetings of the clubs or  organizations you belong to? Patient declined   In a typical week, how many times do you talk on the phone with family, friends, or neighbors? More than three times a week   How often do you get together with friends or relatives? Three times a week   Are you , , , , never , or living with a partner?    How hard is it for you to pay for the very basics like food, housing, medical care, and heating? Not hard at all   Within the past 12 months, you worried that your food would run out before you got the money to buy more. Never true   Within the past 12 months, the food you bought just didnt last and you didnt have money to get more. Never true   In the past 12 months, has lack of transportation kept you from medical appointments or from getting medications? No   In the past 12 months, has lack of transportation kept you from meetings, work, or from getting things needed for daily living? No   How often do you have a drink containing alcohol? 2-4 times a month   How many drinks containing alcohol do you have on a typical day when you are drinking? 1 or 2   How often do you have six or more drinks on one occasion? Never   In the last 12 months, was there a time when you were not able to pay the mortgage or rent on time? No   In the last 12 months, how many places have you lived? (range: at least 0) 1   In the last 12 months, was there a time when you did not have a steady place to sleep or slept in a shelter (including now)? No         Pmh, Psh, Family Hx, Social Hx, HM updated in Epic Tabs today.   Review of Systems   Constitutional:  Negative for chills, fatigue and fever.   HENT:  Negative for ear pain and trouble swallowing.    Eyes:  Negative for pain and visual disturbance.   Respiratory:  Negative for cough and shortness of breath.    Cardiovascular:  Negative for chest pain and leg swelling.   Gastrointestinal:  Negative for abdominal pain, blood in stool, nausea  "and vomiting.   Endocrine: Negative for cold intolerance and heat intolerance.   Genitourinary:  Negative for dysuria and frequency.   Musculoskeletal:  Negative for joint swelling, myalgias and neck pain.        + Right hip pain   Skin:  Negative for color change and rash.   Neurological:  Negative for dizziness and headaches.   Psychiatric/Behavioral:  Negative for behavioral problems and sleep disturbance.      Objective:     Vitals:    03/11/24 1106   BP: 130/80   BP Location: Left arm   Patient Position: Sitting   BP Method: Large (Manual)   Pulse: 73   Resp: 18   Temp: 97.5 °F (36.4 °C)   TempSrc: Tympanic   SpO2: 98%   Weight: 76.5 kg (168 lb 10.4 oz)   Height: 5' 2" (1.575 m)     Wt Readings from Last 10 Encounters:   03/11/24 76.5 kg (168 lb 10.4 oz)   11/16/23 73.9 kg (162 lb 14.7 oz)   11/16/23 73.9 kg (162 lb 14.7 oz)   09/11/23 72.3 kg (159 lb 8 oz)   06/20/23 71.2 kg (157 lb)   06/07/23 71.2 kg (157 lb)   05/11/23 71.5 kg (157 lb 10.1 oz)   11/09/22 76.2 kg (167 lb 15.9 oz)   11/09/22 76.2 kg (167 lb 15.9 oz)   10/20/22 75.4 kg (166 lb 1.9 oz)     Physical Exam  Vitals reviewed.   Constitutional:       Appearance: Normal appearance. She is well-developed. She is obese.   HENT:      Head: Normocephalic and atraumatic.      Right Ear: Tympanic membrane and external ear normal.      Left Ear: Tympanic membrane and external ear normal.      Nose: Nose normal.      Mouth/Throat:      Mouth: Mucous membranes are moist.      Pharynx: Oropharynx is clear.   Eyes:      Conjunctiva/sclera: Conjunctivae normal.      Pupils: Pupils are equal, round, and reactive to light.   Neck:      Thyroid: No thyromegaly.   Cardiovascular:      Rate and Rhythm: Normal rate and regular rhythm.      Heart sounds: Normal heart sounds. No murmur heard.     No friction rub. No gallop.   Pulmonary:      Effort: Pulmonary effort is normal. No respiratory distress.      Breath sounds: Normal breath sounds. No wheezing or rales. "   Abdominal:      General: Bowel sounds are normal. There is no distension.      Palpations: Abdomen is soft.      Tenderness: There is no abdominal tenderness. There is no rebound.   Musculoskeletal:         General: Normal range of motion.      Cervical back: Normal range of motion and neck supple.      Right hip: Tenderness (with motion) present.   Lymphadenopathy:      Cervical: No cervical adenopathy.   Skin:     General: Skin is warm and dry.      Findings: No rash.   Neurological:      Mental Status: She is alert and oriented to person, place, and time.   Psychiatric:         Attention and Perception: Attention and perception normal.         Mood and Affect: Mood and affect normal.         Speech: Speech normal.         Behavior: Behavior normal.         Thought Content: Thought content normal.         Cognition and Memory: Cognition and memory normal.         Judgment: Judgment normal.         Assessment:     1. Bursitis, unspecified site    2. Lateral pain of right hip    3. Essential hypertension    4. Atherosclerosis of both carotid arteries    5. Mixed hyperlipidemia    6. Anxiety    7. Age-related osteoporosis without current pathological fracture    8. Hyperparathyroidism        Plan:   Cleo was seen today for follow-up.    Diagnoses and all orders for this visit:    Bursitis, unspecified site  -     Ambulatory referral/consult to Physical/Occupational Therapy; Future  -     Ambulatory referral/consult to Orthopedics; Future    Lateral pain of right hip  -     Ambulatory referral/consult to Physical/Occupational Therapy; Future  -     Ambulatory referral/consult to Orthopedics; Future    Essential hypertension    Atherosclerosis of both carotid arteries    Mixed hyperlipidemia    Anxiety  Comments:  stable now off xanax and ssris. doing well.    Age-related osteoporosis without current pathological fracture    Hyperparathyroidism      Right hip bursitis is persisting at this time.   Referral given  to PT and orthopedics for further bursitis assessment and to help with stretching exercises.   Other diagnoses were reassessed during today's visit. The associated diagnoses are linked to their chronic conditions. These conditions are currently stable, and will be monitored through associated labs, imaging, and treated with the associated medications as listed per EPIC in the patient's Medication List. I will refill medications to continue ongoing care as requested per the patient or pharmacy when needed.   Pt is off Xanax at this time.   Hyperparathyroidism- is monitored by rheumatology most recently with labs. Calcium, vitamin D, levels normal.   Instructed to f/u in 1 year.     Visit today included increased complexity associated with the care of the episodic problem anxiety and addressed and managing the longitudinal care of the patient due to the serious and/or complex managed problem(s) hypertension, hyperparathyroidism, and hypothyroidism .    There are no Patient Instructions on file for this visit.    Follow up in about 1 year (around 3/11/2025) for physical with Dr MARLEY.      LABS:   Lab Results   Component Value Date    HGBA1C 5.5 03/04/2024    HGBA1C 5.2 09/05/2023    HGBA1C 5.4 10/20/2022      Lab Results   Component Value Date    CHOL 184 03/04/2024    CHOL 163 09/05/2023    CHOL 202 (H) 10/20/2022     Lab Results   Component Value Date    LDLCALC 100.0 03/04/2024    LDLCALC 88.0 09/05/2023    LDLCALC 125.2 10/20/2022     Lab Results   Component Value Date    WBC 8.07 03/04/2024    HGB 13.7 03/04/2024    HCT 42.4 03/04/2024     03/04/2024    CHOL 184 03/04/2024    TRIG 60 03/04/2024    HDL 72 03/04/2024    ALT 15 03/04/2024    AST 25 03/04/2024     03/04/2024    K 4.2 03/04/2024     03/04/2024    CREATININE 1.0 03/04/2024    BUN 20 03/04/2024    CO2 25 03/04/2024    TSH 1.284 03/04/2024    HGBA1C 5.5 03/04/2024       The 10-year ASCVD risk score (Timothy CARLIN, et al., 2019) is: 13.6%     Values used to calculate the score:      Age: 73 years      Sex: Female      Is Non- : No      Diabetic: No      Tobacco smoker: No      Systolic Blood Pressure: 130 mmHg      Is BP treated: Yes      HDL Cholesterol: 72 mg/dL      Total Cholesterol: 184 mg/dL  DXA Bone Density Axial Skeleton 1 or more sites  Narrative: EXAMINATION:  DXA BONE DENSITY AXIAL SKELETON 1 OR MORE SITES    CLINICAL HISTORY:  OP; Age-related osteoporosis without current pathological fracture    TECHNIQUE:  DXA scanning was performed over the left hip and lumbar spine.  Review of the images confirms satisfactory positioning and technique.    COMPARISON:  11/03/2021    FINDINGS:  The L1 to L4 vertebral bone mineral density is equal to 1.229 g/cm squared with a T score of 0.3.  There has been a positive 5.4% statistically significant change relative to the prior study.    The left femoral neck bone mineral density is equal to 0.810 g/cm squared with a T score of -1.6.  There has been  no significant change relative to the prior study.    There is a 16.6% risk of a major osteoporotic fracture and a 2.7% risk of hip fracture in the next 10 years (FRAX).  Impression: Osteopenia    Consider FDA approved medical therapies in postmenopausal women and men aged 50 years and older, based on the following:    *A hip or vertebral (clinical or morphometric) fracture  *T score less than or equal to -2.5 at the femoral neck or spine after appropriate evaluation to exclude secondary causes.  *Low bone mass -- also known as osteopenia (T score between -1.0 and -2.5 at the femoral neck or spine) and a 10 year probability of hip fracture greater than or equal to 3% or a 10 year probability of major osteoporosis-related fracture greater than or equal to 20% based on the [place]-adapted WHO algorithm.  *Clinicians judgment and/or patient preference may indicate treatment for people with 10 year fracture probabilities is above or below these  levels.    Electronically signed by: Robin Cooley DO  Date:    11/13/2023  Time:    13:31    Scribe Attestation:   I, Richard lFower, am scribing for, and in the presence of, Dr. Elizabeth Julian MD. I performed the above scribed service and the documentation accurately describes the services I performed. I attest to the accuracy of the note.    I, Dr. Elizabeth Julian MD, reviewed documentation as scribed above. I personally performed the services described in this documentation.  I agree that the record reflects my personal performance and is accurate and complete. Elizabeth Julian MD.    03/11/2024

## 2024-03-11 NOTE — TELEPHONE ENCOUNTER
No care due was identified.  [facility] Embedded Care Due Messages. Reference number: [id number].   3/11/2024 4:49:38 PM CDT

## 2024-03-12 NOTE — TELEPHONE ENCOUNTER
Refill Decision Note   Cleo Ford  is requesting a refill authorization.  Brief Assessment and Rationale for Refill:  Approve     Medication Therapy Plan:         Comments:     Note composed:11:54 PM 03/11/2024

## 2024-03-15 DIAGNOSIS — M70.61 GREATER TROCHANTERIC BURSITIS OF RIGHT HIP: Primary | ICD-10-CM

## 2024-03-19 ENCOUNTER — HOSPITAL ENCOUNTER (OUTPATIENT)
Dept: RADIOLOGY | Facility: HOSPITAL | Age: 74
Discharge: HOME | End: 2024-03-19
Attending: STUDENT IN AN ORGANIZED HEALTH CARE EDUCATION/TRAINING PROGRAM
Payer: MEDICARE

## 2024-03-19 ENCOUNTER — OFFICE VISIT (OUTPATIENT)
Dept: SPORTS MEDICINE | Facility: CLINIC | Age: 74
End: 2024-03-19
Payer: MEDICARE

## 2024-03-19 VITALS — WEIGHT: 168.63 LBS | BODY MASS INDEX: 31.03 KG/M2 | HEIGHT: 62 IN

## 2024-03-19 DIAGNOSIS — M25.551 LATERAL PAIN OF RIGHT HIP: ICD-10-CM

## 2024-03-19 DIAGNOSIS — G57.11 MERALGIA PARESTHETICA OF RIGHT SIDE: ICD-10-CM

## 2024-03-19 DIAGNOSIS — M16.11 PRIMARY OSTEOARTHRITIS OF RIGHT HIP: Primary | ICD-10-CM

## 2024-03-19 DIAGNOSIS — M70.61 GREATER TROCHANTERIC BURSITIS OF RIGHT HIP: ICD-10-CM

## 2024-03-19 PROCEDURE — 3288F FALL RISK ASSESSMENT DOCD: CPT | Mod: CPTII,S$GLB,, | Performed by: STUDENT IN AN ORGANIZED HEALTH CARE EDUCATION/TRAINING PROGRAM

## 2024-03-19 PROCEDURE — 99999 PR PBB SHADOW E&M-EST. PATIENT-LVL III: CPT | Mod: PBBFAC,,, | Performed by: STUDENT IN AN ORGANIZED HEALTH CARE EDUCATION/TRAINING PROGRAM

## 2024-03-19 PROCEDURE — 1159F MED LIST DOCD IN RCRD: CPT | Mod: CPTII,S$GLB,, | Performed by: STUDENT IN AN ORGANIZED HEALTH CARE EDUCATION/TRAINING PROGRAM

## 2024-03-19 PROCEDURE — 1126F AMNT PAIN NOTED NONE PRSNT: CPT | Mod: CPTII,S$GLB,, | Performed by: STUDENT IN AN ORGANIZED HEALTH CARE EDUCATION/TRAINING PROGRAM

## 2024-03-19 PROCEDURE — 3008F BODY MASS INDEX DOCD: CPT | Mod: CPTII,S$GLB,, | Performed by: STUDENT IN AN ORGANIZED HEALTH CARE EDUCATION/TRAINING PROGRAM

## 2024-03-19 PROCEDURE — 99204 OFFICE O/P NEW MOD 45 MIN: CPT | Mod: 25,S$GLB,, | Performed by: STUDENT IN AN ORGANIZED HEALTH CARE EDUCATION/TRAINING PROGRAM

## 2024-03-19 PROCEDURE — 20611 DRAIN/INJ JOINT/BURSA W/US: CPT | Mod: RT,S$GLB,, | Performed by: STUDENT IN AN ORGANIZED HEALTH CARE EDUCATION/TRAINING PROGRAM

## 2024-03-19 PROCEDURE — 4010F ACE/ARB THERAPY RXD/TAKEN: CPT | Mod: CPTII,S$GLB,, | Performed by: STUDENT IN AN ORGANIZED HEALTH CARE EDUCATION/TRAINING PROGRAM

## 2024-03-19 PROCEDURE — 1101F PT FALLS ASSESS-DOCD LE1/YR: CPT | Mod: CPTII,S$GLB,, | Performed by: STUDENT IN AN ORGANIZED HEALTH CARE EDUCATION/TRAINING PROGRAM

## 2024-03-19 PROCEDURE — 73502 X-RAY EXAM HIP UNI 2-3 VIEWS: CPT | Mod: 26,RT,, | Performed by: RADIOLOGY

## 2024-03-19 PROCEDURE — 73502 X-RAY EXAM HIP UNI 2-3 VIEWS: CPT | Mod: TC,PN,RT

## 2024-03-19 PROCEDURE — 3044F HG A1C LEVEL LT 7.0%: CPT | Mod: CPTII,S$GLB,, | Performed by: STUDENT IN AN ORGANIZED HEALTH CARE EDUCATION/TRAINING PROGRAM

## 2024-03-19 RX ORDER — TRIAMCINOLONE ACETONIDE 40 MG/ML
40 INJECTION, SUSPENSION INTRA-ARTICULAR; INTRAMUSCULAR
Status: DISCONTINUED | OUTPATIENT
Start: 2024-03-19 | End: 2024-03-19 | Stop reason: HOSPADM

## 2024-03-19 RX ADMIN — TRIAMCINOLONE ACETONIDE 40 MG: 40 INJECTION, SUSPENSION INTRA-ARTICULAR; INTRAMUSCULAR at 09:03

## 2024-03-19 NOTE — PROCEDURES
Large Joint Aspiration/Injection: R hip joint    Date/Time: 3/19/2024 9:20 AM    Performed by: Kennedy Edward MD  Authorized by: Kennedy Edward MD    Consent Done?:  Yes (Verbal)  Indications:  Pain  Site marked: the procedure site was marked    Timeout: prior to procedure the correct patient, procedure, and site was verified    Prep: patient was prepped and draped in usual sterile fashion      Local anesthesia used?: Yes    Local anesthetic:  Topical anesthetic    Details:  Needle Size:  22 G  Ultrasonic Guidance for needle placement?: Yes    Images are saved and documented.  Approach:  Anterior  Location:  Hip  Site:  R hip joint  Medications:  40 mg triamcinolone acetonide 40 mg/mL  Patient tolerance:  Patient tolerated the procedure well with no immediate complications     Ultrasound guidance was used for needle localization. Images were saved and stored for documentation. The appropriate structures were visualized. Dynamic visualization of the needle was continuous throughout the procedures and maintained good position.     We discussed the proper protocols after the injection such as no submerging pools, baths tubs, or hot tubs for 24 hr.  Showering is okay today.  We also discussed that blood sugars can be elevated after an injection and asked patient to properly checked her sugars over the next few days and contact their PCP if there are any concerns.  We discussed red flags such as fevers, chills, red, warm, tender joint at the area of injection to please seek medical care immediately.

## 2024-03-19 NOTE — PROGRESS NOTES
Patient ID: Cleo Ford  YOB: 1950  MRN: [id number]    Chief Complaint: Pain of the Right Hip    Referred By: Elizabeth Julian MD for right hip    History of Present Illness: Cleo Ford is a 73 y.o. female who presents today with right hip pain.     73-year-old female presenting today for 1 year of right hip pain.  Worse with prolonged sitting as well as getting up from a seated to standing position.  Mostly in the groin and extends down the anterior thigh.  She also notes some anterior lateral numbness and tingling at times.  Has been having that on and off now for about a year.  Has not started physical therapy yet, canceled her PT for yesterday as she was waiting to see me for a formal evaluation.  Was sent from primary care for evaluation of her right hip.  Denies any specific injury falls traumas in the past.  Does not have any pain when lying on her right side.  Is unable take anti-inflammatories, has taken some Tylenol.  No previous injections MRI.    Past Medical History:   Past Medical History:   Diagnosis Date    Anxiety     Benzodiazepine dependence 12/20/2022    using xanax at night. working on weaning down dose over next 6 months.     Depression     Dysmetabolic syndrome X     Obesity     Other and unspecified hyperlipidemia     Recurrent major depressive disorder, in partial remission 10/30/2018    Stage 3b chronic kidney disease 05/15/2023    Unspecified essential hypertension     Unspecified hypothyroidism      Past Surgical History:   Procedure Laterality Date    ADENOIDECTOMY      APPENDECTOMY      BREAST RECONSTRUCTION Bilateral 02/2021    CHOLECYSTECTOMY      COLONOSCOPY N/A 09/15/2016    Procedure: COLONOSCOPY;  Surgeon: Jose Daniel MD;  Location: [facility];  Service: Endoscopy;  Laterality: N/A;    gastric sleeve      HYSTERECTOMY      INJECTION OF ANESTHETIC AGENT INTO SACROILIAC JOINT Right 01/16/2020    Procedure: Right BLOCK, SACROILIAC JOINT and  Right ischial bursa inejction with RN IV sedation;  Surgeon: Rudi Fajardo MD;  Location: [facility];  Service: Pain Management;  Laterality: Right;    OOPHORECTOMY      1 ovary removed    REDUCTION OF BOTH BREASTS      TUBAL LIGATION      teodoro gauthier       Family History   Problem Relation Age of Onset    Cancer Mother     Breast cancer Mother     Hypertension Father     Cancer Maternal Grandmother 80        colon    Colon cancer Other      Social History     Socioeconomic History    Marital status:      Spouse name: orlin    Number of children: 2   Occupational History    Occupation: realtor   Tobacco Use    Smoking status: Never    Smokeless tobacco: Never   Substance and Sexual Activity    Alcohol use: No    Drug use: No    Sexual activity: Not Currently     Partners: Female, Male     Birth control/protection: None     Comment: No libido     Social Determinants of Health     Financial Resource Strain: Low Risk  (9/11/2023)    Overall Financial Resource Strain (CARDIA)     Difficulty of Paying Living Expenses: Not hard at all   Food Insecurity: No Food Insecurity (9/11/2023)    Hunger Vital Sign     Worried About Running Out of Food in the Last Year: Never true     Ran Out of Food in the Last Year: Never true   Transportation Needs: No Transportation Needs (9/11/2023)    PRAPARE - Transportation     Lack of Transportation (Medical): No     Lack of Transportation (Non-Medical): No   Physical Activity: Inactive (9/11/2023)    Exercise Vital Sign     Days of Exercise per Week: 0 days     Minutes of Exercise per Session: 0 min   Stress: Stress Concern Present (9/11/2023)    [employer] [place] [employer] - Occupational Stress Questionnaire     Feeling of Stress : To some extent   Social Connections: Unknown (9/11/2023)    Social Connection and Isolation Panel [NHANES]     Frequency of Communication with Friends and Family: More than three times a week     Frequency of Social Gatherings with Friends  and Family: Three times a week     Active Member of Clubs or Organizations: Patient declined     Attends Club or Organization Meetings: Patient declined     Marital Status:    Housing Stability: Low Risk  (9/11/2023)    Housing Stability Vital Sign     Unable to Pay for Housing in the Last Year: No     Number of Places Lived in the Last Year: 1     Unstable Housing in the Last Year: No     Medication List with Changes/Refills   Current Medications    ASPIRIN (ECOTRIN) 81 MG EC TABLET    Take 81 mg by mouth once daily.    AZELASTINE-FLUTICASONE (DYMISTA) 137-50 MCG/SPRAY SPRY NASSAL SPRAY    1 spray by Each Nostril route 2 (two) times daily.    CARVEDILOL (COREG) 12.5 MG TABLET    take 1 tablet by mouth 2 times a day with meals    GUAIFENESIN (MUCINEX) 1,200 MG TA12    1 tab po daily    LEVOTHYROXINE (SYNTHROID) 100 MCG TABLET    Take 1 tablet (100 mcg total) by mouth before breakfast.    LOVASTATIN (MEVACOR) 40 MG TABLET    TAKE 1 TABLET BY MOUTH NIGHTLY    NIFEDIPINE (ADALAT CC) 90 MG TBSR    take 1 tablet by mouth daily    VALSARTAN (DIOVAN) 320 MG TABLET    Take 1 tablet (320 mg total) by mouth once daily. For blood pressure, to replace ibersartan     Review of patient's allergies indicates:   Allergen Reactions    Sertraline Other (See Comments)     Tremors     Tobramycin-dexamethasone      Eye Drops         Physical Exam:   Body mass index is 30.85 kg/m².    GENERAL: Well appearing, in no acute distress.  HEAD: Normocephalic and atraumatic.  ENT: External ears and nose grossly normal.  EYES: EOMI bilaterally  PULMONARY: Respirations are grossly even and non-labored.  NEURO: Awake, alert, and oriented x 3.  SKIN: No obvious rashes appreciated.  PSYCH: Mood & affect are appropriate.    Detailed MSK exam:     Right hip exam range of motion 110/50/20 pain at end flexion positive FADIR positive A[place] 3-1/2 fist.  No tenderness over the greater trochanter some pain over the anterior hip with glute medius  testing.    Imaging:  X-Ray Hip 2 or 3 views Right (with Pelvis when performed)  Narrative: EXAM: XR HIP WITH PELVIS WHEN PERFORMED, 2 OR 3  VIEWS RIGHT    CLINICAL INDICATION:   Pelvis and perineal pain.  Pain in right hip.    FINDINGS:  Comparisons are made to 10/20/2022.  4 views of the pelvis and right hip were submitted for interpretation.  Stable asymmetric degenerative changes involving the right greater the left hip joints to include axial hip joint space narrowing, and ring osteophyte formation.  Stable posterior matter changes to the left inferior pubic ramus.  Stable these hepatic changes involve the pelvis as well as degenerative changes of the pubic symphysis.  Marked degenerative changes of the lower lumbar spine again seen.    Alignment is satisfactory. No     fractures, dislocations, or erosive arthritic change.  Negative for radiopaque foreign bodies or air in the soft tissues.    Normal bowel gas pattern.  Phlebolith  Impression: 1.  Negative for acute process involving the visualized osseous structures.  2.  Stable findings as noted above.    Finalized on: 3/19/2024 8:53 AM By:  Ko Conway MD  [facility]# [id number]      2024-03-19 08:55:[id number]    [facility]      Relevant imaging results were reviewed and interpreted by me and per my read as above.  This was discussed with the patient and / or family today.     Assessment:  Cleo Ford is a 73 y.o. female presents today for right hip pain more consistent with underlying osteoarthritis of the hip.  Has some findings of old BERNARD as well with a cam lesion.  Discussed her x-rays today and length as well as her signs and symptoms that are congruent with more underlying hip arthritis.  I have not see as much symptoms congruent with lateral hip pain or glute medius tendinopathy.  She does have some signs and symptoms consistent with meralgia paresthetica.  We discussed the diagnosis as well as avoiding tight clothing and being aware of prolonged sitting that  may irritate the lateral femoral cutaneous nerve.  Discussed potential injection in the future if persistent.  As for her left hip pain, interested in intra-articular injection today has not seen good relief with over-the-counters or prescriptions.  Please refer to procedure note for the details.  Discussed importance of getting back into PT next week.  Follow-up p.r.n.    Primary osteoarthritis of right hip  -     Ambulatory referral/consult to Orthopedics    Lateral pain of right hip  -     Ambulatory referral/consult to Orthopedics    Meralgia paresthetica of right side         A copy of today's visit note has been sent to the referring provider.       Kennedy Edward MD    Disclaimer: This note was prepared using a voice recognition system and is likely to have sound alike errors within the text.

## 2024-03-19 NOTE — PATIENT INSTRUCTIONS
Assessment:  Cleo Ford is a 73 y.o. female   Chief Complaint   Patient presents with    Right Hip - Pain       Encounter Diagnoses   Name Primary?    Primary osteoarthritis of right hip Yes    Lateral pain of right hip     Meralgia paresthetica of right side         Plan:  Reviewed your x-rays with you today and discussed pertinent findings.   Provided corticosteroid injection to right hip. We discussed the proper protocols after the injection such as no submerging pools, baths tubs, or hot tubs for 24 hr.  Showering is okay today.  We also discussed that blood sugars can be elevated after an injection and asked patient to properly checked her sugars over the next few days and contact their PCP if there are any concerns.  We discussed red flags such as fevers, chills, red, warm, tender joint at the area of injection to please seek medical care immediately.     Avoid tight fitting clothes at the waist line and extended periods hunched over in a trunk flexed position    Start therapy for hip  Please reach out to us through [employer] messages if you have any questions or concerns. We will gladly answer you in a timely manner.     Follow-up: As needed or sooner if there are any problems between now and then.    Thank you for choosing [facility] and Dr. Kennedy Edward for your orthopedic & sports medicine care. It is our goal to provide you with exceptional care that will help keep you healthy, active, and get you back in the game.    Please do not hesitate to reach out to us via email, phone, or MyChart with any questions, concerns, or feedback.    If you felt that you received exemplary care today, please consider leaving us feedback on Healthgrades at:  [email or web address]    If you are experiencing pain/discomfort ,or have questions after 5pm and would like to be connected to the [facility] on-call team, please  call this number and specify which Sports Medicine provider is treating you: [phone]

## 2024-03-25 ENCOUNTER — CLINICAL SUPPORT (OUTPATIENT)
Facility: HOSPITAL | Age: 74
End: 2024-03-25
Payer: MEDICARE

## 2024-03-25 DIAGNOSIS — M25.551 CHRONIC RIGHT HIP PAIN: Primary | ICD-10-CM

## 2024-03-25 DIAGNOSIS — G89.29 CHRONIC RIGHT HIP PAIN: Primary | ICD-10-CM

## 2024-03-25 DIAGNOSIS — M62.551 MUSCLE WASTING AND ATROPHY, NOT ELSEWHERE CLASSIFIED, RIGHT THIGH: ICD-10-CM

## 2024-03-25 DIAGNOSIS — M25.651 HIP JOINT STIFFNESS, RIGHT: ICD-10-CM

## 2024-03-25 PROCEDURE — 97162 PT EVAL MOD COMPLEX 30 MIN: CPT | Mod: PN

## 2024-03-25 PROCEDURE — 97110 THERAPEUTIC EXERCISES: CPT | Mod: PN

## 2024-03-25 NOTE — PLAN OF CARE
[facility]   Physical Therapy Initial Evaluation        Date: 3/25/2024   Name: Cleo Ford  Clinic Number: [id number]    Therapy Diagnosis:    Encounter Diagnoses   Name Primary?    Chronic right hip pain Yes    Hip joint stiffness, right     Muscle wasting and atrophy, not elsewhere classified, right thigh       Physician: Elizabeth Julian MD    Physician Orders: PT Eval and Treat  Medical Diagnosis from Referral: Bursitis, unspecified side; Lateral Pain of Right Hip  Evaluation Date: 3/25/2024  Authorization Period Expiration: 12/31/24  Plan of Care Expiration: 6/17/24  Visit # / Visits authorized: 1/1  FOTO: 1/3    Progress Note Due: 4/25/24    Precautions: Standard    Time In: 3:40 pm   Time Out: 4:10 pm  Total Billable Time (timed & untimed codes): 25 minutes    SUBJECTIVE   Date of onset: approximately 6 months ago  History of current condition - Eboni is a 73 y.o. female whom reports seeing her MD for R hip pain with radiating pain into RLE. Patient states the pain began in her R hip and worsened over the course of about 6 months. Mechanism of injury: none. Pain is better since receiving a steroid injection.     Falls: 5 y/a    Pain:  Current 0/10, worst 10/10, best 0/10   Location: post-lateral R hip and into RLE  Description: throbbing, aching  Aggravating Factors: prolonged sitting, getting out of bed in the morning  Easing Factors: none    Imaging: X-ray performed on 3/19/24    Prior Therapy: N/A  Social History: Pt lives alone  Occupation: retired  Prior Level of Function: Independent and pain free with all ADL, IADL, community mobility and functional activities.   Current Level of Function: Patient experiences quite a bit of difficulty with their typical ADL's.     Dominant Extremity: right    Pts goals: Pt reported goals are to decrease overall pain levels in order to return to prior functional level.       Medical History:   Past Medical History:   Diagnosis Date     Anxiety     Benzodiazepine dependence 12/20/2022    using xanax at night. working on weaning down dose over next 6 months.     Depression     Dysmetabolic syndrome X     Obesity     Other and unspecified hyperlipidemia     Recurrent major depressive disorder, in partial remission 10/30/2018    Stage 3b chronic kidney disease 05/15/2023    Unspecified essential hypertension     Unspecified hypothyroidism        Surgical History:   Cleo Ford  has a past surgical history that includes gastric sleeve; Hysterectomy; Appendectomy; Cholecystectomy; Colonoscopy (N/A, 09/15/2016); Oophorectomy; Injection of anesthetic agent into sacroiliac joint (Right, 01/16/2020); tummy tuck; Reduction of both breasts; Breast reconstruction (Bilateral, 02/2021); Adenoidectomy; and Tubal ligation.    Medications:   Cleo has a current medication list which includes the following prescription(s): aspirin, azelastine-fluticasone, carvedilol, mucinex, levothyroxine, lovastatin, nifedipine, and valsartan.    Allergies:   Review of patient's allergies indicates:   Allergen Reactions    Sertraline Other (See Comments)     Tremors     Tobramycin-dexamethasone      Eye Drops          OBJECTIVE     RANGE OF MOTION:  *denotes pain     Lumbar  (degrees) Right   Left   Goal   Lumbar Flexion  NT 60   Lumbar Extension  NT 30   Lumbar Side Bending  NT NT 30     Hip  (degrees) Right Left Goal   Hip Flexion  120 120 120   Hip IR  30* 30 30   Hip ER  30 45 45           STRENGTH:  *denotes pain    L/E MMT Right Left Goal   Hip Flexion  4/5 4/5 5/5   Hip Abduction  4-/5 4-/5 5/5   Hip ER 4+/5 4/5 5/5         SPECIAL TESTS:     Right  (spine) Left  Goal   Carmen's Negative Negative Negative           Functional Mobility Observations noted Goal   Squat NT Funcitonal Nonpainful    Step Down  NT Funcitonal Nonpainful    Single Leg Stance  NT Funcitonal Nonpainful          FUNCTION:     Intake Outcome Measure for FOTO Hip Survey    Therapist reviewed  "FOTO scores for Cleo Ford on 3/25/2024.   FOTO report - see Media section or FOTO account episode details.    Intake Score: 86%           TREATMENT         Intervention Code  (see below chart) Date/Notes  3/25/24   Piriformis Stretch TE 10"x5   Clamshells  TE 2x10   SLR - Abduction TE 2x10   HEP TE Visit 1   15 minutes of Therapeutic Exercise (TE) to develop strength, endurance, ROM, and flexibility.      Home Exercises and Patient Education Provided    Education/Self-Care provided:   Issued HEP for lateral hip strengthening.    Written Home Exercises Provided: yes.  Exercises were reviewed and Eboni was able to demonstrate them prior to the end of the session.  Eboni demonstrated good understanding of the education provided.     See EMR under Patient Instructions for exercises provided 3/25/2024.    ASSESSMENT   Cleo is a 73 y.o. female referred to outpatient Physical Therapy with a medical diagnosis of Bursitis, unspecified side; Lateral Pain of Right Hip. Pt presents with impairments including: impairments list: ROM, strength, and functional movement patterns.    Pt prognosis is Excellent.   Pt will benefit from skilled outpatient Physical Therapy to address the deficits stated above and in the chart below, provide pt/family education, and to maximize pt's level of independence.     Plan of care discussed with patient: Yes  Pt's spiritual, cultural and educational needs considered and patient is agreeable to the plan of care and goals as stated below:     Anticipated Barriers for therapy: co-morbidities    Medical Necessity is demonstrated by the following  History  Co-morbidities and personal factors that may impact the plan of care [] LOW: no personal factors / co-morbidities  [x] MODERATE: 1-2 personal factors / co-morbidities  [] HIGH: 3+ personal factors / co-morbidities    Moderate / High Support Documentation: See Past Medical History     Examination  Body Structures and Functions, activity " limitations and participation restrictions that may impact the plan of care [] LOW: addressing 1-2 elements  [x] MODERATE: 3+ elements  [] HIGH: 4+ elements (please support below)    Moderate / High Support Documentation: See Evaluation Above     Clinical Presentation [] LOW: stable  [x] MODERATE: Evolving  [] HIGH: Unstable     Decision Making/ Complexity Score: moderate         GOALS:    Short Term Goals:  6 weeks Progress      Patient will report decreased pain to <6/10 at worst on VAS to progress toward Prior Level of Function.    Patient will improve AROM to 50% of stated goals in order to progress towards Prior Level of Function.    Patient will improve strength to 50% of stated goals in order to progress towards Prior Level of Function.      Long Term Goals:  12 weeks Progress     Patient will report decreased pain to <3/10 at worst on VAS to progress toward Prior Level of Function.      Patient will improve ROM and Functional Mobility to stated goals to return to Prior Level of Function.    Patient will improve strength to stated goals in order to return to Prior Level of Function.         PLAN   Plan of care Certification: 3/25/2024 to 6/17/24     Outpatient Physical Therapy 3 times weekly for 12 weeks to include any combination of the following interventions: virtual visits, dry needling, modalities, electrical stimulation (IFC, Pre-Mod, Attended with Functional Dry Needling), Cervical/Lumbar Traction, Gait Training, Manual Therapy, Neuromuscular Re-ed, Patient Education, Self Care, Therapeutic Activites, and Therapeutic Exercise     Thank you for this referral.    These services are reasonable and necessary for the conditions set forth above while under my care.    Jun Dunlap, PT, DPT, SCS

## 2024-05-14 ENCOUNTER — PATIENT MESSAGE (OUTPATIENT)
Dept: OPHTHALMOLOGY | Facility: CLINIC | Age: 74
End: 2024-05-14

## 2024-05-14 ENCOUNTER — OFFICE VISIT (OUTPATIENT)
Dept: OPHTHALMOLOGY | Facility: CLINIC | Age: 74
End: 2024-05-14
Payer: MEDICARE

## 2024-05-14 DIAGNOSIS — H52.7 REFRACTIVE ERRORS: ICD-10-CM

## 2024-05-14 DIAGNOSIS — H25.13 CATARACT, NUCLEAR SCLEROTIC SENILE, BILATERAL: Primary | ICD-10-CM

## 2024-05-14 DIAGNOSIS — H04.123 DRY EYES, BILATERAL: ICD-10-CM

## 2024-05-14 PROCEDURE — 3044F HG A1C LEVEL LT 7.0%: CPT | Mod: CPTII,S$GLB,, | Performed by: OPTOMETRIST

## 2024-05-14 PROCEDURE — 1159F MED LIST DOCD IN RCRD: CPT | Mod: CPTII,S$GLB,, | Performed by: OPTOMETRIST

## 2024-05-14 PROCEDURE — 99999 PR PBB SHADOW E&M-EST. PATIENT-LVL II: CPT | Mod: PBBFAC,,, | Performed by: OPTOMETRIST

## 2024-05-14 PROCEDURE — 4010F ACE/ARB THERAPY RXD/TAKEN: CPT | Mod: CPTII,S$GLB,, | Performed by: OPTOMETRIST

## 2024-05-14 PROCEDURE — 92015 DETERMINE REFRACTIVE STATE: CPT | Mod: S$GLB,,, | Performed by: OPTOMETRIST

## 2024-05-14 PROCEDURE — 92014 COMPRE OPH EXAM EST PT 1/>: CPT | Mod: S$GLB,,, | Performed by: OPTOMETRIST

## 2024-05-14 NOTE — PROGRESS NOTES
SUBJECTIVE  Cleo Ford is 73 y.o. female  Uncorrected distance visual acuity was 20/30 +2 in the right eye and 20/60 in the left eye. Uncorrected near visual acuity was J4 in the right eye and J5 in the left eye.   Chief Complaint   Patient presents with    Cataract     Pt states she is here for annual eye exam for cataracts. Pt states for the past 3 weeks she has noticed a film over her left eye that's driving her crazy. Pt Pt denies flashes of lights think she has a floater.           HPI     Cataract     Additional comments: Pt states she is here for annual eye exam for   cataracts. Pt states for the past 3 weeks she has noticed a film over her   left eye that's driving her crazy. Pt Pt denies flashes of lights think   she has a floater.            Comments    No specialty comments available.            Last edited by Cristina Brewer on 5/14/2024  1:56 PM.         Assessment /Plan :  1. Cataract, nuclear sclerotic senile, bilateral   Moderate cataracts OU. Cataracts are not significantly affecting activities of daily living and therefore surgery is not indicated at this time.   Will continue to monitor annually. Pt to call or RTC with any significant change in vision prior to next visit.   Worsening     2. Dry eyes, bilateral   Worksheet given. Discussed Dry Eyes in detail including Artificial Tears, lubricants, and Omega 3 Fish Oils.     3. Refractive errors   Dispense Final Rx for glasses.  RTC 1 year  Discussed above and answered questions.

## 2024-05-29 ENCOUNTER — OFFICE VISIT (OUTPATIENT)
Dept: RHEUMATOLOGY | Facility: CLINIC | Age: 74
End: 2024-05-29
Payer: MEDICARE

## 2024-05-29 ENCOUNTER — INFUSION (OUTPATIENT)
Dept: INFUSION THERAPY | Facility: HOSPITAL | Age: 74
End: 2024-05-29
Attending: INTERNAL MEDICINE
Payer: MEDICARE

## 2024-05-29 VITALS
HEART RATE: 63 BPM | HEIGHT: 62 IN | SYSTOLIC BLOOD PRESSURE: 118 MMHG | BODY MASS INDEX: 31 KG/M2 | WEIGHT: 168.44 LBS | DIASTOLIC BLOOD PRESSURE: 76 MMHG

## 2024-05-29 DIAGNOSIS — M70.71 ISCHIAL BURSITIS OF RIGHT SIDE: ICD-10-CM

## 2024-05-29 DIAGNOSIS — M81.0 AGE-RELATED OSTEOPOROSIS WITHOUT CURRENT PATHOLOGICAL FRACTURE: Primary | ICD-10-CM

## 2024-05-29 PROCEDURE — 3078F DIAST BP <80 MM HG: CPT | Mod: CPTII,S$GLB,, | Performed by: INTERNAL MEDICINE

## 2024-05-29 PROCEDURE — 4010F ACE/ARB THERAPY RXD/TAKEN: CPT | Mod: CPTII,S$GLB,, | Performed by: INTERNAL MEDICINE

## 2024-05-29 PROCEDURE — 3074F SYST BP LT 130 MM HG: CPT | Mod: CPTII,S$GLB,, | Performed by: INTERNAL MEDICINE

## 2024-05-29 PROCEDURE — 3288F FALL RISK ASSESSMENT DOCD: CPT | Mod: CPTII,S$GLB,, | Performed by: INTERNAL MEDICINE

## 2024-05-29 PROCEDURE — 96372 THER/PROPH/DIAG INJ SC/IM: CPT

## 2024-05-29 PROCEDURE — 1101F PT FALLS ASSESS-DOCD LE1/YR: CPT | Mod: CPTII,S$GLB,, | Performed by: INTERNAL MEDICINE

## 2024-05-29 PROCEDURE — 3044F HG A1C LEVEL LT 7.0%: CPT | Mod: CPTII,S$GLB,, | Performed by: INTERNAL MEDICINE

## 2024-05-29 PROCEDURE — 99999 PR PBB SHADOW E&M-EST. PATIENT-LVL IV: CPT | Mod: PBBFAC,,, | Performed by: INTERNAL MEDICINE

## 2024-05-29 PROCEDURE — 1126F AMNT PAIN NOTED NONE PRSNT: CPT | Mod: CPTII,S$GLB,, | Performed by: INTERNAL MEDICINE

## 2024-05-29 PROCEDURE — 3008F BODY MASS INDEX DOCD: CPT | Mod: CPTII,S$GLB,, | Performed by: INTERNAL MEDICINE

## 2024-05-29 PROCEDURE — 99214 OFFICE O/P EST MOD 30 MIN: CPT | Mod: S$GLB,,, | Performed by: INTERNAL MEDICINE

## 2024-05-29 PROCEDURE — 63600175 PHARM REV CODE 636 W HCPCS: Mod: JZ,JG | Performed by: INTERNAL MEDICINE

## 2024-05-29 RX ADMIN — DENOSUMAB 60 MG: 60 INJECTION SUBCUTANEOUS at 10:05

## 2024-05-29 NOTE — PROGRESS NOTES
RHEUMATOLOGY CLINIC FOLLOW UP VISIT  Chief complaints, HPI, ROS, EXAM, Assessment & Plans:-  Cleo Arnold a 73 y.o. pleasant female comes in for follow up visit. She follows in the Rheumatology Clinic for osteoporosis.  On Prolia.  She reports doing well.  No fragility fractures since last visit. No significant joint pain today.  Right hip bursitis resolved after CSI from orthopedic doctor. Rheumatological review of system negative otherwise.  No tenderness. No synovitis. No effusion.   1. Age-related osteoporosis without current pathological fracture    2. Ischial bursitis of right side        Problem List Items Addressed This Visit       Age-related osteoporosis without current pathological fracture - Primary    Ischial bursitis of right side     Personally reviewed DEXA scan images today.  Shows significant improvement at lumbar spine and stable results at femoral neck.  Lab results shows stable results with stable renal functions and elevated calcium.   Latest Reference Range & Units 05/29/24 09:32   Sodium 136 - 145 mmol/L 138   Potassium 3.5 - 5.1 mmol/L 4.3   Chloride 95 - 110 mmol/L 102   CO2 23 - 29 mmol/L 26   Anion Gap 8 - 16 mmol/L 10   BUN 8 - 23 mg/dL 20   Creatinine 0.5 - 1.4 mg/dL 1.3   eGFR >60 mL/min/1.73 m^2 44 !   Glucose 70 - 110 mg/dL 107   Calcium 8.7 - 10.5 mg/dL 10.3   ALP 55 - 135 U/L 46 (L)   PROTEIN TOTAL 6.0 - 8.4 g/dL 7.5   Albumin 3.5 - 5.2 g/dL 4.0   BILIRUBIN TOTAL 0.1 - 1.0 mg/dL 0.5   AST 10 - 40 U/L 21   ALT 10 - 44 U/L 14   !: Data is abnormal  (L): Data is abnormally low  Continue Prolia every 6 months.   Next bone density due in 2025 November.  Dental precautions with Prolia.  Advised conservative treatment to prevent recurrence of bursitis.    # Follow up in about 6 months (around 11/29/2024).      Disclaimer: This note was prepared using voice recognition system and is likely to have sound alike errors and is  not proof read.  Please call me with any questions.

## 2024-05-29 NOTE — PATIENT INSTRUCTIONS
Read Fiber Fueled, Why we sleep  and The Immune System Recover Plan ( books) .   Dr. Andrew Weil's anti-inflammatory diet.

## 2024-05-29 NOTE — NURSING
1024: Prolia Injection given without difficulties.Bandaid applied. Patient instructed to stay in the clinic for 15 minutes. Patient verbalized understanding and pt chose not to wait.

## 2024-05-29 NOTE — DISCHARGE INSTRUCTIONS
Thank you for allowing me to care for you today,  MICHAEL BarnettN, RN    [facility]  [address] [address]  [phone] phone     [phone] fax  Hours of Operation: Monday- Friday 7:00am- 5:30pm  After hours phone  [phone]  Hematology / Oncology Physicians on call      BRUCE Goldsmith Dr., Dr., Dr., Dr., Dr., Dr., Dr., Dr., Dr., Dr., Dr., Dr., Dr., Dr., Dr., JULES Maxwell, JULES Bardales, JULES Sanders, NP  Please call with any concerns regarding your appointment today.   FALL PREVENTION   Falls often occur due to slipping, tripping or losing your balance. Here are ways to reduce your risk of falling again.   Was there anything that caused your fall that can be fixed, removed or replaced?   Make your home safe by keeping walkways clear of objects you may trip over.   Use non-slip pads under rugs.   Do not walk in poorly lit areas.   Do not stand on chairs or wobbly ladders.   Use caution when reaching overhead or looking upward. This position can cause a loss of balance.   Be sure your shoes fit properly, have non-slip bottoms and are in good condition.   Be cautious when going up and down stairs, curbs, and when walking on uneven sidewalks.   If your balance is poor, consider using a cane or walker.   If your fall was related to alcohol use, stop or limit alcohol intake.   If your fall was related to use of sleeping medicines, talk to your doctor about this. You may need to reduce your dosage at bedtime if you awaken during the night to go to the bathroom.   To reduce the need for nighttime bathroom trips:   Avoid drinking fluids for several hours before going to bed   Empty your bladder before going to bed   Men can keep a urinal at the bedside   © 6527-0161 [employer], [address] [place] [address], [place] [address]. All rights reserved. This information is not intended as a  substitute for professional medical care. Always follow your healthcare professional's instructions.

## 2024-05-31 DIAGNOSIS — I10 ESSENTIAL HYPERTENSION: ICD-10-CM

## 2024-05-31 RX ORDER — CARVEDILOL 12.5 MG/1
TABLET ORAL
Qty: 180 TABLET | Refills: 0 | Status: SHIPPED | OUTPATIENT
Start: 2024-05-31

## 2024-06-19 ENCOUNTER — TELEPHONE (OUTPATIENT)
Dept: PRIMARY CARE CLINIC | Facility: CLINIC | Age: 74
End: 2024-06-19
Payer: MEDICARE

## 2024-06-19 DIAGNOSIS — L72.0 EPIDERMAL CYST OF FACE: Primary | ICD-10-CM

## 2024-07-15 ENCOUNTER — OFFICE VISIT (OUTPATIENT)
Dept: OPHTHALMOLOGY | Facility: CLINIC | Age: 74
End: 2024-07-15
Payer: MEDICARE

## 2024-07-15 DIAGNOSIS — H04.123 DRY EYES, BILATERAL: ICD-10-CM

## 2024-07-15 DIAGNOSIS — M35.01 KERATITIS SICCA, BOTH EYES: Primary | ICD-10-CM

## 2024-07-15 PROCEDURE — 92012 INTRM OPH EXAM EST PATIENT: CPT | Mod: S$GLB,,, | Performed by: OPTOMETRIST

## 2024-07-15 PROCEDURE — 99999 PR PBB SHADOW E&M-EST. PATIENT-LVL II: CPT | Mod: PBBFAC,,, | Performed by: OPTOMETRIST

## 2024-07-15 PROCEDURE — 1159F MED LIST DOCD IN RCRD: CPT | Mod: CPTII,S$GLB,, | Performed by: OPTOMETRIST

## 2024-07-15 PROCEDURE — 4010F ACE/ARB THERAPY RXD/TAKEN: CPT | Mod: CPTII,S$GLB,, | Performed by: OPTOMETRIST

## 2024-07-15 PROCEDURE — 3044F HG A1C LEVEL LT 7.0%: CPT | Mod: CPTII,S$GLB,, | Performed by: OPTOMETRIST

## 2024-07-15 RX ORDER — CYCLOSPORINE 0.5 MG/ML
1 EMULSION OPHTHALMIC 2 TIMES DAILY
Qty: 60 EACH | Refills: 12 | Status: SHIPPED | OUTPATIENT
Start: 2024-07-15 | End: 2025-07-15

## 2024-07-15 NOTE — PROGRESS NOTES
SUBJECTIVE  Cleo Ford is 73 y.o. female  Corrected distance visual acuity was 20/25 -2 in the right eye and 20/20 -2 in the left eye.   Chief Complaint   Patient presents with    Blurred Vision     Pt. C/o FB sensation and blurred vision OS only.  Has cataracts and questions if they are symptomatic.  It is mostly OS and fluctuates.          HPI     Blurred Vision     Additional comments: Pt. C/o FB sensation and blurred vision OS only.    Has cataracts and questions if they are symptomatic.  It is mostly OS and   fluctuates.           Comments    Cataracts OU  Dry eyes    Genteal Gel prn              Last edited by Alivia Peguero MA on 7/15/2024  3:28 PM.         Assessment /Plan :  1. Keratitis sicca, both eyes +  Start Restasis BID OU      2. Dry eyes, bilateral   Worksheet given. Discussed Dry Eyes in detail including Artificial Tears, lubricants, and Omega 3 Fish Oils.   RTC PRN

## 2024-07-15 NOTE — PROGRESS NOTES
SUBJECTIVE  Cleo Ford is 73 y.o. female  Corrected distance visual acuity was 20/25 -2 in the right eye and 20/20 -2 in the left eye.   Chief Complaint   Patient presents with    Blurred Vision     Pt. C/o FB sensation and blurred vision OS only.  Has cataracts and questions if they are symptomatic.  It is mostly OS and fluctuates.          HPI     Blurred Vision     Additional comments: Pt. C/o FB sensation and blurred vision OS only.    Has cataracts and questions if they are symptomatic.  It is mostly OS and   fluctuates.           Comments    Cataracts OU  Dry eyes    Genteal Gel prn              Last edited by Alivia Peguero MA on 7/15/2024  3:28 PM.         Assessment /Plan :  1. Dry eyes, bilateral   Worksheet given. Discussed Dry Eyes in detail including Artificial Tears, lubricants, and Omega 3 Fish Oils.

## 2024-08-31 DIAGNOSIS — I10 ESSENTIAL HYPERTENSION: ICD-10-CM

## 2024-09-03 RX ORDER — CARVEDILOL 12.5 MG/1
TABLET ORAL
Qty: 180 TABLET | Refills: 3 | Status: SHIPPED | OUTPATIENT
Start: 2024-09-03

## 2024-10-15 ENCOUNTER — OFFICE VISIT (OUTPATIENT)
Dept: PRIMARY CARE CLINIC | Facility: CLINIC | Age: 74
End: 2024-10-15
Payer: MEDICARE

## 2024-10-15 DIAGNOSIS — M62.81 PROXIMAL MUSCLE WEAKNESS: ICD-10-CM

## 2024-10-15 DIAGNOSIS — R46.89 COGNITIVE AND BEHAVIORAL CHANGES: ICD-10-CM

## 2024-10-15 DIAGNOSIS — R41.89 COGNITIVE AND BEHAVIORAL CHANGES: ICD-10-CM

## 2024-10-15 DIAGNOSIS — M81.0 AGE-RELATED OSTEOPOROSIS WITHOUT CURRENT PATHOLOGICAL FRACTURE: ICD-10-CM

## 2024-10-15 DIAGNOSIS — F41.9 ANXIETY: ICD-10-CM

## 2024-10-15 DIAGNOSIS — G47.00 INSOMNIA, UNSPECIFIED TYPE: Primary | ICD-10-CM

## 2024-10-15 DIAGNOSIS — M25.651 HIP JOINT STIFFNESS, RIGHT: ICD-10-CM

## 2024-10-15 DIAGNOSIS — I10 ESSENTIAL HYPERTENSION: ICD-10-CM

## 2024-10-15 RX ORDER — TRAZODONE HYDROCHLORIDE 50 MG/1
TABLET ORAL
Qty: 90 TABLET | Refills: 3 | Status: SHIPPED | OUTPATIENT
Start: 2024-10-15

## 2024-10-15 NOTE — PROGRESS NOTES
Chief Complaint  Chief Complaint   Patient presents with    Insomnia     Reports insomnia x 1 year have been off Xanax since December       HPI  Cleo Ford is a 74 y.o. female with multiple medical diagnoses as listed in the medical history and problem list that presents for  virtual visit.       History of Present Illness    CHIEF COMPLAINT:  - The patient presents for a video visit to discuss resuming Xanax, primarily due to concerns about recent insomnia.    HPI:  Patient reports insomnia since discontinuing Xanax in December of last year. She has not slept a full night since then, with some nights of complete sleeplessness. The patient is concerned about cognitive impairment from sleep deprivation, noting brain fog and difficulty with simple calculations. She has tried melatonin and magnesium for sleep without effect.    The patient has bursitis in one hip, for which she received a cortisone injection in February or March. The hip pain is recurring. She participates in water aerobics, which she finds beneficial for her bursitis.    The patient reports cognitive difficulties since her 's death 3 years ago, including forgetfulness of simple tasks. She desires resolution of these ongoing cognitive issues.    The patient does not want a medication that would cause sudden onset of sleep during the night.    MEDICATIONS:  - Melatonin for sleep  - Magnesium for sleep  - Prolia injection for bone health (osteoporosis)    PMH:  - Insomnia.  Anxiety.  Bursitis.  Osteoporosis.  Hypertension.    RECENT/REMOTE SURGICAL HISTORY:  - Cortisone injection: Last February or March, for hip bursitis.    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     Review of Systems   Constitutional:  Negative for activity change and unexpected weight change.   HENT:  Negative for hearing loss,  rhinorrhea and trouble swallowing.    Eyes:  Negative for discharge and visual disturbance.   Respiratory:  Negative for chest tightness and wheezing.    Cardiovascular:  Negative for chest pain and palpitations.   Gastrointestinal:  Negative for blood in stool, constipation, diarrhea and vomiting.   Endocrine: Negative for polydipsia and polyuria.   Genitourinary:  Negative for difficulty urinating, dysuria, hematuria and menstrual problem.   Musculoskeletal:  Positive for arthralgias. Negative for joint swelling and neck pain.   Neurological:  Negative for weakness and headaches.   Psychiatric/Behavioral:  Positive for decreased concentration and sleep disturbance. Negative for confusion and dysphoric mood.            Physical Exam  Vitals reviewed.   Constitutional:       General: She is awake. She is not in acute distress.     Appearance: Normal appearance. She is well-developed, well-groomed and normal weight. She is not ill-appearing.   HENT:      Head: Normocephalic and atraumatic.      Right Ear: External ear normal.      Left Ear: External ear normal.      Nose: Nose normal.      Mouth/Throat:      Lips: Pink.   Eyes:      Conjunctiva/sclera: Conjunctivae normal.   Pulmonary:      Effort: Pulmonary effort is normal.   Neurological:      Mental Status: She is alert.   Psychiatric:         Attention and Perception: Attention and perception normal. She is attentive.         Mood and Affect: Mood and affect normal. Mood is not anxious or depressed. Affect is not labile, blunt, angry or inappropriate.         Speech: Speech normal. She is communicative. Speech is not rapid and pressured, delayed, slurred or tangential.         Behavior: Behavior normal. Behavior is not agitated, slowed, aggressive, withdrawn, hyperactive or combative. Behavior is cooperative.         Thought Content: Thought content normal. Thought content is not paranoid or delusional. Thought content does not include homicidal or suicidal  ideation. Thought content does not include homicidal or suicidal plan.         Cognition and Memory: Memory normal. Cognition is impaired. Memory is not impaired. She does not exhibit impaired recent memory or impaired remote memory.         Judgment: Judgment normal. Judgment is not impulsive or inappropriate.       Physical Exam    IMAGING:  - Mammogram: Scheduled for Monday at the [place]           ASSESSMENT/PLAN:  1. Insomnia, unspecified type  -     traZODone (DESYREL) 50 MG tablet; 25mg - 50mg po QHS prn insomnia  Dispense: 90 tablet; Refill: 3    2. Anxiety    3. Cognitive and behavioral changes    4. Age-related osteoporosis without current pathological fracture    5. Essential hypertension  -     Microalbumin/creatinine urine ratio    6. Hip joint stiffness, right    7. Proximal muscle weakness      Assessment & Plan    IMPRESSION:  - Initiated trial of trazodone for insomnia, with potential benefits for anxiety and cognitive issues  - Continued Prolia injections for osteoporosis management  - Assessed blood pressure control as adequate  - Noted patient's engagement in water aerobics for hip bursitis management    COGNITIVE IMPAIRMENT:  - Evaluated the patient's reported cognitive impairment, brain fog, and difficulty with simple math.  - Assessed that cognitive issues may be attributed to lack of sleep and grief from loss of family member.  - Acknowledged cognitive issues and suggested improved sleep may help.  - Plan to reassess cognitive function after addressing sleep issues.  - Noted that patient reports cognitive issues since family member's death 3 years ago.  - Acknowledged the impact of grief on cognitive function.    OSTEOPOROSIS:  - Continue Prolia injections for osteoporosis management.  - Noted that patient is scheduled for Prolia injection in November or December.  - Confirmed patient has seen Dr. RANDOLPH for bone health.    KIDNEY FUNCTION:  - Order urine microalbumin test to assess kidney  function.    INSOMNIA:  - Noted that the patient has been off Xanax for about 1 year.  - Discussed non-habit forming medication options for sleep.  - Prescribed trazodone 25-50mg for sleep and anxiety, with instructions on usage and potential side effects.  - Explained that trazodone is not habit-forming or addictive, unlike Xanax.  - Discussed potential initial grogginess with trazodone use, which typically subsides.  - Prescribed trazodone 25-50 mg at bedtime for insomnia.  - Instructed to take 1 hour before desired sleep time.  - Advised to start with half tablet (25 mg) and increase to full tablet (50 mg) if needed.  - Informed that dose can be increased to 2 tablets (100 mg) if lower doses are ineffective.  - Directed to contact the office if trazodone is ineffective after 1 month of consistent use.  - Noted that patient reports not sleeping a full night since December and sometimes not sleeping at all.  - Evaluated that current sleep aids (melatonin, magnesium) are ineffective.  - Discussed insomnia's impact on cognitive function.    ANXIETY:  - Acknowledged patient's anxiety and discussed medication that can help with both sleep and anxiety.  - Prescribed trazodone for anxiety in addition to sleep issues.    HIP BURSITIS:  - Advised patient to continue water aerobics for hip bursitis management.  - Noted that patient reports bursitis in one hip, had cortisone injection in February or March.  - Advised patient to continue water aerobics for hip pain management.  - Noted that patient reports hip pain returning after cortisone injection in February or March.  - Patient to continue water aerobics for hip bursitis management.    FOLLOW UP:  - Follow up in March for annual visit.       X-Ray Hip 2 or 3 views Right (with Pelvis when performed)  Narrative: EXAM: XR HIP WITH PELVIS WHEN PERFORMED, 2 OR 3  VIEWS RIGHT    CLINICAL INDICATION:   Pelvis and perineal pain.  Pain in right hip.    FINDINGS:  Comparisons are  made to 10/20/2022.  4 views of the pelvis and right hip were submitted for interpretation.  Stable asymmetric degenerative changes involving the right greater the left hip joints to include axial hip joint space narrowing, and ring osteophyte formation.  Stable posterior matter changes to the left inferior pubic ramus.  Stable these hepatic changes involve the pelvis as well as degenerative changes of the pubic symphysis.  Marked degenerative changes of the lower lumbar spine again seen.    Alignment is satisfactory. No     fractures, dislocations, or erosive arthritic change.  Negative for radiopaque foreign bodies or air in the soft tissues.    Normal bowel gas pattern.  Phlebolith  Impression: 1.  Negative for acute process involving the visualized osseous structures.  2.  Stable findings as noted above.    Finalized on: 3/19/2024 8:53 AM By:  Ko Conway MD  [facility]# [id number]      2024-03-19 08:55:56.328    [facility]    Lab Results   Component Value Date    WBC 8.07 03/04/2024    HGB 13.7 03/04/2024    HCT 42.4 03/04/2024     03/04/2024    CHOL 184 03/04/2024    TRIG 60 03/04/2024    HDL 72 03/04/2024    ALT 14 05/29/2024    AST 21 05/29/2024     05/29/2024    K 4.3 05/29/2024     05/29/2024    CREATININE 1.3 05/29/2024    BUN 20 05/29/2024    CO2 26 05/29/2024    TSH 1.284 03/04/2024    HGBA1C 5.5 03/04/2024       Follow-up: Follow up if symptoms worsen or fail to improve.    ______________________________________________________________________    Face to Face time with patient: 10:20 AM CDT - 1040am     The patient location is:  home  The chief complaint leading to consultation is: noted   Visit type: Virtual visit with synchronous audio and video   Each patient to whom he or she provides medical services by telemedicine is:  (1) informed of the relationship between the physician and patient and the respective role of any other health care provider with respect to management of the patient; and  (2) notified that he or she may decline to receive medical services by telemedicine and may withdraw from such care at any time.    I spent a total of   25    minutes face to face and non-face to face on the date of this visit.This includes time preparing to see the patient (eg, review of tests, notes), obtaining and/or reviewing additional history from an independent historian and/or outside medical records, documenting clinical information in the electronic health record, independently interpreting results and/or communicating results to the patient/family/caregiver, or care coordinator.  Visit today included increased complexity associated with the care of the episodic problem addressed and managing the longitudinal care of the patient due to the serious and/or complex managed problem(s).    This note was generated with the assistance of ambient listening technology. Verbal consent was obtained by the patient and accompanying visitor(s) for the recording of patient appointment to facilitate this note. I attest to having reviewed and edited the generated note for accuracy, though some syntax or spelling errors may persist. Please contact the author of this note for any clarification.

## 2024-10-17 ENCOUNTER — LAB VISIT (OUTPATIENT)
Dept: LAB | Facility: HOSPITAL | Age: 74
End: 2024-10-17
Attending: FAMILY MEDICINE
Payer: MEDICARE

## 2024-10-17 DIAGNOSIS — E83.52 HYPERCALCEMIA: ICD-10-CM

## 2024-10-17 DIAGNOSIS — R73.09 ABNORMAL GLUCOSE: Primary | ICD-10-CM

## 2024-10-17 LAB
ALBUMIN/CREAT UR: 31.9 UG/MG (ref 0–30)
CREAT UR-MCNC: 72 MG/DL (ref 15–325)
MICROALBUMIN UR DL<=1MG/L-MCNC: 23 UG/ML

## 2024-10-17 PROCEDURE — 82570 ASSAY OF URINE CREATININE: CPT | Performed by: FAMILY MEDICINE

## 2024-10-17 PROCEDURE — 82043 UR ALBUMIN QUANTITATIVE: CPT | Performed by: FAMILY MEDICINE

## 2024-10-21 ENCOUNTER — HOSPITAL ENCOUNTER (OUTPATIENT)
Dept: RADIOLOGY | Facility: HOSPITAL | Age: 74
Discharge: HOME | End: 2024-10-21
Attending: FAMILY MEDICINE
Payer: MEDICARE

## 2024-10-21 ENCOUNTER — OFFICE VISIT (OUTPATIENT)
Dept: DERMATOLOGY | Facility: CLINIC | Age: 74
End: 2024-10-21
Payer: MEDICARE

## 2024-10-21 VITALS — BODY MASS INDEX: 31 KG/M2 | WEIGHT: 168.44 LBS | HEIGHT: 62 IN

## 2024-10-21 DIAGNOSIS — L81.4 LENTIGINES: ICD-10-CM

## 2024-10-21 DIAGNOSIS — Z12.31 ENCOUNTER FOR SCREENING MAMMOGRAM FOR BREAST CANCER: ICD-10-CM

## 2024-10-21 DIAGNOSIS — D23.9 INTRADERMAL NEVUS: ICD-10-CM

## 2024-10-21 DIAGNOSIS — L82.1 SEBORRHEIC KERATOSES: Primary | ICD-10-CM

## 2024-10-21 PROCEDURE — 99999 PR PBB SHADOW E&M-EST. PATIENT-LVL III: CPT | Mod: PBBFAC,,, | Performed by: PHYSICIAN ASSISTANT

## 2024-10-21 PROCEDURE — 3044F HG A1C LEVEL LT 7.0%: CPT | Mod: CPTII,S$GLB,, | Performed by: PHYSICIAN ASSISTANT

## 2024-10-21 PROCEDURE — 1160F RVW MEDS BY RX/DR IN RCRD: CPT | Mod: CPTII,S$GLB,, | Performed by: PHYSICIAN ASSISTANT

## 2024-10-21 PROCEDURE — 4010F ACE/ARB THERAPY RXD/TAKEN: CPT | Mod: CPTII,S$GLB,, | Performed by: PHYSICIAN ASSISTANT

## 2024-10-21 PROCEDURE — 77067 SCR MAMMO BI INCL CAD: CPT | Mod: 26,,, | Performed by: RADIOLOGY

## 2024-10-21 PROCEDURE — 3288F FALL RISK ASSESSMENT DOCD: CPT | Mod: CPTII,S$GLB,, | Performed by: PHYSICIAN ASSISTANT

## 2024-10-21 PROCEDURE — 1101F PT FALLS ASSESS-DOCD LE1/YR: CPT | Mod: CPTII,S$GLB,, | Performed by: PHYSICIAN ASSISTANT

## 2024-10-21 PROCEDURE — 1126F AMNT PAIN NOTED NONE PRSNT: CPT | Mod: CPTII,S$GLB,, | Performed by: PHYSICIAN ASSISTANT

## 2024-10-21 PROCEDURE — 99203 OFFICE O/P NEW LOW 30 MIN: CPT | Mod: S$GLB,,, | Performed by: PHYSICIAN ASSISTANT

## 2024-10-21 PROCEDURE — 77067 SCR MAMMO BI INCL CAD: CPT | Mod: TC

## 2024-10-21 PROCEDURE — 3066F NEPHROPATHY DOC TX: CPT | Mod: CPTII,S$GLB,, | Performed by: PHYSICIAN ASSISTANT

## 2024-10-21 PROCEDURE — 3060F POS MICROALBUMINURIA REV: CPT | Mod: CPTII,S$GLB,, | Performed by: PHYSICIAN ASSISTANT

## 2024-10-21 PROCEDURE — 1159F MED LIST DOCD IN RCRD: CPT | Mod: CPTII,S$GLB,, | Performed by: PHYSICIAN ASSISTANT

## 2024-10-21 PROCEDURE — 77063 BREAST TOMOSYNTHESIS BI: CPT | Mod: 26,,, | Performed by: RADIOLOGY

## 2024-10-21 NOTE — PROGRESS NOTES
Subjective:      Patient ID:  Cleo Ford is a 74 y.o. female who presents for   Chief Complaint   Patient presents with    Skin Check     Fbse, no hx of skin cancer      75 yo female, here for skin check today. Wears spf in foundation daily.  C/o 2 spots of arm and leg. +rough, brown. Denies itching or bleeding.    The patient denies personal or family history of skin cancer.          Review of Systems   Constitutional:  Negative for fever and chills.   Gastrointestinal:  Negative for nausea and vomiting.   Skin:  Positive for daily sunscreen use and activity-related sunscreen use. Negative for itching, rash, dry skin, sun sensitivity, recent sunburn and dry lips.   Hematologic/Lymphatic: Does not bruise/bleed easily.       Objective:   Physical Exam   Constitutional: She appears well-developed and well-nourished. No distress.   Neurological: She is alert and oriented to person, place, and time. She is not disoriented.   Psychiatric: She has a normal mood and affect.   Skin:   Areas Examined (abnormalities noted in diagram):   Scalp / Hair Palpated and Inspected  Head / Face Inspection Performed  Neck Inspection Performed  Chest / Axilla Inspection Performed  Abdomen Inspection Performed  Back Inspection Performed  RUE Inspected  LUE Inspection Performed  RLE Inspected  LLE Inspection Performed  Gland Inspection Performed                     Diagram Legend     Erythematous scaling macule/papule c/w actinic keratosis       Vascular papule c/w angioma      Pigmented verrucoid papule/plaque c/w seborrheic keratosis      Yellow umbilicated papule c/w sebaceous hyperplasia      Irregularly shaped tan macule c/w lentigo     1-2 mm smooth white papules consistent with Milia      Movable subcutaneous cyst with punctum c/w epidermal inclusion cyst      Subcutaneous movable cyst c/w pilar cyst      Firm pink to brown papule c/w dermatofibroma      Pedunculated fleshy papule(s) c/w skin tag(s)      Evenly pigmented  macule c/w junctional nevus     Mildly variegated pigmented, slightly irregular-bordered macule c/w mildly atypical nevus      Flesh colored to evenly pigmented papule c/w intradermal nevus       Pink pearly papule/plaque c/w basal cell carcinoma      Erythematous hyperkeratotic cursted plaque c/w SCC      Surgical scar with no sign of skin cancer recurrence      Open and closed comedones      Inflammatory papules and pustules      Verrucoid papule consistent consistent with wart     Erythematous eczematous patches and plaques     Dystrophic onycholytic nail with subungual debris c/w onychomycosis     Umbilicated papule    Erythematous-base heme-crusted tan verrucoid plaque consistent with inflamed seborrheic keratosis     Erythematous Silvery Scaling Plaque c/w Psoriasis     See annotation      Assessment / Plan:        Seborrheic keratoses  Reassurance given.  Lesions are benign.    Intradermal nevus  Reassurance given.  Lesions are benign. Recommend annual skin exams.     Lentigines  Encouraged photoprotection.          No follow-ups on file.

## 2024-10-29 ENCOUNTER — TELEPHONE (OUTPATIENT)
Dept: SPORTS MEDICINE | Facility: CLINIC | Age: 74
End: 2024-10-29
Payer: MEDICARE

## 2024-10-30 ENCOUNTER — OFFICE VISIT (OUTPATIENT)
Dept: SPORTS MEDICINE | Facility: CLINIC | Age: 74
End: 2024-10-30
Payer: MEDICARE

## 2024-10-30 DIAGNOSIS — M25.551 LATERAL PAIN OF RIGHT HIP: ICD-10-CM

## 2024-10-30 DIAGNOSIS — M16.11 PRIMARY OSTEOARTHRITIS OF RIGHT HIP: Primary | ICD-10-CM

## 2024-10-30 PROCEDURE — 1101F PT FALLS ASSESS-DOCD LE1/YR: CPT | Mod: CPTII,S$GLB,, | Performed by: STUDENT IN AN ORGANIZED HEALTH CARE EDUCATION/TRAINING PROGRAM

## 2024-10-30 PROCEDURE — 3060F POS MICROALBUMINURIA REV: CPT | Mod: CPTII,S$GLB,, | Performed by: STUDENT IN AN ORGANIZED HEALTH CARE EDUCATION/TRAINING PROGRAM

## 2024-10-30 PROCEDURE — 1159F MED LIST DOCD IN RCRD: CPT | Mod: CPTII,S$GLB,, | Performed by: STUDENT IN AN ORGANIZED HEALTH CARE EDUCATION/TRAINING PROGRAM

## 2024-10-30 PROCEDURE — 1126F AMNT PAIN NOTED NONE PRSNT: CPT | Mod: CPTII,S$GLB,, | Performed by: STUDENT IN AN ORGANIZED HEALTH CARE EDUCATION/TRAINING PROGRAM

## 2024-10-30 PROCEDURE — 3066F NEPHROPATHY DOC TX: CPT | Mod: CPTII,S$GLB,, | Performed by: STUDENT IN AN ORGANIZED HEALTH CARE EDUCATION/TRAINING PROGRAM

## 2024-10-30 PROCEDURE — 20611 DRAIN/INJ JOINT/BURSA W/US: CPT | Mod: RT,S$GLB,, | Performed by: STUDENT IN AN ORGANIZED HEALTH CARE EDUCATION/TRAINING PROGRAM

## 2024-10-30 PROCEDURE — 3288F FALL RISK ASSESSMENT DOCD: CPT | Mod: CPTII,S$GLB,, | Performed by: STUDENT IN AN ORGANIZED HEALTH CARE EDUCATION/TRAINING PROGRAM

## 2024-10-30 PROCEDURE — 3044F HG A1C LEVEL LT 7.0%: CPT | Mod: CPTII,S$GLB,, | Performed by: STUDENT IN AN ORGANIZED HEALTH CARE EDUCATION/TRAINING PROGRAM

## 2024-10-30 PROCEDURE — 4010F ACE/ARB THERAPY RXD/TAKEN: CPT | Mod: CPTII,S$GLB,, | Performed by: STUDENT IN AN ORGANIZED HEALTH CARE EDUCATION/TRAINING PROGRAM

## 2024-10-30 PROCEDURE — 99999 PR PBB SHADOW E&M-EST. PATIENT-LVL III: CPT | Mod: PBBFAC,,, | Performed by: STUDENT IN AN ORGANIZED HEALTH CARE EDUCATION/TRAINING PROGRAM

## 2024-10-30 PROCEDURE — 99214 OFFICE O/P EST MOD 30 MIN: CPT | Mod: 25,S$GLB,, | Performed by: STUDENT IN AN ORGANIZED HEALTH CARE EDUCATION/TRAINING PROGRAM

## 2024-10-30 RX ORDER — LIDOCAINE HYDROCHLORIDE 10 MG/ML
1 INJECTION, SOLUTION INFILTRATION; PERINEURAL
Status: DISCONTINUED | OUTPATIENT
Start: 2024-10-30 | End: 2024-10-30 | Stop reason: HOSPADM

## 2024-10-30 RX ORDER — BUPIVACAINE HYDROCHLORIDE 5 MG/ML
2 INJECTION, SOLUTION PERINEURAL
Status: DISCONTINUED | OUTPATIENT
Start: 2024-10-30 | End: 2024-10-30 | Stop reason: HOSPADM

## 2024-10-30 RX ORDER — TRIAMCINOLONE ACETONIDE 40 MG/ML
40 INJECTION, SUSPENSION INTRA-ARTICULAR; INTRAMUSCULAR
Status: DISCONTINUED | OUTPATIENT
Start: 2024-10-30 | End: 2024-10-30 | Stop reason: HOSPADM

## 2024-10-30 RX ADMIN — LIDOCAINE HYDROCHLORIDE 1 ML: 10 INJECTION, SOLUTION INFILTRATION; PERINEURAL at 09:10

## 2024-10-30 RX ADMIN — BUPIVACAINE HYDROCHLORIDE 2 ML: 5 INJECTION, SOLUTION PERINEURAL at 09:10

## 2024-10-30 RX ADMIN — TRIAMCINOLONE ACETONIDE 40 MG: 40 INJECTION, SUSPENSION INTRA-ARTICULAR; INTRAMUSCULAR at 09:10

## 2024-11-26 RX ORDER — LEVOTHYROXINE SODIUM 100 UG/1
100 TABLET ORAL
Qty: 90 TABLET | Refills: 1 | Status: SHIPPED | OUTPATIENT
Start: 2024-11-26

## 2024-11-26 NOTE — TELEPHONE ENCOUNTER
No care due was identified.  [facility] Embedded Care Due Messages. Reference number: [id number].   11/26/2024 6:32:36 AM CST

## 2024-11-26 NOTE — TELEPHONE ENCOUNTER
Refill Decision Note   Cleo Ford  is requesting a refill authorization.  Brief Assessment and Rationale for Refill:  Approve     Medication Therapy Plan:         Comments:     Note composed:12:17 PM 11/26/2024

## 2024-12-05 ENCOUNTER — PATIENT MESSAGE (OUTPATIENT)
Dept: RHEUMATOLOGY | Facility: CLINIC | Age: 74
End: 2024-12-05

## 2024-12-05 ENCOUNTER — OFFICE VISIT (OUTPATIENT)
Dept: RHEUMATOLOGY | Facility: CLINIC | Age: 74
End: 2024-12-05
Payer: MEDICARE

## 2024-12-05 ENCOUNTER — INFUSION (OUTPATIENT)
Dept: INFUSION THERAPY | Facility: HOSPITAL | Age: 74
End: 2024-12-05
Attending: INTERNAL MEDICINE
Payer: MEDICARE

## 2024-12-05 VITALS
HEART RATE: 74 BPM | DIASTOLIC BLOOD PRESSURE: 69 MMHG | WEIGHT: 172.38 LBS | HEIGHT: 62 IN | BODY MASS INDEX: 31.72 KG/M2 | SYSTOLIC BLOOD PRESSURE: 113 MMHG

## 2024-12-05 DIAGNOSIS — I10 ESSENTIAL HYPERTENSION: Chronic | ICD-10-CM

## 2024-12-05 DIAGNOSIS — M81.0 AGE-RELATED OSTEOPOROSIS WITHOUT CURRENT PATHOLOGICAL FRACTURE: Primary | ICD-10-CM

## 2024-12-05 DIAGNOSIS — M70.71 ISCHIAL BURSITIS OF RIGHT SIDE: ICD-10-CM

## 2024-12-05 PROCEDURE — 3078F DIAST BP <80 MM HG: CPT | Mod: CPTII,S$GLB,, | Performed by: INTERNAL MEDICINE

## 2024-12-05 PROCEDURE — 3060F POS MICROALBUMINURIA REV: CPT | Mod: CPTII,S$GLB,, | Performed by: INTERNAL MEDICINE

## 2024-12-05 PROCEDURE — 1159F MED LIST DOCD IN RCRD: CPT | Mod: CPTII,S$GLB,, | Performed by: INTERNAL MEDICINE

## 2024-12-05 PROCEDURE — 99999 PR PBB SHADOW E&M-EST. PATIENT-LVL III: CPT | Mod: PBBFAC,,, | Performed by: INTERNAL MEDICINE

## 2024-12-05 PROCEDURE — 1101F PT FALLS ASSESS-DOCD LE1/YR: CPT | Mod: CPTII,S$GLB,, | Performed by: INTERNAL MEDICINE

## 2024-12-05 PROCEDURE — 3066F NEPHROPATHY DOC TX: CPT | Mod: CPTII,S$GLB,, | Performed by: INTERNAL MEDICINE

## 2024-12-05 PROCEDURE — 96372 THER/PROPH/DIAG INJ SC/IM: CPT

## 2024-12-05 PROCEDURE — 4010F ACE/ARB THERAPY RXD/TAKEN: CPT | Mod: CPTII,S$GLB,, | Performed by: INTERNAL MEDICINE

## 2024-12-05 PROCEDURE — 3044F HG A1C LEVEL LT 7.0%: CPT | Mod: CPTII,S$GLB,, | Performed by: INTERNAL MEDICINE

## 2024-12-05 PROCEDURE — 99214 OFFICE O/P EST MOD 30 MIN: CPT | Mod: S$GLB,,, | Performed by: INTERNAL MEDICINE

## 2024-12-05 PROCEDURE — 63600175 PHARM REV CODE 636 W HCPCS: Mod: JZ,JG | Performed by: INTERNAL MEDICINE

## 2024-12-05 PROCEDURE — 3074F SYST BP LT 130 MM HG: CPT | Mod: CPTII,S$GLB,, | Performed by: INTERNAL MEDICINE

## 2024-12-05 PROCEDURE — 1160F RVW MEDS BY RX/DR IN RCRD: CPT | Mod: CPTII,S$GLB,, | Performed by: INTERNAL MEDICINE

## 2024-12-05 PROCEDURE — 3288F FALL RISK ASSESSMENT DOCD: CPT | Mod: CPTII,S$GLB,, | Performed by: INTERNAL MEDICINE

## 2024-12-05 PROCEDURE — 1126F AMNT PAIN NOTED NONE PRSNT: CPT | Mod: CPTII,S$GLB,, | Performed by: INTERNAL MEDICINE

## 2024-12-05 PROCEDURE — 3008F BODY MASS INDEX DOCD: CPT | Mod: CPTII,S$GLB,, | Performed by: INTERNAL MEDICINE

## 2024-12-05 RX ORDER — NIFEDIPINE 90 MG/1
TABLET, EXTENDED RELEASE ORAL
Qty: 90 TABLET | Refills: 3 | Status: SHIPPED | OUTPATIENT
Start: 2024-12-05

## 2024-12-05 RX ADMIN — DENOSUMAB 60 MG: 60 INJECTION SUBCUTANEOUS at 01:12

## 2024-12-05 NOTE — TELEPHONE ENCOUNTER
Care Due:                  Date            Visit Type   Department     Provider  --------------------------------------------------------------------------------                                ESTABLISHED                              PATIENT -    [facility]  Last Visit: 10-      [facility]           Elizabeth Julian                               -                              PRIMARY      [facility]  Next Visit: 03-      CARE ([facility])   [facility]           Elizabeth Julian                                                            Last  Test          Frequency    Reason                     Performed    Due Date  --------------------------------------------------------------------------------    Lipid Panel.  12 months..  lovastatin...............  03- 02-    TSH.........  12 months..  levothyroxine............  03- 02-    [facility] Embedded Care Due Messages. Reference number: [id number].   12/05/2024 10:52:35 AM CST

## 2024-12-05 NOTE — PROGRESS NOTES
RHEUMATOLOGY CLINIC FOLLOW UP VISIT  Chief complaints, HPI, ROS, EXAM, Assessment & Plans:-  Cleo Arnold a 74 y.o. pleasant female comes in for follow up visit. She follows in the Rheumatology Clinic for osteoporosis.  On Prolia.  She reports doing well.  No fragility fractures since last visit. No significant joint pain today.  Right hip bursitis resolved after CSI from orthopedic doctor. Rheumatological review of system negative otherwise.  No tenderness. No synovitis. No effusion.   1. Age-related osteoporosis without current pathological fracture    2. Ischial bursitis of right side        Problem List Items Addressed This Visit       Age-related osteoporosis without current pathological fracture - Primary    Relevant Orders    DXA Bone Density Axial Skeleton 1 or more sites    Ischial bursitis of right side     Personally reviewed DEXA scan images today.  Shows significant improvement at lumbar spine and stable results at femoral neck.  Lab results shows stable results with stable renal functions and elevated calcium.   Latest Reference Range & Units 05/29/24 09:32   Sodium 136 - 145 mmol/L 138   Potassium 3.5 - 5.1 mmol/L 4.3   Chloride 95 - 110 mmol/L 102   CO2 23 - 29 mmol/L 26   Anion Gap 8 - 16 mmol/L 10   BUN 8 - 23 mg/dL 20   Creatinine 0.5 - 1.4 mg/dL 1.3   eGFR >60 mL/min/1.73 m^2 44 !   Glucose 70 - 110 mg/dL 107   Calcium 8.7 - 10.5 mg/dL 10.3   ALP 55 - 135 U/L 46 (L)   PROTEIN TOTAL 6.0 - 8.4 g/dL 7.5   Albumin 3.5 - 5.2 g/dL 4.0   BILIRUBIN TOTAL 0.1 - 1.0 mg/dL 0.5   AST 10 - 40 U/L 21   ALT 10 - 44 U/L 14   !: Data is abnormal  (L): Data is abnormally low      Continue Prolia every 6 months.   Next bone density due in 2025 November.  Dental precautions with Prolia.  Advised conservative treatment to prevent recurrence of bursitis.    # Follow up in about 1 year (around 12/5/2025).      Disclaimer: This note was prepared using  voice recognition system and is likely to have sound alike errors and is not proof read.  Please call me with any questions.

## 2024-12-05 NOTE — NURSING
1327: Prolia Injection given without difficulties.Bandaid applied. Patient instructed to stay in the clinic for 15 minutes. Patient verbalized understanding and will notify nurse with any complaints.

## 2024-12-05 NOTE — DISCHARGE INSTRUCTIONS
Thank you for allowing me to care for you today,  MICHAEL BarnettN, RN    [facility]  [address] [address]  [phone] phone     [phone] fax  Hours of Operation: Monday- Friday 7:00am- 5:30pm    Please call with any concerns regarding your appointment today.   FALL PREVENTION   Falls often occur due to slipping, tripping or losing your balance. Here are ways to reduce your risk of falling again.   Was there anything that caused your fall that can be fixed, removed or replaced?   Make your home safe by keeping walkways clear of objects you may trip over.   Use non-slip pads under rugs.   Do not walk in poorly lit areas.   Do not stand on chairs or wobbly ladders.   Use caution when reaching overhead or looking upward. This position can cause a loss of balance.   Be sure your shoes fit properly, have non-slip bottoms and are in good condition.   Be cautious when going up and down stairs, curbs, and when walking on uneven sidewalks.   If your balance is poor, consider using a cane or walker.   If your fall was related to alcohol use, stop or limit alcohol intake.   If your fall was related to use of sleeping medicines, talk to your doctor about this. You may need to reduce your dosage at bedtime if you awaken during the night to go to the bathroom.   To reduce the need for nighttime bathroom trips:   Avoid drinking fluids for several hours before going to bed   Empty your bladder before going to bed   Men can keep a urinal at the bedside   © 8240-5575 [employer], [address] [place] [address], [place] [address]. All rights reserved. This information is not intended as a substitute for professional medical care. Always follow your healthcare professional's instructions.

## 2024-12-17 ENCOUNTER — HOSPITAL ENCOUNTER (EMERGENCY)
Facility: HOSPITAL | Age: 74
Discharge: HOME | End: 2024-12-18
Attending: EMERGENCY MEDICINE
Payer: MEDICARE

## 2024-12-17 DIAGNOSIS — S92.353A FRACTURE OF 5TH METATARSAL: ICD-10-CM

## 2024-12-17 DIAGNOSIS — R60.0 BILATERAL LOWER EXTREMITY EDEMA: ICD-10-CM

## 2024-12-17 DIAGNOSIS — S22.41XA CLOSED FRACTURE OF MULTIPLE RIBS OF RIGHT SIDE, INITIAL ENCOUNTER: Primary | ICD-10-CM

## 2024-12-17 DIAGNOSIS — R21 RASH: ICD-10-CM

## 2024-12-17 LAB
ALBUMIN SERPL BCP-MCNC: 3.8 G/DL (ref 3.5–5.2)
ALP SERPL-CCNC: 55 U/L (ref 40–150)
ALT SERPL W/O P-5'-P-CCNC: 15 U/L (ref 10–44)
ANION GAP SERPL CALC-SCNC: 11 MMOL/L (ref 8–16)
APTT PPP: <21 SEC (ref 21–32)
AST SERPL-CCNC: 30 U/L (ref 10–40)
BASOPHILS # BLD AUTO: 0.05 K/UL (ref 0–0.2)
BASOPHILS NFR BLD: 0.7 % (ref 0–1.9)
BILIRUB SERPL-MCNC: 0.3 MG/DL (ref 0.1–1)
BNP SERPL-MCNC: 120 PG/ML (ref 0–99)
BUN SERPL-MCNC: 23 MG/DL (ref 8–23)
CALCIUM SERPL-MCNC: 10.1 MG/DL (ref 8.7–10.5)
CHLORIDE SERPL-SCNC: 111 MMOL/L (ref 95–110)
CO2 SERPL-SCNC: 17 MMOL/L (ref 23–29)
CREAT SERPL-MCNC: 1.1 MG/DL (ref 0.5–1.4)
DIFFERENTIAL METHOD BLD: ABNORMAL
EOSINOPHIL # BLD AUTO: 0.3 K/UL (ref 0–0.5)
EOSINOPHIL NFR BLD: 3.9 % (ref 0–8)
ERYTHROCYTE [DISTWIDTH] IN BLOOD BY AUTOMATED COUNT: 13.1 % (ref 11.5–14.5)
EST. GFR  (NO RACE VARIABLE): 53 ML/MIN/1.73 M^2
GLUCOSE SERPL-MCNC: 122 MG/DL (ref 70–110)
HCT VFR BLD AUTO: 35.5 % (ref 37–48.5)
HCV AB SERPL QL IA: NEGATIVE
HEP C VIRUS HOLD SPECIMEN: NORMAL
HGB BLD-MCNC: 12.1 G/DL (ref 12–16)
HIV 1+2 AB+HIV1 P24 AG SERPL QL IA: NEGATIVE
IMM GRANULOCYTES # BLD AUTO: 0.02 K/UL (ref 0–0.04)
IMM GRANULOCYTES NFR BLD AUTO: 0.3 % (ref 0–0.5)
INR PPP: 0.9 (ref 0.8–1.2)
LACTATE SERPL-SCNC: 0.8 MMOL/L (ref 0.5–2.2)
LYMPHOCYTES # BLD AUTO: 2.3 K/UL (ref 1–4.8)
LYMPHOCYTES NFR BLD: 30.8 % (ref 18–48)
MCH RBC QN AUTO: 31.3 PG (ref 27–31)
MCHC RBC AUTO-ENTMCNC: 34.1 G/DL (ref 32–36)
MCV RBC AUTO: 92 FL (ref 82–98)
MONOCYTES # BLD AUTO: 0.5 K/UL (ref 0.3–1)
MONOCYTES NFR BLD: 6.8 % (ref 4–15)
NEUTROPHILS # BLD AUTO: 4.2 K/UL (ref 1.8–7.7)
NEUTROPHILS NFR BLD: 57.5 % (ref 38–73)
NRBC BLD-RTO: 0 /100 WBC
PLATELET # BLD AUTO: 265 K/UL (ref 150–450)
PMV BLD AUTO: 9.4 FL (ref 9.2–12.9)
POTASSIUM SERPL-SCNC: 3.9 MMOL/L (ref 3.5–5.1)
PROT SERPL-MCNC: 7.5 G/DL (ref 6–8.4)
PROTHROMBIN TIME: 10.3 SEC (ref 9–12.5)
RBC # BLD AUTO: 3.87 M/UL (ref 4–5.4)
SODIUM SERPL-SCNC: 139 MMOL/L (ref 136–145)
TROPONIN I SERPL DL<=0.01 NG/ML-MCNC: 0.01 NG/ML (ref 0–0.03)
WBC # BLD AUTO: 7.37 K/UL (ref 3.9–12.7)

## 2024-12-17 PROCEDURE — 25500020 PHARM REV CODE 255: Performed by: EMERGENCY MEDICINE

## 2024-12-17 PROCEDURE — 86803 HEPATITIS C AB TEST: CPT | Performed by: EMERGENCY MEDICINE

## 2024-12-17 PROCEDURE — 80053 COMPREHEN METABOLIC PANEL: CPT | Performed by: EMERGENCY MEDICINE

## 2024-12-17 PROCEDURE — 83880 ASSAY OF NATRIURETIC PEPTIDE: CPT | Performed by: EMERGENCY MEDICINE

## 2024-12-17 PROCEDURE — 85730 THROMBOPLASTIN TIME PARTIAL: CPT | Performed by: EMERGENCY MEDICINE

## 2024-12-17 PROCEDURE — 87389 HIV-1 AG W/HIV-1&-2 AB AG IA: CPT | Performed by: EMERGENCY MEDICINE

## 2024-12-17 PROCEDURE — 83605 ASSAY OF LACTIC ACID: CPT | Performed by: EMERGENCY MEDICINE

## 2024-12-17 PROCEDURE — 84484 ASSAY OF TROPONIN QUANT: CPT | Performed by: EMERGENCY MEDICINE

## 2024-12-17 PROCEDURE — 85025 COMPLETE CBC W/AUTO DIFF WBC: CPT | Performed by: EMERGENCY MEDICINE

## 2024-12-17 PROCEDURE — 85610 PROTHROMBIN TIME: CPT | Performed by: EMERGENCY MEDICINE

## 2024-12-17 PROCEDURE — 99285 EMERGENCY DEPT VISIT HI MDM: CPT | Mod: 25

## 2024-12-17 RX ADMIN — IOHEXOL 100 ML: 350 INJECTION, SOLUTION INTRAVENOUS at 10:12

## 2024-12-18 VITALS
OXYGEN SATURATION: 98 % | WEIGHT: 171.75 LBS | RESPIRATION RATE: 18 BRPM | HEART RATE: 81 BPM | SYSTOLIC BLOOD PRESSURE: 139 MMHG | TEMPERATURE: 98 F | HEIGHT: 63 IN | BODY MASS INDEX: 30.43 KG/M2 | DIASTOLIC BLOOD PRESSURE: 73 MMHG

## 2024-12-18 RX ORDER — MORPHINE SULFATE 15 MG/1
7.5 TABLET ORAL EVERY 6 HOURS PRN
Qty: 12 TABLET | Refills: 0 | Status: SHIPPED | OUTPATIENT
Start: 2024-12-18

## 2024-12-18 NOTE — ED PROVIDER NOTES
SCRIBE #1 NOTE: I, Maryan Greer, am scribing for, and in the presence of, Sivakumar Stover MD. I have scribed the entire note.       History     Chief Complaint   Patient presents with    Fall     Pt reports she fell Thursday off bottom step of ladder and rolled her right ankle and hit her right rib area on granite. Pt went to Urgent Care today and xrays done but sent here. Pt complaining of right ankle pain, right rib pain, and groin pain. Pt also reports small rash like area to bilateral lower legs that showed up after fall     Review of patient's allergies indicates:   Allergen Reactions    Sertraline Other (See Comments)     Tremors     Tobramycin-dexamethasone      Eye Drops           History of Present Illness     HPI    12/17/2024, 10:26 PM  History obtained from the patient      History of Present Illness: Cleo Ford is a 74 y.o. female patient with a PMHx of anxiety, depression, obesity, unspecific HLD, stage 3b chronic kidney disease, HTN, and hypothyroidism who presents to the Emergency Department for evaluation after falling from the bottom step of a ladder which occurred last week. Pt estimates falling two feet. She denies hitting her head or LOC from the fall. Pt went to UC earlier today after noticing a BL rash to her legs. UC tests revealed fracture of L-ankle and one of her ribs. Associated sxs include a rash, R-hip pain, swelling of R-foot and ankle, rib pain, foot pain, and groin tenderness. Pt notes that she is not on blood thinners. Patient denies any LOC, bleeding after fall, spinal pain, neck pain and all other sxs at this time. Pt has been taking Aleve. No further complaints or concerns at this time.       Arrival mode: Personal vehicle      PCP: Elizabeth Julian MD        Past Medical History:  Past Medical History:   Diagnosis Date    Anxiety     Benzodiazepine dependence 12/20/2022    using xanax at night. working on weaning down dose over next 6 months.     Depression      Dysmetabolic syndrome X     Obesity     Other and unspecified hyperlipidemia     Recurrent major depressive disorder, in partial remission 10/30/2018    Stage 3b chronic kidney disease 05/15/2023    Unspecified essential hypertension     Unspecified hypothyroidism        Past Surgical History:  Past Surgical History:   Procedure Laterality Date    ADENOIDECTOMY      APPENDECTOMY      BREAST RECONSTRUCTION Bilateral 02/2021    CHOLECYSTECTOMY      COLONOSCOPY N/A 09/15/2016    Procedure: COLONOSCOPY;  Surgeon: Jose Daniel MD;  Location: [facility] ENDO;  Service: Endoscopy;  Laterality: N/A;    gastric sleeve      HYSTERECTOMY      INJECTION OF ANESTHETIC AGENT INTO SACROILIAC JOINT Right 01/16/2020    Procedure: Right BLOCK, SACROILIAC JOINT and Right ischial bursa inejction with RN IV sedation;  Surgeon: Rudi Fajardo MD;  Location: [facility] PAIN MG;  Service: Pain Management;  Laterality: Right;    OOPHORECTOMY      1 ovary removed    REDUCTION OF BOTH BREASTS      TUBAL LIGATION      tummy tuck           Family History:  Family History   Problem Relation Name Age of Onset    Cancer Mother Silvia Valenzuela     Breast cancer Mother Silvia Valenzuela     Hypertension Father Lyndon Valenzuela     Cancer Maternal Grandmother Silvia Valenzuela 80        colon    Colon cancer Other         Social History:  Social History     Tobacco Use    Smoking status: Never    Smokeless tobacco: Never   Substance and Sexual Activity    Alcohol use: Yes    Drug use: No    Sexual activity: Not Currently     Partners: Female, Male     Birth control/protection: None     Comment: No libido        Review of Systems     Review of Systems   Constitutional:  Negative for fever.   HENT:  Negative for sore throat.    Respiratory:  Negative for shortness of breath.    Cardiovascular:  Negative for chest pain.   Gastrointestinal:  Negative for nausea.   Genitourinary:  Negative for dysuria.   Musculoskeletal:  Negative for back pain and neck pain.        (+)  R-hip pain  (+) R-foot pain  (+) swelling of R foot and ankle  (+) rib pain  (+) groin tenderness  (-) spinal pain   Skin:  Positive for rash (BL legs).        (+) bruising of R-Ribs  (+) bruising of R foot and ankle   Neurological:  Negative for syncope and weakness.             Hematological:  Does not bruise/bleed easily.        Physical Exam     Initial Vitals [12/17/24 1936]   BP Pulse Resp Temp SpO2   (!) 141/67 77 18 97.7 °F (36.5 °C) 97 %      MAP       --          Physical Exam  Nursing Notes and Vital Signs Reviewed.  Constitutional: Patient is in no acute distress. Well-developed and well-nourished.  Head: Atraumatic. Normocephalic.  Eyes: PERRL. EOM intact. Conjunctivae are not pale. No scleral icterus.  ENT: Mucous membranes are moist. Oropharynx is clear and symmetric.    Neck: Supple. Full ROM. No lymphadenopathy.  Cardiovascular: Regular rate. Regular rhythm. No murmurs, rubs, or gallops. Distal pulses are 2+ and symmetric.  Pulmonary/Chest: No respiratory distress. Clear to auscultation bilaterally. No wheezing or rales.  Abdominal: Soft and non-distended.  There is no tenderness.  No rebound, guarding, or rigidity. Good bowel sounds.  Genitourinary: No CVA tenderness  Musculoskeletal: There is tenderness over right inferior ribs. There is tenderness over the R ATF ligament and base of the 5th metatarsal. Full range of motion of the R-hip. There is no tenderness to the R hip, R-groin. Bruising over the R-ATF ligament and  R-lateral ribs.  Skin: Warm and dry. Superficial rash on bilateral inner distal lower legs.  Neurological:  Alert, awake, and appropriate.  Normal speech.  No acute focal neurological deficits are appreciated.  Psychiatric: Normal affect. Good eye contact. Appropriate in content.     ED Course   Procedures  ED Vital Signs:  Vitals:    12/17/24 1936 12/17/24 2301 12/17/24 2319 12/18/24 0024   BP: (!) 141/67 (!) 142/68 126/72 139/73   Pulse: 77 88 91 81   Resp: 18      Temp: 97.7 °F  "(36.5 °C)      TempSrc: Oral      SpO2: 97% 99% 99% 98%   Weight: 77.9 kg (171 lb 11.8 oz)      Height: 5' 3" (1.6 m)          Abnormal Lab Results:  Labs Reviewed   CBC W/ AUTO DIFFERENTIAL - Abnormal       Result Value    WBC 7.37      RBC 3.87 (*)     Hemoglobin 12.1      Hematocrit 35.5 (*)     MCV 92      MCH 31.3 (*)     MCHC 34.1      RDW 13.1      Platelets 265      MPV 9.4      Immature Granulocytes 0.3      Gran # (ANC) 4.2      Immature Grans (Abs) 0.02      Lymph # 2.3      Mono # 0.5      Eos # 0.3      Baso # 0.05      nRBC 0      Gran % 57.5      Lymph % 30.8      Mono % 6.8      Eosinophil % 3.9      Basophil % 0.7      Differential Method Automated     COMPREHENSIVE METABOLIC PANEL - Abnormal    Sodium 139      Potassium 3.9      Chloride 111 (*)     CO2 17 (*)     Glucose 122 (*)     BUN 23      Creatinine 1.1      Calcium 10.1      Total Protein 7.5      Albumin 3.8      Total Bilirubin 0.3      Alkaline Phosphatase 55      AST 30      ALT 15      eGFR 53 (*)     Anion Gap 11     B-TYPE NATRIURETIC PEPTIDE - Abnormal     (*)    HEPATITIS C ANTIBODY    Hepatitis C Ab Negative      Narrative:     Release to patient->Immediate   HEP C VIRUS HOLD SPECIMEN    HEP C Virus Hold Specimen Hold for HCV sendout      Narrative:     Release to patient->Immediate   HIV 1 / 2 ANTIBODY    HIV 1/2 Ag/Ab Negative      Narrative:     Release to patient->Immediate   TROPONIN I    Troponin I 0.008     LACTIC ACID, PLASMA    Lactate (Lactic Acid) 0.8     APTT    aPTT <21.0     PROTIME-INR    Prothrombin Time 10.3      INR 0.9          All Lab Results:  Results for orders placed or performed during the hospital encounter of 12/17/24   Hepatitis C Antibody    Collection Time: 12/17/24  8:16 PM   Result Value Ref Range    Hepatitis C Ab Negative Negative   HCV Virus Hold Specimen    Collection Time: 12/17/24  8:16 PM   Result Value Ref Range    HEP C Virus Hold Specimen Hold for HCV sendout    HIV 1/2 Ag/Ab (4th " Gen)    Collection Time: 12/17/24  8:16 PM   Result Value Ref Range    HIV 1/2 Ag/Ab Negative Negative   APTT    Collection Time: 12/17/24  9:59 PM   Result Value Ref Range    aPTT <21.0 21.0 - 32.0 sec   Protime-INR    Collection Time: 12/17/24  9:59 PM   Result Value Ref Range    Prothrombin Time 10.3 9.0 - 12.5 sec    INR 0.9 0.8 - 1.2   CBC auto differential    Collection Time: 12/17/24 10:02 PM   Result Value Ref Range    WBC 7.37 3.90 - 12.70 K/uL    RBC 3.87 (L) 4.00 - 5.40 M/uL    Hemoglobin 12.1 12.0 - 16.0 g/dL    Hematocrit 35.5 (L) 37.0 - 48.5 %    MCV 92 82 - 98 fL    MCH 31.3 (H) 27.0 - 31.0 pg    MCHC 34.1 32.0 - 36.0 g/dL    RDW 13.1 11.5 - 14.5 %    Platelets 265 150 - 450 K/uL    MPV 9.4 9.2 - 12.9 fL    Immature Granulocytes 0.3 0.0 - 0.5 %    Gran # (ANC) 4.2 1.8 - 7.7 K/uL    Immature Grans (Abs) 0.02 0.00 - 0.04 K/uL    Lymph # 2.3 1.0 - 4.8 K/uL    Mono # 0.5 0.3 - 1.0 K/uL    Eos # 0.3 0.0 - 0.5 K/uL    Baso # 0.05 0.00 - 0.20 K/uL    nRBC 0 0 /100 WBC    Gran % 57.5 38.0 - 73.0 %    Lymph % 30.8 18.0 - 48.0 %    Mono % 6.8 4.0 - 15.0 %    Eosinophil % 3.9 0.0 - 8.0 %    Basophil % 0.7 0.0 - 1.9 %    Differential Method Automated    Comprehensive metabolic panel    Collection Time: 12/17/24 10:02 PM   Result Value Ref Range    Sodium 139 136 - 145 mmol/L    Potassium 3.9 3.5 - 5.1 mmol/L    Chloride 111 (H) 95 - 110 mmol/L    CO2 17 (L) 23 - 29 mmol/L    Glucose 122 (H) 70 - 110 mg/dL    BUN 23 8 - 23 mg/dL    Creatinine 1.1 0.5 - 1.4 mg/dL    Calcium 10.1 8.7 - 10.5 mg/dL    Total Protein 7.5 6.0 - 8.4 g/dL    Albumin 3.8 3.5 - 5.2 g/dL    Total Bilirubin 0.3 0.1 - 1.0 mg/dL    Alkaline Phosphatase 55 40 - 150 U/L    AST 30 10 - 40 U/L    ALT 15 10 - 44 U/L    eGFR 53 (A) >60 mL/min/1.73 m^2    Anion Gap 11 8 - 16 mmol/L   Brain natriuretic peptide    Collection Time: 12/17/24 10:02 PM   Result Value Ref Range     (H) 0 - 99 pg/mL   Troponin I    Collection Time: 12/17/24 10:02 PM    Result Value Ref Range    Troponin I 0.008 0.000 - 0.026 ng/mL   Lactic acid, plasma    Collection Time: 12/17/24 10:12 PM   Result Value Ref Range    Lactate (Lactic Acid) 0.8 0.5 - 2.2 mmol/L     *Note: Due to a large number of results and/or encounters for the requested time period, some results have not been displayed. A complete set of results can be found in Results Review.         Imaging Results:  Imaging Results              X-Ray Foot Complete Right (Final result)  Result time 12/17/24 23:38:59      Final result by Gisella Rolle MD (12/17/24 23:38:59)                   Impression:      As above      Electronically signed by: Gisella Rolle  Date:    12/17/2024  Time:    23:38               Narrative:    EXAMINATION:  XR FOOT COMPLETE 3 VIEW RIGHT    CLINICAL HISTORY:  . Displaced fracture of fifth metatarsal bone, unspecified foot, initial encounter for closed fracture    TECHNIQUE:  AP, lateral, and oblique views of the right foot were performed.    COMPARISON:  None    FINDINGS:  Acute minimally displaced intra-articular fracture of the 5th metatarsal base.                                       CT Chest Abdomen Pelvis With IV Contrast (XPD) Routine Oral Contrast (Final result)  Result time 12/17/24 23:25:12      Final result by Cole Clark MD (12/17/24 23:25:12)                   Impression:      Fracture of the right lateral 11th and 10th rib.  Multiple findings as above.  Remaining findings as above.  Recommend follow-up if symptoms persist.    All CT scans   are performed using dose optimization techniques including the following: automated exposure control; adjustment of the mA and/or kV; use of iterative reconstruction technique.  Dose modulation was employed for ALARA by means of: Automated exposure control; adjustment of the mA and/or kV according to patient size (this includes techniques or standardized protocols for targeted exams where dose is matched to indication/reason for  exam; i.e. extremities or head); and/or use of iterative reconstructive technique.      Electronically signed by: Cole Clark  Date:    12/17/2024  Time:    23:25               Narrative:    EXAMINATION:  CT CHEST ABDOMEN PELVIS WITH IV CONTRAST (XPD)    CLINICAL HISTORY:  Polytrauma, blunt;    TECHNIQUE:  Low dose axial images, sagittal and coronal reformations were obtained from the thoracic inlet to the pubic symphysis following the IV administration of 100 mL of Omnipaque 350    COMPARISON:  None    FINDINGS:  Fracture of the right lateral 11th and 10th rib multiple findings as above.    Mild bronchiectasis.  Mild subsegmental atelectasis..  Heart and great vessels have an ungated  appearance.  No evidence for mediastinal hematoma.  No adenopathy.  No pneumothorax.  Fatty infiltration of the liver.  Hepatomegaly.  Spleen pancreas are grossly unremarkable.  Bilateral extrarenal pelvis sees suspected which appear large measuring up to 5 cm on the right and up to 3.4 cm on the left.  The ureters do not appear dilated.  A urogram could be performed for further evaluation.  Small fat containing umbilical hernia.  Moderate amount of stool in the colon.    No solid organ injury.  Spleen pancreas adrenal glands gallbladder liver have are without acute abnormality.  No bowel obstruction free fluid or adenopathy. Status post hysterectomy.  Coronary artery calcification.  Old fracture deformity of the left inferior pubic rami.                                       US Lower Extremity Veins Bilateral (Final result)  Result time 12/17/24 22:46:58      Final result by Cole Clark MD (12/17/24 22:46:58)                   Impression:      No evidence of deep venous thrombosis in either lower extremity.      Electronically signed by: Cole Clark  Date:    12/17/2024  Time:    22:46               Narrative:    EXAMINATION:  US LOWER EXTREMITY VEINS BILATERAL    CLINICAL HISTORY:  Localized edema    TECHNIQUE:  Duplex and  color flow Doppler and dynamic compression was performed of the bilateral lower extremity veins was performed.    COMPARISON:  None    FINDINGS:  Right thigh veins: The common femoral, femoral, popliteal, upper greater saphenous, and deep femoral veins are patent and free of thrombus. The veins are normally compressible and have normal phasic flow and augmentation response.    Right calf veins: The visualized calf veins are patent.    Left thigh veins: The common femoral, femoral, popliteal, upper greater saphenous, and deep femoral veins are patent and free of thrombus. The veins are normally compressible and have normal phasic flow and augmentation response.    Left calf veins: The visualized calf veins are patent.    Miscellaneous: None                                                The Emergency Provider reviewed the vital signs and test results, which are outlined above.     ED Discussion     12:26 AM: Reassessed pt at this time.  Discussed with pt all pertinent ED information and results. Discussed pt dx and plan of tx. Gave pt all f/u and return to the ED instructions. All questions and concerns were addressed at this time. Pt expresses understanding of information and instructions, and is comfortable with plan to discharge. Pt is stable for discharge.    I discussed with patient and/or family/caretaker that evaluation in the ED does not suggest any emergent or life threatening medical conditions requiring immediate intervention beyond what was provided in the ED, and I believe patient is safe for discharge.  Regardless, an unremarkable evaluation in the ED does not preclude the development or presence of a serious of life threatening condition. As such, patient was instructed to return immediately for any worsening or change in current symptoms.      ED Course as of 12/18/24 0151   Wed Dec 18, 2024   0150 Placed in walking boot on right foot. [BA]      ED Course User Index  [BA] Sivakumar Stover MD      Medical Decision Making  DDx includes rib fracture, viscus fracture, PTX, hemothorax, foot fracture, ankle sprain, dislocation    Amount and/or Complexity of Data Reviewed  External Data Reviewed: radiology.     Details: Radiology report from   Labs: ordered. Decision-making details documented in ED Course.  Radiology: ordered and independent interpretation performed. Decision-making details documented in ED Course.     Details: Agree with XR findings    Risk  Prescription drug management.                ED Medication(s):  Medications   iohexoL (OMNIPAQUE 350) injection 100 mL (100 mLs Intravenous Given 12/17/24 7592)       Discharge Medication List as of 12/18/2024 12:22 AM        START taking these medications    Details   morphine (MSIR) 15 MG tablet Take 0.5 tablets (7.5 mg total) by mouth every 6 (six) hours as needed for Pain., Starting Wed 12/18/2024, Print              Follow-up Information       Clinic, [facility]. Schedule an appointment as soon as possible for a visit in 2 days.    Why: For re-evaluation and further treatment  Contact information:  [id number] [place] [address] Dr Camacho [address] [place] [id number] [phone]               [facility] - Emergency Dept.. Go today.    Specialty: Emergency Medicine  Why: If symptoms worsen, For re-evaluation and further treatment, As needed  Contact information:  [address] [facility] [address] [phone]                               Scribe Attestation:   Scribe #1: I performed the above scribed service and the documentation accurately describes the services I performed. I attest to the accuracy of the note.     Attending:   Physician Attestation Statement for Scribe #1: I, Sivakumar Stover MD, personally performed the services described in this documentation, as scribed by Maryan Greer, in my presence, and it is both accurate and complete.           Clinical Impression       ICD-10-CM ICD-9-CM   1. Closed fracture of  multiple ribs of right side, initial encounter  S22.41XA 807.09   2. Bilateral lower extremity edema  R60.0 782.3   3. Fracture of 5th metatarsal  S92.353A 825.25   4. Rash  R21 782.1       Disposition:   Disposition: Discharged  Condition: Stable         Sivakumar Stover MD  12/18/24 0151

## 2024-12-18 NOTE — DISCHARGE INSTRUCTIONS
Use crutches and remain non - weight bearing on your right foot until cleared by Orthopaedics.   Use your incentive spirometer at least every 4 hours while awake.

## 2024-12-21 ENCOUNTER — PATIENT MESSAGE (OUTPATIENT)
Dept: PRIMARY CARE CLINIC | Facility: CLINIC | Age: 74
End: 2024-12-21
Payer: MEDICARE